# Patient Record
Sex: MALE | Race: WHITE | Employment: OTHER | URBAN - METROPOLITAN AREA
[De-identification: names, ages, dates, MRNs, and addresses within clinical notes are randomized per-mention and may not be internally consistent; named-entity substitution may affect disease eponyms.]

---

## 2017-03-13 ENCOUNTER — HOSPITAL ENCOUNTER (OUTPATIENT)
Dept: CT IMAGING | Age: 77
Discharge: HOME | End: 2017-03-13
Attending: NURSE PRACTITIONER
Payer: MEDICARE

## 2017-03-13 DIAGNOSIS — R91.1 LUNG NODULE: ICD-10-CM

## 2017-03-13 PROCEDURE — 71250 CT THORAX DX C-: CPT

## 2017-03-14 PROBLEM — Z01.810 PREOP CARDIOVASCULAR EXAM: Status: ACTIVE | Noted: 2017-03-14

## 2017-06-06 PROBLEM — I10 ESSENTIAL HYPERTENSION WITH GOAL BLOOD PRESSURE LESS THAN 130/80: Status: ACTIVE | Noted: 2017-06-06

## 2017-06-06 PROBLEM — E11.9 CONTROLLED TYPE 2 DIABETES MELLITUS WITHOUT COMPLICATION, WITHOUT LONG-TERM CURRENT USE OF INSULIN (HCC): Status: ACTIVE | Noted: 2017-06-06

## 2017-06-09 ENCOUNTER — HOSPITAL ENCOUNTER (INPATIENT)
Age: 77
LOS: 2 days | Discharge: HOME | DRG: 190 | End: 2017-06-11
Attending: INTERNAL MEDICINE | Admitting: INTERNAL MEDICINE
Payer: MEDICARE

## 2017-06-09 DIAGNOSIS — E87.1 HYPONATREMIA: ICD-10-CM

## 2017-06-09 DIAGNOSIS — J18.9 PNEUMONIA OF RIGHT LOWER LOBE DUE TO INFECTIOUS ORGANISM: ICD-10-CM

## 2017-06-09 DIAGNOSIS — R91.8 PULMONARY NODULES: ICD-10-CM

## 2017-06-09 DIAGNOSIS — E11.9 CONTROLLED TYPE 2 DIABETES MELLITUS WITHOUT COMPLICATION, WITHOUT LONG-TERM CURRENT USE OF INSULIN (HCC): ICD-10-CM

## 2017-06-09 DIAGNOSIS — J44.1 COPD EXACERBATION (HCC): ICD-10-CM

## 2017-06-09 PROBLEM — I10 ESSENTIAL HYPERTENSION WITH GOAL BLOOD PRESSURE LESS THAN 130/80: Chronic | Status: ACTIVE | Noted: 2017-06-06

## 2017-06-09 LAB — GLUCOSE BLD STRIP.AUTO-MCNC: 135 MG/DL (ref 65–100)

## 2017-06-09 PROCEDURE — 74011000250 HC RX REV CODE- 250: Performed by: INTERNAL MEDICINE

## 2017-06-09 PROCEDURE — 74011250637 HC RX REV CODE- 250/637: Performed by: NURSE PRACTITIONER

## 2017-06-09 PROCEDURE — 82962 GLUCOSE BLOOD TEST: CPT

## 2017-06-09 PROCEDURE — 99223 1ST HOSP IP/OBS HIGH 75: CPT | Performed by: INTERNAL MEDICINE

## 2017-06-09 PROCEDURE — 65270000029 HC RM PRIVATE

## 2017-06-09 PROCEDURE — 74011250636 HC RX REV CODE- 250/636: Performed by: INTERNAL MEDICINE

## 2017-06-09 PROCEDURE — 94760 N-INVAS EAR/PLS OXIMETRY 1: CPT

## 2017-06-09 PROCEDURE — 94640 AIRWAY INHALATION TREATMENT: CPT

## 2017-06-09 RX ORDER — ALBUTEROL SULFATE 0.83 MG/ML
2.5 SOLUTION RESPIRATORY (INHALATION)
Status: DISCONTINUED | OUTPATIENT
Start: 2017-06-09 | End: 2017-06-11 | Stop reason: HOSPADM

## 2017-06-09 RX ORDER — INSULIN LISPRO 100 [IU]/ML
INJECTION, SOLUTION INTRAVENOUS; SUBCUTANEOUS
Status: DISCONTINUED | OUTPATIENT
Start: 2017-06-09 | End: 2017-06-11 | Stop reason: HOSPADM

## 2017-06-09 RX ORDER — TRAZODONE HYDROCHLORIDE 50 MG/1
50 TABLET ORAL
Status: DISCONTINUED | OUTPATIENT
Start: 2017-06-09 | End: 2017-06-11 | Stop reason: HOSPADM

## 2017-06-09 RX ORDER — SODIUM CHLORIDE 0.9 % (FLUSH) 0.9 %
5-10 SYRINGE (ML) INJECTION EVERY 8 HOURS
Status: DISCONTINUED | OUTPATIENT
Start: 2017-06-09 | End: 2017-06-11 | Stop reason: HOSPADM

## 2017-06-09 RX ORDER — ENOXAPARIN SODIUM 100 MG/ML
40 INJECTION SUBCUTANEOUS EVERY 24 HOURS
Status: DISCONTINUED | OUTPATIENT
Start: 2017-06-10 | End: 2017-06-11 | Stop reason: HOSPADM

## 2017-06-09 RX ORDER — SODIUM CHLORIDE 9 MG/ML
75 INJECTION, SOLUTION INTRAVENOUS CONTINUOUS
Status: DISCONTINUED | OUTPATIENT
Start: 2017-06-09 | End: 2017-06-11 | Stop reason: HOSPADM

## 2017-06-09 RX ORDER — ACETAMINOPHEN 325 MG/1
650 TABLET ORAL
Status: DISCONTINUED | OUTPATIENT
Start: 2017-06-09 | End: 2017-06-11 | Stop reason: HOSPADM

## 2017-06-09 RX ORDER — SODIUM CHLORIDE 0.9 % (FLUSH) 0.9 %
5-10 SYRINGE (ML) INJECTION AS NEEDED
Status: DISCONTINUED | OUTPATIENT
Start: 2017-06-09 | End: 2017-06-11 | Stop reason: HOSPADM

## 2017-06-09 RX ORDER — DOCUSATE SODIUM 100 MG/1
100 CAPSULE, LIQUID FILLED ORAL
Status: DISCONTINUED | OUTPATIENT
Start: 2017-06-09 | End: 2017-06-11 | Stop reason: HOSPADM

## 2017-06-09 RX ADMIN — SODIUM CHLORIDE 75 ML/HR: 900 INJECTION, SOLUTION INTRAVENOUS at 21:27

## 2017-06-09 RX ADMIN — TRAZODONE HYDROCHLORIDE 50 MG: 50 TABLET ORAL at 21:31

## 2017-06-09 RX ADMIN — Medication 5 ML: at 21:28

## 2017-06-09 RX ADMIN — ALBUTEROL SULFATE 2.5 MG: 2.5 SOLUTION RESPIRATORY (INHALATION) at 21:54

## 2017-06-09 RX ADMIN — METHYLPREDNISOLONE SODIUM SUCCINATE 60 MG: 125 INJECTION, POWDER, FOR SOLUTION INTRAMUSCULAR; INTRAVENOUS at 21:30

## 2017-06-09 NOTE — IP AVS SNAPSHOT
Oscar Shaver 
 
 
 [address] [facility] [address] [facility] 
[phone] Patient: Mynorjames Flores MRN: [id number] EOV:[id number] You are allergic to the following Allergen Reactions Aspirin Swelling Anaphylaxis Aleve (Naproxen Sodium) Unknown (comments) Codeine Sulfate Unknown (comments) Corn Containing Products Other (comments) Crestor (Rosuvastatin) Unknown (comments) Septra (Sulfamethoprim Ds) Unknown (comments) Zetia (Ezetimibe) Unknown (comments) Zoloft (Sertraline) Unknown (comments) Recent Documentation Weight BMI Smoking Status 61.5 kg 21.89 kg/m2 Former Smoker Unresulted Labs Order Current Status CULTURE, RESPIRATORY/SPUTUM/BRONCH W GRAM STAIN In process Emergency Contacts Name Discharge Info Relation Home Work Mobile Parvin Kim DISCHARGE CAREGIVER [3] Spouse [3] [phone] About your hospitalization You were admitted on:  June 9, 2017 You last received care in the:  [facility] You were discharged on:  June 11, 2017 Unit phone number:  [phone] Why you were hospitalized Your primary diagnosis was:  Pneumonia Your diagnoses also included:  Copd Exacerbation (Hcc), Hyponatremia, Controlled Type 2 Diabetes Mellitus Without Complication, Without Long-Term Current Use Of Insulin (Hcc) Providers Seen During Your Hospitalizations Provider Role Specialty Primary office phone Maria Antonia Michelle MD Attending Provider Pulmonary Disease [phone] Your Primary Care Physician (PCP) Primary Care Physician Office Phone Office Fax Sumeet Guillen [phone] Follow-up Information Follow up With Details Comments Contact Info  Ashley Swan PA-C Schedule an appointment as soon as possible for a visit in 1 week call on Monday for follow up appointment post hospital admission [address] [place] [address] 
[phone] CONTRERAS Pang Schedule an appointment as soon as possible for a visit in 3 weeks call on Monday for follow up appointment in 3-4 weeks. [address] [facility] [address] 
[phone] Your Appointments Tuesday June 20, 2017  9:00 AM EDT Nurse Visit with [place] [address] ([address] [facility]) [address] [facility] [address] [facility] [id number] [phone] Monday June 26, 2017  3:00 PM EDT  
(Arrive by 2:40 PM) Return appointment with CONTRERAS Pang [facility] ([facility]) [address] [place] [address] [facility]  
[phone] Thursday July 06, 2017  1:00 PM EDT Office Visit with MD Carol Calles [address] [facility] [address] [facility] ENT) [address]  
[phone] Current Discharge Medication List  
  
START taking these medications Dose & Instructions Dispensing Information Comments Morning Noon Evening Bedtime  
 azithromycin 500 mg Tab Commonly known as:  Catarino Serrano Start taking on:  6/12/2017 Your last dose was:  6/10/17 Your next dose is:  6/11/17 Take 2 tablets today, then take 1 tablet daily Quantity:  5 Tab Refills:  0 Take first done tomorrow on 6/12 cefadroxil 500 mg capsule Commonly known as:  Joseph Castañeda Your last dose was:  none Your next dose is:  6/11/17 Dose:  500 mg Take 1 Cap by mouth two (2) times a day. Quantity:  10 Cap Refills:  0 Take first dose tomorrow  
    
   
   
   
  
 predniSONE 20 mg tablet Commonly known as:  Tanner Reveal Your last dose was:  none Your next dose is:  6/11/17  Take 40 mg x 2 days,  then 20 mg x 2 days, then 10 mg x 2 days, then stop.  
 Quantity:  20 Tab Refills:  0 CONTINUE these medications which have CHANGED Dose & Instructions Dispensing Information Comments Morning Noon Evening Bedtime * PROAIR HFA 90 mcg/actuation inhaler Generic drug:  albuterol What changed:  Another medication with the same name was added. Make sure you understand how and when to take each. INHALE 2 PUFFS BY MOUTH EVERY 4 HOURS AS NEEDED Quantity:  25.5 Inhaler Refills:  3  
     
   
   
   
  
 * albuterol 2.5 mg /3 mL (0.083 %) nebulizer solution Commonly known as:  PROVENTIL VENTOLIN What changed: You were already taking a medication with the same name, and this prescription was added. Make sure you understand how and when to take each. Dose:  2.5 mg  
3 mL by Nebulization route every six (6) hours as needed for Wheezing. Quantity:  90 Each Refills:  1  
     
   
   
   
  
 * Notice: This list has 2 medication(s) that are the same as other medications prescribed for you. Read the directions carefully, and ask your doctor or other care provider to review them with you. CONTINUE these medications which have NOT CHANGED Dose & Instructions Dispensing Information Comments Morning Noon Evening Bedtime  
 amitriptyline 75 mg tablet Commonly known as:  ELAVIL Your last dose was:  6/10/17 Your next dose is:  6/11/17 Dose:  75 mg Take 1 Tab by mouth nightly. Quantity:  90 Tab Refills:  1  
     
   
   
   
  
 clopidogrel 75 mg Tab Commonly known as:  PLAVIX Your last dose was:  6/11/17 Your next dose is:  6/12/17 TAKE 1 TABLET EVERY DAY Quantity:  90 Tab Refills:  3 EPIPEN 0.3 mg/0.3 mL injection Generic drug:  EPINEPHrine Dose:  0.3 mg  
0.3 mg by IntraMUSCular route once as needed. Refills:  0  
     
   
   
   
  
 fluticasone-salmeterol 500-50 mcg/dose diskus inhaler Commonly known as:  ADVAIR DISKUS Dose:  1 Puff Take 1 Puff by inhalation two (2) times a day. Quantity:  1 Inhaler Refills:  11  
     
   
   
   
  
 gabapentin 300 mg capsule Commonly known as:  NEURONTIN Your last dose was:  6/10/17 Your next dose is:  6/11/17 Dose:  300 mg Take 1 Cap by mouth two (2) times a day. Quantity:  180 Cap Refills:  1  
     
   
   
   
  
 glucose blood VI test strips strip Commonly known as:  blood glucose test  
   
 Check BS once a day fasting (DX: E11.9); one touch verio Quantity:  100 Strip Refills:  11 Lancets Misc Check BS once a day fasting (DX: E11.9); one touch verio Quantity:  100 Each Refills:  11  
     
   
   
   
  
 lisinopril 2.5 mg tablet Commonly known as:  Clif Alexander Your last dose was:  6/11/17 Your next dose is:  6/12/17 TAKE 1 TABLET BY MOUTH EVERY DAY; please cancel the 10 mg dosage and refills Quantity:  30 Tab Refills:  6  
     
   
   
   
  
 loratadine 10 mg tablet Commonly known as:  Concha Flores Your last dose was:  6/11/17 Your next dose is:  6/12/17 Dose:  10 mg Take 1 Tab by mouth daily. Quantity:  90 Tab Refills:  1  
     
   
   
   
  
 metFORMIN  mg tablet Commonly known as:  GLUCOPHAGE XR Dose:  1000 mg Take 2 Tabs by mouth daily (with dinner). Quantity:  60 Tab Refills:  6  
     
   
   
   
  
 mometasone 50 mcg/actuation nasal spray Commonly known as:  Meldon Duane Your last dose was:  6/11/17 am  
Your next dose is:  6/11/17 pm  
   
 USE 2 SPRAYS IN EACH NOSTRIL TWICE A DAY Quantity:  1 Container Refills:  11  
     
   
   
   
  
 montelukast 10 mg tablet Commonly known as:  SINGULAIR Your last dose was:  6/10/17 Your next dose is:  6/11/17 Dose:  10 mg Take 1 Tab by mouth every evening. Quantity:  90 Tab Refills:  1  
     
   
   
   
  
 nitroglycerin 0.4 mg SL tablet Commonly known as:  NITROSTAT Dose:  0.4 mg  
1 Tab by SubLINGual route every five (5) minutes as needed for Chest Pain. Indications: ANGINA Quantity:  25 Tab Refills:  11 PRESERVISION AREDS PO Dose:  1 Tab Take 1 Tab by mouth two (2) times a day. Refills:  0 PROBIOTIC (S.BOULARDII) PO Take  by mouth daily as needed. Refills:  0 RHINOCORT AQUA 32 mcg/actuation nasal spray Generic drug:  budesonide  
   
 daily. Refills:  0 SPIRIVA WITH HANDIHALER 18 mcg inhalation capsule Generic drug:  tiotropium Your last dose was:  6/11/17 Your next dose is:  6/12/17 INHALE 1 CAPSULE VIA HANDIHALER ONCE DAILY AT THE SAME TIME EVERY DAY Quantity:  90 Cap Refills:  3 SUMAtriptan 50 mg tablet Commonly known as:  IMITREX  
   
 TAKE 1 TABLET BY MOUTH ONCE AS NEEDED for migraine, may repeat in 2 hours if no better, no more than 2 tablets in 24 hours Quantity:  15 Tab Refills:  2 Where to Get Your Medications These medications were sent to [address],[facility], [address] [place] [address] Phone:  [phone]  
  albuterol 2.5 mg /3 mL (0.083 %) nebulizer solution  
 azithromycin 500 mg Tab  
 cefadroxil 500 mg capsule  
 predniSONE 20 mg tablet Discharge Instructions DISCHARGE SUMMARY from Nurse The following personal items are in your possession at time of discharge: 
 
Dental Appliances: Partials, With patient Visual Aid: Glasses Hearing Aids/Status: Bilateral, With patient Home Medications: None Jewelry: Ring, With patient Clothing: Pants, Shirt, Footwear Other Valuables: None PATIENT INSTRUCTIONS: 
 
 
F-face looks uneven A-arms unable to move or move unevenly S-speech slurred or non-existent T-time-call 911 as soon as signs and symptoms begin-DO NOT go Back to bed or wait to see if you get better-TIME IS BRAIN. Warning Signs of HEART ATTACK Call 911 if you have these symptoms: 
? Chest discomfort. Most heart attacks involve discomfort in the center of the chest that lasts more than a few minutes, or that goes away and comes back. It can feel like uncomfortable pressure, squeezing, fullness, or pain. ? Discomfort in other areas of the upper body. Symptoms can include pain or discomfort in one or both arms, the back, neck, jaw, or stomach. ? Shortness of breath with or without chest discomfort. ? Other signs may include breaking out in a cold sweat, nausea, or lightheadedness. Don't wait more than five minutes to call [address]! Fast action can save your life. Calling 911 is almost always the fastest way to get lifesaving treatment. Emergency Medical Services staff can begin treatment when they arrive  up to an hour sooner than if someone gets to the hospital by car. The discharge information has been reviewed with the patient and spouse. The patient and spouse verbalized understanding. Discharge medications reviewed with the patient and spouse and appropriate educational materials and side effects teaching were provided. Discharge Orders None ACO Transitions of Care Introducing [employer] [address] offers a voluntary care coordination program to provide high quality service and care to [facility] fee-for-service beneficiaries. Evon Gamez was designed to help you enhance your health and well-being through the following services: ? Transitions of Care  support for individuals who are transitioning from one care setting to another (example: Hospital to home). ? Chronic and Complex Care Coordination  support for individuals and caregivers of those with serious or chronic illnesses or with more than one chronic (ongoing) condition and those who take a number of different medications. If you meet specific medical criteria, a [address] [place] [address] Rd may call you directly to coordinate your care with your primary care physician and your other care providers. For questions about the [facility] programs, please, contact your physicians office. For general questions or additional information about Accountable Care Organizations: 
Please visit www.medicare.gov/acos. html or call 1-800-MEDICARE (2-426.441.1313) TTY users should call 5-873.296.1079. [employer] Announcement We are excited to announce that we are making your provider's discharge notes available to you in [employer]. You will see these notes when they are completed and signed by the physician that discharged you from your recent hospital stay. If you have any questions or concerns about any information you see in [employer], please call the Health Information Department where you were seen or reach out to your Primary Care Provider for more information about your plan of care. Introducing [facility]! Dear Chris Agrawal: 
Thank you for requesting a [employer] account. Our records indicate that you already have an active [employer] account. You can access your account anytime at [email or web address] Did you know that you can access your hospital and ER discharge instructions at any time in [employer]? You can also review all of your test results from your hospital stay or ER visit. Additional Information If you have questions, please visit the Frequently Asked Questions section of the [employer] website at [email or web address] Remember, MyChart is NOT to be used for urgent needs. For medical emergencies, dial 911. Now available from your iPhone and Android! General Information Please provide this summary of care documentation to your next provider. Patient Signature:  ____________________________________________________________ Date:  ____________________________________________________________  
  
[place] Provider Signature:  ____________________________________________________________ Date:  ____________________________________________________________

## 2017-06-09 NOTE — PROGRESS NOTES
Patient arrived via EMS transport, via stretcher from [address] [place] [address]. Assessment completed and documented, see docflow. Patient A/Ox3, but with some confusion; reorients easily. Patient aware of surroundings, staff and room. Patient denies needs. NAD noted at present. Respirations even and non labored. Patient verbalized understanding to call for needs. Call light within reach. Will continue to monitor.

## 2017-06-09 NOTE — H&P
HISTORY AND PHYSICAL      Gissell Kim    6/9/2017    Date of Admission:  6/9/2017    The patient's chart is reviewed and the patient is discussed with the staff. Subjective:     Patient is a 68 y.o.  male presents with sob. Patient has a history of fungal sinusitis followed by ENT, post herpetic neuralgia s/p shingles, HTN, CAD, remote tobacco abuse and COPD. He is followed by [facility] as an outpatient and is maintained on Albuterol / Niranjan Ng / Spiriva. Patient's wife is at the bedside and assists with the history. Patient started feeling poorly about 2 weeks ago with body aches. This week he developed fevers and Wednesday reached 103 and continues to have chills. Patient reports increased sob with any activity, cough, wheezing, and pain in R shoulder area. Wife reports that patient has also been having visual hallucinations. He denies any recent sick contacts. He felt poorly enough today that he went to  for further evaluation. His HR was elevated to 120 bpm, WBC 18.3, Na+ 128, and CXR with RLL infiltrate. He was referred to [facility] for direct admission.       Review of Systems  Constitutional: positive for fevers, chills and anorexia  Eyes: negative  Ears, nose, mouth, throat, and face: positive for chronic sinus drainage - unchanged from baseline  Respiratory: positive for cough, wheezing or increased sob  Cardiovascular: negative  Gastrointestinal: negative  Genitourinary:negative  Integument/breast: negative  Hematologic/lymphatic: negative  Musculoskeletal:negative  Neurological: negative  Behavioral/Psych: positive for hallucinations  Endocrine: negative  Allergic/Immunologic: negative    Patient Active Problem List   Diagnosis Code    COPD (chronic obstructive pulmonary disease) ([facility] J44.9    Migraine G43.909    CAD (coronary artery disease) I25.10    Allergic rhinitis J30.9    Gastroesophageal reflux disease without esophagitis K21.9    Osteoporosis M81.0    Encounter for long-term (current) use of antiplatelets/antithrombotics Z79.02    Personal history of tobacco use Z87.891    Thoracic aneurysm without mention of rupture I71.2    Pulmonary nodules R91.8    Pulmonary infiltrates on CXR R91.8    Ineffective airway clearance R06.89    Mild dementia F03.90    Chest pain R07.9    DNR (do not resuscitate) Z66    Coronary artery disease due to lipid rich plaque I25.10, I25.83    Post herpetic neuralgia B02.29    Mixed hyperlipidemia E78.2    ETD (eustachian tube dysfunction) H69.80    Chronic sinusitis J32.9    Nasal polyp J33.9    Fungal sinusitis B49, J32.9    Platelet inhibition due to Plavix D69.59, T45.515A    Hx of migraines Z86.69    Shingles B02.9    Nasal obstruction J34.89    Long term (current) use of antithrombotics/antiplatelets V97.40    Preop cardiovascular exam Z01.810    Essential hypertension with goal blood pressure less than 130/80 I10    Controlled type 2 diabetes mellitus without complication, without long-term current use of insulin (HCC) E11.9    Pneumonia J18.9       Prior to Admission Medications   Prescriptions Last Dose Informant Patient Reported? Taking? EPINEPHrine (EPIPEN) 0.3 mg/0.3 mL (1:1,000) injection   Yes No   Si.3 mg by IntraMUSCular route once as needed. Lancets misc   No No   Sig: Check BS once a day fasting (DX: E11.9); one touch verio   PROAIR HFA 90 mcg/actuation inhaler   No No   Sig: INHALE 2 PUFFS BY MOUTH EVERY 4 HOURS AS NEEDED   SACCHAROMYCES BOULARDII (PROBIOTIC, S.BOULARDII, PO)   Yes No   Sig: Take  by mouth daily as needed.    SPIRIVA WITH HANDIHALER 18 mcg inhalation capsule   No No   Sig: INHALE 1 CAPSULE VIA HANDIHALER ONCE DAILY AT THE SAME TIME EVERY DAY   SUMAtriptan (IMITREX) 50 mg tablet   No No   Sig: TAKE 1 TABLET BY MOUTH ONCE AS NEEDED for migraine, may repeat in 2 hours if no better, no more than 2 tablets in 24 hours   VIT A/VIT C/VIT E/ZINC/COPPER (PRESERVISION AREDS PO)   Yes No   Sig: Take 1 Tab by mouth two (2) times a day. amitriptyline (ELAVIL) 75 mg tablet   No No   Sig: Take 1 Tab by mouth nightly. budesonide (RHINOCORT AQUA) 32 mcg/actuation nasal spray   Yes No   Sig: daily. clopidogrel (PLAVIX) 75 mg tablet   No No   Sig: TAKE 1 TABLET EVERY DAY   fluticasone-salmeterol (ADVAIR DISKUS) 500-50 mcg/dose diskus inhaler   No No   Sig: Take 1 Puff by inhalation two (2) times a day.   gabapentin (NEURONTIN) 300 mg capsule   No No   Sig: Take 1 Cap by mouth two (2) times a day. glucose blood VI test strips (BLOOD GLUCOSE TEST) strip   No No   Sig: Check BS once a day fasting (DX: E11.9); one touch verio   lisinopril (PRINIVIL, ZESTRIL) 2.5 mg tablet   No No   Sig: TAKE 1 TABLET BY MOUTH EVERY DAY; please cancel the 10 mg dosage and refills   loratadine (CLARITIN) 10 mg tablet   No No   Sig: Take 1 Tab by mouth daily. metFORMIN ER (GLUCOPHAGE XR) 500 mg tablet   No No   Sig: Take 2 Tabs by mouth daily (with dinner). mometasone (NASONEX) 50 mcg/actuation nasal spray   No No   Sig: USE 2 SPRAYS IN EACH NOSTRIL TWICE A DAY   montelukast (SINGULAIR) 10 mg tablet   No No   Sig: Take 1 Tab by mouth every evening. nitroglycerin (NITROSTAT) 0.4 mg SL tablet   No No   Si Tab by SubLINGual route every five (5) minutes as needed for Chest Pain. Indications: ANGINA      Facility-Administered Medications: None       Past Medical History:   Diagnosis Date    Arthritis     Chronic obstructive pulmonary disease (HCC)     Diabetes ([address]     Fungal sinusitis     GERD (gastroesophageal reflux disease)     History of multiple allergies     Hx of migraines     Hypertension     Shingles 10/1/15    Sinus problem     Thoracic aneurysm without mention of rupture 2014    No chest or upper back pain. Echo shows a mildly dilated aortic root at 4.0 cm. There is mild LVH and normal systolic function.        Past Surgical History:   Procedure Laterality Date    HX APPENDECTOMY  age 10    HX CATARACT REMOVAL Bilateral     HX HEART CATHETERIZATION  2/27/12    stent x 1    HX HEENT  1999    sinus x3    HX HERNIA REPAIR Right 1978    inguinal    HX OTHER SURGICAL  2/2012    neurostimulator implant for migraines at [facility] in [place], [place]      childhood   [address] [facility]      Neurostimulator implant for migraines 2/2012     Social History     Social History    Marital status:      Spouse name: RADHA    Number of children: 3    Years of education: 1 YR TRADE     Occupational History    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      Family History   Problem Relation Age of Onset    No Known Problems Sister     No Known Problems Brother     No Known Problems Sister     COPD Mother     Asthma Father     Dementia Other      UNCLE     Allergies   Allergen Reactions    Aspirin Swelling and Anaphylaxis    Aleve [Naproxen Sodium] Unknown (comments)    Codeine Sulfate Unknown (comments)    Corn Containing Products Other (comments)    Crestor [Rosuvastatin] Unknown (comments)    Septra [Sulfamethoprim Ds] Unknown (comments)    Zetia [Ezetimibe] Unknown (comments)    Zoloft [Sertraline] Unknown (comments)       Current Facility-Administered Medications   Medication Dose Route Frequency    sodium chloride (NS) flush 5-10 mL  5-10 mL IntraVENous Q8H    sodium chloride (NS) flush 5-10 mL  5-10 mL IntraVENous PRN    [START ON 6/10/2017] cefTRIAXone (ROCEPHIN) 2 g in 0.9% sodium chloride (MBP/ADV) 50 mL  2 g IntraVENous Q24H    [START ON 6/10/2017] azithromycin (ZITHROMAX) 500 mg in 0.9% sodium chloride (MBP/ADV) 250 mL  500 mg IntraVENous Q24H    acetaminophen (TYLENOL) tablet 650 mg  650 mg Oral Q4H PRN    albuterol (PROVENTIL VENTOLIN) nebulizer solution 2.5 mg  2.5 mg Nebulization Q6H PRN    docusate sodium (COLACE) capsule 100 mg  100 mg Oral BID PRN    enoxaparin (LOVENOX) injection 40 mg  40 mg SubCUTAneous Q24H    insulin lispro (HUMALOG) injection   SubCUTAneous AC&HS    albuterol (PROVENTIL VENTOLIN) nebulizer solution 2.5 mg  2.5 mg Nebulization QID RT    0.9% sodium chloride infusion  75 mL/hr IntraVENous CONTINUOUS    methylPREDNISolone (PF) (SOLU-MEDROL) injection 60 mg  60 mg IntraVENous Q8H       Objective:     Vitals:    06/09/17 1736   BP: 132/85   Pulse: (!) 120   Resp: 18   Temp: 99.1 °F (37.3 °C)   SpO2: 97%       PHYSICAL EXAM     Constitutional:  the patient is well developed and in no acute distress  EENMT:  Sclera clear, pupils equal, oral mucosa moist  Respiratory: diffuse wheezing and scattered rhonchi, RA  Cardiovascular:  RRR without M,G,R  Gastrointestinal: soft and non-tender; with positive bowel sounds. Musculoskeletal: warm without cyanosis. There is no lower leg edema. Skin:  no jaundice or rashes, no open wounds   Neurologic: no gross neuro deficits     Psychiatric:  alert and oriented x 3    CXR:  6/9/2017          No results for input(s): WBC, HGB, HCT, PLT, INR, HGBEXT, HCTEXT, PLTEXT in the last 72 hours. No lab exists for component: INREXT  No results for input(s): NA, K, CL, GLU, CO2, BUN, CREA, MG, PHOS, CA, TROIQ, ALB, TBIL, TBILI, GPT, ALT, SGOT, BNPP in the last 72 hours. No lab exists for component: TROIP  No results for input(s): PH, PCO2, PO2, HCO3 in the last 72 hours. No results for input(s): LCAD, LAC in the last 72 hours.     Assessment:  (Medical Decision Making)     Hospital Problems  Date Reviewed: 6/9/2017          Codes Class Noted POA    Controlled type 2 diabetes mellitus without complication, without long-term current use of insulin ([facility] ICD-10-CM: E11.9  ICD-9-CM: 250.00  6/9/2017 Yes    Recently given prescription as an outpatient but has not yet had the opportunity to start  Likely to be exacerbated by steroids - will cover with SSI      Pneumonia ICD-10-CM: J18.9  ICD-9-CM: 963  6/9/2017 Yes    RLL infiltrate      COPD exacerbation ([facility] ICD-10-CM: J44.1  ICD-9-CM: 491.21  6/9/2017 Yes    Diffuse wheezing  Maintained on Albuterol / Ricardo Remak / Carmie Nightingale as an outpatient  Start scheduled nebs / steroids      Hyponatremia ICD-10-CM: E87.1  ICD-9-CM: 276.1  6/9/2017 Yes    Na+ 128 at          Plan:  (Medical Decision Making)     --Will admit for further medical management  --Supplemental O2 as needed  --Respiratory nebulizer treatments  --culture  --steroid therapy  --antibiotic therapy - received a dose of LVQ at   --IVF with NS and continue to follow BMP     More than 50% of the time documented was spent in face-to-face contact with the patient and in the care of the patient on the floor/unit where the patient is located. Petey Johnston NP  I have spoken with and examined the patient. I agree with the above assessment and plan as documented. Mr. Karen Burton has symptoms of CAP but in setting of RLL bronchiectasis and emphysema. We are treating CAP but also COPD exacerbation, hyponatremia. 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 Will monitor Na after IVF (given for insensible losses for pneumonia) and can consider free water restriction once euvolemic.   Sputum cultures, including mycobacterial  F/u blood cultures from [facility] in [facility] MD

## 2017-06-09 NOTE — IP AVS SNAPSHOT
Current Discharge Medication List  
  
START taking these medications Dose & Instructions Dispensing Information Comments Morning Noon Evening Bedtime  
 azithromycin 500 mg Tab Commonly known as:  Josue Severin Start taking on:  6/12/2017 Your last dose was:  6/10/17 Your next dose is:  6/11/17 Take 2 tablets today, then take 1 tablet daily Quantity:  5 Tab Refills:  0 Take first done tomorrow on 6/12 cefadroxil 500 mg capsule Commonly known as:  Michael Jordan Your last dose was:  none Your next dose is:  6/11/17 Dose:  500 mg Take 1 Cap by mouth two (2) times a day. Quantity:  10 Cap Refills:  0 Take first dose tomorrow  
    
   
   
   
  
 predniSONE 20 mg tablet Commonly known as:  Adam Whittaker Your last dose was:  none Your next dose is:  6/11/17 Take 40 mg x 2 days,  then 20 mg x 2 days, then 10 mg x 2 days, then stop. Quantity:  20 Tab Refills:  0 CONTINUE these medications which have CHANGED Dose & Instructions Dispensing Information Comments Morning Noon Evening Bedtime * PROAIR HFA 90 mcg/actuation inhaler Generic drug:  albuterol What changed:  Another medication with the same name was added. Make sure you understand how and when to take each. INHALE 2 PUFFS BY MOUTH EVERY 4 HOURS AS NEEDED Quantity:  25.5 Inhaler Refills:  3  
     
   
   
   
  
 * albuterol 2.5 mg /3 mL (0.083 %) nebulizer solution Commonly known as:  PROVENTIL VENTOLIN What changed: You were already taking a medication with the same name, and this prescription was added. Make sure you understand how and when to take each. Dose:  2.5 mg  
3 mL by Nebulization route every six (6) hours as needed for Wheezing. Quantity:  90 Each Refills:  1  
     
   
   
   
  
 * Notice:   This list has 2 medication(s) that are the same as other medications prescribed for you. Read the directions carefully, and ask your doctor or other care provider to review them with you. CONTINUE these medications which have NOT CHANGED Dose & Instructions Dispensing Information Comments Morning Noon Evening Bedtime  
 amitriptyline 75 mg tablet Commonly known as:  ELAVIL Your last dose was:  6/10/17 Your next dose is:  6/11/17 Dose:  75 mg Take 1 Tab by mouth nightly. Quantity:  90 Tab Refills:  1  
     
   
   
   
  
 clopidogrel 75 mg Tab Commonly known as:  PLAVIX Your last dose was:  6/11/17 Your next dose is:  6/12/17 TAKE 1 TABLET EVERY DAY Quantity:  90 Tab Refills:  3 EPIPEN 0.3 mg/0.3 mL injection Generic drug:  EPINEPHrine Dose:  0.3 mg  
0.3 mg by IntraMUSCular route once as needed. Refills:  0  
     
   
   
   
  
 fluticasone-salmeterol 500-50 mcg/dose diskus inhaler Commonly known as:  ADVAIR DISKUS Dose:  1 Puff Take 1 Puff by inhalation two (2) times a day. Quantity:  1 Inhaler Refills:  11  
     
   
   
   
  
 gabapentin 300 mg capsule Commonly known as:  NEURONTIN Your last dose was:  6/10/17 Your next dose is:  6/11/17 Dose:  300 mg Take 1 Cap by mouth two (2) times a day. Quantity:  180 Cap Refills:  1  
     
   
   
   
  
 glucose blood VI test strips strip Commonly known as:  blood glucose test  
   
 Check BS once a day fasting (DX: E11.9); one touch verio Quantity:  100 Strip Refills:  11 Lancets Misc Check BS once a day fasting (DX: E11.9); one touch verio Quantity:  100 Each Refills:  11  
     
   
   
   
  
 lisinopril 2.5 mg tablet Commonly known as:  Patrick Leaver Your last dose was:  6/11/17 Your next dose is:  6/12/17 TAKE 1 TABLET BY MOUTH EVERY DAY; please cancel the 10 mg dosage and refills Quantity:  30 Tab Refills:  6 loratadine 10 mg tablet Commonly known as:  Marie Engle Your last dose was:  6/11/17 Your next dose is:  6/12/17 Dose:  10 mg Take 1 Tab by mouth daily. Quantity:  90 Tab Refills:  1  
     
   
   
   
  
 metFORMIN  mg tablet Commonly known as:  GLUCOPHAGE XR Dose:  1000 mg Take 2 Tabs by mouth daily (with dinner). Quantity:  60 Tab Refills:  6  
     
   
   
   
  
 mometasone 50 mcg/actuation nasal spray Commonly known as:  Olvin Lane Your last dose was:  6/11/17 am  
Your next dose is:  6/11/17 pm  
   
 USE 2 SPRAYS IN EACH NOSTRIL TWICE A DAY Quantity:  1 Container Refills:  11  
     
   
   
   
  
 montelukast 10 mg tablet Commonly known as:  SINGULAIR Your last dose was:  6/10/17 Your next dose is:  6/11/17 Dose:  10 mg Take 1 Tab by mouth every evening. Quantity:  90 Tab Refills:  1  
     
   
   
   
  
 nitroglycerin 0.4 mg SL tablet Commonly known as:  NITROSTAT Dose:  0.4 mg  
1 Tab by SubLINGual route every five (5) minutes as needed for Chest Pain. Indications: ANGINA Quantity:  25 Tab Refills:  11 PRESERVISION AREDS PO Dose:  1 Tab Take 1 Tab by mouth two (2) times a day. Refills:  0 PROBIOTIC (S.BOULARDII) PO Take  by mouth daily as needed. Refills:  0 RHINOCORT AQUA 32 mcg/actuation nasal spray Generic drug:  budesonide  
   
 daily. Refills:  0 SPIRIVA WITH HANDIHALER 18 mcg inhalation capsule Generic drug:  tiotropium Your last dose was:  6/11/17 Your next dose is:  6/12/17 INHALE 1 CAPSULE VIA HANDIHALER ONCE DAILY AT THE SAME TIME EVERY DAY Quantity:  90 Cap Refills:  3 SUMAtriptan 50 mg tablet Commonly known as:  IMITREX  
   
 TAKE 1 TABLET BY MOUTH ONCE AS NEEDED for migraine, may repeat in 2 hours if no better, no more than 2 tablets in 24 hours Quantity:  15 Tab Refills:  2 Where to Get Your Medications These medications were sent to [address],[facility], [address] [place] [address], [address] [place] [address] Chas Phone:  [phone]  
  albuterol 2.5 mg /3 mL (0.083 %) nebulizer solution  
 azithromycin 500 mg Tab  
 cefadroxil 500 mg capsule  
 predniSONE 20 mg tablet

## 2017-06-10 ENCOUNTER — APPOINTMENT (OUTPATIENT)
Dept: GENERAL RADIOLOGY | Age: 77
DRG: 190 | End: 2017-06-10
Attending: INTERNAL MEDICINE
Payer: MEDICARE

## 2017-06-10 LAB
ANION GAP BLD CALC-SCNC: 12 MMOL/L (ref 7–16)
BUN SERPL-MCNC: 12 MG/DL (ref 8–23)
CALCIUM SERPL-MCNC: 8.3 MG/DL (ref 8.3–10.4)
CHLORIDE SERPL-SCNC: 100 MMOL/L (ref 98–107)
CO2 SERPL-SCNC: 22 MMOL/L (ref 21–32)
CREAT SERPL-MCNC: 0.48 MG/DL (ref 0.8–1.5)
ERYTHROCYTE [DISTWIDTH] IN BLOOD BY AUTOMATED COUNT: 13.6 % (ref 11.9–14.6)
GLUCOSE BLD STRIP.AUTO-MCNC: 191 MG/DL (ref 65–100)
GLUCOSE BLD STRIP.AUTO-MCNC: 210 MG/DL (ref 65–100)
GLUCOSE BLD STRIP.AUTO-MCNC: 219 MG/DL (ref 65–100)
GLUCOSE BLD STRIP.AUTO-MCNC: 263 MG/DL (ref 65–100)
GLUCOSE SERPL-MCNC: 186 MG/DL (ref 65–100)
HCT VFR BLD AUTO: 37.9 % (ref 41.1–50.3)
HGB BLD-MCNC: 12.9 G/DL (ref 13.6–17.2)
MCH RBC QN AUTO: 27.9 PG (ref 26.1–32.9)
MCHC RBC AUTO-ENTMCNC: 34 G/DL (ref 31.4–35)
MCV RBC AUTO: 81.9 FL (ref 79.6–97.8)
PLATELET # BLD AUTO: 307 K/UL (ref 150–450)
PMV BLD AUTO: 9.5 FL (ref 10.8–14.1)
POTASSIUM SERPL-SCNC: 4.1 MMOL/L (ref 3.5–5.1)
RBC # BLD AUTO: 4.63 M/UL (ref 4.23–5.67)
SODIUM SERPL-SCNC: 134 MMOL/L (ref 136–145)
WBC # BLD AUTO: 10.6 K/UL (ref 4.3–11.1)

## 2017-06-10 PROCEDURE — 74011250636 HC RX REV CODE- 250/636: Performed by: INTERNAL MEDICINE

## 2017-06-10 PROCEDURE — 74011250637 HC RX REV CODE- 250/637: Performed by: NURSE PRACTITIONER

## 2017-06-10 PROCEDURE — 74011636637 HC RX REV CODE- 636/637: Performed by: INTERNAL MEDICINE

## 2017-06-10 PROCEDURE — 94640 AIRWAY INHALATION TREATMENT: CPT

## 2017-06-10 PROCEDURE — 80048 BASIC METABOLIC PNL TOTAL CA: CPT | Performed by: INTERNAL MEDICINE

## 2017-06-10 PROCEDURE — 74011000258 HC RX REV CODE- 258: Performed by: INTERNAL MEDICINE

## 2017-06-10 PROCEDURE — 65270000029 HC RM PRIVATE

## 2017-06-10 PROCEDURE — 82962 GLUCOSE BLOOD TEST: CPT

## 2017-06-10 PROCEDURE — 99232 SBSQ HOSP IP/OBS MODERATE 35: CPT | Performed by: INTERNAL MEDICINE

## 2017-06-10 PROCEDURE — 77010033678 HC OXYGEN DAILY

## 2017-06-10 PROCEDURE — 85027 COMPLETE CBC AUTOMATED: CPT | Performed by: INTERNAL MEDICINE

## 2017-06-10 PROCEDURE — 94760 N-INVAS EAR/PLS OXIMETRY 1: CPT

## 2017-06-10 PROCEDURE — 74011000250 HC RX REV CODE- 250: Performed by: INTERNAL MEDICINE

## 2017-06-10 PROCEDURE — 71020 XR CHEST PA LAT: CPT

## 2017-06-10 PROCEDURE — 36415 COLL VENOUS BLD VENIPUNCTURE: CPT | Performed by: INTERNAL MEDICINE

## 2017-06-10 RX ORDER — MONTELUKAST SODIUM 10 MG/1
10 TABLET ORAL EVERY EVENING
Status: DISCONTINUED | OUTPATIENT
Start: 2017-06-10 | End: 2017-06-11 | Stop reason: HOSPADM

## 2017-06-10 RX ORDER — LORATADINE 10 MG/1
10 TABLET ORAL DAILY
Status: DISCONTINUED | OUTPATIENT
Start: 2017-06-10 | End: 2017-06-11 | Stop reason: HOSPADM

## 2017-06-10 RX ORDER — LISINOPRIL 5 MG/1
5 TABLET ORAL DAILY
Status: DISCONTINUED | OUTPATIENT
Start: 2017-06-10 | End: 2017-06-11 | Stop reason: HOSPADM

## 2017-06-10 RX ORDER — NITROGLYCERIN 0.4 MG/1
0.4 TABLET SUBLINGUAL
Status: DISCONTINUED | OUTPATIENT
Start: 2017-06-10 | End: 2017-06-11 | Stop reason: HOSPADM

## 2017-06-10 RX ORDER — CLOPIDOGREL BISULFATE 75 MG/1
75 TABLET ORAL DAILY
Status: DISCONTINUED | OUTPATIENT
Start: 2017-06-10 | End: 2017-06-11 | Stop reason: HOSPADM

## 2017-06-10 RX ORDER — GABAPENTIN 300 MG/1
600 CAPSULE ORAL 3 TIMES DAILY
Status: DISCONTINUED | OUTPATIENT
Start: 2017-06-10 | End: 2017-06-11 | Stop reason: HOSPADM

## 2017-06-10 RX ORDER — AMITRIPTYLINE HYDROCHLORIDE 50 MG/1
75 TABLET, FILM COATED ORAL
Status: DISCONTINUED | OUTPATIENT
Start: 2017-06-10 | End: 2017-06-11 | Stop reason: HOSPADM

## 2017-06-10 RX ORDER — SUMATRIPTAN 50 MG/1
50 TABLET, FILM COATED ORAL
Status: DISCONTINUED | OUTPATIENT
Start: 2017-06-10 | End: 2017-06-11 | Stop reason: HOSPADM

## 2017-06-10 RX ORDER — FLUTICASONE PROPIONATE 50 MCG
2 SPRAY, SUSPENSION (ML) NASAL DAILY
Status: DISCONTINUED | OUTPATIENT
Start: 2017-06-10 | End: 2017-06-11 | Stop reason: HOSPADM

## 2017-06-10 RX ADMIN — TIOTROPIUM BROMIDE 18 MCG: 18 CAPSULE ORAL; RESPIRATORY (INHALATION) at 12:32

## 2017-06-10 RX ADMIN — ALBUTEROL SULFATE 2.5 MG: 2.5 SOLUTION RESPIRATORY (INHALATION) at 20:32

## 2017-06-10 RX ADMIN — TRAZODONE HYDROCHLORIDE 50 MG: 50 TABLET ORAL at 22:08

## 2017-06-10 RX ADMIN — AMITRIPTYLINE HYDROCHLORIDE 75 MG: 50 TABLET, FILM COATED ORAL at 22:08

## 2017-06-10 RX ADMIN — ALBUTEROL SULFATE 2.5 MG: 2.5 SOLUTION RESPIRATORY (INHALATION) at 15:35

## 2017-06-10 RX ADMIN — FLUTICASONE PROPIONATE 2 SPRAY: 50 SPRAY, METERED NASAL at 09:58

## 2017-06-10 RX ADMIN — Medication 10 ML: at 06:17

## 2017-06-10 RX ADMIN — ALBUTEROL SULFATE 2.5 MG: 2.5 SOLUTION RESPIRATORY (INHALATION) at 08:38

## 2017-06-10 RX ADMIN — LORATADINE 10 MG: 10 TABLET ORAL at 09:55

## 2017-06-10 RX ADMIN — LISINOPRIL 5 MG: 5 TABLET ORAL at 09:55

## 2017-06-10 RX ADMIN — MONTELUKAST SODIUM 10 MG: 10 TABLET, FILM COATED ORAL at 16:41

## 2017-06-10 RX ADMIN — AZITHROMYCIN MONOHYDRATE 500 MG: 500 INJECTION, POWDER, LYOPHILIZED, FOR SOLUTION INTRAVENOUS at 09:57

## 2017-06-10 RX ADMIN — SODIUM CHLORIDE 75 ML/HR: 900 INJECTION, SOLUTION INTRAVENOUS at 12:32

## 2017-06-10 RX ADMIN — GABAPENTIN 600 MG: 300 CAPSULE ORAL at 22:08

## 2017-06-10 RX ADMIN — ENOXAPARIN SODIUM 40 MG: 40 INJECTION SUBCUTANEOUS at 09:55

## 2017-06-10 RX ADMIN — INSULIN LISPRO 4 UNITS: 100 INJECTION, SOLUTION INTRAVENOUS; SUBCUTANEOUS at 22:14

## 2017-06-10 RX ADMIN — INSULIN LISPRO 4 UNITS: 100 INJECTION, SOLUTION INTRAVENOUS; SUBCUTANEOUS at 16:41

## 2017-06-10 RX ADMIN — METHYLPREDNISOLONE SODIUM SUCCINATE 60 MG: 125 INJECTION, POWDER, FOR SOLUTION INTRAMUSCULAR; INTRAVENOUS at 06:16

## 2017-06-10 RX ADMIN — INSULIN LISPRO 2 UNITS: 100 INJECTION, SOLUTION INTRAVENOUS; SUBCUTANEOUS at 06:44

## 2017-06-10 RX ADMIN — INSULIN LISPRO 6 UNITS: 100 INJECTION, SOLUTION INTRAVENOUS; SUBCUTANEOUS at 12:33

## 2017-06-10 RX ADMIN — METHYLPREDNISOLONE SODIUM SUCCINATE 40 MG: 125 INJECTION, POWDER, FOR SOLUTION INTRAMUSCULAR; INTRAVENOUS at 22:08

## 2017-06-10 RX ADMIN — CLOPIDOGREL BISULFATE 75 MG: 75 TABLET ORAL at 09:55

## 2017-06-10 RX ADMIN — FLUTICASONE PROPIONATE 2 SPRAY: 50 SPRAY, METERED NASAL at 09:59

## 2017-06-10 RX ADMIN — ALBUTEROL SULFATE 2.5 MG: 2.5 SOLUTION RESPIRATORY (INHALATION) at 11:23

## 2017-06-10 RX ADMIN — CEFTRIAXONE 2 G: 2 INJECTION, POWDER, FOR SOLUTION INTRAMUSCULAR; INTRAVENOUS at 09:56

## 2017-06-10 RX ADMIN — Medication 1 TABLET: at 12:32

## 2017-06-10 RX ADMIN — Medication 5 ML: at 12:35

## 2017-06-10 NOTE — PROGRESS NOTES
.Respirations even and unlabored. No c/o pain, CP, palpitations or SOB. Encouraged to call for needs. Assessment completed. Will continue to monitor.

## 2017-06-10 NOTE — PROGRESS NOTES
PUlmonary daily progress Note      Jose Kim    6/10/2017     Patient is a 68 y.o.  male presents with sob. Patient has a history of fungal sinusitis followed by ENT, post herpetic neuralgia s/p shingles, HTN, CAD, remote tobacco abuse and COPD. He is followed by [facility] as an outpatient and is maintained on Albuterol / Bev Bhandari / Spiriva. Patient's wife is at the bedside and assists with the history. Patient started feeling poorly about 2 weeks ago with body aches. This week he developed fevers and Wednesday reached 103 and continues to have chills. Patient reports increased sob with any activity, cough, wheezing, and pain in R shoulder area. Wife reports that patient has also been having visual hallucinations. He denies any recent sick contacts. He felt poorly enough today that he went to [facility]. His HR was elevated to 120 bpm, WBC 18.3, Na+ 128, and CXR with RLL infiltrate. He was referred to [facility] for direct [facility]. Date of Admission:  6/9/2017    The patient's chart is reviewed and the patient is discussed with the staff. Subjective:     Patient is feeling stronger. Coughing up lightly yellow phlegm. NO chest pain.        Review of Systems:  -Fever  -Headaches  -Chest pain  -Dyspnea, -wheezing, +cough  -Abdominal pain, -constipation  -Leg swelling  All other organ systems grossly normal.      Current Facility-Administered Medications   Medication Dose Route Frequency    sodium chloride (NS) flush 5-10 mL  5-10 mL IntraVENous Q8H    sodium chloride (NS) flush 5-10 mL  5-10 mL IntraVENous PRN    cefTRIAXone (ROCEPHIN) 2 g in 0.9% sodium chloride (MBP/ADV) 50 mL  2 g IntraVENous Q24H    azithromycin (ZITHROMAX) 500 mg in 0.9% sodium chloride (MBP/ADV) 250 mL  500 mg IntraVENous Q24H    acetaminophen (TYLENOL) tablet 650 mg  650 mg Oral Q4H PRN    albuterol (PROVENTIL VENTOLIN) nebulizer solution 2.5 mg  2.5 mg Nebulization Q6H PRN    docusate sodium (COLACE) capsule 100 mg  100 mg Oral BID PRN    enoxaparin (LOVENOX) injection 40 mg  40 mg SubCUTAneous Q24H    insulin lispro (HUMALOG) injection   SubCUTAneous AC&HS    albuterol (PROVENTIL VENTOLIN) nebulizer solution 2.5 mg  2.5 mg Nebulization QID RT    0.9% sodium chloride infusion  75 mL/hr IntraVENous CONTINUOUS    methylPREDNISolone (PF) (SOLU-MEDROL) injection 60 mg  60 mg IntraVENous Q8H    traZODone (DESYREL) tablet 50 mg  50 mg Oral QHS PRN       Objective:     Vitals:    06/09/17 2317 06/10/17 0359 06/10/17 0600 06/10/17 0737   BP: 128/74 115/68  129/78   Pulse: 97 97  (!) 104   Resp: 18 17 18   Temp: 98.2 °F (36.8 °C) 97.4 °F (36.3 °C)  97.6 °F (36.4 °C)   SpO2: 95% 93%  94%   Weight:   135 lb 9.6 oz (61.5 kg)        PHYSICAL EXAM     Constitutional:  the patient is well developed and in no acute distress  EENMT:  Sclera clear, pupils equal, oral mucosa moist  Respiratory: coarse sounds in the right side. No wheezing today. Cardiovascular:  RRR without M,G,R  Gastrointestinal: soft and non-tender; with positive bowel sounds. Musculoskeletal: warm without cyanosis. There is no lower leg edema. Skin:  no jaundice or rashes, no open wounds   Neurologic: no gross neuro deficits     Psychiatric:  alert and oriented x 3    CXR:  6/9/2017          Recent Labs      06/10/17   0445   WBC  10.6   HGB  12.9*   HCT  37.9*   PLT  307     Recent Labs      06/10/17   0445   NA  134*   K  4.1   CL  100   GLU  186*   CO2  22   BUN  12   CREA  0.48*   CA  8.3     No results for input(s): PH, PCO2, PO2, HCO3 in the last 72 hours. No results for input(s): LCAD, LAC in the last 72 hours.     Assessment:  (Medical Decision Making)     Hospital Problems  Date Reviewed: 6/9/2017          Codes Class Noted POA    Controlled type 2 diabetes mellitus without complication, without long-term current use of insulin ([facility] ICD-10-CM: E11.9  ICD-9-CM: 250.00  6/9/2017 Yes    Range noted.     Pneumonia ICD-10-CM: J18.9  ICD-9-CM: 505  6/9/2017 Yes    RLL infiltrate      COPD exacerbation (HCC) ICD-10-CM: J44.1  ICD-9-CM: 491.21  6/9/2017 Yes    Diffuse wheezing -- none today. Maintained on Albuterol / Crissy Frank / Pepper Galvez as an outpatient  Start scheduled nebs / steroids      Hyponatremia ICD-10-CM: E87.1  ICD-9-CM: 276.1  6/9/2017 Yes    Na+ 128 at  and up here. We are treating CAP but also COPD exacerbation, hyponatremia. Plan:  (Medical Decision Making)        --Respiratory nebulizer treatments  --steroid therapy -- on 60 q8 will taper to 40 q12  --antibiotic therapy - D2 now -- on Rocephin/azithro  --get cultures not done per computer  --IVF continue and sodium is up, f/u tomorrow  --if ok by tomorrow will send him home      More than 50% of the time documented was spent in face-to-face contact with the patient and in the care of the patient on the floor/unit where the patient is located. Aashish Harrison MD  I have spoken with and examined the patient. I agree with the above assessment and plan as documented.

## 2017-06-11 VITALS
HEART RATE: 104 BPM | RESPIRATION RATE: 19 BRPM | BODY MASS INDEX: 21.89 KG/M2 | SYSTOLIC BLOOD PRESSURE: 131 MMHG | WEIGHT: 135.6 LBS | TEMPERATURE: 97.5 F | OXYGEN SATURATION: 94 % | DIASTOLIC BLOOD PRESSURE: 81 MMHG

## 2017-06-11 LAB
ANION GAP BLD CALC-SCNC: 8 MMOL/L (ref 7–16)
BASOPHILS # BLD AUTO: 0 K/UL (ref 0–0.2)
BASOPHILS # BLD: 0 % (ref 0–2)
BUN SERPL-MCNC: 12 MG/DL (ref 8–23)
CALCIUM SERPL-MCNC: 8.4 MG/DL (ref 8.3–10.4)
CHLORIDE SERPL-SCNC: 106 MMOL/L (ref 98–107)
CO2 SERPL-SCNC: 25 MMOL/L (ref 21–32)
CREAT SERPL-MCNC: 0.51 MG/DL (ref 0.8–1.5)
DIFFERENTIAL METHOD BLD: ABNORMAL
EOSINOPHIL # BLD: 0 K/UL (ref 0–0.8)
EOSINOPHIL NFR BLD: 0 % (ref 0.5–7.8)
ERYTHROCYTE [DISTWIDTH] IN BLOOD BY AUTOMATED COUNT: 13.7 % (ref 11.9–14.6)
GLUCOSE BLD STRIP.AUTO-MCNC: 187 MG/DL (ref 65–100)
GLUCOSE SERPL-MCNC: 186 MG/DL (ref 65–100)
HCT VFR BLD AUTO: 34.8 % (ref 41.1–50.3)
HGB BLD-MCNC: 12.2 G/DL (ref 13.6–17.2)
IMM GRANULOCYTES # BLD: 0.1 K/UL (ref 0–0.5)
IMM GRANULOCYTES NFR BLD AUTO: 1.3 % (ref 0–5)
LYMPHOCYTES # BLD AUTO: 13 % (ref 13–44)
LYMPHOCYTES # BLD: 1.4 K/UL (ref 0.5–4.6)
MCH RBC QN AUTO: 28.1 PG (ref 26.1–32.9)
MCHC RBC AUTO-ENTMCNC: 35.1 G/DL (ref 31.4–35)
MCV RBC AUTO: 80.2 FL (ref 79.6–97.8)
MONOCYTES # BLD: 0.6 K/UL (ref 0.1–1.3)
MONOCYTES NFR BLD AUTO: 6 % (ref 4–12)
NEUTS SEG # BLD: 8.3 K/UL (ref 1.7–8.2)
NEUTS SEG NFR BLD AUTO: 80 % (ref 43–78)
PLATELET # BLD AUTO: 356 K/UL (ref 150–450)
PMV BLD AUTO: 9.4 FL (ref 10.8–14.1)
POTASSIUM SERPL-SCNC: 3.8 MMOL/L (ref 3.5–5.1)
RBC # BLD AUTO: 4.34 M/UL (ref 4.23–5.67)
SODIUM SERPL-SCNC: 139 MMOL/L (ref 136–145)
WBC # BLD AUTO: 10.4 K/UL (ref 4.3–11.1)

## 2017-06-11 PROCEDURE — 77010033678 HC OXYGEN DAILY

## 2017-06-11 PROCEDURE — 85025 COMPLETE CBC W/AUTO DIFF WBC: CPT | Performed by: INTERNAL MEDICINE

## 2017-06-11 PROCEDURE — 74011250637 HC RX REV CODE- 250/637: Performed by: NURSE PRACTITIONER

## 2017-06-11 PROCEDURE — 36415 COLL VENOUS BLD VENIPUNCTURE: CPT | Performed by: INTERNAL MEDICINE

## 2017-06-11 PROCEDURE — 74011250636 HC RX REV CODE- 250/636: Performed by: INTERNAL MEDICINE

## 2017-06-11 PROCEDURE — 94760 N-INVAS EAR/PLS OXIMETRY 1: CPT

## 2017-06-11 PROCEDURE — 99239 HOSP IP/OBS DSCHRG MGMT >30: CPT | Performed by: INTERNAL MEDICINE

## 2017-06-11 PROCEDURE — 74011000258 HC RX REV CODE- 258: Performed by: INTERNAL MEDICINE

## 2017-06-11 PROCEDURE — 82962 GLUCOSE BLOOD TEST: CPT

## 2017-06-11 PROCEDURE — 74011636637 HC RX REV CODE- 636/637: Performed by: INTERNAL MEDICINE

## 2017-06-11 PROCEDURE — 80048 BASIC METABOLIC PNL TOTAL CA: CPT | Performed by: INTERNAL MEDICINE

## 2017-06-11 PROCEDURE — 94640 AIRWAY INHALATION TREATMENT: CPT

## 2017-06-11 PROCEDURE — 87070 CULTURE OTHR SPECIMN AEROBIC: CPT | Performed by: INTERNAL MEDICINE

## 2017-06-11 PROCEDURE — 74011000250 HC RX REV CODE- 250: Performed by: INTERNAL MEDICINE

## 2017-06-11 RX ORDER — CEFADROXIL 500 MG/1
500 CAPSULE ORAL 2 TIMES DAILY
Qty: 10 CAP | Refills: 0 | Status: SHIPPED | OUTPATIENT
Start: 2017-06-11 | End: 2017-06-26 | Stop reason: ALTCHOICE

## 2017-06-11 RX ORDER — PREDNISONE 20 MG/1
TABLET ORAL
Qty: 20 TAB | Refills: 0 | Status: SHIPPED | OUTPATIENT
Start: 2017-06-11 | End: 2017-06-26 | Stop reason: ALTCHOICE

## 2017-06-11 RX ORDER — AZITHROMYCIN 500 MG/1
TABLET, FILM COATED ORAL
Qty: 5 TAB | Refills: 0 | Status: SHIPPED | OUTPATIENT
Start: 2017-06-12 | End: 2017-06-26 | Stop reason: ALTCHOICE

## 2017-06-11 RX ORDER — ALBUTEROL SULFATE 0.83 MG/ML
2.5 SOLUTION RESPIRATORY (INHALATION)
Qty: 90 EACH | Refills: 1 | Status: SHIPPED | OUTPATIENT
Start: 2017-06-11 | End: 2017-09-06 | Stop reason: SDUPTHER

## 2017-06-11 RX ADMIN — FLUTICASONE PROPIONATE 2 SPRAY: 50 SPRAY, METERED NASAL at 09:34

## 2017-06-11 RX ADMIN — METHYLPREDNISOLONE SODIUM SUCCINATE 40 MG: 125 INJECTION, POWDER, FOR SOLUTION INTRAMUSCULAR; INTRAVENOUS at 09:31

## 2017-06-11 RX ADMIN — INSULIN LISPRO 2 UNITS: 100 INJECTION, SOLUTION INTRAVENOUS; SUBCUTANEOUS at 06:32

## 2017-06-11 RX ADMIN — ALBUTEROL SULFATE 2.5 MG: 2.5 SOLUTION RESPIRATORY (INHALATION) at 07:59

## 2017-06-11 RX ADMIN — CEFTRIAXONE 2 G: 2 INJECTION, POWDER, FOR SOLUTION INTRAMUSCULAR; INTRAVENOUS at 09:32

## 2017-06-11 RX ADMIN — CLOPIDOGREL BISULFATE 75 MG: 75 TABLET ORAL at 09:32

## 2017-06-11 RX ADMIN — TIOTROPIUM BROMIDE 18 MCG: 18 CAPSULE ORAL; RESPIRATORY (INHALATION) at 08:00

## 2017-06-11 RX ADMIN — Medication 10 ML: at 06:30

## 2017-06-11 RX ADMIN — Medication 1 TABLET: at 09:32

## 2017-06-11 RX ADMIN — ENOXAPARIN SODIUM 40 MG: 40 INJECTION SUBCUTANEOUS at 09:31

## 2017-06-11 RX ADMIN — LISINOPRIL 5 MG: 5 TABLET ORAL at 09:32

## 2017-06-11 RX ADMIN — LORATADINE 10 MG: 10 TABLET ORAL at 09:32

## 2017-06-11 RX ADMIN — SODIUM CHLORIDE 75 ML/HR: 900 INJECTION, SOLUTION INTRAVENOUS at 00:30

## 2017-06-11 NOTE — DISCHARGE INSTRUCTIONS
DISCHARGE SUMMARY from Nurse    The following personal items are in your possession at time of discharge:    Dental Appliances: Partials, With patient  Visual Aid: Glasses  Hearing Aids/Status: Bilateral, With patient  Home Medications: None  Jewelry: Ring, With patient  Clothing: Pants, Shirt, Footwear  Other Valuables: None             PATIENT INSTRUCTIONS:    After general anesthesia or intravenous sedation, for 24 hours or while taking prescription Narcotics:  · Limit your activities  · Do not drive and operate hazardous machinery  · Do not make important personal or business decisions  · Do  not drink alcoholic beverages  · If you have not urinated within 8 hours after discharge, please contact your surgeon on call. Report the following to your surgeon:  · Excessive pain, swelling, redness or odor of or around the surgical area  · Temperature over 100.5  · Nausea and vomiting lasting longer than 4 hours or if unable to take medications  · Any signs of decreased circulation or nerve impairment to extremity: change in color, persistent  numbness, tingling, coldness or increase pain  · Any questions        What to do at Home:  Recommended activity: Activity as tolerated,     If you experience any of the following symptoms fever above 100.5, shortness of breath, chest pain, unrelieved cough, foul discolored sputum, please follow up with Dr. Brandon Lux at [facility]. *  Please give a list of your current medications to your Primary Care Provider. *  Please update this list whenever your medications are discontinued, doses are      changed, or new medications (including over-the-counter products) are added. *  Please carry medication information at all times in case of emergency situations.           These are general instructions for a healthy lifestyle:    No smoking/ No tobacco products/ Avoid exposure to second hand smoke    Surgeon General's Warning:  Quitting smoking now greatly reduces serious risk to your health. Obesity, smoking, and sedentary lifestyle greatly increases your risk for illness    A healthy diet, regular physical exercise & weight monitoring are important for maintaining a healthy lifestyle    You may be retaining fluid if you have a history of heart failure or if you experience any of the following symptoms:  Weight gain of 3 pounds or more overnight or 5 pounds in a week, increased swelling in our hands or feet or shortness of breath while lying flat in bed. Please call your doctor as soon as you notice any of these symptoms; do not wait until your next office visit. Recognize signs and symptoms of STROKE:    F-face looks uneven    A-arms unable to move or move unevenly    S-speech slurred or non-existent    T-time-call 911 as soon as signs and symptoms begin-DO NOT go       Back to bed or wait to see if you get better-TIME IS BRAIN. Warning Signs of HEART ATTACK     Call 911 if you have these symptoms:   Chest discomfort. Most heart attacks involve discomfort in the center of the chest that lasts more than a few minutes, or that goes away and comes back. It can feel like uncomfortable pressure, squeezing, fullness, or pain.  Discomfort in other areas of the upper body. Symptoms can include pain or discomfort in one or both arms, the back, neck, jaw, or stomach.  Shortness of breath with or without chest discomfort.  Other signs may include breaking out in a cold sweat, nausea, or lightheadedness. Don't wait more than five minutes to call 911 - MINUTES MATTER! Fast action can save your life. Calling 911 is almost always the fastest way to get lifesaving treatment. Emergency Medical Services staff can begin treatment when they arrive -- up to an hour sooner than if someone gets to the hospital by car. The discharge information has been reviewed with the patient and spouse. The patient and spouse verbalized understanding.     Discharge medications reviewed with the patient and spouse and appropriate educational materials and side effects teaching were provided.

## 2017-06-11 NOTE — PROGRESS NOTES
Respirations even and unlabored. No c/o pain or SOB. Encouraged to call for needs. Assessment completed.   Up in chair, set up for breakfast.

## 2017-06-11 NOTE — DISCHARGE SUMMARY
DISCHARGE NOTE    Yovany Kim  Admission date:  6/9/2017  Discharge date:  6/11/2017    Admitting Diagnosis:  pneumonia;Pneumonia    Discharge Diagnoses:    Hospital Problems  Date Reviewed: 6/9/2017          Codes Class Noted POA    Controlled type 2 diabetes mellitus without complication, without long-term current use of insulin ([facility] ICD-10-CM: E11.9  ICD-9-CM: 250.00  6/9/2017 Yes        * (Principal)Pneumonia ICD-10-CM: J18.9  ICD-9-CM: 427  6/9/2017 Yes        COPD exacerbation ([facility].) ICD-10-CM: J44.1  ICD-9-CM: 491.21  6/9/2017 Yes        Hyponatremia ICD-10-CM: E87.1  ICD-9-CM: 276.1  6/9/2017 Yes              Consultants:  None     Studies/Procedures:  CXR    Condition on Discharge:    Stable    Disposition:    Home with office follow up    Presenting Illness / Hospital course:  Patient is a 68 y.o.  male who presented with sob. Patient has a history of fungal sinusitis followed by ENT, post herpetic neuralgia s/p shingles, HTN, CAD, remote tobacco abuse and COPD. He is followed by [facility] as an outpatient and is maintained on Albuterol / Luciano Jacqueline / Spiriva. Patient's wife was at the bedside and assisted with the history. Patient started feeling poorly about 2 weeks ago with body aches. This week he developed fevers and Wednesday reached 103 and continued to have chills. Patient reported increased sob with any activity, cough, wheezing, and pain in R shoulder area. Wife reports that patient has also been having visual hallucinations. He denied any recent sick contacts. He felt poorly enough the day of admission that he went to  for further evaluation. His HR was elevated to 120 bpm, WBC 18.3, Na+ 128, and CXR with RLL infiltrate. He was referred to [address] [place] [address] for direct admission. Patient was admitted to the floor for CAP, COPD exacerbation, and hyponatremia and started on abx, nebulizer tx, steroids, and IVF. His Na+ improved and currently is up to 139.   Cultures are pending still. He very quickly clinically improved and is now felt stable for discharge home. He will complete a 7 day course of abx (both duricef and azithro) and follow up in the office with repeat CXR. He can use in inhalers at home and will write for him to have a nebulzier that he can use at least 2 x per day for this week then prn. Physical Exam:   Constitution:  the patient is well developed and in no acute distress  EENMT:  Sclera clear, pupils equal, oral mucosa moist  Respiratory: scant crackles. No wheezing  Cardiovascular:  RRR without M,G,R  Gastrointestinal: soft and non-tender; with positive bowel sounds. Musculoskeletal: warm without cyanosis. There is no lower leg edema. Skin:  no jaundice or rashes, no wounds   Neurologic: no gross neuro deficits     Psychiatric:  alert and oriented x 3    LAB  Recent Labs      06/11/17   0500  06/10/17   0445   WBC  10.4  10.6   HGB  12.2*  12.9*   HCT  34.8*  37.9*   PLT  356  307     Recent Labs      06/11/17   0500  06/10/17   0445   NA  139  134*   K  3.8  4.1   CL  106  100   CO2  25  22   BUN  12  12   CREA  0.51*  0.48*   CA  8.4  8.3     No results for input(s): PH, PCO2, PO2, HCO3 in the last 72 hours. Discharge Medications:   Current Discharge Medication List      START taking these medications    Details   albuterol (PROVENTIL VENTOLIN) 2.5 mg /3 mL (0.083 %) nebulizer solution 3 mL by Nebulization route every six (6) hours as needed for Wheezing. Qty: 90 Each, Refills: 1      cefadroxil (DURICEF) 500 mg capsule Take 1 Cap by mouth two (2) times a day. Qty: 10 Cap, Refills: 0    Comments: Take first dose tomorrow      azithromycin (ZITHROMAX) 500 mg tab Take 2 tablets today, then take 1 tablet daily  Qty: 5 Tab, Refills: 0    Comments: Take first done tomorrow on 6/12      predniSONE (DELTASONE) 20 mg tablet Take 40 mg x 2 days,  then 20 mg x 2 days, then 10 mg x 2 days, then stop.   Qty: 20 Tab, Refills: 0         CONTINUE these medications which have NOT CHANGED    Details   lisinopril (PRINIVIL, ZESTRIL) 2.5 mg tablet TAKE 1 TABLET BY MOUTH EVERY DAY; please cancel the 10 mg dosage and refills  Qty: 30 Tab, Refills: 6    Associated Diagnoses: Essential hypertension with goal blood pressure less than 130/80      metFORMIN ER (GLUCOPHAGE XR) 500 mg tablet Take 2 Tabs by mouth daily (with dinner). Qty: 60 Tab, Refills: 6    Associated Diagnoses: Controlled type 2 diabetes mellitus without complication, without long-term current use of insulin ([facility])      glucose blood VI test strips (BLOOD GLUCOSE TEST) strip Check BS once a day fasting (DX: E11.9); one touch verio  Qty: 100 Strip, Refills: 11    Associated Diagnoses: Controlled type 2 diabetes mellitus without complication, without long-term current use of insulin ([facility])      Lancets misc Check BS once a day fasting (DX: E11.9); one touch verio  Qty: 100 Each, Refills: 11    Associated Diagnoses: Controlled type 2 diabetes mellitus without complication, without long-term current use of insulin ([facility])      montelukast (SINGULAIR) 10 mg tablet Take 1 Tab by mouth every evening. Qty: 90 Tab, Refills: 1    Associated Diagnoses: Allergic rhinitis due to pollen, unspecified rhinitis seasonality      loratadine (CLARITIN) 10 mg tablet Take 1 Tab by mouth daily. Qty: 90 Tab, Refills: 1    Associated Diagnoses: Allergic rhinitis due to pollen, unspecified rhinitis seasonality      gabapentin (NEURONTIN) 300 mg capsule Take 1 Cap by mouth two (2) times a day. Qty: 180 Cap, Refills: 1    Associated Diagnoses: Postherpetic neuralgia      amitriptyline (ELAVIL) 75 mg tablet Take 1 Tab by mouth nightly.   Qty: 90 Tab, Refills: 1    Associated Diagnoses: Postherpetic neuralgia      SUMAtriptan (IMITREX) 50 mg tablet TAKE 1 TABLET BY MOUTH ONCE AS NEEDED for migraine, may repeat in 2 hours if no better, no more than 2 tablets in 24 hours  Qty: 15 Tab, Refills: 2    Associated Diagnoses: Migraine with aura and without status migrainosus, not intractable      SPIRIVA WITH HANDIHALER 18 mcg inhalation capsule INHALE 1 CAPSULE VIA HANDIHALER ONCE DAILY AT THE SAME TIME EVERY DAY  Qty: 90 Cap, Refills: 3      mometasone (NASONEX) 50 mcg/actuation nasal spray USE 2 SPRAYS IN EACH NOSTRIL TWICE A DAY  Qty: 1 Container, Refills: 11      budesonide (RHINOCORT AQUA) 32 mcg/actuation nasal spray daily. nitroglycerin (NITROSTAT) 0.4 mg SL tablet 1 Tab by SubLINGual route every five (5) minutes as needed for Chest Pain. Indications: ANGINA  Qty: 25 Tab, Refills: 11      PROAIR HFA 90 mcg/actuation inhaler INHALE 2 PUFFS BY MOUTH EVERY 4 HOURS AS NEEDED  Qty: 25.5 Inhaler, Refills: 3      clopidogrel (PLAVIX) 75 mg tablet TAKE 1 TABLET EVERY DAY  Qty: 90 Tab, Refills: 3      fluticasone-salmeterol (ADVAIR DISKUS) 500-50 mcg/dose diskus inhaler Take 1 Puff by inhalation two (2) times a day. Qty: 1 Inhaler, Refills: 11      SACCHAROMYCES BOULARDII (PROBIOTIC, S.BOULARDII, PO) Take  by mouth daily as needed. Associated Diagnoses: DNR (do not resuscitate); Memory loss; Polypharmacy; Palliative care encounter; Back pain, unspecified back pain laterality, unspecified chronicity, unspecified location; Dysphagia, unspecified type; Neck pain; Other insomnia; Balance disorder; MCI (mild cognitive impairment)      EPINEPHrine (EPIPEN) 0.3 mg/0.3 mL (1:1,000) injection 0.3 mg by IntraMUSCular route once as needed. VIT A/VIT C/VIT E/ZINC/COPPER (PRESERVISION AREDS PO) Take 1 Tab by mouth two (2) times a day. Condition on Discharge:    Stable    Followup/Outpt Studies:  --Follow up appointment with [facility] in 4 weeks with CXR. --Total discharge greater than 30 minutes in duration. More than 50% of the time documented was spent in face-to-face contact with the patient and in the care of the patient on the floor/unit where the patient is located.     Mercedes Guaman MD

## 2017-06-11 NOTE — PROGRESS NOTES
Discharge instructions given, prescriptions sent electronically to [facility]. Opportunity given for questions , verbalized understanding. Escorted per Drake Foley CNA in [facility] to discharge area to waiting vehicle.

## 2017-06-11 NOTE — PROGRESS NOTES
Will discharge today. Will send home on nebs and will need both duricef and rachelle to complete both.    Angelia Miranda MD

## 2017-06-12 ENCOUNTER — PATIENT OUTREACH (OUTPATIENT)
Dept: CASE MANAGEMENT | Age: 77
End: 2017-06-12

## 2017-06-12 NOTE — PROGRESS NOTES
Date/Time of Call:   06/12/17 12:50 PM   What was the patient hospitalized for? Patient was hospitalized for Pneumonia   Does the patient understand his/her diagnosis and/or treatment and what happened during the hospitalization? Patient verbalized understanding of treatment and diagnosis. Did the patient receive discharge instructions? Yes Patient states d/c instructions received. Review any discharge instructions (see notes in [facility]). Ask patient if they understand these. Do they have any questions? Patient discussed d/c plan and instruction as he understood it to be with Care Coordinator. Which I have documented in the pertinent areas throughout this note. Were home services ordered (nursing, PT, OT, ST, etc.)? No HH ordered at d/c. If so, has the first visit occurred? If not, why? (Assist with coordination of services if necessary.) N/A   Was any DME ordered? No DME ordered at d/c. per patient. If so, has it been received? If not, why?  (Assist with coordination of arranging DME orders if necessary.) N/A   Complete a review of all medications (new, continued and discontinued meds per the D/C instructions and medication tab in [address] [place] [address]). Care coordinator and patient reviewed all medications per [facility]. Albuterol, Duricef, Zithromax, and Prednisone were prescribed at d/c. Were all new prescriptions filled? If not, why?  (Assist with obtainment of medications if necessary.) Yes. Does the patient understand the purpose and dosing instructions for all medications? (If patient has questions, provide explanation and education.) Indicated by patient to care coordinator, the purpose and dosing instructions for all medications and were understood. Does the patient have any problems in performing ADLs? (If patient is unable to perform ADLs  what is the limiting factor(s)?   Do they have a support system that can assist? If no support system is present, discuss possible assistance that they may be able to obtain.) Patient states he is independent with all ADLs. Does the patient have all follow-up appointments scheduled? Has transportation been arranged? [facility] follow-up should be within 7 days of discharge; all others should have PCP follow-up within 7 days of discharge; follow-ups with other specialists as appropriate or ordered.) Patient has [facility] appt. 6/20/17 and PCP f/u 6/26/17. Patient declines need for transportation. Any other questions or concerns expressed by the patient? Patient did not express any other questions or concerns. Patient stated gratitude for care and call. No further needs identified. ANGEL Call Completed By: Sylwia Herrera LPN  [facility] [address] Coordinator          This note will not be viewable in [address]

## 2017-06-13 LAB
BACTERIA SPEC CULT: NORMAL
GRAM STN SPEC: NORMAL
SERVICE CMNT-IMP: NORMAL

## 2017-07-10 ENCOUNTER — HOSPITAL ENCOUNTER (OUTPATIENT)
Dept: CT IMAGING | Age: 77
Discharge: HOME | End: 2017-07-10
Attending: PHYSICIAN ASSISTANT
Payer: MEDICARE

## 2017-07-10 DIAGNOSIS — R91.8 PULMONARY NODULES: ICD-10-CM

## 2017-07-10 PROCEDURE — 71250 CT THORAX DX C-: CPT

## 2017-07-20 PROBLEM — R00.0 TACHYCARDIA: Status: ACTIVE | Noted: 2017-07-20

## 2017-08-01 ENCOUNTER — HOSPITAL ENCOUNTER (OUTPATIENT)
Dept: PET IMAGING | Age: 77
Discharge: HOME | End: 2017-08-01

## 2017-08-01 DIAGNOSIS — R91.1 PULMONARY NODULE: ICD-10-CM

## 2017-08-10 ENCOUNTER — HOSPITAL ENCOUNTER (OUTPATIENT)
Dept: PET IMAGING | Age: 77
Discharge: HOME | End: 2017-08-10
Payer: MEDICARE

## 2017-08-10 PROCEDURE — 78815 PET IMAGE W/CT SKULL-THIGH: CPT

## 2017-08-10 PROCEDURE — 74011636320 HC RX REV CODE- 636/320: Performed by: PHYSICIAN ASSISTANT

## 2017-08-10 RX ADMIN — DIATRIZOATE MEGLUMINE AND DIATRIZOATE SODIUM 10 ML: 660; 100 LIQUID ORAL; RECTAL at 07:24

## 2017-08-10 NOTE — PROGRESS NOTES
Please call pt and tell him that PET scan revealed decreased density in RML felt to be related to chronic infection. Radiology has recommended CT chest without contrast in 3 months. Please call pt and inform him and place order for CT in 3 months.

## 2017-08-14 NOTE — PROGRESS NOTES
Pt called regarding his PET scan. Information and recommendations were given, and he agrees to CT in 3 months. I will order the CT. While on the call w/pt, he states he is coughing up \"stuff\" of various colors. I transferred call to Alessandra Lobato to assist further.

## 2017-08-16 ENCOUNTER — HOSPITAL ENCOUNTER (OUTPATIENT)
Dept: LAB | Age: 77
Discharge: HOME | End: 2017-08-16
Payer: MEDICARE

## 2017-08-16 DIAGNOSIS — J44.1 COPD EXACERBATION (HCC): ICD-10-CM

## 2017-08-16 PROCEDURE — 87070 CULTURE OTHR SPECIMN AEROBIC: CPT | Performed by: INTERNAL MEDICINE

## 2017-08-16 PROCEDURE — 87186 SC STD MICRODIL/AGAR DIL: CPT | Performed by: INTERNAL MEDICINE

## 2017-08-16 PROCEDURE — 87077 CULTURE AEROBIC IDENTIFY: CPT | Performed by: INTERNAL MEDICINE

## 2017-08-20 LAB
BACTERIA SPEC CULT: ABNORMAL
GRAM STN SPEC: ABNORMAL
SERVICE CMNT-IMP: ABNORMAL
SERVICE CMNT-IMP: ABNORMAL

## 2017-10-09 PROBLEM — E11.9 CONTROLLED TYPE 2 DIABETES MELLITUS WITHOUT COMPLICATION, WITHOUT LONG-TERM CURRENT USE OF INSULIN (HCC): Status: RESOLVED | Noted: 2017-06-09 | Resolved: 2017-10-09

## 2017-10-09 PROBLEM — J44.1 COPD EXACERBATION (HCC): Status: RESOLVED | Noted: 2017-06-09 | Resolved: 2017-10-09

## 2017-10-09 PROBLEM — R73.01 IMPAIRED FASTING BLOOD SUGAR: Status: ACTIVE | Noted: 2017-10-09

## 2017-10-09 PROBLEM — D72.829 LEUKOCYTOSIS: Status: ACTIVE | Noted: 2017-10-09

## 2017-10-09 PROBLEM — B02.29 POSTHERPETIC NEURALGIA: Status: ACTIVE | Noted: 2017-10-09

## 2017-10-09 PROBLEM — E87.1 HYPONATREMIA: Status: RESOLVED | Noted: 2017-06-09 | Resolved: 2017-10-09

## 2017-10-09 PROBLEM — R00.0 TACHYCARDIA: Status: RESOLVED | Noted: 2017-07-20 | Resolved: 2017-10-09

## 2017-10-09 PROBLEM — Z86.69 HISTORY OF MIGRAINE: Status: ACTIVE | Noted: 2017-10-09

## 2017-10-09 PROBLEM — F03.90 DEMENTIA WITHOUT BEHAVIORAL DISTURBANCE (HCC): Status: ACTIVE | Noted: 2017-10-09

## 2017-10-11 ENCOUNTER — HOSPITAL ENCOUNTER (OUTPATIENT)
Dept: CT IMAGING | Age: 77
Discharge: HOME | End: 2017-10-11
Attending: PHYSICIAN ASSISTANT
Payer: MEDICARE

## 2017-10-11 DIAGNOSIS — F03.90 DEMENTIA WITHOUT BEHAVIORAL DISTURBANCE, UNSPECIFIED DEMENTIA TYPE: ICD-10-CM

## 2017-10-11 PROCEDURE — 70450 CT HEAD/BRAIN W/O DYE: CPT

## 2017-10-18 NOTE — PROGRESS NOTES
I advised the patient of mucosal thickening in the sinuses on CT scan, being monitored by ENt and pulmonology. The patient denies feeling c/w infection at this time and has been recently on multiple rounds of antibiotics as written by pulmonary. He  also does have a long history of sinusitis, he has follow-up scheduled with ENT in Dec and pulmonary in about 2 weeks, will forward the result to ENT at this time. This also could be causing a mild dull headache at this time and patient is advised of this.

## 2017-11-06 ENCOUNTER — HOSPITAL ENCOUNTER (OUTPATIENT)
Dept: CT IMAGING | Age: 77
Discharge: HOME | End: 2017-11-06
Attending: NURSE PRACTITIONER
Payer: MEDICARE

## 2017-11-06 DIAGNOSIS — R91.8 PULMONARY INFILTRATE: ICD-10-CM

## 2017-11-06 DIAGNOSIS — R91.1 LUNG NODULE: ICD-10-CM

## 2017-11-06 PROCEDURE — 71250 CT THORAX DX C-: CPT

## 2017-11-06 NOTE — PROGRESS NOTES
Patient has appt with me later this week (11/8/17). Last sputum grew out pseudomonas and was treated with Cipro for 2 weeks. CT is worse now. Do you recommend bronch at this time?

## 2017-11-28 ENCOUNTER — APPOINTMENT (OUTPATIENT)
Dept: GENERAL RADIOLOGY | Age: 77
End: 2017-11-28
Attending: INTERNAL MEDICINE
Payer: MEDICARE

## 2017-11-28 ENCOUNTER — APPOINTMENT (OUTPATIENT)
Dept: CT IMAGING | Age: 77
End: 2017-11-28
Attending: INTERNAL MEDICINE
Payer: MEDICARE

## 2017-11-28 ENCOUNTER — HOSPITAL ENCOUNTER (OUTPATIENT)
Age: 77
Setting detail: OUTPATIENT SURGERY
Discharge: HOME | End: 2017-11-28
Attending: INTERNAL MEDICINE | Admitting: INTERNAL MEDICINE
Payer: MEDICARE

## 2017-11-28 VITALS
TEMPERATURE: 97.4 F | RESPIRATION RATE: 16 BRPM | HEIGHT: 66 IN | HEART RATE: 93 BPM | DIASTOLIC BLOOD PRESSURE: 69 MMHG | SYSTOLIC BLOOD PRESSURE: 141 MMHG | WEIGHT: 133 LBS | BODY MASS INDEX: 21.38 KG/M2 | OXYGEN SATURATION: 91 %

## 2017-11-28 DIAGNOSIS — R93.89 ABNORMAL CT SCAN, CHEST: ICD-10-CM

## 2017-11-28 DIAGNOSIS — R91.8 PULMONARY NODULES: ICD-10-CM

## 2017-11-28 DIAGNOSIS — R06.89 INEFFECTIVE AIRWAY CLEARANCE: ICD-10-CM

## 2017-11-28 LAB
APPEARANCE FLD: NORMAL
COLOR FLD: COLORLESS
EOSINOPHIL NFR FLD MANUAL: 21 %
FLUID COMMENTS, FCOM: NORMAL
GLUCOSE BLD STRIP.AUTO-MCNC: 93 MG/DL (ref 65–100)
LYMPHOCYTES NFR FLD: 39 %
MONOS+MACROS NFR FLD: 5 %
NEUTROPHILS NFR FLD: 35 %
NUC CELL # FLD: 350 /CU MM
RBC # FLD: NORMAL /CU MM
SPECIMEN SOURCE FLD: NORMAL

## 2017-11-28 PROCEDURE — 71250 CT THORAX DX C-: CPT

## 2017-11-28 PROCEDURE — 87077 CULTURE AEROBIC IDENTIFY: CPT | Performed by: INTERNAL MEDICINE

## 2017-11-28 PROCEDURE — 76040000025: Performed by: INTERNAL MEDICINE

## 2017-11-28 PROCEDURE — 74011000250 HC RX REV CODE- 250: Performed by: INTERNAL MEDICINE

## 2017-11-28 PROCEDURE — 77030012699 HC VLV SUC CNTRL OCOA -A: Performed by: INTERNAL MEDICINE

## 2017-11-28 PROCEDURE — 87116 MYCOBACTERIA CULTURE: CPT | Performed by: INTERNAL MEDICINE

## 2017-11-28 PROCEDURE — 89050 BODY FLUID CELL COUNT: CPT | Performed by: INTERNAL MEDICINE

## 2017-11-28 PROCEDURE — 74011250636 HC RX REV CODE- 250/636: Performed by: INTERNAL MEDICINE

## 2017-11-28 PROCEDURE — 87102 FUNGUS ISOLATION CULTURE: CPT | Performed by: INTERNAL MEDICINE

## 2017-11-28 PROCEDURE — 82962 GLUCOSE BLOOD TEST: CPT

## 2017-11-28 PROCEDURE — 88112 CYTOPATH CELL ENHANCE TECH: CPT | Performed by: INTERNAL MEDICINE

## 2017-11-28 PROCEDURE — G0500 MOD SEDAT ENDO SERVICE >5YRS: HCPCS | Performed by: INTERNAL MEDICINE

## 2017-11-28 PROCEDURE — 87186 SC STD MICRODIL/AGAR DIL: CPT | Performed by: INTERNAL MEDICINE

## 2017-11-28 PROCEDURE — 74011250636 HC RX REV CODE- 250/636

## 2017-11-28 PROCEDURE — 31624 DX BRONCHOSCOPE/LAVAGE: CPT | Performed by: INTERNAL MEDICINE

## 2017-11-28 PROCEDURE — 86356 MONONUCLEAR CELL ANTIGEN: CPT | Performed by: INTERNAL MEDICINE

## 2017-11-28 PROCEDURE — 87070 CULTURE OTHR SPECIMN AEROBIC: CPT | Performed by: INTERNAL MEDICINE

## 2017-11-28 PROCEDURE — 87799 DETECT AGENT NOS DNA QUANT: CPT | Performed by: INTERNAL MEDICINE

## 2017-11-28 RX ORDER — MIDAZOLAM HYDROCHLORIDE 1 MG/ML
.25-5 INJECTION, SOLUTION INTRAMUSCULAR; INTRAVENOUS
Status: DISCONTINUED | OUTPATIENT
Start: 2017-11-28 | End: 2017-11-28 | Stop reason: HOSPADM

## 2017-11-28 RX ORDER — SODIUM CHLORIDE 0.9 % (FLUSH) 0.9 %
5-10 SYRINGE (ML) INJECTION EVERY 8 HOURS
Status: CANCELLED | OUTPATIENT
Start: 2017-11-28

## 2017-11-28 RX ORDER — NALOXONE HYDROCHLORIDE 0.4 MG/ML
0.4 INJECTION, SOLUTION INTRAMUSCULAR; INTRAVENOUS; SUBCUTANEOUS
Status: DISCONTINUED | OUTPATIENT
Start: 2017-11-28 | End: 2017-11-28 | Stop reason: HOSPADM

## 2017-11-28 RX ORDER — FLUMAZENIL 0.1 MG/ML
0.2 INJECTION INTRAVENOUS
Status: DISCONTINUED | OUTPATIENT
Start: 2017-11-28 | End: 2017-11-28 | Stop reason: HOSPADM

## 2017-11-28 RX ORDER — LIDOCAINE HYDROCHLORIDE 40 MG/ML
SOLUTION TOPICAL AS NEEDED
Status: DISCONTINUED | OUTPATIENT
Start: 2017-11-28 | End: 2017-11-28 | Stop reason: HOSPADM

## 2017-11-28 RX ORDER — LIDOCAINE HYDROCHLORIDE 20 MG/ML
JELLY TOPICAL AS NEEDED
Status: DISCONTINUED | OUTPATIENT
Start: 2017-11-28 | End: 2017-11-28 | Stop reason: HOSPADM

## 2017-11-28 RX ORDER — SODIUM CHLORIDE 9 MG/ML
1000 INJECTION, SOLUTION INTRAVENOUS CONTINUOUS
Status: DISCONTINUED | OUTPATIENT
Start: 2017-11-28 | End: 2017-11-28 | Stop reason: HOSPADM

## 2017-11-28 RX ORDER — SODIUM CHLORIDE 0.9 % (FLUSH) 0.9 %
5-10 SYRINGE (ML) INJECTION AS NEEDED
Status: CANCELLED | OUTPATIENT
Start: 2017-11-28

## 2017-11-28 RX ORDER — FENTANYL CITRATE 50 UG/ML
100 INJECTION, SOLUTION INTRAMUSCULAR; INTRAVENOUS
Status: DISCONTINUED | OUTPATIENT
Start: 2017-11-28 | End: 2017-11-28 | Stop reason: HOSPADM

## 2017-11-28 RX ADMIN — FENTANYL CITRATE 50 MCG: 50 INJECTION, SOLUTION INTRAMUSCULAR; INTRAVENOUS at 08:33

## 2017-11-28 RX ADMIN — SODIUM CHLORIDE 1000 ML: 900 INJECTION, SOLUTION INTRAVENOUS at 07:18

## 2017-11-28 RX ADMIN — LIDOCAINE HYDROCHLORIDE 7 ML: 40 SOLUTION TOPICAL at 08:40

## 2017-11-28 RX ADMIN — FENTANYL CITRATE 50 MCG: 50 INJECTION, SOLUTION INTRAMUSCULAR; INTRAVENOUS at 08:36

## 2017-11-28 RX ADMIN — FENTANYL CITRATE 50 MCG: 50 INJECTION, SOLUTION INTRAMUSCULAR; INTRAVENOUS at 08:30

## 2017-11-28 RX ADMIN — FENTANYL CITRATE 50 MCG: 50 INJECTION, SOLUTION INTRAMUSCULAR; INTRAVENOUS at 08:26

## 2017-11-28 RX ADMIN — FENTANYL CITRATE 50 MCG: 50 INJECTION, SOLUTION INTRAMUSCULAR; INTRAVENOUS at 08:22

## 2017-11-28 RX ADMIN — LIDOCAINE HYDROCHLORIDE 1 ML: 20 JELLY TOPICAL at 08:40

## 2017-11-28 RX ADMIN — MIDAZOLAM HYDROCHLORIDE 2 MG: 1 INJECTION, SOLUTION INTRAMUSCULAR; INTRAVENOUS at 08:22

## 2017-11-28 NOTE — IP AVS SNAPSHOT
[address] 
[phone] Patient: Nova Mueller MRN: [id number] IOY:[id number] About your hospitalization You were admitted on:  November 28, 2017 You last received care in the:  [facility] You were discharged on:  November 28, 2017 Why you were hospitalized Your primary diagnosis was:  Not on File Your diagnoses also included:  Abnormal Ct Scan, Chest, Ineffective Airway Clearance Things You Need To Do (next 8 weeks) Monday Dec 04, 2017 Office Visit with Barak Garcia MD at  9:20 AM  
Where:  [facility]) Wednesday Eleazar 10, 2018 Return appointment with Krupa Garg NP at  8:00 AM (Arrive by 7:40 AM) Where:  [facility] ([facility]) ESTABLISHED PATIENT with Jeremy Brand DO at  2:15 PM  
Where:  [address] ([address] [place] [address]) Discharge Orders None A check martin indicates which time of day the medication should be taken. My Medications ASK your physician about these medications Instructions Each Dose to Equal  
 Morning Noon Evening Bedtime  
 amitriptyline 25 mg tablet Commonly known as:  ELAVIL Your last dose was: Your next dose is: Take 1 Tab by mouth nightly. Please cancel and stop the 50 mg dosage 25 mg  
    
   
   
   
  
 clopidogrel 75 mg Tab Commonly known as:  PLAVIX Your last dose was: Your next dose is: Take 1 Tab by mouth daily. 75 mg  
    
   
   
   
  
 donepezil 10 mg tablet Commonly known as:  ARICEPT Your last dose was: Your next dose is: Take 1 Tab by mouth nightly. 10 mg  
    
   
   
   
  
 EPIPEN 0.3 mg/0.3 mL injection Generic drug:  EPINEPHrine Your last dose was: Your next dose is: 0.3 mg by IntraMUSCular route once as needed. 0.3 mg  
    
   
   
   
  
 fluticasone-salmeterol 500-50 mcg/dose diskus inhaler Commonly known as:  ADVAIR DISKUS Your last dose was: Your next dose is: Take 1 Puff by inhalation two (2) times a day. 1 Puff  
    
   
   
   
  
 gabapentin 300 mg capsule Commonly known as:  NEURONTIN Your last dose was: Your next dose is: TAKE 1 CAP BY MOUTH TWO (2) TIMES A DAY. glucose blood VI test strips strip Commonly known as:  blood glucose test  
   
Your last dose was: Your next dose is:    
   
   
 Check BS once a day fasting (DX: E11.9); one touch verio Lancets Misc Your last dose was: Your next dose is:    
   
   
 Check BS once a day fasting (DX: E11.9); one touch verio  
     
   
   
   
  
 latanoprost 0.005 % ophthalmic solution Commonly known as:  Garrick Goyal Your last dose was: Your next dose is:    
   
   
 Administer 1 Drop to both eyes nightly. 1 Drop  
    
   
   
   
  
 mometasone 50 mcg/actuation nasal spray Commonly known as:  Gearld  Your last dose was: Your next dose is: 2 Sprays by Both Nostrils route daily. 2 Spray  
    
   
   
   
  
 nitroglycerin 0.4 mg SL tablet Commonly known as:  NITROSTAT Your last dose was: Your next dose is:    
   
   
 1 Tab by SubLINGual route every five (5) minutes as needed for Chest Pain. Indications: ANGINA  
 0.4 mg  
    
   
   
   
  
 PRESERVISION AREDS PO Your last dose was: Your next dose is: Take 1 Tab by mouth two (2) times a day. 1 Tab * PROAIR HFA 90 mcg/actuation inhaler Generic drug:  albuterol Your last dose was:     
   
Your next dose is:    
   
   
 INHALE 2 PUFFS BY MOUTH EVERY 4 HOURS AS NEEDED  
     
   
   
   
  
 * albuterol 2.5 mg /3 mL (0.083 %) nebulizer solution Commonly known as:  PROVENTIL VENTOLIN Your last dose was: Your next dose is:    
   
   
 3 mL by Nebulization route three (3) times daily. 2.5 mg PROBIOTIC (S.BOULARDII) PO Your last dose was: Your next dose is: Take  by mouth daily as needed. SPIRIVA WITH HANDIHALER 18 mcg inhalation capsule Generic drug:  tiotropium Your last dose was: Your next dose is:    
   
   
 INHALE 1 CAPSULE VIA HANDIHALER ONCE DAILY AT THE SAME TIME EVERY DAY  
     
   
   
   
  
 SUMAtriptan 100 mg tablet Commonly known as:  IMITREX Your last dose was: Your next dose is: Take 1/2-1 tablet as needed for migraine, may repeat in 2 hours if no better, take no more than 2 tablets in 24 hours. * Notice: This list has 2 medication(s) that are the same as other medications prescribed for you. Read the directions carefully, and ask your doctor or other care provider to review them with you. Discharge Instructions RESPIRATORY CARE - BRONCHOSCOPY - DISCHARGE INSTRUCTIONS You received a lot of numbing medication for your throat and nose, and you also received medication to make you sleepy during your procedure. Because of this and because the bronchoscopy may have irritated your airways, we ask that you follow these directions: 1. Do not eat or drink until  1030 . After that, you may have what you please. You may want to try some liquids first, because your throat may be a little sore. 2. Medication may cause drowsiness for several hours, therefore, do not drive or operate machinery for remainder of the day. No alcohol today. 3. You may cough up more mucus than usual and you may see some blood, but this is expected and should subside by the following day. 4. If severe throat irritation, coughing, or bleeding continue, call your doctor. 5.         If you run a fever greater than 102, call [facility] at [phone]. 6.         Dr. Edenilson Flanagan has asked that you: A. Call the doctor's office at [phone] results of bronchoscopy next week-you can ask for [facility]. B. See [facility] in the office on 1/10/2018 as scheduled. Discharge Medications: 
 
  
 
Any additional instructions:  motrin or tylenol for fever. Resume Plavix as previously scheduled and dosed. Instructions given to Floyd Fung and other family members ACO Transitions of Care Introducing [employer] [address] offers a voluntary care coordination program to provide high quality service and care to [facility] fee-for-service beneficiaries. Tamika Monte was designed to help you enhance your health and well-being through the following services: ? Transitions of Care  support for individuals who are transitioning from one care setting to another (example: Hospital to home). ? Chronic and Complex Care Coordination  support for individuals and caregivers of those with serious or chronic illnesses or with more than one chronic (ongoing) condition and those who take a number of different medications. If you meet specific medical criteria, a [facility] [address] may call you directly to coordinate your care with your primary care physician and your other care providers. For questions about the [facility] programs, please, contact your physicians office. For general questions or additional information about Accountable Care Organizations: 
Please visit www.medicare.gov/acos. html or call 1-800-MEDICARE (9-531.879.9456) TTY users should call 3-524.897.2139. Introducing [facility]! Dear Mayito Galvez: Thank you for requesting a [employer] account. Our records indicate that you already have an active FreakOut account. You can access your account anytime at [email or web address] Did you know that you can access your hospital and ER discharge instructions at any time in [employer]Out? You can also review all of your test results from your hospital stay or ER visit. Additional Information If you have questions, please visit the Frequently Asked Questions section of the [employer] website at [email or web address] Remember, [employer]Out is NOT to be used for urgent needs. For medical emergencies, dial 911. Now available from your iPhone and Android! Providers Seen During Your Hospitalization Provider Specialty Primary office phone Amber Chan MD Pulmonary Disease [phone] Your Primary Care Physician (PCP) Primary Care Physician Office Phone Office Fax Rodrigue Coates [phone] You are allergic to the following Allergen Reactions Aspirin Swelling Anaphylaxis Aleve (Naproxen Sodium) Unknown (comments) Codeine Sulfate Other (comments)  
 headache Corn Containing Products Other (comments) Not allergic Crestor (Rosuvastatin) Unknown (comments)  
 unknown Septra (Sulfamethoprim Ds) Unknown (comments) Not allergic Zetia (Ezetimibe) Unknown (comments) Unknown allergy Zoloft (Sertraline) Unknown (comments) Not allergic Recent Documentation Height Weight BMI Smoking Status 1.676 m 60.3 kg 21.47 kg/m2 Former Smoker Emergency Contacts Name Discharge Info Relation Home Work Mobile Parvin Kim DISCHARGE CAREGIVER [3] Spouse [3] [phone] Patient Belongings  The following personal items are in your possession at time of discharge: 
  Dental Appliances: Partials (PARTIALS ON TOP AND LOWER)  Visual Aid: Glasses   Hearing Aids/Status: Bilateral                   
 
  
  
 Please provide this summary of care documentation to your next provider. Signatures-by signing, you are acknowledging that this After Visit Summary has been reviewed with you and you have received a copy. Patient Signature:  ____________________________________________________________ Date:  ____________________________________________________________  
  
[facility] Provider Signature:  ____________________________________________________________ Date:  ____________________________________________________________

## 2017-11-28 NOTE — H&P (VIEW-ONLY)
[address] [facility] [address] [place]. [address] [facility], [address] [facility]  [phone]      Patient Name:  Héctor Kim  YOB: 1940      Office Visit 11/8/2017    CHIEF COMPLAINT:    Chief Complaint   Patient presents with    COPD    Infiltrate         HISTORY OF PRESENT ILLNESS:     The patient is a 29-year-old white male who is seen for follow-up of pulmonary infiltrate, lung nodules, and COPD. He had CT of chest earlier this week which revealed worsening of the bilateral multifocal pneumonia. This scan was reviewed with Dr. Terell Trejo and Dr. Geovanny Monte who recommended bronchoscopy with navigation. The patient tells me that he is getting over a horrible sinus infection. He just completed Levaquin on Monday. He is finally feeling better. His chart is reviewed again. He does have a soft tissue right middle lobe density that is peripheral.  He has a right middle lobe nodule that is nearly 2 cm. He had a PET scan in August which demonstrated no significant activity, maximum SUV was 1.9. He did grow out heavy growth of Pseudomonas from sputum culture in August and was treated with Cipro for 2 weeks. Past Medical History:   Diagnosis Date    Arthritis     Chronic obstructive pulmonary disease ([address]     Diabetes ([address]     Fungal sinusitis     GERD (gastroesophageal reflux disease)     History of multiple allergies     Hx of migraines     Hypertension     Shingles 10/1/15    Sinus problem     Thoracic aneurysm without mention of rupture 12/2/2014    No chest or upper back pain. Echo shows a mildly dilated aortic root at 4.0 cm. There is mild LVH and normal systolic function.            Problem List  Date Reviewed: 11/8/2017          Codes Class Noted    Dementia without behavioral disturbance ICD-10-CM: F03.90  ICD-9-CM: 294.20  10/9/2017        Impaired fasting blood sugar ICD-10-CM: R73.01  ICD-9-CM: 790.21  10/9/2017        Leukocytosis ICD-10-CM: K94.424  ICD-9-CM: 288.60  10/9/2017 History of migraine ICD-10-CM: Z86.69  ICD-9-CM: V12.49  10/9/2017        Postherpetic neuralgia ICD-10-CM: B02.29  ICD-9-CM: 053.19  10/9/2017        Pulmonary nodules ICD-10-CM: R91.8  ICD-9-CM: 793.19  6/9/2017    Overview Addendum 2/18/2016 12:52 PM by [address] [facility]     CT-8/10/2015-  Multiple  small  bilateral  pulmonary  nodules. Since  some  of the nodules  are  new,  six-month  followup  is  recommended. CT-2/18/2016-  1.  Increased nodular airspace disease in both lungs, most prominent in the  right lower lobe.  This is most consistent with an atypical infection such as  BOO.  Consider follow-up to document resolution. 2.  Cholelithiasis. Pneumonia ICD-10-CM: J18.9  ICD-9-CM: 680  6/9/2017        Essential hypertension with goal blood pressure less than 130/80 (Chronic) ICD-10-CM: I10  ICD-9-CM: 401.9  6/6/2017        Long term (current) use of antithrombotics/antiplatelets QJS-30-XX: Z79.02  ICD-9-CM: V58.63  9/15/2016        Fungal sinusitis ICD-10-CM: B49, J32.9  ICD-9-CM: 117.9, 473.9  8/8/2016        Platelet inhibition due to Plavix ICD-10-CM: D69.59, T45.515A  ICD-9-CM: 287.49, E934.2  8/8/2016        Hx of migraines ICD-10-CM: Z86.69  ICD-9-CM: V12.49  8/8/2016        ETD (eustachian tube dysfunction) ICD-10-CM: H69.80  ICD-9-CM: 381.81  7/21/2016        Chronic sinusitis ICD-10-CM: J32.9  ICD-9-CM: 473.9  7/21/2016        Nasal polyp ICD-10-CM: J33.9  ICD-9-CM: 471.9  Unknown        Mixed hyperlipidemia (Chronic) ICD-10-CM: E78.2  ICD-9-CM: 272.2  6/2/2016        DNR (do not resuscitate) ICD-10-CM: Z66  ICD-9-CM: V49.86  5/12/2016        Ineffective airway clearance ICD-10-CM: R06.89  ICD-9-CM: 786.09  3/8/2016        Thoracic aneurysm without mention of rupture (Chronic) ICD-10-CM: I71.2  ICD-9-CM: 441.2  12/2/2014    Overview Signed 4/4/2016 11:56 AM by Dajuan Fonseca     No chest or upper back pain. Echo shows a mildly dilated aortic root at 4.0 cm.   There is mild LVH and normal systolic function. COPD (chronic obstructive pulmonary disease) (HCC) (Chronic) ICD-10-CM: J44.9  ICD-9-CM: 496  10/8/2013    Overview Signed 10/8/2013 12:49 PM by Radha Gilmore NP     GOLD stage II             Migraine (Chronic) ICD-10-CM: M06.324  ICD-9-CM: 346.90  10/8/2013        CAD (coronary artery disease) (Chronic) ICD-10-CM: I25.10  ICD-9-CM: 414.00  10/8/2013    Overview Signed 4/4/2016 11:55 AM by Debra VERMA of the native vessel             Allergic rhinitis (Chronic) ICD-10-CM: J30.9  ICD-9-CM: 477.9  10/8/2013        Gastroesophageal reflux disease without esophagitis (Chronic) ICD-10-CM: K21.9  ICD-9-CM: 530.81  10/8/2013        Osteoporosis (Chronic) ICD-10-CM: M81.0  ICD-9-CM: 733.00  10/8/2013        Encounter for long-term (current) use of antiplatelets/antithrombotics (Chronic) ICD-10-CM: Z79.02  ICD-9-CM: V58.63  10/8/2013    Overview Signed 10/8/2013 12:50 PM by Radha Gilmore NP     Plavix             Personal history of tobacco use (Chronic) ICD-10-CM: J54.381  ICD-9-CM: V15.82  10/8/2013                Past Surgical History:   Procedure Laterality Date    HX APPENDECTOMY  age 9   Jose Clines CATARACT REMOVAL Bilateral     HX HEART CATHETERIZATION  2/27/12    stent x 1    HX HEENT  1999    sinus x3    HX HERNIA REPAIR Right 1978    inguinal    HX OTHER SURGICAL  2/2012    neurostimulator implant for migraines at [facility] in [facility]      childhood   [address] [facility]      Neurostimulator implant for migraines 2/2012       No flowsheet data found.         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 Family History   Problem Relation Age of Onset    No Known Problems Sister     No Known Problems Brother     No Known Problems Sister     COPD Mother     Asthma Father     Dementia Other      UNCLE         Allergies   Allergen Reactions    Aspirin Swelling and Anaphylaxis    Aleve [Naproxen Sodium] Unknown (comments)    Codeine Sulfate Unknown (comments)    Corn Containing Products Other (comments)    Crestor [Rosuvastatin] Unknown (comments)    Septra [Sulfamethoprim Ds] Unknown (comments)    Zetia [Ezetimibe] Unknown (comments)    Zoloft [Sertraline] Unknown (comments)         Current Outpatient Prescriptions   Medication Sig    gabapentin (NEURONTIN) 300 mg capsule TAKE 1 CAP BY MOUTH TWO (2) TIMES A DAY.  SUMAtriptan (IMITREX) 100 mg tablet Take 1/2-1 tablet as needed for migraine, may repeat in 2 hours if no better, take no more than 2 tablets in 24 hours.  amitriptyline (ELAVIL) 50 mg tablet Take 1 Tab by mouth nightly. Please cancel and stop the 75 mg dosage    mometasone (NASONEX) 50 mcg/actuation nasal spray 2 Sprays by Both Nostrils route daily.  albuterol (PROVENTIL VENTOLIN) 2.5 mg /3 mL (0.083 %) nebulizer solution 3 mL by Nebulization route three (3) times daily.  fluticasone-salmeterol (ADVAIR DISKUS) 500-50 mcg/dose diskus inhaler Take 1 Puff by inhalation two (2) times a day.  latanoprost (XALATAN) 0.005 % ophthalmic solution Administer 1 Drop to both eyes nightly.     glucose blood VI test strips (BLOOD GLUCOSE TEST) strip Check BS once a day fasting (DX: E11.9); one touch verio    Lancets misc Check BS once a day fasting (DX: E11.9); one touch verio    SPIRIVA WITH HANDIHALER 18 mcg inhalation capsule INHALE 1 CAPSULE VIA HANDIHALER ONCE DAILY AT THE SAME TIME EVERY DAY    nitroglycerin (NITROSTAT) 0.4 mg SL tablet 1 Tab by SubLINGual route every five (5) minutes as needed for Chest Pain. Indications: ANGINA    PROAIR HFA 90 mcg/actuation inhaler INHALE 2 PUFFS BY MOUTH EVERY 4 HOURS AS NEEDED    clopidogrel (PLAVIX) 75 mg tablet TAKE 1 TABLET EVERY DAY    SACCHAROMYCES BOULARDII (PROBIOTIC, S.BOULARDII, PO) Take  by mouth daily as needed.  EPINEPHrine (EPIPEN) 0.3 mg/0.3 mL (1:1,000) injection 0.3 mg by IntraMUSCular route once as needed.  VIT A/VIT C/VIT E/ZINC/COPPER (PRESERVISION AREDS PO) Take 1 Tab by mouth two (2) times a day.  donepezil (ARICEPT) 5 mg tablet Take 1 Tab by mouth nightly. No current facility-administered medications for this visit. REVIEW OF SYSTEMS:   CONSTITUTIONAL:   There is no history of fever, chills, night sweats, weight loss, weight gain, persistent fatigue, or lethargy/hypersomnolence. CARDIAC:   No chest pain, pressure, discomfort, palpitations, orthopnea, murmurs, or edema. GI:   No dysphagia, heartburn, reflux, nausea/vomiting, diarrhea, abdominal pain, or bleeding. NEURO:   There is no history of AMS, persistent headache, decreased level of consciousness, seizures, or motor or sensory deficits. PHYSICAL EXAM:    Visit Vitals    /82 (BP 1 Location: Left arm, BP Patient Position: Sitting)    Pulse (!) 110    Temp 97.8 °F (36.6 °C) (Oral)    Resp 16    Ht 5' 6\" (1.676 m)    Wt 135 lb (61.2 kg)    SpO2 93%    BMI 21.79 kg/m2        GENERAL APPEARANCE:   The patient is normal weight and in no respiratory distress. HEENT:   PERRL. Conjunctivae unremarkable. Nasal mucosa is without epistaxis, exudate, or polyps. Gums and dentition are unremarkable. There is no oropharyngeal narrowing. TMs are clear. NECK/LYMPHATIC:   Symmetrical with no elevation of jugular venous pulsation. Trachea midline. No thyroid enlargement. No cervical adenopathy.    LUNGS:   Normal respiratory effort with symmetrical lung expansion. Breath sounds minimally decreased without rhonchi or wheezing. HEART:   There is a regular rate and rhythm. No murmur, rub, or gallop. There is no edema in the lower extremities. ABDOMEN:   Soft and non-tender. No hepatosplenomegaly. Bowel sounds are normal.     NEURO:   The patient is alert and oriented to person, place, and time. Memory appears intact and mood is normal.  No gross sensorimotor deficits are present. DIAGNOSTIC TESTS:   PCXR: No results found for this or any previous visit. CXR PA and lateral:    Results for orders placed during the hospital encounter of 06/09/17   XR CHEST PA LAT    Narrative PA and lateral chest radiographs    History: Follow-up pneumonia. Comparison: 06/09/2017    Findings: The cardiomediastinal silhouette is normal. Patchy somewhat reticular  opacities in the right lower lung are similar to slightly increased from  yesterday. Left basilar atelectasis is also similar. Impression Impression:  Right lower lung opacities are similar to slightly increased from  yesterday. Stable left basilar atelectasis. CT of chest without contrast:     Results for orders placed during the hospital encounter of 11/06/17   CT CHEST WO CONT    Narrative CT THORAX WITHOUT CONTRAST    HISTORY: Lung nodule, <1cm, mod-high risk for cancer, 68 years Male    COMPARISON: CT chest July 10, 2017 dating back to CT lung screening August 07, 2015    TECHNIQUE: Noncontrast axial helical images from the thoracic inlet through  diaphragm were obtained. Radiation dose reduction techniques were used for this  study:  Our CT scanners use one or all of the following: Automated exposure  control, adjustment of the mA and/or kVp according to patient's size, iterative  reconstruction. FINDINGS:  Partially visualized thyroid unremarkable. Mild prominent mediastinal lymph  nodes measuring up to 11 mm in short axis unchanged, nonspecific.   No evidence  of significant axillary lymphadenopathy. Mild calcific atherosclerosis of a  normal caliber aortic arch and descending aorta. Persistent mild bilateral  upper lobe predominant panlobular emphysema. There are increased bilateral tree  in bud and nodular airspace opacities, most likely representing interval  worsening of  multifocal pneumonia. Mild dependent subsegmental atelectasis  bilateral lung bases. No evidence of pleural effusion. Visualized proximal  airways unremarkable. Visualized upper abdominal viscera including the adrenal glands are otherwise  unremarkable. Visualized osseous structures unremarkable. Impression IMPRESSION:     1. Apparent interval worsening of bilateral multifocal pneumonia. Category  Lung-RADS 3, probably benign findings. Follow-up low dose CT chest without IV  contrast is recommended in 3-6 months to ensure resolution after treatment  (P3c). 2.  Other chronic findings as above. PET/CT:   Results for orders placed during the hospital encounter of 08/01/17   PET/CT TUMOR IMAGE SKULL THIGH W (INI)    Narrative PET/CT      Indication: Initial staging right middle lobe pulmonary nodule    Radiopharmaceutical: 15.6 mCi F18-FDG, intravenously. Technique: Imaging was performed from the skull through the proximal thighs  using routine PET/CT acquisition protocol. Imaging was performed approximately  60 minutes post injection. Oral contrast was administered. Radiation dose  reduction techniques were used for this study:  Our CT scanners use one or all  of the following: Automated exposure control, adjustment of the mA and/or kVp  according to patient's size, iterative reconstruction. Serum glucose: 92 mg/dL prior to injection. Comparison studies: Chest CT 7/10/2017 and prior    Findings:    Head and Neck: No lymphadenopathy or abnormal FDG uptake.     Chest: Cardiac enlargement, trace pericardial effusion, extensive coronary  artery calcification, mild aneurysmal dilatation of the ascending aorta 4.1 cm  similar to prior study. Scattered patchy densities of the bilateral lower lobes  similar to prior exam. Bronchial wall thickening also present. More prominent  wedge-shaped soft tissue adjacent to the minor fissure right middle lobe  location appears less dense than on previous exam although there is minimal  elevated FDG activity relative to background parenchyma with Max SUV 1.9. Similar appearance to left lower lobe peripheral density image 77. Abdomen/Pelvis: Subcentimeter right paratracheal lymph node image 75 with  minimal FDG activity, max SUV 2.4. Cholelithiasis without biliary ductal  dilatation. Adrenal glands normal on noncontrast imaging. Left renal simple  cyst. Extensive atherosclerotic calcification of the aorta. There is mild  aneurysmal dilatation of the right common iliac artery measuring 2.3 cm. Minimal  FDG uptake associated with the spinous process L5 likely degenerative  inflammation. No bowel obstruction. No lymphadenopathy. Impression IMPRESSION:   1. Decreased density of process associated with the right middle lobe at the  minor fissure. This focus as well as stable appearing infiltrative process  peripheral left lower lobe each with minimal FDG activity. Likely reactive right  paratracheal lymph node. Favor sequelae of chronic infection. 2. Recommend noncontrast chest CT in 3 months. ASSESSMENT:  (Medical Decision Making)       ICD-10-CM ICD-9-CM    1. Pulmonary infiltrate  Worse on CT R91.8 793.19 SCHEDULE PROCEDURE   2. COPD, severe ([facility] [address] J44.9 496    3. Encounter for long-term (current) use of antiplatelets/antithrombotics Z79.02 V58.63         PLAN:  Reviewed with patient Dr. Lázaro Russell and Dr. Katina Yi recommendations. Will schedule for outpatient bronch and navigational bronch. He is on Plavix and this will need to be held for 5 days prior.     Will hold off on any further antibiotics or steroids until results come back    Orders Placed This Encounter    SCHEDULE PROCEDURE     Bronch with BAL and ANTHONY--per Dr. João Arenas. Follow up with us in 6 weeks. Supervising physician is Dr. Jose Angel Tapia. Over 50% of today's office visit was spent in face to face time reviewing test results/records, prognosis, importance of compliance, education about disease process, benefits of medications, instructions for management of acute flare-ups, and follow up plans. Total time spent was 25 minutes. Larry Black NP  Electronically signed    Dictated using voice recognition software.   Proof read but unrecognized errors may exist.

## 2017-11-28 NOTE — INTERVAL H&P NOTE
H&P Update:  Aby Kim was seen and examined. History and physical has been reviewed. The patient has been examined.  There have been no significant clinical changes since the completion of the originally dated History and Physical.    Signed By: Yovani Mitchell MD     November 28, 2017 8:22 AM

## 2017-11-28 NOTE — PROCEDURES
PROCEDURE    Bronchoscopy with airway inspection/cleanout/BAL    INDICATION   Abnormal CT scan (infiltrates and nodules, many of which improved on today's Super D CT, so planned biopsies not performed, just BAL)    EQUIPMENT:  Olympus T 180 Bronchoscope    ANESTHESIA    Concious sedation with: Fentanyl 250 mcg IV; Versed 2mg IV; Lidocaine 200 mg to tracheo-bronchial tree and vocal cords. IMAGING:  CT Chest 11/28/17            AIRWAY INSPECTION    After obtaining informed consent, orally with use of a bite block, an Olympus T 180 Bronchoscope was introduced into the trachea without complication. RIGHT    LOCATION NORM/ABNORM DESCRIPTION   Larynx NL    VOCAL CORDS NL    TRACHEA NL    KEVIN NL    RMSB NL    RUL NL    BI NL    RML NL    RLL NL    SUP SEGM RLL NL    MED BASAL NL    ANTERIOR BASAL NL    LATERAL BASAL NL    POSTERIOR BASAL NL               LEFT    LOCATION NORM/ABNORM DESCRIPTION   LMSB NL    THAD NL    LINGULA NL    LLL NL    SUPERIOR SEG LLL NL    YOVANNY-MEDIAL LLL NL    LATERAL LLL NL    POSTERIOR LLL NL      While all airways were grossly normal, they were also filled with purulent secretions. There was severe mucus plugging with all visible plugs removed with the use of saline. The following samples were obtained:    BAL: Lingula - cell count, diff, culture, afb, fungus, cytology, CD4:CD8, aspergillus pcr. Bronchial Wash: LLL - routine culture. The procedure was completed without complication and the patient tolerated the procedure well. EBL: <10cc    Recommendations:  Patient's lung process is most likely infectious/inflammatory. Would follow up final culture results and treat accordingly (has grown heavy pseudomonas as recently as August). Continue to follow up serial CT scans. Increase airway clearance regimen at next appointment.      Nupur Ye MD

## 2017-11-28 NOTE — DISCHARGE INSTRUCTIONS
RESPIRATORY CARE - BRONCHOSCOPY - DISCHARGE INSTRUCTIONS      You received a lot of numbing medication for your throat and nose, and you also received medication to make you sleepy during your procedure. Because of this and because the bronchoscopy may have irritated your airways, we ask that you follow these directions:    1. Do not eat or drink until  1030 . After that, you may have what you please. You may want to try some liquids first, because your throat may be a little sore. 2. Medication may cause drowsiness for several hours, therefore, do not drive or operate machinery for remainder of the day. No alcohol today. 3. You may cough up more mucus than usual and you may see some blood, but this is expected and should subside by the following day. 4. If severe throat irritation, coughing, or bleeding continue, call your doctor. 5.         If you run a fever greater than 102, call [facility] at [phone]. 6.         Dr. Delfino Gilliland has asked that you:                A. Call the doctor's office at [phone] results of bronchoscopy next week-you can ask for [facility]. B. See [facility] in the office on 1/10/2018 as scheduled. Discharge Medications:         Any additional instructions:  motrin or tylenol for fever. Resume Plavix as previously scheduled and dosed.     Instructions given to Rani Williamson and other family members

## 2017-11-28 NOTE — PROGRESS NOTES
Discharge instructions and discharge med list given to pt's wife, Lindsay Sheikh, voiced understanding.

## 2017-11-28 NOTE — PROGRESS NOTES
Super D protocol CT reviewed prior to procedure. The predominate areas of concern present on his last CT have shrunk in size. They are no longer good targets for navigation guided biopsy and also are less likely malignancy given their reduction in size. Will not perform TBBx. However, bronch with BAL would still seem to be indicated to guide future abx choices and we will proceed with that.      Brayan Hdez MD

## 2017-11-29 LAB
CD3 CELLS # SPEC: 94 %
CD3+CD4+ CELLS # BLD: 32 %
CD4:CD8 RATIO, C48RBT: 0.53 RATIO
CD8, CD8BT: 60 %
SPECIMEN SOURCE: NORMAL

## 2017-11-30 LAB
A FUMIGATUS DNA SPEC QL NAA+PROBE: NOT DETECTED
A TERREUS DNA SPEC QL NAA+PROBE: NOT DETECTED
ASPERGILLUS DNA SPEC QL NAA+PROBE: NOT DETECTED
BACTERIA SPEC CULT: NORMAL
GRAM STN SPEC: NORMAL
SERVICE CMNT-IMP: NORMAL

## 2017-12-03 LAB
BACTERIA SPEC CULT: ABNORMAL
BACTERIA SPEC CULT: ABNORMAL
GRAM STN SPEC: ABNORMAL
SERVICE CMNT-IMP: ABNORMAL

## 2017-12-27 LAB
FUNGUS CULTURE, RFCO2T: NORMAL
FUNGUS SMEAR, RFCO1T: NORMAL
FUNGUS SPEC CULT: NORMAL
FUNGUS STAIN, 188244: NORMAL
REFLEX TO ID, RFCO3T: NORMAL
SPECIMEN SOURCE: NORMAL
SPECIMEN SOURCE: NORMAL

## 2018-01-11 LAB
ACID FAST STN SPEC: NEGATIVE
MYCOBACTERIUM SPEC QL CULT: NEGATIVE
SPECIMEN PREPARATION: NORMAL
SPECIMEN SOURCE: NORMAL

## 2018-01-31 PROBLEM — J18.9 PNEUMONIA: Status: RESOLVED | Noted: 2017-06-09 | Resolved: 2018-01-31

## 2018-02-16 ENCOUNTER — HOSPITAL ENCOUNTER (OUTPATIENT)
Dept: CT IMAGING | Age: 78
Discharge: HOME | End: 2018-02-16
Attending: NURSE PRACTITIONER
Payer: MEDICARE

## 2018-02-16 DIAGNOSIS — J98.8 PSEUDOMONAS RESPIRATORY INFECTION: ICD-10-CM

## 2018-02-16 DIAGNOSIS — B96.5 PSEUDOMONAS RESPIRATORY INFECTION: ICD-10-CM

## 2018-02-16 DIAGNOSIS — R91.8 PULMONARY NODULES: ICD-10-CM

## 2018-02-16 PROCEDURE — 71250 CT THORAX DX C-: CPT

## 2018-08-16 ENCOUNTER — HOSPITAL ENCOUNTER (OUTPATIENT)
Dept: CT IMAGING | Age: 78
Discharge: HOME | End: 2018-08-16
Attending: NURSE PRACTITIONER
Payer: MEDICARE

## 2018-08-16 DIAGNOSIS — R91.8 PULMONARY NODULES: ICD-10-CM

## 2018-08-16 PROCEDURE — 71250 CT THORAX DX C-: CPT

## 2018-08-16 NOTE — PROGRESS NOTES
Patient is way overdue for appointment. Please schedule follow up appt with us soon. I'm concerned about possible aspiration based upon CT results. May give him results--waxing and waning of nodular densities.

## 2018-10-02 PROBLEM — K58.0 IRRITABLE BOWEL SYNDROME WITH DIARRHEA: Status: ACTIVE | Noted: 2018-10-02

## 2018-10-05 ENCOUNTER — HOSPITAL ENCOUNTER (OUTPATIENT)
Dept: CT IMAGING | Age: 78
Discharge: HOME | End: 2018-10-05
Attending: NURSE PRACTITIONER
Payer: MEDICARE

## 2018-10-05 DIAGNOSIS — J18.9 RECURRENT PNEUMONIA: ICD-10-CM

## 2018-10-05 DIAGNOSIS — R05.9 COUGH: ICD-10-CM

## 2018-10-05 PROCEDURE — 71250 CT THORAX DX C-: CPT

## 2018-10-08 NOTE — PROGRESS NOTES
Please let patient know that CT scan demonstrates MILD bronchiectasis. We can try to start the process for VEST therapy if he agrees or we can hold off and order at another time.

## 2018-12-19 PROBLEM — Z86.69 HISTORY OF MIGRAINE: Status: RESOLVED | Noted: 2017-10-09 | Resolved: 2018-12-19

## 2019-01-23 ENCOUNTER — HOSPITAL ENCOUNTER (INPATIENT)
Age: 79
LOS: 3 days | Discharge: HOME | DRG: 871 | End: 2019-01-26
Attending: EMERGENCY MEDICINE | Admitting: INTERNAL MEDICINE
Payer: MEDICARE

## 2019-01-23 ENCOUNTER — HOSPITAL ENCOUNTER (EMERGENCY)
Age: 79
Discharge: HOME | DRG: 871 | End: 2019-01-23
Attending: EMERGENCY MEDICINE | Admitting: EMERGENCY MEDICINE
Payer: MEDICARE

## 2019-01-23 ENCOUNTER — APPOINTMENT (OUTPATIENT)
Dept: GENERAL RADIOLOGY | Age: 79
DRG: 871 | End: 2019-01-23
Payer: MEDICARE

## 2019-01-23 VITALS
DIASTOLIC BLOOD PRESSURE: 74 MMHG | HEART RATE: 98 BPM | OXYGEN SATURATION: 91 % | BODY MASS INDEX: 22.5 KG/M2 | WEIGHT: 140 LBS | TEMPERATURE: 99.5 F | RESPIRATION RATE: 26 BRPM | HEIGHT: 66 IN | SYSTOLIC BLOOD PRESSURE: 134 MMHG

## 2019-01-23 DIAGNOSIS — J47.1 BRONCHIECTASIS WITH ACUTE EXACERBATION (HCC): Chronic | ICD-10-CM

## 2019-01-23 DIAGNOSIS — J96.01 ACUTE RESPIRATORY FAILURE WITH HYPOXIA (HCC): ICD-10-CM

## 2019-01-23 DIAGNOSIS — J18.9 COMMUNITY ACQUIRED PNEUMONIA, UNSPECIFIED LATERALITY: Primary | ICD-10-CM

## 2019-01-23 DIAGNOSIS — J18.9 COMMUNITY ACQUIRED PNEUMONIA OF RIGHT LOWER LOBE OF LUNG: Primary | ICD-10-CM

## 2019-01-23 DIAGNOSIS — J44.9 CHRONIC OBSTRUCTIVE PULMONARY DISEASE, UNSPECIFIED COPD TYPE (HCC): Chronic | ICD-10-CM

## 2019-01-23 DIAGNOSIS — J18.9 PNEUMONIA OF BOTH LUNGS DUE TO INFECTIOUS ORGANISM, UNSPECIFIED PART OF LUNG: ICD-10-CM

## 2019-01-23 DIAGNOSIS — J44.1 COPD WITH ACUTE EXACERBATION (HCC): ICD-10-CM

## 2019-01-23 LAB
ALBUMIN SERPL-MCNC: 3.1 G/DL (ref 3.2–4.6)
ALBUMIN/GLOB SERPL: 0.7 {RATIO} (ref 1.2–3.5)
ALP SERPL-CCNC: 72 U/L (ref 50–136)
ALT SERPL-CCNC: 84 U/L (ref 12–65)
ANION GAP SERPL CALC-SCNC: 7 MMOL/L (ref 7–16)
AST SERPL-CCNC: 66 U/L (ref 15–37)
ATRIAL RATE: 119 BPM
BASOPHILS # BLD: 0.1 K/UL (ref 0–0.2)
BASOPHILS NFR BLD: 1 % (ref 0–2)
BILIRUB SERPL-MCNC: 1.1 MG/DL (ref 0.2–1.1)
BUN SERPL-MCNC: 7 MG/DL (ref 8–23)
CALCIUM SERPL-MCNC: 9.2 MG/DL (ref 8.3–10.4)
CALCULATED P AXIS, ECG09: 75 DEGREES
CALCULATED R AXIS, ECG10: 59 DEGREES
CALCULATED T AXIS, ECG11: 80 DEGREES
CHLORIDE SERPL-SCNC: 99 MMOL/L (ref 98–107)
CO2 SERPL-SCNC: 28 MMOL/L (ref 21–32)
CREAT SERPL-MCNC: 0.77 MG/DL (ref 0.8–1.5)
DIAGNOSIS, 93000: NORMAL
DIFFERENTIAL METHOD BLD: ABNORMAL
EOSINOPHIL # BLD: 0 K/UL (ref 0–0.8)
EOSINOPHIL NFR BLD: 0 % (ref 0.5–7.8)
ERYTHROCYTE [DISTWIDTH] IN BLOOD BY AUTOMATED COUNT: 14.5 % (ref 11.9–14.6)
GLOBULIN SER CALC-MCNC: 4.5 G/DL (ref 2.3–3.5)
GLUCOSE SERPL-MCNC: 97 MG/DL (ref 65–100)
HCT VFR BLD AUTO: 46.7 % (ref 41.1–50.3)
HGB BLD-MCNC: 15.4 G/DL (ref 13.6–17.2)
IMM GRANULOCYTES # BLD AUTO: 0.2 K/UL (ref 0–0.5)
IMM GRANULOCYTES NFR BLD AUTO: 1 % (ref 0–5)
LACTATE BLD-SCNC: 1.75 MMOL/L (ref 0.5–1.9)
LACTATE BLD-SCNC: 2.89 MMOL/L (ref 0.5–1.9)
LYMPHOCYTES # BLD: 1.1 K/UL (ref 0.5–4.6)
LYMPHOCYTES NFR BLD: 6 % (ref 13–44)
MCH RBC QN AUTO: 28.6 PG (ref 26.1–32.9)
MCHC RBC AUTO-ENTMCNC: 33 G/DL (ref 31.4–35)
MCV RBC AUTO: 86.8 FL (ref 79.6–97.8)
MONOCYTES # BLD: 2.4 K/UL (ref 0.1–1.3)
MONOCYTES NFR BLD: 14 % (ref 4–12)
NEUTS SEG # BLD: 13.3 K/UL (ref 1.7–8.2)
NEUTS SEG NFR BLD: 78 % (ref 43–78)
NRBC # BLD: 0 K/UL (ref 0–0.2)
P-R INTERVAL, ECG05: 164 MS
PLATELET # BLD AUTO: 312 K/UL (ref 150–450)
PMV BLD AUTO: 10 FL (ref 9.4–12.3)
POTASSIUM SERPL-SCNC: 5.1 MMOL/L (ref 3.5–5.1)
PROT SERPL-MCNC: 7.6 G/DL (ref 6.3–8.2)
Q-T INTERVAL, ECG07: 288 MS
QRS DURATION, ECG06: 68 MS
QTC CALCULATION (BEZET), ECG08: 405 MS
RBC # BLD AUTO: 5.38 M/UL (ref 4.23–5.6)
SODIUM SERPL-SCNC: 134 MMOL/L (ref 136–145)
VENTRICULAR RATE, ECG03: 119 BPM
WBC # BLD AUTO: 17 K/UL (ref 4.3–11.1)

## 2019-01-23 PROCEDURE — 93005 ELECTROCARDIOGRAM TRACING: CPT | Performed by: EMERGENCY MEDICINE

## 2019-01-23 PROCEDURE — 93005 ELECTROCARDIOGRAM TRACING: CPT | Performed by: INTERNAL MEDICINE

## 2019-01-23 PROCEDURE — 87040 BLOOD CULTURE FOR BACTERIA: CPT

## 2019-01-23 PROCEDURE — 94640 AIRWAY INHALATION TREATMENT: CPT

## 2019-01-23 PROCEDURE — 74011250637 HC RX REV CODE- 250/637: Performed by: EMERGENCY MEDICINE

## 2019-01-23 PROCEDURE — 96365 THER/PROPH/DIAG IV INF INIT: CPT | Performed by: EMERGENCY MEDICINE

## 2019-01-23 PROCEDURE — 85025 COMPLETE CBC W/AUTO DIFF WBC: CPT

## 2019-01-23 PROCEDURE — 74011250636 HC RX REV CODE- 250/636: Performed by: EMERGENCY MEDICINE

## 2019-01-23 PROCEDURE — 65270000029 HC RM PRIVATE

## 2019-01-23 PROCEDURE — 74011000258 HC RX REV CODE- 258: Performed by: EMERGENCY MEDICINE

## 2019-01-23 PROCEDURE — 87804 INFLUENZA ASSAY W/OPTIC: CPT

## 2019-01-23 PROCEDURE — 83605 ASSAY OF LACTIC ACID: CPT

## 2019-01-23 PROCEDURE — 99285 EMERGENCY DEPT VISIT HI MDM: CPT | Performed by: EMERGENCY MEDICINE

## 2019-01-23 PROCEDURE — 74011000250 HC RX REV CODE- 250: Performed by: EMERGENCY MEDICINE

## 2019-01-23 PROCEDURE — 80053 COMPREHEN METABOLIC PANEL: CPT

## 2019-01-23 PROCEDURE — 71046 X-RAY EXAM CHEST 2 VIEWS: CPT

## 2019-01-23 PROCEDURE — 77030013140 HC MSK NEB VYRM -A

## 2019-01-23 PROCEDURE — 99284 EMERGENCY DEPT VISIT MOD MDM: CPT | Performed by: EMERGENCY MEDICINE

## 2019-01-23 RX ORDER — IPRATROPIUM BROMIDE AND ALBUTEROL SULFATE 2.5; .5 MG/3ML; MG/3ML
3 SOLUTION RESPIRATORY (INHALATION)
Status: COMPLETED | OUTPATIENT
Start: 2019-01-23 | End: 2019-01-23

## 2019-01-23 RX ORDER — SUMATRIPTAN 50 MG/1
50 TABLET, FILM COATED ORAL
Status: COMPLETED | OUTPATIENT
Start: 2019-01-23 | End: 2019-01-23

## 2019-01-23 RX ORDER — CEFDINIR 300 MG/1
300 CAPSULE ORAL 2 TIMES DAILY
Qty: 14 CAP | Refills: 0 | Status: SHIPPED | OUTPATIENT
Start: 2019-01-23 | End: 2019-01-26

## 2019-01-23 RX ORDER — ACETAMINOPHEN 500 MG
1000 TABLET ORAL
Status: COMPLETED | OUTPATIENT
Start: 2019-01-23 | End: 2019-01-23

## 2019-01-23 RX ADMIN — AZITHROMYCIN MONOHYDRATE 500 MG: 500 INJECTION, POWDER, LYOPHILIZED, FOR SOLUTION INTRAVENOUS at 23:50

## 2019-01-23 RX ADMIN — SODIUM CHLORIDE 1000 ML: 900 INJECTION, SOLUTION INTRAVENOUS at 23:50

## 2019-01-23 RX ADMIN — ACETAMINOPHEN 1000 MG: 500 TABLET ORAL at 17:00

## 2019-01-23 RX ADMIN — SODIUM CHLORIDE 1000 ML: 900 INJECTION, SOLUTION INTRAVENOUS at 15:12

## 2019-01-23 RX ADMIN — SUMATRIPTAN SUCCINATE 50 MG: 50 TABLET ORAL at 16:49

## 2019-01-23 RX ADMIN — IPRATROPIUM BROMIDE AND ALBUTEROL SULFATE 3 ML: .5; 3 SOLUTION RESPIRATORY (INHALATION) at 23:46

## 2019-01-23 RX ADMIN — CEFTRIAXONE 1 G: 1 INJECTION, POWDER, FOR SOLUTION INTRAMUSCULAR; INTRAVENOUS at 16:08

## 2019-01-23 NOTE — DISCHARGE INSTRUCTIONS
Patient Education        Pneumonia: Care Instructions  Your Care Instructions    Pneumonia is an infection of the lungs. Most cases are caused by infections from bacteria or viruses. Pneumonia may be mild or very severe. If it is caused by bacteria, you will be treated with antibiotics. It may take a few weeks to a few months to recover fully from pneumonia, depending on how sick you were and whether your overall health is good. Follow-up care is a key part of your treatment and safety. Be sure to make and go to all appointments, and call your doctor if you are having problems. It's also a good idea to know your test results and keep a list of the medicines you take. How can you care for yourself at home? · Take your antibiotics exactly as directed. Do not stop taking the medicine just because you are feeling better. You need to take the full course of antibiotics. · Take your medicines exactly as prescribed. Call your doctor if you think you are having a problem with your medicine. · Get plenty of rest and sleep. You may feel weak and tired for a while, but your energy level will improve with time. · To prevent dehydration, drink plenty of fluids, enough so that your urine is light yellow or clear like water. Choose water and other caffeine-free clear liquids until you feel better. If you have kidney, heart, or liver disease and have to limit fluids, talk with your doctor before you increase the amount of fluids you drink. · Take care of your cough so you can rest. A cough that brings up mucus from your lungs is common with pneumonia. It is one way your body gets rid of the infection. But if coughing keeps you from resting or causes severe fatigue and chest-wall pain, talk to your doctor. He or she may suggest that you take a medicine to reduce the cough. · Use a vaporizer or humidifier to add moisture to your bedroom. Follow the directions for cleaning the machine.   · Do not smoke or allow others to smoke around you. Smoke will make your cough last longer. If you need help quitting, talk to your doctor about stop-smoking programs and medicines. These can increase your chances of quitting for good. · Take an over-the-counter pain medicine, such as acetaminophen (Tylenol), ibuprofen (Advil, Motrin), or naproxen (Aleve). Read and follow all instructions on the label. · Do not take two or more pain medicines at the same time unless the doctor told you to. Many pain medicines have acetaminophen, which is Tylenol. Too much acetaminophen (Tylenol) can be harmful. · If you were given a spirometer to measure how well your lungs are working, use it as instructed. This can help your doctor tell how your recovery is going. · To prevent pneumonia in the future, talk to your doctor about getting a flu vaccine (once a year) and a pneumococcal vaccine (one time only for most people). When should you call for help? Call 911 anytime you think you may need emergency care. For example, call if:    · You have severe trouble breathing.    Call your doctor now or seek immediate medical care if:    · You cough up dark brown or bloody mucus (sputum).     · You have new or worse trouble breathing.     · You are dizzy or lightheaded, or you feel like you may faint.    Watch closely for changes in your health, and be sure to contact your doctor if:    · You have a new or higher fever.     · You are coughing more deeply or more often.     · You are not getting better after 2 days (48 hours).     · You do not get better as expected. Where can you learn more? Go to http://kiran-justice.info/. Enter [id number] in the search box to learn more about \"Pneumonia: Care Instructions. \"  Current as of: September 5, 2018  Content Version: 11.9  © 6554-7133 [employer]. Care instructions adapted under license by [employer] (which disclaims liability or warranty for this information).  If you have questions about a medical condition or this instruction, always ask your healthcare professional. [place] [address] any warranty or liability for your use of this information.

## 2019-01-23 NOTE — ED NOTES
Patient reports migraine HA type symptoms. Patient reports he takes 50mg imitrex PRN- requests a dose. MD Marmolejo Drafts advised. Patient off floor to XR.

## 2019-01-23 NOTE — ED PROVIDER NOTES
77-year-old white male with history of coronary artery disease and COPD but not on supplemental oxygen presents with cough and shortness breath and low-grade fever for the past 2 days. Cough is productive of a yellow green nonbloody sputum. Temperature yesterday 100.9. No chest pain. No vomiting. No abdominal pain. Patient also complaining of a headache which she says feels similar to previous migraines. He takes 50 mg of oral Imitrex. The history is provided by the patient. Cough Associated symptoms include chills, headaches and shortness of breath. Pertinent negatives include no chest pain, no nausea and no vomiting. Past Medical History:  
Diagnosis Date  Arthritis  Benign essential HTN 6/6/2017  Chronic obstructive pulmonary disease ([address]  Diabetes ([address]  Fungal sinusitis  GERD (gastroesophageal reflux disease)  History of multiple allergies  Hx of migraines  Hypertension  Irritable bowel syndrome with diarrhea 10/2/2018  Shingles 10/1/15  Sinus problem  Thoracic aneurysm without mention of rupture 12/2/2014 No chest or upper back pain. Echo shows a mildly dilated aortic root at 4.0 cm. There is mild LVH and normal systolic function. Past Surgical History:  
Procedure Laterality Date  HX APPENDECTOMY  age 9  
 HX CATARACT REMOVAL Bilateral   
 HX HEART CATHETERIZATION  2/27/12  
 stent x 1  
 HX HEENT  1999  
 sinus x3  
 HX HERNIA REPAIR Right 1978  
 inguinal  
 HX OTHER SURGICAL  2/2012  
 neurostimulator implant for migraines at [facility] in [address] childhood  NEUROLOGICAL PROCEDURE UNLISTED Neurostimulator implant for migraines 2/2012 Family History:  
Problem Relation Age of Onset  No Known Problems Sister  No Known Problems Brother  No Known Problems Sister  COPD Mother  Asthma Father  Dementia Other UNCLE Social History Socioeconomic History  Marital status:  Spouse name: Murvin Boxer  Number of children: 3  
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  
  Alcohol/week: 0.0 oz  Drug use: No  
 Sexual activity: Not on file Other Topics Concern  Not on file Social History Narrative  and lives with wife. ALLERGIES: Aspirin; Aleve [naproxen sodium]; Codeine sulfate; Corn containing products; Crestor [rosuvastatin]; Septra [sulfamethoprim ds]; Zetia [ezetimibe]; and Zoloft [sertraline] Review of Systems Constitutional: Positive for chills and fever. HENT: Positive for congestion. Respiratory: Positive for cough and shortness of breath. Cardiovascular: Negative for chest pain and leg swelling. Gastrointestinal: Negative for abdominal pain, nausea and vomiting. Genitourinary: Negative for dysuria. Musculoskeletal: Negative for back pain and neck pain. Skin: Negative for rash. Neurological: Positive for headaches. Vitals:  
 19 1342 19 1551 BP: 121/75 132/75 Pulse: (!) 133 (!) 121 Resp: 20 20 Temp: (!) 100.8 °F (38.2 °C) 99.8 °F (37.7 °C) SpO2: 97% 95% Weight: 63.5 kg (140 lb) Height: 5' 6\" (1.676 m) Physical Exam  
Constitutional: He is oriented to person, place, and time.  He appears well-developed and well-nourished. No distress. HENT:  
Head: Normocephalic and atraumatic. Mouth/Throat: Oropharynx is clear and moist.  
Eyes: Conjunctivae are normal. Pupils are equal, round, and reactive to light. Neck: Normal range of motion. Neck supple. Cardiovascular: Regular rhythm. Tachycardia present. Pulmonary/Chest: Effort normal.  
Mild coarseness both bases Abdominal: Soft. He exhibits no distension. There is no tenderness. There is no guarding. Musculoskeletal: Normal range of motion. He exhibits no edema. Neurological: He is alert and oriented to person, place, and time. Skin: Skin is warm and dry. Psychiatric: He has a normal mood and affect. His behavior is normal.  
Nursing note and vitals reviewed. MDM Number of Diagnoses or Management Options Diagnosis management comments: Lab work as a leukocytosis 17.0, but otherwise normal basic panel, LFTs and lactic acid. Chest x-ray mild right basilar scar versus infiltrate. Patient being treated with IV fluids, Rocephin and oral Imitrex. Patient is feeling better. At this time I'm concerned he may have pneumonia that is not showing up on chest x-ray. Have recommended admission however patient would prefer to be treated as an outpatient. His O2 saturations remained in the 90s without supplemental oxygen therefore will allow patient to be treated as an outpatient. Amount and/or Complexity of Data Reviewed Clinical lab tests: ordered and reviewed Tests in the radiology section of CPT®: ordered and reviewed Tests in the medicine section of CPT®: ordered and reviewed Independent visualization of images, tracings, or specimens: yes Risk of Complications, Morbidity, and/or Mortality Presenting problems: moderate Diagnostic procedures: moderate Management options: moderate Procedures

## 2019-01-23 NOTE — ED NOTES
I have reviewed discharge instructions with the patient and spouse. The patient and spouse verbalized understanding. Patient left ED via Discharge Method: ambulatory to Home with (spouse. Opportunity for questions and clarification provided. Patient given 1 scripts. No e-sign To continue your aftercare when you leave the hospital, you may receive an automated call from our care team to check in on how you are doing. This is a free service and part of our promise to provide the best care and service to meet your aftercare needs.  If you have questions, or wish to unsubscribe from this service please call [phone]. Thank you for Choosing our [facility] Emergency Department.

## 2019-01-24 PROBLEM — J18.9 PNEUMONIA: Status: ACTIVE | Noted: 2019-01-24

## 2019-01-24 PROBLEM — J47.9 BRONCHIECTASIS (HCC): Chronic | Status: ACTIVE | Noted: 2019-01-24

## 2019-01-24 PROBLEM — J96.01 ACUTE RESPIRATORY FAILURE WITH HYPOXIA (HCC): Status: ACTIVE | Noted: 2019-01-24

## 2019-01-24 PROBLEM — A41.9 SEPSIS (HCC): Status: ACTIVE | Noted: 2019-01-24

## 2019-01-24 LAB
ATRIAL RATE: 126 BPM
CALCULATED P AXIS, ECG09: 68 DEGREES
CALCULATED R AXIS, ECG10: 46 DEGREES
CALCULATED T AXIS, ECG11: 70 DEGREES
DIAGNOSIS, 93000: NORMAL
FLUAV AG NPH QL IA: NEGATIVE
FLUBV AG NPH QL IA: NEGATIVE
GLUCOSE BLD STRIP.AUTO-MCNC: 158 MG/DL (ref 65–100)
GLUCOSE BLD STRIP.AUTO-MCNC: 186 MG/DL (ref 65–100)
GLUCOSE BLD STRIP.AUTO-MCNC: 211 MG/DL (ref 65–100)
GLUCOSE BLD STRIP.AUTO-MCNC: 223 MG/DL (ref 65–100)
P-R INTERVAL, ECG05: 152 MS
Q-T INTERVAL, ECG07: 298 MS
QRS DURATION, ECG06: 78 MS
QTC CALCULATION (BEZET), ECG08: 431 MS
SPECIMEN SOURCE: NORMAL
VENTRICULAR RATE, ECG03: 126 BPM

## 2019-01-24 PROCEDURE — 77030021668 HC NEB PREFIL KT VYRM -A

## 2019-01-24 PROCEDURE — 94760 N-INVAS EAR/PLS OXIMETRY 1: CPT

## 2019-01-24 PROCEDURE — 74011250636 HC RX REV CODE- 250/636: Performed by: INTERNAL MEDICINE

## 2019-01-24 PROCEDURE — 74011250637 HC RX REV CODE- 250/637: Performed by: INTERNAL MEDICINE

## 2019-01-24 PROCEDURE — 65270000029 HC RM PRIVATE

## 2019-01-24 PROCEDURE — 86580 TB INTRADERMAL TEST: CPT | Performed by: INTERNAL MEDICINE

## 2019-01-24 PROCEDURE — 74011000250 HC RX REV CODE- 250: Performed by: INTERNAL MEDICINE

## 2019-01-24 PROCEDURE — 99223 1ST HOSP IP/OBS HIGH 75: CPT | Performed by: INTERNAL MEDICINE

## 2019-01-24 PROCEDURE — 74011250637 HC RX REV CODE- 250/637: Performed by: NURSE PRACTITIONER

## 2019-01-24 PROCEDURE — 74011000258 HC RX REV CODE- 258: Performed by: INTERNAL MEDICINE

## 2019-01-24 PROCEDURE — 82962 GLUCOSE BLOOD TEST: CPT

## 2019-01-24 PROCEDURE — 94640 AIRWAY INHALATION TREATMENT: CPT

## 2019-01-24 PROCEDURE — 74011000250 HC RX REV CODE- 250: Performed by: NURSE PRACTITIONER

## 2019-01-24 PROCEDURE — 74011636637 HC RX REV CODE- 636/637: Performed by: INTERNAL MEDICINE

## 2019-01-24 PROCEDURE — 77010033678 HC OXYGEN DAILY

## 2019-01-24 PROCEDURE — 87070 CULTURE OTHR SPECIMN AEROBIC: CPT

## 2019-01-24 PROCEDURE — 74011000302 HC RX REV CODE- 302: Performed by: INTERNAL MEDICINE

## 2019-01-24 RX ORDER — TAMSULOSIN HYDROCHLORIDE 0.4 MG/1
0.4 CAPSULE ORAL DAILY
Status: DISCONTINUED | OUTPATIENT
Start: 2019-01-24 | End: 2019-01-26 | Stop reason: HOSPADM

## 2019-01-24 RX ORDER — ALBUTEROL SULFATE 0.83 MG/ML
2.5 SOLUTION RESPIRATORY (INHALATION)
Status: DISCONTINUED | OUTPATIENT
Start: 2019-01-24 | End: 2019-01-26 | Stop reason: HOSPADM

## 2019-01-24 RX ORDER — ENOXAPARIN SODIUM 100 MG/ML
40 INJECTION SUBCUTANEOUS EVERY 24 HOURS
Status: DISCONTINUED | OUTPATIENT
Start: 2019-01-24 | End: 2019-01-26 | Stop reason: HOSPADM

## 2019-01-24 RX ORDER — GUAIFENESIN 600 MG/1
1200 TABLET, EXTENDED RELEASE ORAL 2 TIMES DAILY
Status: DISCONTINUED | OUTPATIENT
Start: 2019-01-24 | End: 2019-01-26 | Stop reason: HOSPADM

## 2019-01-24 RX ORDER — FLUTICASONE PROPIONATE 50 MCG
2 SPRAY, SUSPENSION (ML) NASAL DAILY
Status: DISCONTINUED | OUTPATIENT
Start: 2019-01-24 | End: 2019-01-26 | Stop reason: HOSPADM

## 2019-01-24 RX ORDER — IPRATROPIUM BROMIDE AND ALBUTEROL SULFATE 2.5; .5 MG/3ML; MG/3ML
3 SOLUTION RESPIRATORY (INHALATION)
Status: DISCONTINUED | OUTPATIENT
Start: 2019-01-24 | End: 2019-01-24

## 2019-01-24 RX ORDER — SODIUM CHLORIDE 0.9 % (FLUSH) 0.9 %
5-40 SYRINGE (ML) INJECTION EVERY 8 HOURS
Status: DISCONTINUED | OUTPATIENT
Start: 2019-01-24 | End: 2019-01-26 | Stop reason: HOSPADM

## 2019-01-24 RX ORDER — NITROGLYCERIN 0.4 MG/1
0.4 TABLET SUBLINGUAL
Status: DISCONTINUED | OUTPATIENT
Start: 2019-01-24 | End: 2019-01-26 | Stop reason: HOSPADM

## 2019-01-24 RX ORDER — SUMATRIPTAN 50 MG/1
50 TABLET, FILM COATED ORAL
Status: COMPLETED | OUTPATIENT
Start: 2019-01-24 | End: 2019-01-24

## 2019-01-24 RX ORDER — CLOPIDOGREL BISULFATE 75 MG/1
75 TABLET ORAL DAILY
Status: DISCONTINUED | OUTPATIENT
Start: 2019-01-24 | End: 2019-01-26 | Stop reason: HOSPADM

## 2019-01-24 RX ORDER — GABAPENTIN 300 MG/1
300 CAPSULE ORAL 3 TIMES DAILY
Status: DISCONTINUED | OUTPATIENT
Start: 2019-01-24 | End: 2019-01-26 | Stop reason: HOSPADM

## 2019-01-24 RX ORDER — LATANOPROST 50 UG/ML
1 SOLUTION/ DROPS OPHTHALMIC
Status: DISCONTINUED | OUTPATIENT
Start: 2019-01-24 | End: 2019-01-26 | Stop reason: HOSPADM

## 2019-01-24 RX ORDER — SODIUM CHLORIDE 0.9 % (FLUSH) 0.9 %
5-40 SYRINGE (ML) INJECTION AS NEEDED
Status: DISCONTINUED | OUTPATIENT
Start: 2019-01-24 | End: 2019-01-26 | Stop reason: HOSPADM

## 2019-01-24 RX ORDER — LEVOFLOXACIN 5 MG/ML
750 INJECTION, SOLUTION INTRAVENOUS EVERY 24 HOURS
Status: DISCONTINUED | OUTPATIENT
Start: 2019-01-24 | End: 2019-01-24

## 2019-01-24 RX ORDER — INSULIN LISPRO 100 [IU]/ML
INJECTION, SOLUTION INTRAVENOUS; SUBCUTANEOUS
Status: DISCONTINUED | OUTPATIENT
Start: 2019-01-24 | End: 2019-01-26 | Stop reason: HOSPADM

## 2019-01-24 RX ORDER — ONDANSETRON 2 MG/ML
4 INJECTION INTRAMUSCULAR; INTRAVENOUS
Status: DISCONTINUED | OUTPATIENT
Start: 2019-01-24 | End: 2019-01-26 | Stop reason: HOSPADM

## 2019-01-24 RX ORDER — SUMATRIPTAN 50 MG/1
50 TABLET, FILM COATED ORAL
Status: DISCONTINUED | OUTPATIENT
Start: 2019-01-24 | End: 2019-01-26 | Stop reason: HOSPADM

## 2019-01-24 RX ORDER — ACETAMINOPHEN 325 MG/1
650 TABLET ORAL
Status: DISCONTINUED | OUTPATIENT
Start: 2019-01-24 | End: 2019-01-26 | Stop reason: HOSPADM

## 2019-01-24 RX ORDER — BUDESONIDE 0.5 MG/2ML
500 INHALANT ORAL
Status: DISCONTINUED | OUTPATIENT
Start: 2019-01-24 | End: 2019-01-26 | Stop reason: HOSPADM

## 2019-01-24 RX ORDER — SAME BUTANEDISULFONATE/BETAINE 400-600 MG
500 POWDER IN PACKET (EA) ORAL 2 TIMES DAILY
Status: DISCONTINUED | OUTPATIENT
Start: 2019-01-24 | End: 2019-01-24

## 2019-01-24 RX ORDER — HYDROCODONE BITARTRATE AND ACETAMINOPHEN 5; 325 MG/1; MG/1
1 TABLET ORAL
Status: DISCONTINUED | OUTPATIENT
Start: 2019-01-24 | End: 2019-01-26 | Stop reason: HOSPADM

## 2019-01-24 RX ADMIN — LEVOFLOXACIN 750 MG: 5 INJECTION, SOLUTION INTRAVENOUS at 01:59

## 2019-01-24 RX ADMIN — GABAPENTIN 300 MG: 300 CAPSULE ORAL at 08:25

## 2019-01-24 RX ADMIN — IPRATROPIUM BROMIDE AND ALBUTEROL SULFATE 3 ML: .5; 3 SOLUTION RESPIRATORY (INHALATION) at 03:29

## 2019-01-24 RX ADMIN — INSULIN LISPRO 2 UNITS: 100 INJECTION, SOLUTION INTRAVENOUS; SUBCUTANEOUS at 07:30

## 2019-01-24 RX ADMIN — FLUTICASONE PROPIONATE 2 SPRAY: 50 SPRAY, METERED NASAL at 09:21

## 2019-01-24 RX ADMIN — Medication 10 ML: at 13:22

## 2019-01-24 RX ADMIN — METHYLPREDNISOLONE SODIUM SUCCINATE 40 MG: 125 INJECTION, POWDER, FOR SOLUTION INTRAMUSCULAR; INTRAVENOUS at 08:25

## 2019-01-24 RX ADMIN — GABAPENTIN 300 MG: 300 CAPSULE ORAL at 22:01

## 2019-01-24 RX ADMIN — BUDESONIDE 500 MCG: 0.5 INHALANT RESPIRATORY (INHALATION) at 20:05

## 2019-01-24 RX ADMIN — GUAIFENESIN 1200 MG: 600 TABLET, EXTENDED RELEASE ORAL at 17:08

## 2019-01-24 RX ADMIN — Medication 500 MG: at 09:21

## 2019-01-24 RX ADMIN — PIPERACILLIN AND TAZOBACTAM 4.5 G: 4; .5 INJECTION, POWDER, FOR SOLUTION INTRAVENOUS at 19:10

## 2019-01-24 RX ADMIN — INSULIN LISPRO 2 UNITS: 100 INJECTION, SOLUTION INTRAVENOUS; SUBCUTANEOUS at 17:06

## 2019-01-24 RX ADMIN — Medication 10 ML: at 02:00

## 2019-01-24 RX ADMIN — LATANOPROST 1 DROP: 50 SOLUTION OPHTHALMIC at 22:00

## 2019-01-24 RX ADMIN — ALBUTEROL SULFATE 2.5 MG: 2.5 SOLUTION RESPIRATORY (INHALATION) at 15:13

## 2019-01-24 RX ADMIN — INSULIN LISPRO 4 UNITS: 100 INJECTION, SOLUTION INTRAVENOUS; SUBCUTANEOUS at 22:00

## 2019-01-24 RX ADMIN — BUDESONIDE 500 MCG: 0.5 INHALANT RESPIRATORY (INHALATION) at 07:32

## 2019-01-24 RX ADMIN — CLOPIDOGREL BISULFATE 75 MG: 75 TABLET ORAL at 08:25

## 2019-01-24 RX ADMIN — ENOXAPARIN SODIUM 40 MG: 40 INJECTION SUBCUTANEOUS at 08:27

## 2019-01-24 RX ADMIN — ALBUTEROL SULFATE 2.5 MG: 2.5 SOLUTION RESPIRATORY (INHALATION) at 23:52

## 2019-01-24 RX ADMIN — GABAPENTIN 300 MG: 300 CAPSULE ORAL at 16:00

## 2019-01-24 RX ADMIN — METHYLPREDNISOLONE SODIUM SUCCINATE 40 MG: 125 INJECTION, POWDER, FOR SOLUTION INTRAMUSCULAR; INTRAVENOUS at 22:00

## 2019-01-24 RX ADMIN — TAMSULOSIN HYDROCHLORIDE 0.4 MG: 0.4 CAPSULE ORAL at 08:25

## 2019-01-24 RX ADMIN — PIPERACILLIN AND TAZOBACTAM 4.5 G: 4; .5 INJECTION, POWDER, FOR SOLUTION INTRAVENOUS at 11:45

## 2019-01-24 RX ADMIN — TUBERCULIN PURIFIED PROTEIN DERIVATIVE 5 UNITS: 5 INJECTION, SOLUTION INTRADERMAL at 01:59

## 2019-01-24 RX ADMIN — Medication 10 ML: at 22:01

## 2019-01-24 RX ADMIN — METHYLPREDNISOLONE SODIUM SUCCINATE 40 MG: 125 INJECTION, POWDER, FOR SOLUTION INTRAMUSCULAR; INTRAVENOUS at 01:59

## 2019-01-24 RX ADMIN — SUMATRIPTAN SUCCINATE 50 MG: 50 TABLET ORAL at 11:45

## 2019-01-24 RX ADMIN — IPRATROPIUM BROMIDE AND ALBUTEROL SULFATE 3 ML: .5; 3 SOLUTION RESPIRATORY (INHALATION) at 07:32

## 2019-01-24 RX ADMIN — INSULIN LISPRO 4 UNITS: 100 INJECTION, SOLUTION INTRAVENOUS; SUBCUTANEOUS at 11:30

## 2019-01-24 RX ADMIN — ALBUTEROL SULFATE 2.5 MG: 2.5 SOLUTION RESPIRATORY (INHALATION) at 20:05

## 2019-01-24 RX ADMIN — GUAIFENESIN 1200 MG: 600 TABLET, EXTENDED RELEASE ORAL at 13:42

## 2019-01-24 NOTE — H&P
Hospitalist H&P Note Admit Date:  2019 10:51 PM  
Name:  Trace Hernandez Age:  66 y.o. 
:  1940 MRN:  [id number] PCP:  Teetee Magallanes DO Treatment Team: Attending Provider: Akhil Beckett MD; Primary Nurse: Selena Sanders CC: sob and wheezing HPI:  
72yr old male with pmhx sig for copd and bronchiectasis- relatively well until 2-3 days ago when he noted increased wheezing and sob. Pt has vest at home along with bronchodilator which he has been using but symptoms got worse. He went to [facility] today and was sent to the er. He was treated and declined to be admitted. But he went home and wheezing and sob got worse so he came back to the ER. He was again found to be in hypoxic resp failure and wheezing- hospitalist asked to admit. .. 10 systems reviewed and negative except as noted in HPI. Past Medical History:  
Diagnosis Date  Arthritis  Benign essential HTN 2017  Chronic obstructive pulmonary disease ([address]  Diabetes ([address]  Fungal sinusitis  GERD (gastroesophageal reflux disease)  History of multiple allergies  Hx of migraines  Hypertension  Irritable bowel syndrome with diarrhea 10/2/2018  Shingles 10/1/15  Sinus problem  Thoracic aneurysm without mention of rupture 2014 No chest or upper back pain. Echo shows a mildly dilated aortic root at 4.0 cm. There is mild LVH and normal systolic function. Past Surgical History:  
Procedure Laterality Date  HX APPENDECTOMY  age 9  
 HX CATARACT REMOVAL Bilateral   
 HX HEART CATHETERIZATION  12  
 stent x 1  
 HX HEENT    
 sinus x3  
 HX HERNIA REPAIR Right 1978  
 inguinal  
 HX OTHER SURGICAL  2012  
 neurostimulator implant for migraines at [facility] in [facility] childhood  NEUROLOGICAL PROCEDURE UNLISTED Neurostimulator implant for migraines 2012 Allergies Allergen Reactions  Aspirin Swelling and Anaphylaxis  Aleve [Naproxen Sodium] Unknown (comments)  Codeine Sulfate Other (comments)  
  headache  Corn Containing Products Other (comments) Not allergic  Crestor [Rosuvastatin] Unknown (comments)  
  unknown  Septra [Sulfamethoprim Ds] Unknown (comments) Not allergic  Zetia [Ezetimibe] Unknown (comments) Unknown allergy  Zoloft [Sertraline] Unknown (comments) Not allergic Social History Tobacco Use  Smoking status: Former Smoker Packs/day: 1.50 Years: 40.00 Pack years: 60.00 Types: Cigarettes Last attempt to quit: 1973 Years since quittin.0  Smokeless tobacco: Never Used Substance Use Topics  Alcohol use: No  
  Alcohol/week: 0.0 oz Family History Problem Relation Age of Onset  No Known Problems Sister  No Known Problems Brother  No Known Problems Sister  COPD Mother  Asthma Father  Dementia Other UNCLE Immunization History Administered Date(s) Administered  Influenza High Dose Vaccine PF 2015, 10/15/2016, 10/06/2017  Influenza Vaccine 2012, 2014, 10/01/2014  Pneumococcal Conjugate (PCV-13) 2016  Pneumococcal Polysaccharide (PPSV-23) 2017  Pneumococcal Vaccine (Unspecified Type) 2010  Zoster Vaccine, Live 2014 PTA Medications: 
Prior to Admission Medications Prescriptions Last Dose Informant Patient Reported? Taking? ADVAIR DISKUS 500-50 mcg/dose diskus inhaler   No No  
Sig: INHALE 1 PUFF BY MOUTH 2 TIMES DAILY B.infantis-B.ani-B.long-B.bifi (PROBIOTIC 4X) 10-15 mg TbEC   Yes No  
Sig: Take  by mouth. EPINEPHrine (EPIPEN) 0.3 mg/0.3 mL (1:1,000) injection   Yes No  
Si.3 mg by IntraMUSCular route once as needed. Lancets misc   No No  
Sig: Check BS once a day fasting (DX: E11.9); one touch verio PROAIR HFA 90 mcg/actuation inhaler   No No  
 Sig: INHALE 2 PUFFS BY MOUTH EVERY 4 HOURS AS NEEDED  
SPIRIVA WITH HANDIHALER 18 mcg inhalation capsule   No No  
Sig: INHALE 1 CAPSULE VIA HANDIHALER ONCE DAILY AT THE SAME TIME EVERY DAY  
SUMAtriptan (IMITREX) 100 mg tablet   No No  
Sig: TAKE 1/2 TO 1 TABLET BY MOUTH AS NEEDED FOR MIGRAINE MAY REPEAT IN 2HRS MAX 2/24HRS  
VIT A/VIT C/VIT E/ZINC/COPPER (PRESERVISION AREDS PO)   Yes No  
Sig: Take 1 Tab by mouth two (2) times a day. albuterol (PROVENTIL VENTOLIN) 2.5 mg /3 mL (0.083 %) nebulizer solution   No No  
Sig: USE 3 ML BY NEBULIZATION ROUTE THREE (3) TIMES DAILY. albuterol (PROVENTIL VENTOLIN) 2.5 mg /3 mL (0.083 %) nebulizer solution   No No  
Sig: 3 mL by Nebulization route three (3) times daily. cefdinir (OMNICEF) 300 mg capsule   No No  
Sig: Take 1 Cap by mouth two (2) times a day. cholecalciferol (VITAMIN D3) 400 unit tab tablet   Yes No  
Sig: Take  by mouth daily. clopidogrel (PLAVIX) 75 mg tab   No No  
Sig: TAKE 1 TAB BY MOUTH DAILY. gabapentin (NEURONTIN) 300 mg capsule   No No  
Sig: Take 1 Cap by mouth three (3) times daily. glucose blood VI test strips (BLOOD GLUCOSE TEST) strip   No No  
Sig: Check BS once a day fasting (DX: E11.9); one touch verio  
latanoprost (XALATAN) 0.005 % ophthalmic solution   Yes No  
Sig: Administer 1 Drop to both eyes nightly. nitroglycerin (NITROSTAT) 0.4 mg SL tablet   No No  
Si Tab by SubLINGual route every five (5) minutes as needed for Chest Pain. predniSONE (DELTASONE) 20 mg tablet   No No  
Simg daily x 5d, then 20mg daily x 5d, then D/C.  
tamsulosin (FLOMAX) 0.4 mg capsule   No No  
Sig: TAKE 1 CAP BY MOUTH DAILY. INDICATIONS: BENIGN PROSTATIC HYPERPLASIA WITH LOWER URINARY TRACT SX  
triamcinolone (NASACORT) 55 mcg nasal inhaler   No No  
Si Sprays by Both Nostrils route daily. Indications: Allergic Rhinitis Facility-Administered Medications: None Objective:  
 
Patient Vitals for the past 24 hrs: Temp Pulse Resp BP SpO2  
01/23/19 2346     96 % 01/23/19 2245 98.2 °F (36.8 °C) (!) 127 30 115/69 90 % Oxygen Therapy O2 Sat (%): 96 % (01/23/19 2346) Pulse via Oximetry: 120 beats per minute (01/23/19 2346) O2 Device: Nasal cannula (01/23/19 2346) O2 Flow Rate (L/min): 3 l/min (01/23/19 2346) No intake or output data in the 24 hours ending 01/24/19 0023 Physical Exam: 
General:    Well nourished. Alert. Eyes:   Normal sclera. Extraocular movements intact. ENT:  Normocephalic, atraumatic. Moist mucous membranes CV:   RRR. No m/r/g. Peripheral pulses present. Capillary refill normal 
Lungs:  CTAB. No wheezing, rhonchi, or rales. Abdomen: Soft, nontender, nondistended. Bowel sounds normal.  
Extremities: Warm and dry. No cyanosis or edema. Neurologic: CN II-XII grossly intact. Sensation intact. Skin:     No rashes or jaundice. Normal coloration Psych:  Normal mood and affect. I reviewed the labs, imaging, EKGs, telemetry, and other studies done this admission. Data Review:  
Recent Results (from the past 24 hour(s)) CBC WITH AUTOMATED DIFF Collection Time: 01/23/19  1:47 PM  
Result Value Ref Range WBC 17.0 (H) 4.3 - 11.1 K/uL  
 RBC 5.38 4.23 - 5.6 M/uL  
 HGB 15.4 13.6 - 17.2 g/dL HCT 46.7 41.1 - 50.3 % MCV 86.8 79.6 - 97.8 FL  
 MCH 28.6 26.1 - 32.9 PG  
 MCHC 33.0 31.4 - 35.0 g/dL  
 RDW 14.5 11.9 - 14.6 % PLATELET 009 142 - 421 K/uL MPV 10.0 9.4 - 12.3 FL ABSOLUTE NRBC 0.00 0.0 - 0.2 K/uL  
 DF AUTOMATED NEUTROPHILS 78 43 - 78 % LYMPHOCYTES 6 (L) 13 - 44 % MONOCYTES 14 (H) 4.0 - 12.0 % EOSINOPHILS 0 (L) 0.5 - 7.8 % BASOPHILS 1 0.0 - 2.0 % IMMATURE GRANULOCYTES 1 0.0 - 5.0 %  
 ABS. NEUTROPHILS 13.3 (H) 1.7 - 8.2 K/UL  
 ABS. LYMPHOCYTES 1.1 0.5 - 4.6 K/UL  
 ABS. MONOCYTES 2.4 (H) 0.1 - 1.3 K/UL  
 ABS. EOSINOPHILS 0.0 0.0 - 0.8 K/UL  
 ABS. BASOPHILS 0.1 0.0 - 0.2 K/UL  
 ABS. IMM. GRANS. 0.2 0.0 - 0.5 K/UL METABOLIC PANEL, COMPREHENSIVE Collection Time: 01/23/19  1:47 PM  
Result Value Ref Range Sodium 134 (L) 136 - 145 mmol/L Potassium 5.1 3.5 - 5.1 mmol/L Chloride 99 98 - 107 mmol/L  
 CO2 28 21 - 32 mmol/L Anion gap 7 7 - 16 mmol/L Glucose 97 65 - 100 mg/dL BUN 7 (L) 8 - 23 MG/DL Creatinine 0.77 (L) 0.8 - 1.5 MG/DL  
 GFR est AA >60 >60 ml/min/1.73m2 GFR est non-AA >60 >60 ml/min/1.73m2 Calcium 9.2 8.3 - 10.4 MG/DL Bilirubin, total 1.1 0.2 - 1.1 MG/DL  
 ALT (SGPT) 84 (H) 12 - 65 U/L  
 AST (SGOT) 66 (H) 15 - 37 U/L Alk. phosphatase 72 50 - 136 U/L Protein, total 7.6 6.3 - 8.2 g/dL Albumin 3.1 (L) 3.2 - 4.6 g/dL Globulin 4.5 (H) 2.3 - 3.5 g/dL A-G Ratio 0.7 (L) 1.2 - 3.5 POC LACTIC ACID Collection Time: 01/23/19  1:49 PM  
Result Value Ref Range Lactic Acid (POC) 1.75 0.5 - 1.9 mmol/L  
EKG, 12 LEAD, INITIAL Collection Time: 01/23/19  3:47 PM  
Result Value Ref Range Ventricular Rate 119 BPM  
 Atrial Rate 119 BPM  
 P-R Interval 164 ms QRS Duration 68 ms Q-T Interval 288 ms QTC Calculation (Bezet) 405 ms Calculated P Axis 75 degrees Calculated R Axis 59 degrees Calculated T Axis 80 degrees Diagnosis    
  !! AGE AND GENDER SPECIFIC ECG ANALYSIS !! Sinus tachycardia Low voltage QRS Borderline ECG No previous ECGs available Confirmed by Kathie Serra ([id number]) on 1/23/2019 4:15:28 PM 
  
POC LACTIC ACID Collection Time: 01/23/19 11:28 PM  
Result Value Ref Range Lactic Acid (POC) 2.89 (H) 0.5 - 1.9 mmol/L INFLUENZA A & B AG (RAPID TEST) Collection Time: 01/23/19 11:39 PM  
Result Value Ref Range Influenza A Ag NEGATIVE  NEG Influenza B Ag NEGATIVE  NEG Source NASOPHARYNGEAL All Micro Results Procedure Component Value Units Date/Time INFLUENZA A & B AG (RAPID TEST) [[id number]] Collected:  01/23/19 9532  Order Status:  Completed Specimen:  Nasopharyngeal from Nasal washing Updated:  01/24/19 1836 Influenza A Ag NEGATIVE Comment: NEGATIVE FOR THE PRESENCE OF INFLUENZA A ANTIGEN 
INFECTION DUE TO INFLUENZA A CANNOT BE RULED OUT. BECAUSE THE ANTIGEN PRESENT IN THE SAMPLE MAY BE BELOW 
THE DETECTION LIMIT OF THE TEST. A NEGATIVE TEST IS PRESUMPTIVE AND IT IS RECOMMENDED THAT THESE RESULTS BE CONFIRMED BY VIRAL CULTURE OR AN FDA-CLEARED INFLUENZA A AND B MOLECULAR ASSAY. Influenza B Ag NEGATIVE Comment: NEGATIVE FOR THE PRESENCE OF INFLUENZA B ANTIGEN 
INFECTION DUE TO INFLUENZA B CANNOT BE RULED OUT. BECAUSE THE ANTIGEN PRESENT IN THE SAMPLE MAY BE BELOW 
THE DETECTION LIMIT OF THE TEST. A NEGATIVE TEST IS PRESUMPTIVE AND IT IS RECOMMENDED THAT THESE RESULTS BE CONFIRMED BY VIRAL CULTURE OR AN FDA-CLEARED INFLUENZA A AND B MOLECULAR ASSAY. Source NASOPHARYNGEAL     
 CULTURE, BLOOD [[id number]] Collected:  01/23/19 2335 Order Status:  Completed Specimen:  Blood Updated:  01/24/19 0008 CULTURE, BLOOD [[id number]] Collected:  01/23/19 2333 Order Status:  Completed Specimen:  Whole Blood Updated:  01/24/19 0008 Other Studies: Xr Chest Pa Lat Result Date: 1/23/2019 Chest X-ray INDICATION: Cough and fever PA and lateral views of the chest were obtained. FINDINGS: There is patchy density in the right lung base. This could represent scarring from an old pneumonia, or a new area of infiltrate. There is stable scarring in the left lung base. Upper lung fields remain clear. The heart size is normal.  The bony thorax is intact. IMPRESSION: Minimal scarring/infiltrate in the right lung base Assessment and Plan:  
 
Hospital Problems as of 1/24/2019 Date Reviewed: 1/8/2019 Codes Class Noted - Resolved POA Pneumonia ICD-10-CM: J18.9 ICD-9-CM: 137  1/24/2019 - Present Yes Acute respiratory failure with hypoxia [facility]) ICD-10-CM: J96.01 
ICD-9-CM: 518.81  1/24/2019 - Present Yes Sepsis ([facility] ICD-10-CM: A41.9 ICD-9-CM: 038.9, 995.91  1/24/2019 - Present Yes COPD with acute exacerbation ([facility] ICD-10-CM: J44.1 ICD-9-CM: 491.21  1/23/2019 - Present Unknown Dementia without behavioral disturbance ICD-10-CM: F03.90 ICD-9-CM: 294.20  10/9/2017 - Present Yes Benign essential HTN ICD-10-CM: I10 
ICD-9-CM: 401.1  6/6/2017 - Present Yes CAD (coronary artery disease) (Chronic) ICD-10-CM: I25.10 ICD-9-CM: 414.00  10/8/2013 - Present Yes Overview Signed 4/4/2016 11:55 AM by Twila VERMA of the native vessel Gastroesophageal reflux disease without esophagitis (Chronic) ICD-10-CM: K21.9 ICD-9-CM: 530.81  10/8/2013 - Present Yes PLAN: 
· Copd exacerbation - admit, continue nebs, steroids, abx, percussion vest-; consult pulmonology · Pneumonia- continue abx- f/up cultures · Hypoxic resp failure- continue o2- wean as tolerated should resolve with the above treatment- he is not on home o2 at baseline · Concern for sepsis- given elevated wbc, lactic acid, fever and pneumonia and resp failure - treat per sepsis protocol - will be judicious with fluids given resp failure and hx of cad · Continue appropriate home meds · Ppd 
· Further workup and mgt based on his clinical course DVT ppx:  lovenox Anticipated DC needs:  none Code status:  DNR Estimated LOS:  Greater than 2 midnights Risk:  high Signed: 
Quynh Solis MD

## 2019-01-24 NOTE — PROGRESS NOTES
TRANSFER - IN REPORT: 
 
Verbal report received from BLANCA Fink on Terrell Álvarez  being received from ED for routine progression of care Report consisted of patients Situation, Background, Assessment and  
Recommendations(SBAR). Information from the following report(s) SBAR, OR Summary and Recent Results was reviewed with the receiving nurse. Opportunity for questions and clarification was provided. Assessment will be completed upon patients arrival to unit and care assumed.

## 2019-01-24 NOTE — CONSULTS
CONSULT NOTE    Olimpia Kim    1/24/2019    Date of Admission:  1/23/2019    The patient's chart is reviewed and the patient is discussed with the staff. Subjective:     Patient is a 66 y.o.  male seen and evaluated at the request of Dr. Luiz Iraheta. He was seen at Urgent Care, noted with hypoxia and referred to the ER. In the ER complained of productive cough with low grade fevers for 2 days. Influenza: negative and CXR with right base scarring-possible for pneumonia. He decided he wanted to go home. He left and returned last night with increased shortness of breath and wheezing. WBC was up to 17 from 13. Had recently been treated for bronchitis with Doxycycline. Was placed on Levaquin but changed to Zosyn with culture in November growing pseudomonas. Temp max 100.8 last 24 hours. He is not on home O2 and is followed in our office for underlying centrilobular emphysema, bronchiectasis and pulmonary nodule. CT then with right middle and lower lobes there is new bronchial wall thickening and reticular nodular densities there is hazy groundglass opacities in the left lower lobe as well as new linear stranding in the left lung apex. These changes were less significant than in November 2017. Hs uses a percussion vest twice a day most days and uses Albuterol nebs.     Chronic Medical:  CAD, COPD and has nebs and home vest, HTN, migraines, GERD    Review of Systems  A comprehensive review of systems was negative except for: Constitutional: positive for fevers, chills, sweats, fatigue and malaise  Ears, nose, mouth, throat, and face: positive for hearing loss  Respiratory: positive for cough, sputum, wheezing or dyspnea on exertion  Cardiovascular: positive for dyspnea, fatigue, Hx CAD, dyspnea on exertion  Gastrointestinal: positive for dyspepsia and reflux symptoms  Neurological: positive for headaches and memory problems    Patient Active Problem List   Diagnosis Code    COPD (chronic obstructive pulmonary disease) (HCC) J44.9    Migraine G43.909    CAD (coronary artery disease) I25.10    Allergic rhinitis J30.9    Gastroesophageal reflux disease without esophagitis K21.9    Osteoporosis M81.0    Encounter for long-term (current) use of antiplatelets/antithrombotics Z79.02    Personal history of tobacco use Z87.891    Thoracic aneurysm without mention of rupture I71.2    Pulmonary nodules R91.8    Ineffective airway clearance R06.89    DNR (do not resuscitate) Z66    Mixed hyperlipidemia E78.2    ETD (eustachian tube dysfunction) H69.80    Chronic sinusitis J32.9    Nasal polyp J33.9    Fungal sinusitis B49, J32.9    Platelet inhibition due to Plavix Z79.02    Long term (current) use of antithrombotics/antiplatelets X42.20    Benign essential HTN I10    Dementia without behavioral disturbance F03.90    Impaired fasting blood sugar R73.01    Leukocytosis D72.829    Postherpetic neuralgia B02.29    Abnormal CT scan, chest R93.89    Irritable bowel syndrome with diarrhea K58.0    COPD with acute exacerbation ([facility]) J44.1    Pneumonia J18.9    Acute respiratory failure with hypoxia ([facility]) J96.01    Sepsis ([address] A41.9    Bronchiectasis ([address] J47.9       Home DME company none. Prior to Admission Medications   Prescriptions Last Dose Informant Patient Reported? Taking? ADVAIR DISKUS 500-50 mcg/dose diskus inhaler   No No   Sig: INHALE 1 PUFF BY MOUTH 2 TIMES DAILY   B.infantis-B.ani-B.long-B.bifi (PROBIOTIC 4X) 10-15 mg TbEC   Yes No   Sig: Take  by mouth. EPINEPHrine (EPIPEN) 0.3 mg/0.3 mL (1:1,000) injection   Yes No   Si.3 mg by IntraMUSCular route once as needed.    Lancets misc   No No   Sig: Check BS once a day fasting (DX: E11.9); one touch verio   PROAIR HFA 90 mcg/actuation inhaler   No No   Sig: INHALE 2 PUFFS BY MOUTH EVERY 4 HOURS AS NEEDED   SPIRIVA WITH HANDIHALER 18 mcg inhalation capsule   No No   Sig: INHALE 1 CAPSULE VIA HANDIHALER ONCE DAILY AT THE SAME TIME EVERY DAY   SUMAtriptan (IMITREX) 100 mg tablet   No No   Sig: TAKE 1/2 TO 1 TABLET BY MOUTH AS NEEDED FOR MIGRAINE MAY REPEAT IN 2HRS MAX 2/24HRS   VIT A/VIT C/VIT E/ZINC/COPPER (PRESERVISION AREDS PO)   Yes No   Sig: Take 1 Tab by mouth two (2) times a day. albuterol (PROVENTIL VENTOLIN) 2.5 mg /3 mL (0.083 %) nebulizer solution   No No   Sig: USE 3 ML BY NEBULIZATION ROUTE THREE (3) TIMES DAILY. albuterol (PROVENTIL VENTOLIN) 2.5 mg /3 mL (0.083 %) nebulizer solution   No No   Sig: 3 mL by Nebulization route three (3) times daily. cefdinir (OMNICEF) 300 mg capsule   No No   Sig: Take 1 Cap by mouth two (2) times a day. cholecalciferol (VITAMIN D3) 400 unit tab tablet   Yes No   Sig: Take  by mouth daily. clopidogrel (PLAVIX) 75 mg tab   No No   Sig: TAKE 1 TAB BY MOUTH DAILY. gabapentin (NEURONTIN) 300 mg capsule   No No   Sig: Take 1 Cap by mouth three (3) times daily. glucose blood VI test strips (BLOOD GLUCOSE TEST) strip   No No   Sig: Check BS once a day fasting (DX: E11.9); one touch verio   latanoprost (XALATAN) 0.005 % ophthalmic solution   Yes No   Sig: Administer 1 Drop to both eyes nightly. nitroglycerin (NITROSTAT) 0.4 mg SL tablet   No No   Si Tab by SubLINGual route every five (5) minutes as needed for Chest Pain. predniSONE (DELTASONE) 20 mg tablet   No No   Simg daily x 5d, then 20mg daily x 5d, then D/C.   tamsulosin (FLOMAX) 0.4 mg capsule   No No   Sig: TAKE 1 CAP BY MOUTH DAILY. INDICATIONS: BENIGN PROSTATIC HYPERPLASIA WITH LOWER URINARY TRACT SX   triamcinolone (NASACORT) 55 mcg nasal inhaler   No No   Si Sprays by Both Nostrils route daily. Indications:  Allergic Rhinitis      Facility-Administered Medications: None       Past Medical History:   Diagnosis Date    Arthritis     Benign essential HTN 2017    Chronic obstructive pulmonary disease (HCC)     Diabetes (HCC)     Fungal sinusitis     GERD (gastroesophageal reflux disease)     History of multiple allergies     Hx of migraines     Hypertension     Irritable bowel syndrome with diarrhea 10/2/2018    Shingles 10/1/15    Sinus problem     Thoracic aneurysm without mention of rupture 2014    No chest or upper back pain. Echo shows a mildly dilated aortic root at 4.0 cm. There is mild LVH and normal systolic function. Past Surgical History:   Procedure Laterality Date    HX APPENDECTOMY  age 10    HX CATARACT REMOVAL Bilateral     HX HEART CATHETERIZATION  12    stent x 1    HX HEENT      sinus x3    HX HERNIA REPAIR Right 1978    inguinal    HX OTHER SURGICAL  2012    neurostimulator implant for migraines at [facility] in [place], [place]      childhood   [address] 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    Highest education level: Not on file   Social Needs    Financial resource strain: Not on file    Food insecurity - worry: Not on file    Food insecurity - inability: Not on file   [employer] needs - medical: Not on file   [employer] needs - non-medical: Not on file   Occupational History    Occupation: ELECTRICAL MAINTENANCE   Tobacco Use    Smoking status: Former Smoker     Packs/day: 1.50     Years: 40.00     Pack years: 60.00     Types: Cigarettes     Last attempt to quit: 1973     Years since quittin.0    Smokeless tobacco: Never Used   Substance and Sexual Activity    Alcohol use: No     Alcohol/week: 0.0 oz    Drug use: No    Sexual activity: Not on file   Other Topics Concern    Not on file   Social History Narrative     and lives with wife.      Family History   Problem Relation Age of Onset    No Known Problems Sister     No Known Problems Brother     No Known Problems Sister     COPD Mother  Asthma Father     Dementia Other         UNCLE     Allergies   Allergen Reactions    Aspirin Swelling and Anaphylaxis    Aleve [Naproxen Sodium] Unknown (comments)    Codeine Sulfate Other (comments)     headache    Corn Containing Products Other (comments)     Not allergic    Crestor [Rosuvastatin] Unknown (comments)     unknown    Septra [Sulfamethoprim Ds] Unknown (comments)     Not allergic    Zetia [Ezetimibe] Unknown (comments)     Unknown allergy    Zoloft [Sertraline] Unknown (comments)     Not allergic       Current Facility-Administered Medications   Medication Dose Route Frequency    sodium chloride (NS) flush 5-40 mL  5-40 mL IntraVENous Q8H    sodium chloride (NS) flush 5-40 mL  5-40 mL IntraVENous PRN    insulin lispro (HUMALOG) injection   SubCUTAneous AC&HS    budesonide (PULMICORT) 500 mcg/2 ml nebulizer suspension  500 mcg Nebulization BID RT    methylPREDNISolone (PF) (Solu-MEDROL) injection 40 mg  40 mg IntraVENous Q12H    enoxaparin (LOVENOX) injection 40 mg  40 mg SubCUTAneous Q24H    ondansetron (ZOFRAN) injection 4 mg  4 mg IntraVENous Q4H PRN    acetaminophen (TYLENOL) tablet 650 mg  650 mg Oral Q4H PRN    HYDROcodone-acetaminophen (NORCO) 5-325 mg per tablet 1 Tab  1 Tab Oral Q4H PRN    tuberculin injection 5 Units  5 Units IntraDERMal ONCE    clopidogrel (PLAVIX) tablet 75 mg  75 mg Oral DAILY    gabapentin (NEURONTIN) capsule 300 mg  300 mg Oral TID    latanoprost (XALATAN) 0.005 % ophthalmic solution 1 Drop  1 Drop Both Eyes QHS    nitroglycerin (NITROSTAT) tablet 0.4 mg  0.4 mg SubLINGual Q5MIN PRN    tamsulosin (FLOMAX) capsule 0.4 mg  0.4 mg Oral DAILY    SUMAtriptan (IMITREX) tablet 50 mg  50 mg Oral Q2H PRN    fluticasone (FLONASE) 50 mcg/actuation nasal spray 2 Spray  2 Spray Both Nostrils DAILY    piperacillin-tazobactam (ZOSYN) 4.5 g in 0.9% sodium chloride (MBP/ADV) 100 mL  4.5 g IntraVENous Q8H    albuterol (PROVENTIL VENTOLIN) nebulizer solution 2.5 mg  2.5 mg Nebulization Q4H RT    albuterol (PROVENTIL VENTOLIN) nebulizer solution 2.5 mg  2.5 mg Nebulization Q4H PRN    guaiFENesin ER (MUCINEX) tablet 1,200 mg  1,200 mg Oral BID         Objective:     Vitals:    01/24/19 0329 01/24/19 0726 01/24/19 0735 01/24/19 1055   BP:  128/74  152/70   Pulse:  (!) 101  (!) 119   Resp:  22  22   Temp:  97.5 °F (36.4 °C)  98.4 °F (36.9 °C)   SpO2: 96% 98% 92% 97%   Weight:       Height:           PHYSICAL EXAM     Constitutional:  the patient is thin, elderly and in no acute distress, NC 4L, sat 97%  EENMT:  Sclera clear, pupils equal, oral mucosa moist, [place]  Respiratory: scattered anterior and posterior crackles, with wheezing at times  Cardiovascular:  RRR without M,G,R  Gastrointestinal: soft and non-tender; with positive bowel sounds. Musculoskeletal: warm without cyanosis. There is no lower leg edema. Skin:  no jaundice or rashes, no wounds   Neurologic: no gross neuro deficits     Psychiatric:  alert and oriented x 3    CXR:  None today    CXR 1/23/19:  Minimal scarring/infiltrate in the right lung base        Recent Labs     01/23/19  1347   WBC 17.0*   HGB 15.4   HCT 46.7        Recent Labs     01/23/19  1347   *   K 5.1   CL 99   GLU 97   CO2 28   BUN 7*   CREA 0.77*   CA 9.2   ALB 3.1*   TBILI 1.1   ALT 84*   SGOT 66*     No results for input(s): PH, PCO2, PO2, HCO3 in the last 72 hours. No results for input(s): LCAD, LAC in the last 72 hours.     Assessment:  (Medical Decision Making)     Hospital Problems  Date Reviewed: 1/8/2019          Codes Class Noted POA    Pneumonia ICD-10-CM: J18.9  ICD-9-CM: 162  1/24/2019 Yes    Continue Zosyn--follow up cultures    * (Principal) Acute respiratory failure with hypoxia [facility]) ICD-10-CM: J96.01  ICD-9-CM: 518.81  1/24/2019 Yes    Improved symptoms and less sputum production, weaning O2    Sepsis ([facility] ICD-10-CM: A41.9  ICD-9-CM: 038.9, 995.91  1/24/2019 Yes    Hemodynamically stable Bronchiectasis ([facility] (Chronic) ICD-10-CM: J47.9  ICD-9-CM: 494.0  1/24/2019 Yes    Chronic--add flutter valve and Mucinex, changed to Albuterol and     COPD with acute exacerbation ([facility] ICD-10-CM: J44.1  ICD-9-CM: 491.21  1/23/2019 Unknown    Chronic--continue current    Dementia without behavioral disturbance ICD-10-CM: F03.90  ICD-9-CM: 294.20  10/9/2017 Yes    Chronic--copperative    Benign essential HTN ICD-10-CM: I10  ICD-9-CM: 401.1  6/6/2017 Yes    chronic--controlled    CAD (coronary artery disease) (Chronic) ICD-10-CM: I25.10  ICD-9-CM: 414.00  10/8/2013 Yes    Overview Signed 4/4/2016 11:55 AM by Naresh Fernandezs of the native vessel         No angina    Gastroesophageal reflux disease without esophagitis (Chronic) ICD-10-CM: K21.9  ICD-9-CM: 530.81  10/8/2013 Yes    chronic          Plan:  (Medical Decision Making)     --Duoneb, Pulmicort--change Duoneb to Albuterol and add Mucinex and flutter valve  --Solu Medrol 40mg q12h  --WBC 17.0 yesterday  --Zosyn day 1  --Blood cultures:  Pending  --Respiratory specimen sent today for culture-pending  --Wean o2 as tolerated--was not on prior to admission    More than 50% of the time documented was spent in face-to-face contact with the patient and in the care of the patient on the floor/unit where the patient is located. Thank you very much for this referral.  We appreciate the opportunity to participate in this patient's care. Will follow along with above stated plan. Stephon Quiroga NP     Lungs:  Coarse rhonchi, crackles and wheezes bilaterally  Heart:  RRR with no Murmur/Rubs/Gallops    Additional Comments:  Agree with above. Will also add Vibralung to help with mucus clearance. Uses Vest at home but not very comfortable. I have spoken with and examined the patient. I agree with the above assessment and plan as documented.     Epi Villa MD

## 2019-01-24 NOTE — PROGRESS NOTES
END OF SHIFT NOTE: 
 
Intake/Output 01/24 0701 - 01/24 1900 In: 343 [P.O.:240; I.V.:103] Out: 450 [Urine:450] Voiding: YES Catheter: NO 
Drain:   
 
 
 
 
Stool:  0 occurrences. Emesis:  0 occurrences. VITAL SIGNS Patient Vitals for the past 12 hrs: 
 Temp Pulse Resp BP SpO2  
01/24/19 1515     97 % 01/24/19 1440 97.5 °F (36.4 °C) 98 22 93/69 97 % 01/24/19 1055 98.4 °F (36.9 °C) (!) 119 22 152/70 97 % 01/24/19 0735     92 % 01/24/19 0726 97.5 °F (36.4 °C) (!) 101 22 128/74 98 % Pain Assessment Pain 1 Pain Scale 1: Numeric (0 - 10) (01/24/19 0820) Pain Intensity 1: 0 (01/24/19 0820) Patient Stated Pain Goal: 0 (01/24/19 0820) Ambulating Yes Additional Information: Patient doing well will ambulate in room. Shift report given to oncoming nurse at the bedside.  
 
Irma Dorantes RN

## 2019-01-24 NOTE — PROGRESS NOTES
Problem: Falls - Risk of 
Goal: *Absence of Falls Document Claribel Rose Fall Risk and appropriate interventions in the flowsheet. Outcome: Progressing Towards Goal 
Fall Risk Interventions: 
Mobility Interventions: Patient to call before getting OOB Medication Interventions: Teach patient to arise slowly

## 2019-01-24 NOTE — PROGRESS NOTES
Hospitalist Progress Note 2019 Admit Date: 2019 10:51 PM  
NAME: Torito Morton :  1940 MRN:  [id number] Attending: Janice Díaz MD 
PCP:  Garrick Felty, DO 
 
SUBJECTIVE:  
72yr old male with pmhx sig for copd and bronchiectasis- relatively well until 2-3 days ago when he noted increased wheezing and sob. Pt has vest at home along with bronchodilator which he has been using but symptoms got worse. He went to [facility] today and was sent to the er. He was treated and declined to be admitted. But he went home and wheezing and sob got worse so he came back to the ER.  
  
He was again found to be in hypoxic resp failure and wheezing- hospitalist asked to admit. .. 
  
Interval History (): patient examined at bedside. No acute events since admission, he says he has \"a migraine\" and wants to know why he cannot eat something. No fevers/chills but his coughing and shortness of breath have been getting worse over the past several days. No chest pain. No abdominal pain, nausea/vomiting, or diarrhea. Review of Systems negative with exception of pertinent positives noted above PHYSICAL EXAM  
 
Visit Vitals /74 (BP 1 Location: Left arm) Pulse (!) 101 Temp 97.5 °F (36.4 °C) Resp 22 Ht 5' 6\" (1.676 m) Wt 58.1 kg (128 lb) SpO2 92% BMI 20.66 kg/m² Temp (24hrs), Av.1 °F (37.3 °C), Min:97.5 °F (36.4 °C), Max:100.8 °F (38.2 °C) Oxygen Therapy O2 Sat (%): 92 % (19) Pulse via Oximetry: 104 beats per minute (19) O2 Device: Nasal cannula (19) O2 Flow Rate (L/min): 4 l/min (19) Intake/Output Summary (Last 24 hours) at 2019 1049 Last data filed at 2019 5035 Gross per 24 hour Intake 250 ml Output 1250 ml Net -1000 ml General: No acute distress, frail appearing  
Lungs:  Diffuse expiratory wheezes bilaterally, crackles L>R, fair overall airflow Heart:  Regular rate and rhythm,  No murmur, rub, or gallop Abdomen: Soft, Non distended, Non tender, Positive bowel sounds Extremities: No cyanosis, clubbing or edema Neurologic:  No focal deficits ASSESSMENT Active Hospital Problems Diagnosis Date Noted  Pneumonia 01/24/2019  Acute respiratory failure with hypoxia ([facility] 01/24/2019  Sepsis ([facility] 01/24/2019  COPD with acute exacerbation ([facility] 01/23/2019  Dementia without behavioral disturbance 10/09/2017  Benign essential HTN 06/06/2017  CAD (coronary artery disease) 10/08/2013 [facility] of the native vessel  Gastroesophageal reflux disease without esophagitis 10/08/2013 Plan: # Hypoxic respiratory failure in the setting of known COPD and bronchiectasis 
- continue with scheduled jet nebulizers and corticosteroids - patient with past respiratory cultures growing Pseudomonas, ordered for repeat respiratory culture 
- discontinue Levaquin as past Psedomonas was \"intermediate\" susceptibility 
- add Zosyn empirically now pending repeat cultures - pulmonary consulted, appreciate all assistance 
- wean supplemental oxygen as tolerated (not on home O2), goal SpO2 of 88-92% based on history of COPD # Migraine 
- ordered sumatriptan per patient request 
 
# History of CAD 
- continue with home meds F/E/N: no fluids, replete electrolytes as needed, cardiac diet Ppx: Lovenox for VTE Code Status: DNR/DNI Disposition: pending clinical improvement with management as above. All questions answered. Signed By: Amarilis Klein DO   
 January 24, 2019

## 2019-01-24 NOTE — PROGRESS NOTES
END OF SHIFT NOTE: 
 
Intake/Output 01/23 1901 - 01/24 0700 In: 250 [P.O.:100; I.V.:150] Out: 775 [Urine:775] Voiding: YES Catheter: NO 
Drain:   
 
 
 
 
Stool:  0 occurrences. Emesis:  0 occurrences. VITAL SIGNS Patient Vitals for the past 12 hrs: 
 Temp Pulse Resp BP SpO2  
01/24/19 0329     96 % 01/24/19 0320 98.4 °F (36.9 °C) (!) 117 22 115/70 95 % 01/24/19 0115 99.1 °F (37.3 °C) (!) 116 24 122/57 96 % 01/24/19 0017  (!) 117   98 % 01/23/19 2346     96 % 01/23/19 2245 98.2 °F (36.8 °C) (!) 127 30 115/69 90 % Pain Assessment Pain 1 Pain Scale 1: Numeric (0 - 10) (01/24/19 0134) Pain Intensity 1: 0 (01/24/19 0134) Patient Stated Pain Goal: 0 (01/24/19 0134) Ambulating Yes Additional Information:  
Pt resting quietly. No visible s/sx of distress. Shift report will be given to oncoming nurse at the bedside.  
 
Simba Alejandro, RN

## 2019-01-24 NOTE — PROGRESS NOTES
Problem: Falls - Risk of 
Goal: *Absence of Falls Document Orvel Buffy Fall Risk and appropriate interventions in the flowsheet. Outcome: Progressing Towards Goal 
Fall Risk Interventions: 
  
 
  
 
Medication Interventions: Evaluate medications/consider consulting pharmacy, Teach patient to arise slowly

## 2019-01-24 NOTE — ED NOTES
TRANSFER - OUT REPORT: 
 
Verbal report given to Negar Orantes RN(name) on Lydia Mo  being transferred to 519(unit) for routine progression of care Report consisted of patients Situation, Background, Assessment and  
Recommendations(SBAR). Information from the following report(s) SBAR and ED Summary was reviewed with the receiving nurse. Lines:  
Peripheral IV 01/24/19 Forearm (Active) Site Assessment Clean, dry, & intact 1/24/2019 12:22 AM  
Phlebitis Assessment 0 1/24/2019 12:22 AM  
Infiltration Assessment 0 1/24/2019 12:22 AM  
Dressing Status Clean, dry, & intact 1/24/2019 12:22 AM  
Dressing Type 4 X 4 1/24/2019 12:22 AM  
Hub Color/Line Status Pink 1/24/2019 12:22 AM  
Alcohol Cap Used Yes 1/24/2019 12:22 AM  
   
Peripheral IV 01/24/19 Left Antecubital (Active) Site Assessment Clean, dry, & intact 1/24/2019 12:22 AM  
Phlebitis Assessment 0 1/24/2019 12:22 AM  
Infiltration Assessment 0 1/24/2019 12:22 AM  
Dressing Status Clean, dry, & intact 1/24/2019 12:22 AM  
Dressing Type 4 X 4 1/24/2019 12:22 AM  
Hub Color/Line Status Pink 1/24/2019 12:22 AM  
Alcohol Cap Used Yes 1/24/2019 12:22 AM  
   
Peripheral IV 01/24/19 Left Forearm (Active) Site Assessment Clean, dry, & intact 1/24/2019 12:22 AM  
Phlebitis Assessment 0 1/24/2019 12:22 AM  
Infiltration Assessment 0 1/24/2019 12:22 AM  
Dressing Status Clean, dry, & intact 1/24/2019 12:22 AM  
Dressing Type 4 X 4 1/24/2019 12:22 AM  
Hub Color/Line Status Pink 1/24/2019 12:22 AM  
Alcohol Cap Used Yes 1/24/2019 12:22 AM  
  
 
Opportunity for questions and clarification was provided. Patient transported with: 
 O2 @ 3 liters

## 2019-01-24 NOTE — ED PROVIDER NOTES
66year-old male with history of COPD, hypertension, diabetes, GERD, migraines presents with cough productive of yellow sputum and shortness of breath for the past 2 days with low grade fevers. He also has worsening migraines and has been taking Imitrex. He was seen this morning at urgent care and was given duoneb. Flu was negative. Sats were in the 80s so he was sent to the emergency department for further evaluation. Chest x-ray was suspicious for early pneumonia or scar in right lung base. White blood cell count had increased from 13,000 at urgent care to 17,000 in ED. He had a normal lactic acid. Sats had stabilized to 90% off oxygen and patient preferred to go home for treatment. Tachycardia had improved. He was given dose of IV Rocephin. He has now returned for increasing shortness of breath. He is not on home oxygen. He was treated for bronchitis last week with doxycycline. The history is provided by the patient. Shortness of Breath Associated symptoms include a fever, headaches and cough. Pertinent negatives include no chest pain, no vomiting, no abdominal pain and no rash. Past Medical History:  
Diagnosis Date  Arthritis  Benign essential HTN 6/6/2017  Chronic obstructive pulmonary disease ([facility]  Diabetes ([facility]  Fungal sinusitis  GERD (gastroesophageal reflux disease)  History of multiple allergies  Hx of migraines  Hypertension  Irritable bowel syndrome with diarrhea 10/2/2018  Shingles 10/1/15  Sinus problem  Thoracic aneurysm without mention of rupture 12/2/2014 No chest or upper back pain. Echo shows a mildly dilated aortic root at 4.0 cm. There is mild LVH and normal systolic function. Past Surgical History:  
Procedure Laterality Date  HX APPENDECTOMY  age 9  
 HX CATARACT REMOVAL Bilateral   
 HX HEART CATHETERIZATION  2/27/12  
 stent x 1  
 HX HEENT  1999  
 sinus x3  
 HX HERNIA REPAIR Right 1978 inguinal  
 HX OTHER SURGICAL  2012  
 neurostimulator implant for migraines at [facility] in [facility] Jimy Quesada 67 childhood  NEUROLOGICAL PROCEDURE UNLISTED Neurostimulator implant for migraines 2012 Family History:  
Problem Relation Age of Onset  No Known Problems Sister  No Known Problems Brother  No Known Problems Sister  COPD Mother  Asthma Father  Dementia Other UNCLE Social History Socioeconomic History  Marital status:  Spouse name: Micah Mayer  Number of children: 3  
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  
  Alcohol/week: 0.0 oz  Drug use: No  
 Sexual activity: Not on file Other Topics Concern  Not on file Social History Narrative  and lives with wife. ALLERGIES: Aspirin; Aleve [naproxen sodium]; Codeine sulfate; Corn containing products; Crestor [rosuvastatin]; Septra [sulfamethoprim ds]; Zetia [ezetimibe]; and Zoloft [sertraline] Review of Systems Constitutional: Positive for chills, fatigue and fever. HENT: Negative for hearing loss. Eyes: Negative for visual disturbance. Respiratory: Positive for cough and shortness of breath. Cardiovascular: Negative for chest pain and palpitations.   
Gastrointestinal: Negative for abdominal pain, diarrhea, nausea and vomiting. Musculoskeletal: Negative for back pain. Skin: Negative for rash. Neurological: Positive for headaches. Negative for weakness. Psychiatric/Behavioral: Negative for confusion. Vitals:  
 01/23/19 2245 BP: 115/69 Pulse: (!) 127 Resp: 30 Temp: 98.2 °F (36.8 °C) SpO2: 90% Weight: 64 kg (141 lb) Height: 5' 6\" (1.676 m) Physical Exam  
Constitutional: He appears well-developed and well-nourished. HENT:  
Head: Normocephalic and atraumatic. Eyes: EOM are normal. Pupils are equal, round, and reactive to light. Neck: Normal range of motion. Neck supple. Cardiovascular: Regular rhythm and normal heart sounds. Tachycardia present. Pulmonary/Chest: Effort normal. Tachypnea noted. He has decreased breath sounds. He has wheezes. He has rhonchi. Abdominal: Soft. There is no tenderness. Musculoskeletal: Normal range of motion. Neurological: He is alert. Skin: Skin is warm and dry. Psychiatric: His behavior is normal.  
Nursing note and vitals reviewed. MDM Number of Diagnoses or Management Options Diagnosis management comments: Parts of this document were created using dragon voice recognition software. The chart has been reviewed but errors may still be present. Will repeat lactic and flu and check blood cultures. zithromax and duoneb ordered. Will dw hospitalist for admission. 11:38 PM 
Lactic increasing. Discussed with hospitalist and patient updated. Amount and/or Complexity of Data Reviewed Clinical lab tests: reviewed and ordered (Results for orders placed or performed during the hospital encounter of 01/23/19 
-POC LACTIC ACID Result                      Value             Ref Range Lactic Acid (POC)           2.89 (H)          0.5 - 1.9 mm* 
) Tests in the medicine section of CPT®: ordered and reviewed Procedures

## 2019-01-24 NOTE — PROGRESS NOTES
01/24/19 0139 Dual Skin Pressure Injury Assessment Dual Skin Pressure Injury Assessment WDL Skin Integumentary Skin Integumentary (WDL) WDL Pressure  Injury Documentation No Pressure Injury Noted-Pressure Ulcer Prevention Initiated Surgical scar to right shoulder blade and lower mid back noted. Venipuncture bruising to left arm noted Blanchable redness to sacrum noted.

## 2019-01-24 NOTE — PROGRESS NOTES
Initial visit by  to convey care and concern and encourage patient that  services are available if desired. Offered spiritual interventions, including empathic listening, affirmation of emotions & eliz, and prayer during the visit. Provided business card for future reference. Christine Gonzalez MDiv Board Certified [employer]

## 2019-01-25 LAB
ANION GAP SERPL CALC-SCNC: 7 MMOL/L (ref 7–16)
BASOPHILS # BLD: 0 K/UL (ref 0–0.2)
BASOPHILS NFR BLD: 0 % (ref 0–2)
BUN SERPL-MCNC: 9 MG/DL (ref 8–23)
CALCIUM SERPL-MCNC: 8.2 MG/DL (ref 8.3–10.4)
CHLORIDE SERPL-SCNC: 109 MMOL/L (ref 98–107)
CO2 SERPL-SCNC: 25 MMOL/L (ref 21–32)
CREAT SERPL-MCNC: 0.58 MG/DL (ref 0.8–1.5)
DIFFERENTIAL METHOD BLD: ABNORMAL
EOSINOPHIL # BLD: 0 K/UL (ref 0–0.8)
EOSINOPHIL NFR BLD: 0 % (ref 0.5–7.8)
ERYTHROCYTE [DISTWIDTH] IN BLOOD BY AUTOMATED COUNT: 14.1 % (ref 11.9–14.6)
GLUCOSE BLD STRIP.AUTO-MCNC: 160 MG/DL (ref 65–100)
GLUCOSE BLD STRIP.AUTO-MCNC: 202 MG/DL (ref 65–100)
GLUCOSE BLD STRIP.AUTO-MCNC: 224 MG/DL (ref 65–100)
GLUCOSE BLD STRIP.AUTO-MCNC: 239 MG/DL (ref 65–100)
GLUCOSE SERPL-MCNC: 136 MG/DL (ref 65–100)
HCT VFR BLD AUTO: 41 % (ref 41.1–50.3)
HGB BLD-MCNC: 13.3 G/DL (ref 13.6–17.2)
IMM GRANULOCYTES # BLD AUTO: 0.1 K/UL (ref 0–0.5)
IMM GRANULOCYTES NFR BLD AUTO: 1 % (ref 0–5)
LYMPHOCYTES # BLD: 1 K/UL (ref 0.5–4.6)
LYMPHOCYTES NFR BLD: 9 % (ref 13–44)
MCH RBC QN AUTO: 28.3 PG (ref 26.1–32.9)
MCHC RBC AUTO-ENTMCNC: 32.4 G/DL (ref 31.4–35)
MCV RBC AUTO: 87.2 FL (ref 79.6–97.8)
MM INDURATION POC: NORMAL MM (ref 0–5)
MONOCYTES # BLD: 0.5 K/UL (ref 0.1–1.3)
MONOCYTES NFR BLD: 5 % (ref 4–12)
NEUTS SEG # BLD: 10.1 K/UL (ref 1.7–8.2)
NEUTS SEG NFR BLD: 86 % (ref 43–78)
NRBC # BLD: 0 K/UL (ref 0–0.2)
PLATELET # BLD AUTO: 251 K/UL (ref 150–450)
PMV BLD AUTO: 9.7 FL (ref 9.4–12.3)
POTASSIUM SERPL-SCNC: 3.8 MMOL/L (ref 3.5–5.1)
PPD POC: NORMAL NEGATIVE
RBC # BLD AUTO: 4.7 M/UL (ref 4.23–5.6)
SODIUM SERPL-SCNC: 141 MMOL/L (ref 136–145)
WBC # BLD AUTO: 11.8 K/UL (ref 4.3–11.1)

## 2019-01-25 PROCEDURE — 65270000029 HC RM PRIVATE

## 2019-01-25 PROCEDURE — 77030020120 HC VLV RESP PEP HI -B

## 2019-01-25 PROCEDURE — 74011000250 HC RX REV CODE- 250: Performed by: NURSE PRACTITIONER

## 2019-01-25 PROCEDURE — 74011000258 HC RX REV CODE- 258: Performed by: INTERNAL MEDICINE

## 2019-01-25 PROCEDURE — 74011250637 HC RX REV CODE- 250/637: Performed by: NURSE PRACTITIONER

## 2019-01-25 PROCEDURE — 74011250636 HC RX REV CODE- 250/636: Performed by: INTERNAL MEDICINE

## 2019-01-25 PROCEDURE — 36415 COLL VENOUS BLD VENIPUNCTURE: CPT

## 2019-01-25 PROCEDURE — 74011000250 HC RX REV CODE- 250: Performed by: INTERNAL MEDICINE

## 2019-01-25 PROCEDURE — 94640 AIRWAY INHALATION TREATMENT: CPT

## 2019-01-25 PROCEDURE — 99232 SBSQ HOSP IP/OBS MODERATE 35: CPT | Performed by: INTERNAL MEDICINE

## 2019-01-25 PROCEDURE — 77030012341 HC CHMB SPCR OPTC MDI VYRM -A

## 2019-01-25 PROCEDURE — 74011250637 HC RX REV CODE- 250/637: Performed by: INTERNAL MEDICINE

## 2019-01-25 PROCEDURE — 77010033678 HC OXYGEN DAILY

## 2019-01-25 PROCEDURE — 85025 COMPLETE CBC W/AUTO DIFF WBC: CPT

## 2019-01-25 PROCEDURE — 94760 N-INVAS EAR/PLS OXIMETRY 1: CPT

## 2019-01-25 PROCEDURE — 80048 BASIC METABOLIC PNL TOTAL CA: CPT

## 2019-01-25 PROCEDURE — 82962 GLUCOSE BLOOD TEST: CPT

## 2019-01-25 PROCEDURE — 74011636637 HC RX REV CODE- 636/637: Performed by: INTERNAL MEDICINE

## 2019-01-25 RX ADMIN — INSULIN LISPRO 4 UNITS: 100 INJECTION, SOLUTION INTRAVENOUS; SUBCUTANEOUS at 16:49

## 2019-01-25 RX ADMIN — ALBUTEROL SULFATE 2.5 MG: 2.5 SOLUTION RESPIRATORY (INHALATION) at 04:19

## 2019-01-25 RX ADMIN — ENOXAPARIN SODIUM 40 MG: 40 INJECTION SUBCUTANEOUS at 07:58

## 2019-01-25 RX ADMIN — Medication 10 ML: at 13:59

## 2019-01-25 RX ADMIN — PIPERACILLIN AND TAZOBACTAM 4.5 G: 4; .5 INJECTION, POWDER, FOR SOLUTION INTRAVENOUS at 11:19

## 2019-01-25 RX ADMIN — INSULIN LISPRO 2 UNITS: 100 INJECTION, SOLUTION INTRAVENOUS; SUBCUTANEOUS at 07:56

## 2019-01-25 RX ADMIN — FLUTICASONE PROPIONATE 2 SPRAY: 50 SPRAY, METERED NASAL at 08:03

## 2019-01-25 RX ADMIN — INSULIN LISPRO 4 UNITS: 100 INJECTION, SOLUTION INTRAVENOUS; SUBCUTANEOUS at 11:56

## 2019-01-25 RX ADMIN — LATANOPROST 1 DROP: 50 SOLUTION OPHTHALMIC at 22:00

## 2019-01-25 RX ADMIN — METHYLPREDNISOLONE SODIUM SUCCINATE 40 MG: 125 INJECTION, POWDER, FOR SOLUTION INTRAMUSCULAR; INTRAVENOUS at 08:04

## 2019-01-25 RX ADMIN — ALBUTEROL SULFATE 2.5 MG: 2.5 SOLUTION RESPIRATORY (INHALATION) at 11:36

## 2019-01-25 RX ADMIN — BUDESONIDE 500 MCG: 0.5 INHALANT RESPIRATORY (INHALATION) at 19:36

## 2019-01-25 RX ADMIN — INSULIN LISPRO 4 UNITS: 100 INJECTION, SOLUTION INTRAVENOUS; SUBCUTANEOUS at 22:00

## 2019-01-25 RX ADMIN — ALBUTEROL SULFATE 2.5 MG: 2.5 SOLUTION RESPIRATORY (INHALATION) at 23:05

## 2019-01-25 RX ADMIN — GABAPENTIN 300 MG: 300 CAPSULE ORAL at 08:00

## 2019-01-25 RX ADMIN — GUAIFENESIN 1200 MG: 600 TABLET, EXTENDED RELEASE ORAL at 18:27

## 2019-01-25 RX ADMIN — SALINE NASAL SPRAY 2 SPRAY: 1.5 SOLUTION NASAL at 16:51

## 2019-01-25 RX ADMIN — TAMSULOSIN HYDROCHLORIDE 0.4 MG: 0.4 CAPSULE ORAL at 08:00

## 2019-01-25 RX ADMIN — GABAPENTIN 300 MG: 300 CAPSULE ORAL at 21:25

## 2019-01-25 RX ADMIN — CLOPIDOGREL BISULFATE 75 MG: 75 TABLET ORAL at 08:00

## 2019-01-25 RX ADMIN — GUAIFENESIN 1200 MG: 600 TABLET, EXTENDED RELEASE ORAL at 08:00

## 2019-01-25 RX ADMIN — SALINE NASAL SPRAY 2 SPRAY: 1.5 SOLUTION NASAL at 11:56

## 2019-01-25 RX ADMIN — PIPERACILLIN AND TAZOBACTAM 4.5 G: 4; .5 INJECTION, POWDER, FOR SOLUTION INTRAVENOUS at 18:27

## 2019-01-25 RX ADMIN — Medication 10 ML: at 22:00

## 2019-01-25 RX ADMIN — ALBUTEROL SULFATE 2.5 MG: 2.5 SOLUTION RESPIRATORY (INHALATION) at 19:36

## 2019-01-25 RX ADMIN — GABAPENTIN 300 MG: 300 CAPSULE ORAL at 16:49

## 2019-01-25 RX ADMIN — METHYLPREDNISOLONE SODIUM SUCCINATE 40 MG: 125 INJECTION, POWDER, FOR SOLUTION INTRAMUSCULAR; INTRAVENOUS at 21:26

## 2019-01-25 RX ADMIN — HYDROCODONE BITARTRATE AND ACETAMINOPHEN 1 TABLET: 5; 325 TABLET ORAL at 21:25

## 2019-01-25 RX ADMIN — ALBUTEROL SULFATE 2.5 MG: 2.5 SOLUTION RESPIRATORY (INHALATION) at 15:38

## 2019-01-25 RX ADMIN — BUDESONIDE 500 MCG: 0.5 INHALANT RESPIRATORY (INHALATION) at 07:21

## 2019-01-25 RX ADMIN — PIPERACILLIN AND TAZOBACTAM 4.5 G: 4; .5 INJECTION, POWDER, FOR SOLUTION INTRAVENOUS at 03:35

## 2019-01-25 RX ADMIN — ALBUTEROL SULFATE 2.5 MG: 2.5 SOLUTION RESPIRATORY (INHALATION) at 07:21

## 2019-01-25 NOTE — PROGRESS NOTES
Problem: Breathing Pattern - Ineffective Goal: *Absence of hypoxia Outcome: Progressing Towards Goal 
Patient has wheezy BS today although is progressing well with weaning from supplemental O2

## 2019-01-25 NOTE — PROGRESS NOTES
Results of Oximetry with Exercise RA Restin% RA Ambulated: 85% 1L Ambulated:  85% 2L Ambulated:  86% 3L Ambulated:  87% 4L Ambulated:  89% 5L Ambulated:  94% Patient does not require supplemental O2 while resting. While ambulated patient requires 5L to stay above 90%.

## 2019-01-25 NOTE — PROGRESS NOTES
01/24/19 2006 Oxygen Therapy O2 Sat (%) 98 % Pulse via Oximetry 102 beats per minute O2 Device Nasal cannula O2 Flow Rate (L/min) 3 l/min Pre-Treatment Breathing Pattern Regular Cough Non-productive Breath Sounds Bilateral Coarse Left Breath Sounds Expiratory wheezing Right Breath Sounds Expiratory wheezing Pulse 105 SpO2 95 % Respirations 16 Post-Treatment Breathing Pattern Regular Cough Non-productive Breath Sounds Bilateral Expiratory wheezing Left Breath Sounds Expiratory wheezing Right Breath Sounds Expiratory wheezing Pulse 103 SpO2 99 % Respirations 16 Treatment Tolerance Well Procedures  
$$ Subsequent Procedure Aerosol;IPV ([facility] ) Delivery Source Breath Actuated Nebulizer Aerosolized Medications Albuterol;Pulmicort

## 2019-01-25 NOTE — PROGRESS NOTES
Uriah Shepherd Admission Date: 1/23/2019 Daily Progress Note: 1/25/2019 The patient's chart is reviewed and the patient is discussed with the staff. 66 y.o.  male seen and evaluated at the request of Dr. Narda Rodríguez. He was seen at Urgent Care, noted with hypoxia and referred to the ER. In the ER complained of productive cough with low grade fevers for 2 days. Influenza: negative and CXR with right base scarring-possible for pneumonia. He decided he wanted to go home. He left and returned last night with increased shortness of breath and wheezing. WBC was up to 17 from 13. Had recently been treated for bronchitis with Doxycycline. Was placed on Levaquin but changed to Zosyn with culture in November growing pseudomonas. Temp max 100.8 last 24 hours. He is not on home O2 and is followed in our office for underlying centrilobular emphysema, bronchiectasis and pulmonary nodule. CT then with right middle and lower lobes there is new bronchial wall thickening and reticular nodular densities there is hazy groundglass opacities in the left lower lobe as well as new linear stranding in the left lung apex. These changes were less significant than in November 2017. Hs uses a percussion vest twice a day most days and uses Albuterol nebs. 
  
Chronic Medical:  CAD, COPD and has nebs and home vest, HTN, migraines, GERD Subjective:  
  Feels better. Talked about home regimen - nebulizer, vest, etc.  
 
Current Facility-Administered Medications Medication Dose Route Frequency  sodium chloride (OCEAN) 0.65 % nasal squeeze bottle 2 Spray  2 Spray Both Nostrils Q4H  
 sodium chloride (NS) flush 5-40 mL  5-40 mL IntraVENous Q8H  
 sodium chloride (NS) flush 5-40 mL  5-40 mL IntraVENous PRN  
 insulin lispro (HUMALOG) injection   SubCUTAneous AC&HS  budesonide (PULMICORT) 500 mcg/2 ml nebulizer suspension  500 mcg Nebulization BID RT  
  methylPREDNISolone (PF) (Solu-MEDROL) injection 40 mg  40 mg IntraVENous Q12H  
 enoxaparin (LOVENOX) injection 40 mg  40 mg SubCUTAneous Q24H  
 ondansetron (ZOFRAN) injection 4 mg  4 mg IntraVENous Q4H PRN  
 acetaminophen (TYLENOL) tablet 650 mg  650 mg Oral Q4H PRN  
 HYDROcodone-acetaminophen (NORCO) 5-325 mg per tablet 1 Tab  1 Tab Oral Q4H PRN  
 clopidogrel (PLAVIX) tablet 75 mg  75 mg Oral DAILY  gabapentin (NEURONTIN) capsule 300 mg  300 mg Oral TID  latanoprost (XALATAN) 0.005 % ophthalmic solution 1 Drop  1 Drop Both Eyes QHS  nitroglycerin (NITROSTAT) tablet 0.4 mg  0.4 mg SubLINGual Q5MIN PRN  
 tamsulosin (FLOMAX) capsule 0.4 mg  0.4 mg Oral DAILY  SUMAtriptan (IMITREX) tablet 50 mg  50 mg Oral Q2H PRN  
 fluticasone (FLONASE) 50 mcg/actuation nasal spray 2 Spray  2 Spray Both Nostrils DAILY  piperacillin-tazobactam (ZOSYN) 4.5 g in 0.9% sodium chloride (MBP/ADV) 100 mL  4.5 g IntraVENous Q8H  
 albuterol (PROVENTIL VENTOLIN) nebulizer solution 2.5 mg  2.5 mg Nebulization Q4H RT  
 albuterol (PROVENTIL VENTOLIN) nebulizer solution 2.5 mg  2.5 mg Nebulization Q4H PRN  
 guaiFENesin ER (MUCINEX) tablet 1,200 mg  1,200 mg Oral BID Objective:  
 
Vitals:  
 01/25/19 0412 01/25/19 0421 01/25/19 5813 01/25/19 4489 BP: 122/77   155/83 Pulse: 92   98 Resp: 20 19 Temp: 97.8 °F (36.6 °C)   98.1 °F (36.7 °C) SpO2: 92% 92% 95% 96% Weight:      
Height:      
 
Intake and Output:  
01/23 1901 - 01/25 0700 In: 505 [P.O.:580; I.V.:253] Out: 2700 [Urine:2700] 01/25 0701 - 01/25 1900 In: 587 [P.O.:480; I.V.:107] Out: 200 [Urine:200] Physical Exam:  
Constitutional:  the patient is well developed and in no acute distress HEENT:  Sclera clear, pupils equal, oral mucosa moist 
Lungs: wheezing and crackles bilaterally. Wearing nasal cannula Cardiovascular:  RRR without M,G,R 
Abd/GI: soft and non-tender; with positive bowel sounds. Ext: warm without cyanosis. There is no lower leg edema. Musculoskeletal: moves all four extremities with equal strength Skin:  no jaundice or rashes, no wounds Neuro: no gross neuro deficits Musculoskeletal: can ambulate. No deformity Psychiatric: Calm. ROS: chronic congestion, dyspnea Lines: peripheral IV 
 
CHEST XRAY: none today LAB Recent Labs  
  01/25/19 
0349 01/23/19 
1347 WBC 11.8* 17.0*  
HGB 13.3* 15.4 HCT 41.0* 46.7  312 Recent Labs  
  01/25/19 
0349 01/23/19 
1347  134* K 3.8 5.1 * 99  
CO2 25 28 * 97 BUN 9 7* CREA 0.58* 0.77* ALB  --  3.1*  
SGOT  --  66* Assessment:  (Medical Decision Making) Hospital Problems  Date Reviewed: 1/8/2019 Codes Class Noted POA Pneumonia ICD-10-CM: J18.9 ICD-9-CM: 490  1/24/2019 Yes * (Principal) Acute respiratory failure with hypoxia ([facility] ICD-10-CM: J96.01 
ICD-9-CM: 518.81  1/24/2019 Yes Sepsis ([facility] ICD-10-CM: A41.9 ICD-9-CM: 038.9, 995.91  1/24/2019 Yes Bronchiectasis ([facility] (Chronic) ICD-10-CM: J47.9 ICD-9-CM: 494.0  1/24/2019 Yes COPD with acute exacerbation ([facility] ICD-10-CM: J44.1 ICD-9-CM: 491.21  1/23/2019 Unknown Dementia without behavioral disturbance ICD-10-CM: F03.90 ICD-9-CM: 294.20  10/9/2017 Yes Benign essential HTN ICD-10-CM: I10 
ICD-9-CM: 401.1  6/6/2017 Yes CAD (coronary artery disease) (Chronic) ICD-10-CM: I25.10 ICD-9-CM: 414.00  10/8/2013 Yes Overview Signed 4/4/2016 11:55 AM by Matthew VERMA of the native vessel Gastroesophageal reflux disease without esophagitis (Chronic) ICD-10-CM: K21.9 ICD-9-CM: 530.81  10/8/2013 Yes Plan:  (Medical Decision Making) 1. Check room air sat - not on home oxygen 2. Day 2 zosyn. Cultures neg so far. WBC down, afebrile 3. IV steroids - still wheezing on exam 
4. Vest therapy - uses at home. Nebulized albuterol, mucolytic Jayne Glass, NP More than 50% of time documented was spent face-to-face contact with the patient and in the care of the patient on the floor/unit where the patient is located. Lungs:  Coarse Heart:  RRR with no Murmur/Rubs/Gallops Additional Comments:  Needs 5L  Of -02 with ambulation not on 02 at home before,not ready for discharge ,but will need home 02,will ask case management to arrange I have spoken with and examined the patient. I agree with the above assessment and plan as documented.  
 
Rodrigo Nash MD

## 2019-01-25 NOTE — PROGRESS NOTES
DME for O2 faxed to [facility] 
Per O2 qualifier pt requires 5l during Ambulation Requested portable O2 tank to be delivered tomorrow to pts room  Pending possible  Discharge. Assistance Pt has been ambulating in room without any  Assistance  Do not anticipate and furthers  CM needs at discharge Care Management Interventions PCP Verified by CM: Yes Mode of Transport at Discharge: Self Transition of Care Consult (CM Consult): Discharge Planning Current Support Network: Own Home Confirm Follow Up Transport: Family Plan discussed with Pt/Family/Caregiver: Yes [employer] Information Provided?: No

## 2019-01-25 NOTE — PROGRESS NOTES
END OF SHIFT NOTE: 
 
Intake/Output 01/24 1901 - 01/25 0700 In: -  
Out: 450 [Urine:450] Voiding: YES Catheter: NO 
Drain:   
 
 
 
 
Stool:  0 occurrences. Emesis:  0 occurrences. VITAL SIGNS Patient Vitals for the past 12 hrs: 
 Temp Pulse Resp BP SpO2  
01/25/19 0421     92 % 01/25/19 0341 97.8 °F (36.6 °C) 92 20 122/77 92 % 01/24/19 2352     94 % 01/24/19 2339 97.7 °F (36.5 °C) 94 20 130/73 98 % 01/24/19 2006     98 % 01/24/19 1910 97.8 °F (36.6 °C) (!) 108 20 117/70 97 % Pain Assessment Pain 1 Pain Scale 1: Numeric (0 - 10) (01/25/19 0307) Pain Intensity 1: 0 (01/25/19 0307) Patient Stated Pain Goal: 0 (01/25/19 0307) Pain Reassessment 1: Yes (01/25/19 0307) Ambulating Yes Additional Information:  
 
Shift report given to oncoming nurse at the bedside.  
 
Rupesh Savage RN

## 2019-01-25 NOTE — PROGRESS NOTES
Hospitalist Progress Note 2019 Admit Date: 2019 10:51 PM  
NAME: Jackelin Darnell :  1940 MRN:  [id number] Attending: Jodi Baker MD 
PCP:  Sumeet Eisenberg DO 
 
SUBJECTIVE:  
Gustavo Krabbe is a 67 yo M with history of COPD and bronchiectasis, who was admitted  with 3 day history of SOB, wheezing, and productive cough with green-brown sputum with CXR showing RLL infiltrate. Reports he is doing better today. Sputum clearing up. He reports he is breathing significantly better. Review of Systems negative with exception of pertinent positives noted above PHYSICAL EXAM  
 
Visit Vitals /79 (BP 1 Location: Left arm, BP Patient Position: At rest;Sitting) Pulse (!) 108 Temp 97.6 °F (36.4 °C) Resp 18 Ht 5' 6\" (1.676 m) Wt 58.1 kg (128 lb) SpO2 96% BMI 20.66 kg/m² Temp (24hrs), Av.8 °F (36.6 °C), Min:97.6 °F (36.4 °C), Max:98.1 °F (36.7 °C) Oxygen Therapy O2 Sat (%): 96 % (19 153) Pulse via Oximetry: 105 beats per minute (19 153) O2 Device: Nasal cannula (19) O2 Flow Rate (L/min): 2 l/min (19 153) Intake/Output Summary (Last 24 hours) at 2019 1742 Last data filed at 2019 1635 Gross per 24 hour Intake 1067 ml Output 1700 ml Net -633 ml General: No acute distress   
Lungs:  Slight wheeze bilaterally, decent air movement. Heart:  Regular rate and rhythm,  No murmur, rub, or gallop Abdomen: Soft, Non distended, Non tender, Positive bowel sounds Extremities: No cyanosis, clubbing or edema Neurologic:  No focal deficits Recent Results (from the past 24 hour(s)) GLUCOSE, POC Collection Time: 19  9:27 PM  
Result Value Ref Range Glucose (POC) 223 (H) 65 - 100 mg/dL PLEASE READ & DOCUMENT PPD TEST IN 24 HRS Collection Time: 19  1:59 AM  
Result Value Ref Range PPD  Negative  
 mm Induration  mm METABOLIC PANEL, BASIC  
 Collection Time: 01/25/19  3:49 AM  
Result Value Ref Range Sodium 141 136 - 145 mmol/L Potassium 3.8 3.5 - 5.1 mmol/L Chloride 109 (H) 98 - 107 mmol/L  
 CO2 25 21 - 32 mmol/L Anion gap 7 7 - 16 mmol/L Glucose 136 (H) 65 - 100 mg/dL BUN 9 8 - 23 MG/DL Creatinine 0.58 (L) 0.8 - 1.5 MG/DL  
 GFR est AA >60 >60 ml/min/1.73m2 GFR est non-AA >60 >60 ml/min/1.73m2 Calcium 8.2 (L) 8.3 - 10.4 MG/DL  
CBC WITH AUTOMATED DIFF Collection Time: 01/25/19  3:49 AM  
Result Value Ref Range WBC 11.8 (H) 4.3 - 11.1 K/uL  
 RBC 4.70 4.23 - 5.6 M/uL  
 HGB 13.3 (L) 13.6 - 17.2 g/dL HCT 41.0 (L) 41.1 - 50.3 % MCV 87.2 79.6 - 97.8 FL  
 MCH 28.3 26.1 - 32.9 PG  
 MCHC 32.4 31.4 - 35.0 g/dL  
 RDW 14.1 11.9 - 14.6 % PLATELET 564 546 - 192 K/uL MPV 9.7 9.4 - 12.3 FL ABSOLUTE NRBC 0.00 0.0 - 0.2 K/uL  
 DF AUTOMATED NEUTROPHILS 86 (H) 43 - 78 % LYMPHOCYTES 9 (L) 13 - 44 % MONOCYTES 5 4.0 - 12.0 % EOSINOPHILS 0 (L) 0.5 - 7.8 % BASOPHILS 0 0.0 - 2.0 % IMMATURE GRANULOCYTES 1 0.0 - 5.0 %  
 ABS. NEUTROPHILS 10.1 (H) 1.7 - 8.2 K/UL  
 ABS. LYMPHOCYTES 1.0 0.5 - 4.6 K/UL  
 ABS. MONOCYTES 0.5 0.1 - 1.3 K/UL  
 ABS. EOSINOPHILS 0.0 0.0 - 0.8 K/UL  
 ABS. BASOPHILS 0.0 0.0 - 0.2 K/UL  
 ABS. IMM. GRANS. 0.1 0.0 - 0.5 K/UL GLUCOSE, POC Collection Time: 01/25/19  7:20 AM  
Result Value Ref Range Glucose (POC) 160 (H) 65 - 100 mg/dL GLUCOSE, POC Collection Time: 01/25/19 11:09 AM  
Result Value Ref Range Glucose (POC) 224 (H) 65 - 100 mg/dL GLUCOSE, POC Collection Time: 01/25/19  4:33 PM  
Result Value Ref Range Glucose (POC) 202 (H) 65 - 100 mg/dL Imaging: No orders to display ASSESSMENT Hospital Problems as of 1/25/2019 Date Reviewed: 1/8/2019 Codes Class Noted - Resolved POA Pneumonia ICD-10-CM: J18.9 ICD-9-CM: 137  1/24/2019 - Present Yes  * (Principal) Acute respiratory failure with hypoxia ([facility] ICD-10-CM: J96.01 
ICD-9-CM: 518.81  1/24/2019 - Present Yes Sepsis ([facility] ICD-10-CM: A41.9 ICD-9-CM: 038.9, 995.91  1/24/2019 - Present Yes Bronchiectasis ([facility] (Chronic) ICD-10-CM: J47.9 ICD-9-CM: 494.0  1/24/2019 - Present Yes COPD with acute exacerbation ([facility] ICD-10-CM: J44.1 ICD-9-CM: 491.21  1/23/2019 - Present Unknown Dementia without behavioral disturbance ICD-10-CM: F03.90 ICD-9-CM: 294.20  10/9/2017 - Present Yes Benign essential HTN ICD-10-CM: I10 
ICD-9-CM: 401.1  6/6/2017 - Present Yes CAD (coronary artery disease) (Chronic) ICD-10-CM: I25.10 ICD-9-CM: 414.00  10/8/2013 - Present Yes Overview Signed 4/4/2016 11:55 AM by Sherice VERMA of the native vessel Gastroesophageal reflux disease without esophagitis (Chronic) ICD-10-CM: K21.9 ICD-9-CM: 530.81  10/8/2013 - Present Yes Plan: 
· COPD exacerbation/brocnchectasis with history of pseudomonas- continue zosyn. · Follow cultures. · Continue solumedrol per pulm. · Hypoxic respiratory failure- due to above. · Wean oxygen as tolerated. · May need home oxygen on discharge · CAD- clopidogrel, home meds. DVT Prophylaxis: Lovenox Signed By: Ismael Mcburney Charla Bo, MD   
 January 25, 2019

## 2019-01-25 NOTE — PROGRESS NOTES
Problem: Falls - Risk of 
Goal: *Absence of Falls Document Lindsay Turner Fall Risk and appropriate interventions in the flowsheet. Outcome: Progressing Towards Goal 
Fall Risk Interventions: 
Mobility Interventions: Patient to call before getting OOB Medication Interventions: Evaluate medications/consider consulting pharmacy History of Falls Interventions: Door open when patient unattended, Room close to nurse's station

## 2019-01-26 VITALS
HEIGHT: 66 IN | BODY MASS INDEX: 20.57 KG/M2 | TEMPERATURE: 96.9 F | WEIGHT: 128 LBS | RESPIRATION RATE: 20 BRPM | OXYGEN SATURATION: 98 % | SYSTOLIC BLOOD PRESSURE: 142 MMHG | HEART RATE: 110 BPM | DIASTOLIC BLOOD PRESSURE: 83 MMHG

## 2019-01-26 LAB
BACTERIA SPEC CULT: NORMAL
GLUCOSE BLD STRIP.AUTO-MCNC: 161 MG/DL (ref 65–100)
GLUCOSE BLD STRIP.AUTO-MCNC: 171 MG/DL (ref 65–100)
GRAM STN SPEC: NORMAL
MM INDURATION POC: NORMAL 0MM (ref 0–5)
PPD POC: NORMAL NEGATIVE
SERVICE CMNT-IMP: NORMAL

## 2019-01-26 PROCEDURE — 74011000250 HC RX REV CODE- 250: Performed by: NURSE PRACTITIONER

## 2019-01-26 PROCEDURE — 82962 GLUCOSE BLOOD TEST: CPT

## 2019-01-26 PROCEDURE — 74011250636 HC RX REV CODE- 250/636: Performed by: INTERNAL MEDICINE

## 2019-01-26 PROCEDURE — 74011000250 HC RX REV CODE- 250: Performed by: INTERNAL MEDICINE

## 2019-01-26 PROCEDURE — 94640 AIRWAY INHALATION TREATMENT: CPT

## 2019-01-26 PROCEDURE — 74011636637 HC RX REV CODE- 636/637: Performed by: INTERNAL MEDICINE

## 2019-01-26 PROCEDURE — 94760 N-INVAS EAR/PLS OXIMETRY 1: CPT

## 2019-01-26 PROCEDURE — 99232 SBSQ HOSP IP/OBS MODERATE 35: CPT | Performed by: INTERNAL MEDICINE

## 2019-01-26 PROCEDURE — 77010033678 HC OXYGEN DAILY

## 2019-01-26 PROCEDURE — 74011250637 HC RX REV CODE- 250/637: Performed by: INTERNAL MEDICINE

## 2019-01-26 PROCEDURE — 74011250637 HC RX REV CODE- 250/637: Performed by: NURSE PRACTITIONER

## 2019-01-26 PROCEDURE — 74011000258 HC RX REV CODE- 258: Performed by: INTERNAL MEDICINE

## 2019-01-26 RX ORDER — PREDNISONE 10 MG/1
TABLET ORAL
Qty: 30 TAB | Refills: 0 | Status: SHIPPED | OUTPATIENT
Start: 2019-01-26 | End: 2019-02-13 | Stop reason: ALTCHOICE

## 2019-01-26 RX ORDER — AMOXICILLIN AND CLAVULANATE POTASSIUM 875; 125 MG/1; MG/1
1 TABLET, FILM COATED ORAL EVERY 12 HOURS
Qty: 10 TAB | Refills: 0 | Status: SHIPPED | OUTPATIENT
Start: 2019-01-26 | End: 2019-01-31

## 2019-01-26 RX ADMIN — BUDESONIDE 500 MCG: 0.5 INHALANT RESPIRATORY (INHALATION) at 07:11

## 2019-01-26 RX ADMIN — ALBUTEROL SULFATE 2.5 MG: 2.5 SOLUTION RESPIRATORY (INHALATION) at 11:05

## 2019-01-26 RX ADMIN — ENOXAPARIN SODIUM 40 MG: 40 INJECTION SUBCUTANEOUS at 07:50

## 2019-01-26 RX ADMIN — Medication 10 ML: at 06:00

## 2019-01-26 RX ADMIN — INSULIN LISPRO 2 UNITS: 100 INJECTION, SOLUTION INTRAVENOUS; SUBCUTANEOUS at 08:06

## 2019-01-26 RX ADMIN — TAMSULOSIN HYDROCHLORIDE 0.4 MG: 0.4 CAPSULE ORAL at 07:49

## 2019-01-26 RX ADMIN — ALBUTEROL SULFATE 2.5 MG: 2.5 SOLUTION RESPIRATORY (INHALATION) at 03:57

## 2019-01-26 RX ADMIN — INSULIN LISPRO 2 UNITS: 100 INJECTION, SOLUTION INTRAVENOUS; SUBCUTANEOUS at 12:17

## 2019-01-26 RX ADMIN — ALBUTEROL SULFATE 2.5 MG: 2.5 SOLUTION RESPIRATORY (INHALATION) at 07:11

## 2019-01-26 RX ADMIN — PIPERACILLIN AND TAZOBACTAM 4.5 G: 4; .5 INJECTION, POWDER, FOR SOLUTION INTRAVENOUS at 04:32

## 2019-01-26 RX ADMIN — CLOPIDOGREL BISULFATE 75 MG: 75 TABLET ORAL at 07:49

## 2019-01-26 RX ADMIN — METHYLPREDNISOLONE SODIUM SUCCINATE 40 MG: 125 INJECTION, POWDER, FOR SOLUTION INTRAMUSCULAR; INTRAVENOUS at 07:51

## 2019-01-26 RX ADMIN — GABAPENTIN 300 MG: 300 CAPSULE ORAL at 07:49

## 2019-01-26 RX ADMIN — GUAIFENESIN 1200 MG: 600 TABLET, EXTENDED RELEASE ORAL at 07:50

## 2019-01-26 RX ADMIN — FLUTICASONE PROPIONATE 2 SPRAY: 50 SPRAY, METERED NASAL at 07:53

## 2019-01-26 NOTE — PROGRESS NOTES
END OF SHIFT NOTE: 
 
Intake/Output 01/26 0701 - 01/26 1900 In: -  
Out: 100 [Urine:100] Voiding: YES Catheter: NO 
Drain:   
 
 
 
 
Stool:  1 occurrences. Stool Assessment Stool Color: Vallery Hamming (01/26/19 1434) Stool Amount: Small (01/26/19 0514) Stool Source/Status: Rectum (01/26/19 1849) Emesis:  0 occurrences. VITAL SIGNS Patient Vitals for the past 12 hrs: 
 Temp Pulse Resp BP SpO2  
01/26/19 0723    (!) 143/102   
01/26/19 0719 97.8 °F (36.6 °C) 67 20 (!) 155/94 95 % 01/26/19 0711     94 % 01/26/19 0357     95 % 01/26/19 0343 97.9 °F (36.6 °C) 76 18 153/89 96 % 01/25/19 2324 98.1 °F (36.7 °C) (!) 114 20 140/85 96 % 01/25/19 2305     97 % 01/25/19 1955 97.9 °F (36.6 °C) (!) 116 20 119/66 99 % 01/25/19 1936     97 % Pain Assessment Pain 0 Ambulating Yes Additional Information: Patient up ambulating in the room. Voids in urinal. Clear, yellow urine. Continue Iv abt. Exp. Wheezes noted. Shift report given to oncoming nurse at the bedside. Rene Cruz

## 2019-01-26 NOTE — PROGRESS NOTES
Care Management Interventions PCP Verified by CM: Yes Mode of Transport at Discharge: Self Transition of Care Consult (CM Consult): Discharge Planning Discharge Durable Medical Equipment: Yes([facility] for new home O2 set up.) Current Support Network: Own Home Confirm Follow Up Transport: Family Plan discussed with Pt/Family/Caregiver: Yes [employer] Information Provided?: No 
Discharge Location Discharge Placement: Home [facility] updated that pt is discharged home today.

## 2019-01-26 NOTE — DISCHARGE SUMMARY
Hospitalist Discharge Summary     Patient ID:  Nora Barraza  [id number]  66 y.o.  1940  Admit date: 1/23/2019 10:51 PM  Discharge date and time: 1/26/2019  Attending: Vaishali Esteves MD  PCP:  Max Tabares DO  Treatment Team: Attending Provider: Vaishali Esteves MD; Consulting Provider: Lima Wolfe MD; Utilization Review: Bernie Saini RN    Principal Diagnosis Acute respiratory failure with hypoxia [facility])   Hospital Problems as of 1/26/2019 Date Reviewed: 1/26/2019          Codes Class Noted - Resolved POA    Pneumonia ICD-10-CM: J18.9  ICD-9-CM: 700  1/24/2019 - Present Yes        * (Principal) Acute respiratory failure with hypoxia ([facility] ICD-10-CM: J96.01  ICD-9-CM: 518.81  1/24/2019 - Present Yes        Sepsis ([facility] ICD-10-CM: A41.9  ICD-9-CM: 038.9, 995.91  1/24/2019 - Present Yes        Bronchiectasis ([facility] [address] (Chronic) ICD-10-CM: J47.9  ICD-9-CM: 494.0  1/24/2019 - Present Yes        COPD with acute exacerbation ([facility] [address] ICD-10-CM: J44.1  ICD-9-CM: 491.21  1/23/2019 - Present Unknown        Dementia without behavioral disturbance ICD-10-CM: F03.90  ICD-9-CM: 294.20  10/9/2017 - Present Yes        Benign essential HTN (Chronic) ICD-10-CM: I10  ICD-9-CM: 401.1  6/6/2017 - Present Yes        CAD (coronary artery disease) (Chronic) ICD-10-CM: I25.10  ICD-9-CM: 414.00  10/8/2013 - Present Yes    Overview Signed 4/4/2016 11:55 AM by Lorin Shankar of the native vessel             Gastroesophageal reflux disease without esophagitis (Chronic) ICD-10-CM: K21.9  ICD-9-CM: 530.81  10/8/2013 - Present Yes                 HPI:  '78yr old male with pmhx sig for copd and bronchiectasis- relatively well until 2-3 days ago when he noted increased wheezing and sob. Pt has vest at home along with bronchodilator which he has been using but symptoms got worse. He went to [facility] today and was sent to the er. He was treated and declined to be admitted.  But he went home and wheezing and sob got worse so he came back to the ER.   Rain Armas was again found to be in hypoxic resp failure and wheezing- hospitalist asked to admit.'    Hospital Course:  He was admitted and started on IV abx, IV corticosteroids, nebs. CXR showed R lung base infiltrate. His symptoms improved with treatment. Sputum culture grew normal respiratory michael and blood cultures no growth to date. He had evaluation for home oxygen and qualified based on results below:  Results of Oximetry with Exercise      RA Restin%     RA Ambulated: 85%  1L Ambulated:  85%  2L Ambulated:  86%  3L Ambulated:  87%  4L Ambulated:  89%  5L Ambulated:  94%     On day of discharge, he felt he was better and was anxious to return home. Discussed with pulmonary team.  He will be discharged with 5 more days of augmentin and prednisone taper. He will need close follow up with pulmonology. Significant Diagnostic Studies:       Labs: Results:       Chemistry Recent Labs     19  03419  1347   * 97    134*   K 3.8 5.1   * 99   CO2 25 28   BUN 9 7*   CREA 0.58* 0.77*   CA 8.2* 9.2   AGAP 7 7   AP  --  72   TP  --  7.6   ALB  --  3.1*   GLOB  --  4.5*   AGRAT  --  0.7*      CBC w/Diff Recent Labs     19  03419  1347   WBC 11.8* 17.0*   RBC 4.70 5.38   HGB 13.3* 15.4   HCT 41.0* 46.7    312   GRANS 86* 78   LYMPH 9* 6*   EOS 0* 0*      Cardiac Enzymes No results for input(s): CPK, CKND1, LAKISHA in the last 72 hours. No lab exists for component: CKRMB, TROIP   Coagulation No results for input(s): PTP, INR, APTT in the last 72 hours. No lab exists for component: INREXT    Lipid Panel Lab Results   Component Value Date/Time    Cholesterol, total 221 (H) 2017 08:38 AM    HDL Cholesterol 55 2017 08:38 AM    LDL, calculated 143 (H) 2017 08:38 AM    VLDL, calculated 2017 08:38 AM    Triglyceride 117 2017 08:38 AM      BNP No results for input(s): BNPP in the last 72 hours. Liver Enzymes Recent Labs     01/23/19  1347   TP 7.6   ALB 3.1*   AP 72   SGOT 66*      Thyroid Studies Lab Results   Component Value Date/Time    TSH 0.563 06/25/2018 03:48 PM    TSH 0.797 09/27/2017 08:38 AM          Imaging:    No orders to display       Discharge Exam:  Visit Vitals  BP (!) 151/98 (BP Patient Position: Sitting;Post activity)   Pulse (!) 107   Temp 97.8 °F (36.6 °C)   Resp 20   Ht 5' 6\" (1.676 m)   Wt 58.1 kg (128 lb)   SpO2 95%   BMI 20.66 kg/m²     Patient Vitals for the past 24 hrs:   Temp Pulse Resp BP SpO2   01/26/19 0800  (!) 107  (!) 151/98    01/26/19 0723    (!) 143/102    01/26/19 0719 97.8 °F (36.6 °C) 67 20 (!) 155/94 95 %   01/26/19 0711     94 %   01/26/19 0357     95 %   01/26/19 0343 97.9 °F (36.6 °C) 76 18 153/89 96 %   01/25/19 2324 98.1 °F (36.7 °C) (!) 114 20 140/85 96 %   01/25/19 2305     97 %   01/25/19 1955 97.9 °F (36.6 °C) (!) 116 20 119/66 99 %   01/25/19 1936     97 %   01/25/19 1538     96 %   01/25/19 1504 97.6 °F (36.4 °C) (!) 108 18 135/79 96 %   01/25/19 1139     91 %   01/25/19 1124 97.9 °F (36.6 °C) 100 18 141/85 97 %       General appearance: alert, cooperative, no distress, appears stated age  Lungs: slight bilateral expiratory wheeze  Heart: regular rate and rhythm, S1, S2 normal, no murmur, click, rub or gallop  Abdomen: soft, non-tender. Bowel sounds normal. No masses,  no organomegaly  Extremities: no cyanosis or edema  Neurologic: Grossly normal    Disposition: home  Discharge Condition: stable  Patient Instructions:   Current Discharge Medication List      START taking these medications    Details   guaiFENesin ER (MUCINEX) 1,200 mg Ta12 ER tablet Take 1 Tab by mouth two (2) times a day. Qty: 60 Tab, Refills: 0      amoxicillin-clavulanate (AUGMENTIN) 875-125 mg per tablet Take 1 Tab by mouth every twelve (12) hours for 5 days.   Qty: 10 Tab, Refills: 0         CONTINUE these medications which have CHANGED    Details predniSONE (DELTASONE) 10 mg tablet Taper:  4 tabs x 3 days, then 3 tabs x 3 days, then 2 tabs x 3 days, then 1 tab x 3 days  Qty: 30 Tab, Refills: 0    Associated Diagnoses: COPD with acute exacerbation ([address]         CONTINUE these medications which have NOT CHANGED    Details   lipase-protease-amylase (CREON 12,000) 12,000-38,000 -60,000 unit capsule Take 3 Caps by mouth three (3) times daily (with meals). clopidogrel (PLAVIX) 75 mg tab TAKE 1 TAB BY MOUTH DAILY. Qty: 80 Tab, Refills: 1    Associated Diagnoses: Coronary artery disease involving native coronary artery of native heart without angina pectoris      SUMAtriptan (IMITREX) 100 mg tablet TAKE 1/2 TO 1 TABLET BY MOUTH AS NEEDED FOR MIGRAINE MAY REPEAT IN 2HRS MAX 2/24HRS  Qty: 9 Tab, Refills: 5    Associated Diagnoses: Migraine with aura and without status migrainosus, not intractable      tamsulosin (FLOMAX) 0.4 mg capsule TAKE 1 CAP BY MOUTH DAILY. INDICATIONS: BENIGN PROSTATIC HYPERPLASIA WITH LOWER URINARY TRACT SX  Qty: 90 Cap, Refills: 1    Associated Diagnoses: Benign prostatic hyperplasia with urinary frequency      !! albuterol (PROVENTIL VENTOLIN) 2.5 mg /3 mL (0.083 %) nebulizer solution USE 3 ML BY NEBULIZATION ROUTE THREE (3) TIMES DAILY. Qty: 1 Package, Refills: 4      !! albuterol (PROVENTIL VENTOLIN) 2.5 mg /3 mL (0.083 %) nebulizer solution 3 mL by Nebulization route three (3) times daily. Qty: 90 Each, Refills: 11    Comments: COPD J44.9      gabapentin (NEURONTIN) 300 mg capsule Take 1 Cap by mouth three (3) times daily. Qty: 270 Cap, Refills: 1    Associated Diagnoses: Postherpetic neuralgia      B.infantis-B.ani-B.long-B.bifi (PROBIOTIC 4X) 10-15 mg TbEC Take  by mouth.       ADVAIR DISKUS 500-50 mcg/dose diskus inhaler INHALE 1 PUFF BY MOUTH 2 TIMES DAILY  Qty: 1 Inhaler, Refills: 11      SPIRIVA WITH HANDIHALER 18 mcg inhalation capsule INHALE 1 CAPSULE VIA HANDIHALER ONCE DAILY AT THE SAME TIME EVERY DAY  Qty: 30 Cap, Refills: 11      triamcinolone (NASACORT) 55 mcg nasal inhaler 2 Sprays by Both Nostrils route daily. Indications: Allergic Rhinitis  Qty: 1 Bottle, Refills: 11      cholecalciferol (VITAMIN D3) 400 unit tab tablet Take  by mouth daily. PROAIR HFA 90 mcg/actuation inhaler INHALE 2 PUFFS BY MOUTH EVERY 4 HOURS AS NEEDED  Qty: 1 Inhaler, Refills: 11      latanoprost (XALATAN) 0.005 % ophthalmic solution Administer 1 Drop to both eyes nightly. Associated Diagnoses: Frequency of urination      VIT A/VIT C/VIT E/ZINC/COPPER (PRESERVISION AREDS PO) Take 1 Tab by mouth two (2) times a day. nitroglycerin (NITROSTAT) 0.4 mg SL tablet 1 Tab by SubLINGual route every five (5) minutes as needed for Chest Pain. Qty: 25 Tab, Refills: 6      glucose blood VI test strips (BLOOD GLUCOSE TEST) strip Check BS once a day fasting (DX: E11.9); one touch verio  Qty: 100 Strip, Refills: 11    Associated Diagnoses: Controlled type 2 diabetes mellitus without complication, without long-term current use of insulin ([facility])      Lancets misc Check BS once a day fasting (DX: E11.9); one touch verio  Qty: 100 Each, Refills: 11    Associated Diagnoses: Controlled type 2 diabetes mellitus without complication, without long-term current use of insulin ([facility])      EPINEPHrine (EPIPEN) 0.3 mg/0.3 mL (1:1,000) injection 0.3 mg by IntraMUSCular route once as needed. !! - Potential duplicate medications found. Please discuss with provider. STOP taking these medications       cefdinir (OMNICEF) 300 mg capsule Comments:   Reason for Stopping:               Activity: Activity as tolerated  Diet: Cardiac Diet    He is to continue his nebs, percussion vest, and other recommendations per pulmonology. Follow-up      Follow-up Appointments   Procedures    FOLLOW UP VISIT Appointment in: Other ([address]) 1. PCP in 1 week for hospital follow up.  2. [facility] in 2 weeks for follow up for COPD exacerbation/bronchiectasis/R side pneumonia     1. PCP in 1 week for hospital follow up. 2. [facility] in 2 weeks for follow up for COPD exacerbation/bronchiectasis/R side pneumonia     Standing Status:   Standing     Number of Occurrences:   1     Order Specific Question:   Appointment in     Answer: Other (Specify)   ·   ·   Time spent to discharge patient 31 minutes  Signed:  Nathalia Abdullahi.  Aubrie Low MD  1/26/2019  10:50 AM

## 2019-01-26 NOTE — PROGRESS NOTES
END OF SHIFT NOTE: 
 
Intake/Output No intake/output data recorded. Voiding: YES Catheter: NO 
Drain:   
 
 
 
 
Stool:  0 occurrences. Emesis:  0 occurrences. VITAL SIGNS Patient Vitals for the past 12 hrs: 
 Temp Pulse Resp BP SpO2  
01/25/19 1538     96 % 01/25/19 1504 97.6 °F (36.4 °C) (!) 108 18 135/79 96 % 01/25/19 1139     91 % 01/25/19 1124 97.9 °F (36.6 °C) 100 18 141/85 97 % 01/25/19 0738 98.1 °F (36.7 °C) 98 19 155/83 96 % 01/25/19 0724     95 % Pain Assessment Pain 1 Pain Scale 1: Numeric (0 - 10) (01/25/19 0750) Pain Intensity 1: 0 (01/25/19 0750) Patient Stated Pain Goal: 0 (01/25/19 0750) Pain Reassessment 1: Yes (01/25/19 0750) Ambulating Yes Additional Information: Patient going home with O2. [facility] stopped by and gave instructions on O2 tank. No discharge orders yet. Shift report given to oncoming nurse at the bedside.  
 
Eden Proctor, RN

## 2019-01-26 NOTE — DISCHARGE INSTRUCTIONS
DISCHARGE SUMMARY from Nurse    PATIENT INSTRUCTIONS:    After general anesthesia or intravenous sedation, for 24 hours or while taking prescription Narcotics:  · Limit your activities  · Do not drive and operate hazardous machinery  · Do not make important personal or business decisions  · Do  not drink alcoholic beverages  · If you have not urinated within 8 hours after discharge, please contact your surgeon on call. Please call physician after your discharge if the following symptoms present:    1. Temperature greater than 100.5  2. Heart rate greater than 90 beats per minute  3. Increased respirations   4. Chills  5. Cough with any sputum (color: yellow, white, green, or bloody?)  6. Shortness of breath or change in breathing pattern  7. Bleeding:  Any prolonged bleeding presented from skin tears, cuts, bleeding from brushing teeth, nose bleed, bleeding noted in stool  8. Tenderness, swelling, or drainage from IV site or central line catheter    Diet:cardiac regular    What to do at Home:  Recommended activity: Activity as tolerated    *  Please give a list of your current medications to your Primary Care Provider. *  Please update this list whenever your medications are discontinued, doses are      changed, or new medications (including over-the-counter products) are added. *  Please carry medication information at all times in case of emergency situations. These are general instructions for a healthy lifestyle:    No smoking/ No tobacco products/ Avoid exposure to second hand smoke  Surgeon General's Warning:  Quitting smoking now greatly reduces serious risk to your health.     Obesity, smoking, and sedentary lifestyle greatly increases your risk for illness    A healthy diet, regular physical exercise & weight monitoring are important for maintaining a healthy lifestyle    You may be retaining fluid if you have a history of heart failure or if you experience any of the following symptoms: Weight gain of 3 pounds or more overnight or 5 pounds in a week, increased swelling in our hands or feet or shortness of breath while lying flat in bed. Please call your doctor as soon as you notice any of these symptoms; do not wait until your next office visit. Recognize signs and symptoms of STROKE:    F-face looks uneven    A-arms unable to move or move unevenly    S-speech slurred or non-existent    T-time-call 911 as soon as signs and symptoms begin-DO NOT go       Back to bed or wait to see if you get better-TIME IS BRAIN. Warning Signs of HEART ATTACK     Call 911 if you have these symptoms:   Chest discomfort. Most heart attacks involve discomfort in the center of the chest that lasts more than a few minutes, or that goes away and comes back. It can feel like uncomfortable pressure, squeezing, fullness, or pain.  Discomfort in other areas of the upper body. Symptoms can include pain or discomfort in one or both arms, the back, neck, jaw, or stomach.  Shortness of breath with or without chest discomfort.  Other signs may include breaking out in a cold sweat, nausea, or lightheadedness. Don't wait more than five minutes to call 911 - MINUTES MATTER! Fast action can save your life. Calling 911 is almost always the fastest way to get lifesaving treatment. Emergency Medical Services staff can begin treatment when they arrive -- up to an hour sooner than if someone gets to the hospital by car. The discharge information has been reviewed with the patient. The patient verbalized understanding. Discharge medications reviewed with the patient and appropriate educational materials and side effects teaching were provided.   ___________________________________________________________________________________________________________________________________

## 2019-01-26 NOTE — PROGRESS NOTES
0820- Pts BP elevated since beginning of shift. Dr Aubrie Damon notified and will continue to monitor. 1225- Discharge instructions provided to patient and wife at bedside. Med list, Prescrips, appointments and instructions reviewed. Pt verbalized understanding and any questions answered at bedside. 18- Pt discharged via wheelchair with wife and all patient belongings and oxygen tank.

## 2019-01-28 ENCOUNTER — PATIENT OUTREACH (OUTPATIENT)
Dept: CASE MANAGEMENT | Age: 79
End: 2019-01-28

## 2019-01-28 NOTE — PROGRESS NOTES
Transition of Care Discharge Follow-up Questionnaire Date/Time of Call: 
 1/28/19 What was the patient hospitalized for? Hypoxic Resp. Failure Does the patient understand his/her diagnosis and/or treatment and what happened during the hospitalization? Yes, states understanding Did the patient receive discharge instructions? Yes  
CM Assessed Risk for Readmission:  
 
 
Patient stated Risk for Readmission: Moderate related to COPD If something happens Review any discharge instructions (see discharge instructions/AVS in [facility]). Ask patient if they understand these. Do they have any questions? Activity as tolerated Cardiac diet Were home services ordered (nursing, PT, OT, ST, etc.)? No  
If so, has the first visit occurred? If not, why? (Assist with coordination of services if necessary.) 
 N/A Was any DME ordered? New O2 set up If so, has it been received? If not, why?  (Assist patient in obtaining DME orders &/or equipment if necessary.) Yes Complete a review of all medications (new, continued and discontinued meds per the D/C instructions and medication tab in [facility]). Start: mucinex q 12 hours Augmentin q 12 hours x 5 days Stop: [facility] All other medications without changes Were all new prescriptions filled? If not, why?  (Assist patient in obtaining medications if necessary  escalate for CCM &/or SW if ongoing issues are verbalized by pt or anticipated) All picked-up and taking as ordered. No questions Does the patient understand the purpose and dosing instructions for all medications? (If patient has questions, provide explanation and education.) States understanding Does the patient have any problems in performing ADLs? (If patient is unable to perform ADLs  what is the limiting factor(s)?   Do they have a support system that can assist? If no support system is present, discuss possible assistance that they may be able to obtain. Escalate for CCM/SW if ongoing issues are verbalized by pt or anticipated) Independent of ADLs. Has support system at home to assist as needed. Does the patient have all follow-up appointments scheduled? 7 day f/up with PCP?  
(f/up with PCP may be w/in 14 days if patient has a f/up with their specialist w/in 7 days) 7-14 day f/up with specialist?  
(or per discharge instructions) If f/up has not been made  what actions has the care coordinator made to accomplish this? Has transportation been arranged? PCP Dr. Milton August: states will call for an appointment this afternoon Pulmonology 2/13 at 1:30 Reports he has transportation Any other questions or concerns expressed by the patient? None Schedule next appointment with AUDI Maria or refer to RN Case Manager/ per the workflow guidelines. When is care coordinators next follow-up call scheduled? If referred for CCM  what RN care manager was the referral assigned? Follow-up call within 30 days.   
ANGEL Call Completed By: Morris Abbott RN

## 2019-01-29 LAB
BACTERIA SPEC CULT: NORMAL
BACTERIA SPEC CULT: NORMAL
SERVICE CMNT-IMP: NORMAL
SERVICE CMNT-IMP: NORMAL

## 2019-02-01 ENCOUNTER — HOSPITAL ENCOUNTER (OUTPATIENT)
Dept: CT IMAGING | Age: 79
Discharge: HOME | End: 2019-02-01
Attending: NURSE PRACTITIONER
Payer: MEDICARE

## 2019-02-01 DIAGNOSIS — R91.8 PULMONARY NODULES: ICD-10-CM

## 2019-02-01 DIAGNOSIS — R93.89 ABNORMAL CT OF THE CHEST: ICD-10-CM

## 2019-02-01 PROCEDURE — 71250 CT THORAX DX C-: CPT

## 2019-02-13 PROBLEM — D72.829 LEUKOCYTOSIS: Status: RESOLVED | Noted: 2017-10-09 | Resolved: 2019-02-13

## 2019-02-13 PROBLEM — J18.9 PNEUMONIA: Status: RESOLVED | Noted: 2019-01-24 | Resolved: 2019-02-13

## 2019-02-13 PROBLEM — J44.1 COPD WITH ACUTE EXACERBATION (HCC): Status: RESOLVED | Noted: 2019-01-23 | Resolved: 2019-02-13

## 2019-02-13 PROBLEM — A41.9 SEPSIS (HCC): Status: RESOLVED | Noted: 2019-01-24 | Resolved: 2019-02-13

## 2019-02-13 PROBLEM — J96.01 ACUTE RESPIRATORY FAILURE WITH HYPOXIA (HCC): Status: RESOLVED | Noted: 2019-01-24 | Resolved: 2019-02-13

## 2019-02-19 ENCOUNTER — PATIENT OUTREACH (OUTPATIENT)
Dept: CASE MANAGEMENT | Age: 79
End: 2019-02-19

## 2019-02-19 NOTE — PROGRESS NOTES
Outreach via phone for ANGEL follow-up. Mr. Salazar Back report he is doing well, no new issues. ANGEL episode closed.

## 2019-07-16 LAB
ALBUMIN SERPL-MCNC: 4.1 G/DL (ref 3.2–4.6)
ALBUMIN/GLOB SERPL: 1.2 {RATIO} (ref 1.2–3.5)
ALP SERPL-CCNC: 98 U/L (ref 50–136)
ALT SERPL-CCNC: 27 U/L (ref 12–65)
ANION GAP SERPL CALC-SCNC: 10 MMOL/L (ref 7–16)
AST SERPL-CCNC: 18 U/L (ref 15–37)
BASOPHILS # BLD: 0.1 K/UL (ref 0–0.2)
BASOPHILS NFR BLD: 1 % (ref 0–2)
BILIRUB SERPL-MCNC: 0.8 MG/DL (ref 0.2–1.1)
BUN SERPL-MCNC: 14 MG/DL (ref 8–23)
CALCIUM SERPL-MCNC: 9.1 MG/DL (ref 8.3–10.4)
CHLORIDE SERPL-SCNC: 99 MMOL/L (ref 98–107)
CO2 SERPL-SCNC: 26 MMOL/L (ref 21–32)
CREAT SERPL-MCNC: 0.76 MG/DL (ref 0.8–1.5)
DIFFERENTIAL METHOD BLD: ABNORMAL
EOSINOPHIL # BLD: 0.3 K/UL (ref 0–0.8)
EOSINOPHIL NFR BLD: 1 % (ref 0.5–7.8)
ERYTHROCYTE [DISTWIDTH] IN BLOOD BY AUTOMATED COUNT: 14.1 % (ref 11.9–14.6)
GLOBULIN SER CALC-MCNC: 3.5 G/DL (ref 2.3–3.5)
GLUCOSE SERPL-MCNC: 129 MG/DL (ref 65–100)
HCT VFR BLD AUTO: 50.2 % (ref 41.1–50.3)
HGB BLD-MCNC: 16.4 G/DL (ref 13.6–17.2)
IMM GRANULOCYTES # BLD AUTO: 0.2 K/UL (ref 0–0.5)
IMM GRANULOCYTES NFR BLD AUTO: 1 % (ref 0–5)
LACTATE BLD-SCNC: 1.05 MMOL/L (ref 0.5–1.9)
LYMPHOCYTES # BLD: 1.6 K/UL (ref 0.5–4.6)
LYMPHOCYTES NFR BLD: 7 % (ref 13–44)
MCH RBC QN AUTO: 27.9 PG (ref 26.1–32.9)
MCHC RBC AUTO-ENTMCNC: 32.7 G/DL (ref 31.4–35)
MCV RBC AUTO: 85.4 FL (ref 79.6–97.8)
MONOCYTES # BLD: 1.7 K/UL (ref 0.1–1.3)
MONOCYTES NFR BLD: 8 % (ref 4–12)
NEUTS SEG # BLD: 18.2 K/UL (ref 1.7–8.2)
NEUTS SEG NFR BLD: 82 % (ref 43–78)
NRBC # BLD: 0 K/UL (ref 0–0.2)
PLATELET # BLD AUTO: 236 K/UL (ref 150–450)
PMV BLD AUTO: 9.1 FL (ref 9.4–12.3)
POTASSIUM SERPL-SCNC: 4 MMOL/L (ref 3.5–5.1)
PROCALCITONIN SERPL-MCNC: 0.2 NG/ML
PROT SERPL-MCNC: 7.6 G/DL (ref 6.3–8.2)
RBC # BLD AUTO: 5.88 M/UL (ref 4.23–5.6)
SODIUM SERPL-SCNC: 135 MMOL/L (ref 136–145)
WBC # BLD AUTO: 22 K/UL (ref 4.3–11.1)

## 2019-07-16 PROCEDURE — 84145 PROCALCITONIN (PCT): CPT

## 2019-07-16 PROCEDURE — 85025 COMPLETE CBC W/AUTO DIFF WBC: CPT

## 2019-07-16 PROCEDURE — 83605 ASSAY OF LACTIC ACID: CPT

## 2019-07-16 PROCEDURE — 93005 ELECTROCARDIOGRAM TRACING: CPT | Performed by: EMERGENCY MEDICINE

## 2019-07-16 PROCEDURE — 80053 COMPREHEN METABOLIC PANEL: CPT

## 2019-07-16 PROCEDURE — 87040 BLOOD CULTURE FOR BACTERIA: CPT

## 2019-07-16 PROCEDURE — 99283 EMERGENCY DEPT VISIT LOW MDM: CPT | Performed by: EMERGENCY MEDICINE

## 2019-07-17 ENCOUNTER — APPOINTMENT (OUTPATIENT)
Dept: GENERAL RADIOLOGY | Age: 79
DRG: 190 | End: 2019-07-17
Attending: EMERGENCY MEDICINE
Payer: MEDICARE

## 2019-07-17 ENCOUNTER — HOSPITAL ENCOUNTER (INPATIENT)
Age: 79
LOS: 1 days | Discharge: HOME | DRG: 190 | End: 2019-07-18
Attending: EMERGENCY MEDICINE | Admitting: FAMILY MEDICINE
Payer: MEDICARE

## 2019-07-17 ENCOUNTER — APPOINTMENT (OUTPATIENT)
Dept: CT IMAGING | Age: 79
DRG: 190 | End: 2019-07-17
Attending: INTERNAL MEDICINE
Payer: MEDICARE

## 2019-07-17 DIAGNOSIS — D72.829 LEUKOCYTOSIS, UNSPECIFIED TYPE: ICD-10-CM

## 2019-07-17 DIAGNOSIS — R50.9 FEVER, UNSPECIFIED FEVER CAUSE: ICD-10-CM

## 2019-07-17 DIAGNOSIS — J18.9 PNEUMONIA DUE TO INFECTIOUS ORGANISM, UNSPECIFIED LATERALITY, UNSPECIFIED PART OF LUNG: Primary | ICD-10-CM

## 2019-07-17 DIAGNOSIS — J47.9 BRONCHIECTASIS WITHOUT COMPLICATION (HCC): Chronic | ICD-10-CM

## 2019-07-17 DIAGNOSIS — R91.8 PULMONARY NODULES: Chronic | ICD-10-CM

## 2019-07-17 DIAGNOSIS — Z66 DNR (DO NOT RESUSCITATE): ICD-10-CM

## 2019-07-17 DIAGNOSIS — G93.41 ACUTE METABOLIC ENCEPHALOPATHY: ICD-10-CM

## 2019-07-17 DIAGNOSIS — F03.90 DEMENTIA WITHOUT BEHAVIORAL DISTURBANCE, UNSPECIFIED DEMENTIA TYPE: ICD-10-CM

## 2019-07-17 DIAGNOSIS — J44.9 COPD, MODERATE (HCC): Chronic | ICD-10-CM

## 2019-07-17 DIAGNOSIS — J44.9 CHRONIC OBSTRUCTIVE PULMONARY DISEASE, UNSPECIFIED COPD TYPE (HCC): ICD-10-CM

## 2019-07-17 DIAGNOSIS — J44.1 ACUTE EXACERBATION OF CHRONIC OBSTRUCTIVE PULMONARY DISEASE (COPD) (HCC): ICD-10-CM

## 2019-07-17 DIAGNOSIS — R06.02 SOB (SHORTNESS OF BREATH): ICD-10-CM

## 2019-07-17 PROBLEM — R93.89 ABNORMAL CT SCAN, CHEST: Status: RESOLVED | Noted: 2017-11-28 | Resolved: 2019-07-17

## 2019-07-17 LAB
APPEARANCE UR: CLEAR
ATRIAL RATE: 100 BPM
ATRIAL RATE: 104 BPM
BACTERIA URNS QL MICRO: 0 /HPF
BILIRUB UR QL: NEGATIVE
CALCULATED P AXIS, ECG09: 69 DEGREES
CALCULATED P AXIS, ECG09: 78 DEGREES
CALCULATED R AXIS, ECG10: 45 DEGREES
CALCULATED R AXIS, ECG10: 47 DEGREES
CALCULATED T AXIS, ECG11: 59 DEGREES
CALCULATED T AXIS, ECG11: 66 DEGREES
CASTS URNS QL MICRO: 0 /LPF
COLOR UR: YELLOW
CRYSTALS URNS QL MICRO: 0 /LPF
DIAGNOSIS, 93000: NORMAL
DIAGNOSIS, 93000: NORMAL
EPI CELLS #/AREA URNS HPF: 0 /HPF
GLUCOSE BLD STRIP.AUTO-MCNC: 116 MG/DL (ref 65–100)
GLUCOSE BLD STRIP.AUTO-MCNC: 126 MG/DL (ref 65–100)
GLUCOSE BLD STRIP.AUTO-MCNC: 173 MG/DL (ref 65–100)
GLUCOSE BLD STRIP.AUTO-MCNC: 181 MG/DL (ref 65–100)
GLUCOSE UR STRIP.AUTO-MCNC: NEGATIVE MG/DL
HGB UR QL STRIP: NEGATIVE
KETONES UR QL STRIP.AUTO: 40 MG/DL
LEUKOCYTE ESTERASE UR QL STRIP.AUTO: NEGATIVE
MUCOUS THREADS URNS QL MICRO: 0 /LPF
NITRITE UR QL STRIP.AUTO: NEGATIVE
P-R INTERVAL, ECG05: 178 MS
P-R INTERVAL, ECG05: 186 MS
PH UR STRIP: 7.5 [PH] (ref 5–9)
PROT UR STRIP-MCNC: ABNORMAL MG/DL
Q-T INTERVAL, ECG07: 308 MS
Q-T INTERVAL, ECG07: 320 MS
QRS DURATION, ECG06: 68 MS
QRS DURATION, ECG06: 82 MS
QTC CALCULATION (BEZET), ECG08: 405 MS
QTC CALCULATION (BEZET), ECG08: 412 MS
RBC #/AREA URNS HPF: 0 /HPF
SP GR UR REFRACTOMETRY: 1.02 (ref 1–1.02)
UA: UC IF INDICATED,UAUC: ABNORMAL
UROBILINOGEN UR QL STRIP.AUTO: 0.2 EU/DL (ref 0.2–1)
VENTRICULAR RATE, ECG03: 100 BPM
VENTRICULAR RATE, ECG03: 104 BPM
WBC URNS QL MICRO: 0 /HPF

## 2019-07-17 PROCEDURE — 74011636637 HC RX REV CODE- 636/637: Performed by: FAMILY MEDICINE

## 2019-07-17 PROCEDURE — 77030012341 HC CHMB SPCR OPTC MDI VYRM -A

## 2019-07-17 PROCEDURE — 87040 BLOOD CULTURE FOR BACTERIA: CPT

## 2019-07-17 PROCEDURE — 77030020120 HC VLV RESP PEP HI -B

## 2019-07-17 PROCEDURE — 74011250637 HC RX REV CODE- 250/637: Performed by: FAMILY MEDICINE

## 2019-07-17 PROCEDURE — 74011000250 HC RX REV CODE- 250: Performed by: FAMILY MEDICINE

## 2019-07-17 PROCEDURE — 36415 COLL VENOUS BLD VENIPUNCTURE: CPT

## 2019-07-17 PROCEDURE — 71250 CT THORAX DX C-: CPT

## 2019-07-17 PROCEDURE — 74011250636 HC RX REV CODE- 250/636: Performed by: EMERGENCY MEDICINE

## 2019-07-17 PROCEDURE — 65270000029 HC RM PRIVATE

## 2019-07-17 PROCEDURE — 94640 AIRWAY INHALATION TREATMENT: CPT

## 2019-07-17 PROCEDURE — 93005 ELECTROCARDIOGRAM TRACING: CPT | Performed by: FAMILY MEDICINE

## 2019-07-17 PROCEDURE — 74011250636 HC RX REV CODE- 250/636: Performed by: FAMILY MEDICINE

## 2019-07-17 PROCEDURE — 97165 OT EVAL LOW COMPLEX 30 MIN: CPT

## 2019-07-17 PROCEDURE — 99223 1ST HOSP IP/OBS HIGH 75: CPT | Performed by: INTERNAL MEDICINE

## 2019-07-17 PROCEDURE — 74011000250 HC RX REV CODE- 250: Performed by: NURSE PRACTITIONER

## 2019-07-17 PROCEDURE — 74011000258 HC RX REV CODE- 258: Performed by: EMERGENCY MEDICINE

## 2019-07-17 PROCEDURE — 94760 N-INVAS EAR/PLS OXIMETRY 1: CPT

## 2019-07-17 PROCEDURE — 71046 X-RAY EXAM CHEST 2 VIEWS: CPT

## 2019-07-17 PROCEDURE — 82962 GLUCOSE BLOOD TEST: CPT

## 2019-07-17 PROCEDURE — 81001 URINALYSIS AUTO W/SCOPE: CPT

## 2019-07-17 PROCEDURE — 97161 PT EVAL LOW COMPLEX 20 MIN: CPT

## 2019-07-17 RX ORDER — ALBUTEROL SULFATE 0.83 MG/ML
2.5 SOLUTION RESPIRATORY (INHALATION)
Status: DISCONTINUED | OUTPATIENT
Start: 2019-07-17 | End: 2019-07-18 | Stop reason: HOSPADM

## 2019-07-17 RX ORDER — ACETAMINOPHEN 325 MG/1
650 TABLET ORAL
Status: DISCONTINUED | OUTPATIENT
Start: 2019-07-17 | End: 2019-07-17 | Stop reason: SDUPTHER

## 2019-07-17 RX ORDER — LATANOPROST 50 UG/ML
1 SOLUTION/ DROPS OPHTHALMIC
Status: DISCONTINUED | OUTPATIENT
Start: 2019-07-17 | End: 2019-07-18 | Stop reason: HOSPADM

## 2019-07-17 RX ORDER — ONDANSETRON 2 MG/ML
4 INJECTION INTRAMUSCULAR; INTRAVENOUS
Status: DISCONTINUED | OUTPATIENT
Start: 2019-07-17 | End: 2019-07-18 | Stop reason: HOSPADM

## 2019-07-17 RX ORDER — IPRATROPIUM BROMIDE AND ALBUTEROL SULFATE 2.5; .5 MG/3ML; MG/3ML
3 SOLUTION RESPIRATORY (INHALATION)
Status: DISCONTINUED | OUTPATIENT
Start: 2019-07-17 | End: 2019-07-17

## 2019-07-17 RX ORDER — SODIUM CHLORIDE 0.9 % (FLUSH) 0.9 %
5-40 SYRINGE (ML) INJECTION AS NEEDED
Status: DISCONTINUED | OUTPATIENT
Start: 2019-07-17 | End: 2019-07-18 | Stop reason: HOSPADM

## 2019-07-17 RX ORDER — TAMSULOSIN HYDROCHLORIDE 0.4 MG/1
0.4 CAPSULE ORAL DAILY
Status: DISCONTINUED | OUTPATIENT
Start: 2019-07-17 | End: 2019-07-18 | Stop reason: HOSPADM

## 2019-07-17 RX ORDER — BUDESONIDE 0.5 MG/2ML
500 INHALANT ORAL
Status: DISCONTINUED | OUTPATIENT
Start: 2019-07-17 | End: 2019-07-18 | Stop reason: HOSPADM

## 2019-07-17 RX ORDER — SODIUM CHLORIDE 0.9 % (FLUSH) 0.9 %
5-40 SYRINGE (ML) INJECTION EVERY 8 HOURS
Status: DISCONTINUED | OUTPATIENT
Start: 2019-07-17 | End: 2019-07-18 | Stop reason: HOSPADM

## 2019-07-17 RX ORDER — FLUTICASONE PROPIONATE 50 MCG
1 SPRAY, SUSPENSION (ML) NASAL DAILY
Status: DISCONTINUED | OUTPATIENT
Start: 2019-07-17 | End: 2019-07-18 | Stop reason: HOSPADM

## 2019-07-17 RX ORDER — ACETAMINOPHEN 325 MG/1
TABLET ORAL
Status: ACTIVE
Start: 2019-07-17 | End: 2019-07-17

## 2019-07-17 RX ORDER — CLOPIDOGREL BISULFATE 75 MG/1
75 TABLET ORAL DAILY
Status: DISCONTINUED | OUTPATIENT
Start: 2019-07-17 | End: 2019-07-18 | Stop reason: HOSPADM

## 2019-07-17 RX ORDER — CEFTRIAXONE 1 G/1
1 INJECTION, POWDER, FOR SOLUTION INTRAMUSCULAR; INTRAVENOUS EVERY 24 HOURS
Status: DISCONTINUED | OUTPATIENT
Start: 2019-07-17 | End: 2019-07-17 | Stop reason: SDUPTHER

## 2019-07-17 RX ORDER — ENOXAPARIN SODIUM 100 MG/ML
40 INJECTION SUBCUTANEOUS EVERY 24 HOURS
Status: DISCONTINUED | OUTPATIENT
Start: 2019-07-17 | End: 2019-07-18 | Stop reason: HOSPADM

## 2019-07-17 RX ORDER — GABAPENTIN 300 MG/1
300 CAPSULE ORAL 3 TIMES DAILY
Status: DISCONTINUED | OUTPATIENT
Start: 2019-07-17 | End: 2019-07-18 | Stop reason: HOSPADM

## 2019-07-17 RX ORDER — ACETAMINOPHEN 325 MG/1
650 TABLET ORAL
Status: DISCONTINUED | OUTPATIENT
Start: 2019-07-17 | End: 2019-07-18 | Stop reason: HOSPADM

## 2019-07-17 RX ORDER — GUAIFENESIN 600 MG/1
1200 TABLET, EXTENDED RELEASE ORAL 2 TIMES DAILY
Status: DISCONTINUED | OUTPATIENT
Start: 2019-07-17 | End: 2019-07-18 | Stop reason: HOSPADM

## 2019-07-17 RX ADMIN — LATANOPROST 1 DROP: 50 SOLUTION OPHTHALMIC at 22:02

## 2019-07-17 RX ADMIN — PANCRELIPASE LIPASE, PANCRELIPASE PROTEASE, PANCRELIPASE AMYLASE 2 CAPSULE: 20000; 63000; 84000 CAPSULE, DELAYED RELEASE ORAL at 09:37

## 2019-07-17 RX ADMIN — Medication 10 ML: at 11:54

## 2019-07-17 RX ADMIN — GUAIFENESIN 1200 MG: 600 TABLET, EXTENDED RELEASE ORAL at 17:36

## 2019-07-17 RX ADMIN — CLOPIDOGREL BISULFATE 75 MG: 75 TABLET ORAL at 17:38

## 2019-07-17 RX ADMIN — FLUTICASONE PROPIONATE 1 SPRAY: 50 SPRAY, METERED NASAL at 09:32

## 2019-07-17 RX ADMIN — BUDESONIDE 500 MCG: 0.5 INHALANT RESPIRATORY (INHALATION) at 08:09

## 2019-07-17 RX ADMIN — ALBUTEROL SULFATE 2.5 MG: 2.5 SOLUTION RESPIRATORY (INHALATION) at 11:31

## 2019-07-17 RX ADMIN — ENOXAPARIN SODIUM 40 MG: 40 INJECTION SUBCUTANEOUS at 09:32

## 2019-07-17 RX ADMIN — GABAPENTIN 300 MG: 300 CAPSULE ORAL at 22:02

## 2019-07-17 RX ADMIN — ALBUTEROL SULFATE 2.5 MG: 2.5 SOLUTION RESPIRATORY (INHALATION) at 17:12

## 2019-07-17 RX ADMIN — AZITHROMYCIN MONOHYDRATE 500 MG: 500 INJECTION, POWDER, LYOPHILIZED, FOR SOLUTION INTRAVENOUS at 04:26

## 2019-07-17 RX ADMIN — ACETAMINOPHEN 650 MG: 325 TABLET, FILM COATED ORAL at 04:26

## 2019-07-17 RX ADMIN — INSULIN HUMAN 3 UNITS: 100 INJECTION, SOLUTION PARENTERAL at 17:38

## 2019-07-17 RX ADMIN — PANCRELIPASE LIPASE, PANCRELIPASE PROTEASE, PANCRELIPASE AMYLASE 2 CAPSULE: 20000; 63000; 84000 CAPSULE, DELAYED RELEASE ORAL at 11:47

## 2019-07-17 RX ADMIN — PANCRELIPASE LIPASE, PANCRELIPASE PROTEASE, PANCRELIPASE AMYLASE 2 CAPSULE: 20000; 63000; 84000 CAPSULE, DELAYED RELEASE ORAL at 17:38

## 2019-07-17 RX ADMIN — METHYLPREDNISOLONE SODIUM SUCCINATE 60 MG: 125 INJECTION, POWDER, FOR SOLUTION INTRAMUSCULAR; INTRAVENOUS at 09:33

## 2019-07-17 RX ADMIN — GUAIFENESIN 1200 MG: 600 TABLET, EXTENDED RELEASE ORAL at 09:34

## 2019-07-17 RX ADMIN — BUDESONIDE 500 MCG: 0.5 INHALANT RESPIRATORY (INHALATION) at 19:43

## 2019-07-17 RX ADMIN — CEFTRIAXONE SODIUM 2 G: 2 INJECTION, POWDER, FOR SOLUTION INTRAMUSCULAR; INTRAVENOUS at 02:34

## 2019-07-17 RX ADMIN — Medication 5 ML: at 22:02

## 2019-07-17 RX ADMIN — INSULIN HUMAN 3 UNITS: 100 INJECTION, SOLUTION PARENTERAL at 11:47

## 2019-07-17 RX ADMIN — Medication 5 ML: at 06:18

## 2019-07-17 RX ADMIN — GABAPENTIN 300 MG: 300 CAPSULE ORAL at 17:36

## 2019-07-17 RX ADMIN — IPRATROPIUM BROMIDE AND ALBUTEROL SULFATE 3 ML: .5; 3 SOLUTION RESPIRATORY (INHALATION) at 08:09

## 2019-07-17 RX ADMIN — ALBUTEROL SULFATE 2.5 MG: 2.5 SOLUTION RESPIRATORY (INHALATION) at 19:43

## 2019-07-17 NOTE — CONSULTS
CONSULT NOTE    Aquiles Kim    7/17/2019    Date of Admission:  7/17/2019    The patient's chart is reviewed and the patient is discussed with the staff. Subjective:     Patient is a 78 y.o.  male seen and evaluated at the request of Dr. Anastasia Pineda for COPD. Ambulating in the room on room air and states is feeling better today. Has occasional chronic cough with sputum production. He presented to [facility] with exertional shortness of breath, chills, fever and right pleuritic chest pain in inspiration. Was told WBC was elevated and referred to ER. He is followed in our office and has pulmonary nodules followed since Aug 2015 with repeat CT July 2017 showing  new/enlarging soft tissue density peripherally within the RML with spiculated margins. There was also an new pulmonary nodule in the right lower lobe and right upper lobe. The right middle lobe nodule measured 1.9 x 1.8 cm. Follow-up PET scan performed on August 10, 2017 demonstrated a decrease in the right middle lobe nodule and none of the nodules were significantly PET avid (max SUV less than 3). A CT on 2/1 revealed improved tree in bud opacities in THAD & f/u imaging is scheduled in one year. Chronic medical:  COPD, chronic respiratory failure on 2L at night, uses vest therapy for secretion mobilizatiion, has pulmonary nodules, GERD, sinusitis, DM, HTN, mild dementia, IBS.     Review of Systems  A comprehensive review of systems was negative except for: Constitutional: positive for fevers, chills, fatigue and malaise  Respiratory: positive for cough, sputum or COPD, nocturnal hypoxia  Cardiovascular: positive for chest pain, dyspnea, fatigue  Gastrointestinal: positive for reflux symptoms  Genitourinary: positive for BPH  Neurological: positive for headaches and neuropathy    Patient Active Problem List   Diagnosis Code    COPD, moderate ([facility] [address] J44.9    Migraine G43.909    CAD (coronary artery disease) I25.10  Allergic rhinitis J30.9    Gastroesophageal reflux disease without esophagitis K21.9    Osteoporosis M81.0    Encounter for long-term (current) use of antiplatelets/antithrombotics Z79.02    Personal history of tobacco use Z87.891    Thoracic aneurysm without mention of rupture I71.2    Pulmonary nodules R91.8    DNR (do not resuscitate) Z66    Mixed hyperlipidemia E78.2    ETD (eustachian tube dysfunction) H69.80    Chronic sinusitis J32.9    Nasal polyp J33.9    Fungal sinusitis B49, J32.9    Platelet inhibition due to Plavix Z79.02    Long term (current) use of antithrombotics/antiplatelets T00.57    Benign essential HTN I10    Dementia without behavioral disturbance F03.90    Impaired fasting blood sugar R73.01    Leukocytosis D72.829    Postherpetic neuralgia B02.29    Irritable bowel syndrome with diarrhea K58.0    Acute exacerbation of chronic obstructive pulmonary disease (COPD) ([facility]) J44.1    Bronchiectasis ([facility]) J47.9    Acute metabolic encephalopathy K43.76    SOB (shortness of breath) R06.02       Home O2 2L with sleep, percussion vest    Prior to Admission Medications   Prescriptions Last Dose Informant Patient Reported? Taking? ADVAIR DISKUS 500-50 mcg/dose diskus inhaler 2019 at Unknown time  No Yes   Sig: INHALE 1 PUFF BY MOUTH 2 TIMES DAILY   B.infantis-B.ani-B.long-B.bifi (PROBIOTIC 4X) 10-15 mg TbEC 2019 at Unknown time  Yes Yes   Sig: Take  by mouth. EPINEPHrine (EPIPEN) 0.3 mg/0.3 mL (1:1,000) injection Not Taking at Unknown time  Yes No   Si.3 mg by IntraMUSCular route once as needed.    Lancets misc 2019 at Unknown time  No Yes   Sig: Check BS once a day fasting (DX: E11.9); one touch verio   PROAIR HFA 90 mcg/actuation inhaler 2019 at Unknown time  No Yes   Sig: INHALE 2 PUFFS BY MOUTH EVERY 4 HOURS AS NEEDED   SPIRIVA WITH HANDIHALER 18 mcg inhalation capsule 2019 at Unknown time  No Yes   Sig: INHALE 1 CAPSULE VIA HANDIHALER ONCE DAILY AT THE SAME TIME EVERY DAY   SUMAtriptan (IMITREX) 100 mg tablet 2019 at Unknown time  No Yes   Sig: TAKE 1/2 TO 1 TABLET BY MOUTH AS NEEDED FOR MIGRAINE MAY REPEAT IN 2HRS MAX 2/24HRS   VIT A/VIT C/VIT E/ZINC/COPPER (PRESERVISION AREDS PO) 2019 at Unknown time  Yes Yes   Sig: Take 1 Tab by mouth two (2) times a day. albuterol (PROVENTIL VENTOLIN) 2.5 mg /3 mL (0.083 %) nebulizer solution 2019 at Unknown time  No Yes   Sig: USE 3 ML BY NEBULIZATION ROUTE THREE (3) TIMES DAILY. albuterol (PROVENTIL VENTOLIN) 2.5 mg /3 mL (0.083 %) nebulizer solution   No No   Sig: 3 mL by Nebulization route three (3) times daily. cholecalciferol (VITAMIN D3) 400 unit tab tablet 2019 at Unknown time  Yes Yes   Sig: Take  by mouth daily. clopidogrel (PLAVIX) 75 mg tab 2019 at Unknown time  No Yes   Sig: TAKE 1 TAB BY MOUTH DAILY. gabapentin (NEURONTIN) 600 mg tablet 2019 at Unknown time  Yes Yes   Sig: Take 600 mg by mouth daily. glucose blood VI test strips (BLOOD GLUCOSE TEST) strip 2019 at Unknown time  No Yes   Sig: Check BS once a day fasting (DX: E11.9); one touch verio   latanoprost (XALATAN) 0.005 % ophthalmic solution 2019 at Unknown time  Yes Yes   Sig: Administer 1 Drop to both eyes nightly. lipase-protease-amylase (CREON 12,000) 12,000-38,000 -60,000 unit capsule 2019 at Unknown time  Yes Yes   Sig: Take 3 Caps by mouth three (3) times daily (with meals). nitroglycerin (NITROSTAT) 0.4 mg SL tablet Unknown at Unknown time  No No   Si Tab by SubLINGual route every five (5) minutes as needed for Chest Pain.   tamsulosin (FLOMAX) 0.4 mg capsule 2019 at Unknown time  No Yes   Sig: TAKE 1 CAP BY MOUTH DAILY. INDICATIONS: BENIGN PROSTATIC HYPERPLASIA WITH LOWER URINARY TRACT SX   triamcinolone (NASACORT AQ) 55 mcg nasal inhaler 7/10/2019 at Unknown time  No Yes   Si SPRAYS BY BOTH NOSTRILS ROUTE DAILY.  INDICATIONS: ALLERGIC RHINITIS Facility-Administered Medications: None       Past Medical History:   Diagnosis Date    Arthritis     Benign essential HTN 2017    Chronic obstructive pulmonary disease (HCC)     Diabetes ([facility] [address]     Ear problems     Fungal sinusitis     GERD (gastroesophageal reflux disease)     History of multiple allergies     Hx of migraines     Hypertension     Irritable bowel syndrome with diarrhea 10/2/2018    Shingles 10/1/15    Sinus problem     Thoracic aneurysm without mention of rupture 2014    No chest or upper back pain. Echo shows a mildly dilated aortic root at 4.0 cm. There is mild LVH and normal systolic function.        Past Surgical History:   Procedure Laterality Date    HX APPENDECTOMY  age 10    HX CATARACT REMOVAL Bilateral     HX HEART CATHETERIZATION  12    stent x 1    HX HEENT      sinus x3    HX HERNIA REPAIR Right 1978    inguinal    HX OTHER SURGICAL  2012    neurostimulator implant for migraines at [facility] in [place], [place]      childhood   [id number] [facility]      Neurostimulator implant for migraines 2012     Social History     Socioeconomic History    Marital status:      Spouse name: RADHA    Number of children: 3    Years of education: 1 YR TRADE    Highest education level: Not on file   Occupational History    Occupation: ELECTRICAL MAINTENANCE   Social Needs    Financial resource strain: Not on file    Food insecurity:     Worry: Not on file     Inability: Not on file    Transportation needs:     Medical: Not on file     Non-medical: Not on file   Tobacco Use    Smoking status: Former Smoker     Packs/day: 1.50     Years: 40.00     Pack years: 60.00     Types: Cigarettes     Last attempt to quit: 1973     Years since quittin.5    Smokeless tobacco: Never Used   Substance and Sexual Activity    Alcohol use: No     Alcohol/week: 0.0 standard drinks    Drug use: No    Sexual activity: Not on file   Lifestyle    Physical activity:     Days per week: Not on file     Minutes per session: Not on file    Stress: Not on file   Relationships    Social connections:     Talks on phone: Not on file     Gets together: Not on file     Attends Rastafari service: Not on file     Active member of club or organization: Not on file     Attends meetings of clubs or organizations: Not on file     Relationship status: Not on file    Intimate partner violence:     Fear of current or ex partner: Not on file     Emotionally abused: Not on file     Physically abused: Not on file     Forced sexual activity: Not on file   Other Topics Concern    Not on file   Social History Narrative     and lives with wife.      Family History   Problem Relation Age of Onset    No Known Problems Sister     No Known Problems Brother     No Known Problems Sister     COPD Mother     Asthma Father     Dementia Other         UNCLE     Allergies   Allergen Reactions    Aspirin Swelling and Anaphylaxis    Aleve [Naproxen Sodium] Unknown (comments)    Codeine Sulfate Other (comments)     headache    Corn Containing Products Other (comments)     Not allergic    Crestor [Rosuvastatin] Unknown (comments)     unknown    Septra [Sulfamethoprim Ds] Unknown (comments)     Not allergic    Zetia [Ezetimibe] Unknown (comments)     Unknown allergy    Zoloft [Sertraline] Unknown (comments)     Not allergic       Current Facility-Administered Medications   Medication Dose Route Frequency    budesonide (PULMICORT) 500 mcg/2 ml nebulizer suspension  500 mcg Nebulization BID RT    albuterol-ipratropium (DUO-NEB) 2.5 MG-0.5 MG/3 ML  3 mL Nebulization Q4H RT    methylPREDNISolone (PF) (Solu-MEDROL) injection 60 mg  60 mg IntraVENous DAILY    [START ON 7/18/2019] azithromycin (ZITHROMAX) 500 mg in 0.9% sodium chloride (MBP/ADV) 250 mL  500 mg IntraVENous Q24H    clopidogrel (PLAVIX) tablet 75 mg  75 mg Oral DAILY    insulin regular ([place] R, HUMULIN R) injection   SubCUTAneous AC&HS    latanoprost (XALATAN) 0.005 % ophthalmic solution 1 Drop  1 Drop Both Eyes QHS    guaiFENesin ER (MUCINEX) tablet 1,200 mg  1,200 mg Oral BID    lipase-protease-amylase (ZENPEP 20,000) capsule 2 Cap  2 Cap Oral TID WITH MEALS    tamsulosin (FLOMAX) capsule 0.4 mg  0.4 mg Oral DAILY    fluticasone propionate (FLONASE) 50 mcg/actuation nasal spray 1 Spray  1 Spray Both Nostrils DAILY    sodium chloride (NS) flush 5-40 mL  5-40 mL IntraVENous Q8H    sodium chloride (NS) flush 5-40 mL  5-40 mL IntraVENous PRN    acetaminophen (TYLENOL) tablet 650 mg  650 mg Oral Q4H PRN    ondansetron (ZOFRAN) injection 4 mg  4 mg IntraVENous Q4H PRN    enoxaparin (LOVENOX) injection 40 mg  40 mg SubCUTAneous Q24H    acetaminophen (TYLENOL) 325 mg tablet        gabapentin (NEURONTIN) capsule 300 mg  300 mg Oral TID         Objective:     Vitals:    07/17/19 0529 07/17/19 0601 07/17/19 0812 07/17/19 0813   BP:  111/67  112/75   Pulse:  100  95   Resp:  19 19   Temp: 98.8 °F (37.1 °C) 98.7 °F (37.1 °C)  98.7 °F (37.1 °C)   SpO2:  90% 92% 92%   Weight:       Height:           PHYSICAL EXAM     Constitutional:  the patient is well developed and in no acute distress, room air sat 92%  EENMT:  Sclera clear, pupils equal, oral mucosa moist  Respiratory: bilateral anterior and posterior crackles, no wheezing  Cardiovascular:  RRR without M,G,R  Gastrointestinal: soft and non-tender; with positive bowel sounds. Musculoskeletal: warm without cyanosis. There is no lower extremity edema. Skin:  no jaundice or rashes, no wounds   Neurologic: no gross neuro deficits  Psychiatric:  alert and oriented x 3    Chest CT 7/17/19: Patchy scattered infiltrates as above which accounts for the chest x-ray abnormality. Nodular density left apex, likely inflammatory, recommend repeat CT in 3 months. CXR:    7/17/19:  New stellate lesion in left lower lobe.  Recommend chest CT.    (cannot pull up images)    Recent Labs     07/16/19  2210   WBC 22.0*   HGB 16.4   HCT 50.2        Recent Labs     07/16/19  2210   *   K 4.0   CL 99   *   CO2 26   BUN 14   CREA 0.76*   CA 9.1   ALB 4.1   TBILI 0.8   ALT 27   SGOT 18     No results for input(s): PH, PCO2, PO2, HCO3, PHI, PCO2I, PO2I, HCO3I in the last 72 hours. No results for input(s): LCAD, LAC in the last 72 hours. Assessment:  (Medical Decision Making)     Hospital Problems  Date Reviewed: 6/26/2019          Codes Class Noted POA    * (Principal) Acute metabolic encephalopathy LLF-62-VE: G93.41  ICD-9-CM: 348.31  7/17/2019 Yes    Oriented x3    SOB (shortness of breath) ICD-10-CM: R06.02  ICD-9-CM: 786.05  7/17/2019 Yes    less    Acute exacerbation of chronic obstructive pulmonary disease (COPD) ([facility] ICD-10-CM: J44.1  ICD-9-CM: 491.21  1/23/2019 Unknown    No wheezing    Dementia without behavioral disturbance ICD-10-CM: F03.90  ICD-9-CM: 294.20  10/9/2017 Yes    mild    Leukocytosis ICD-10-CM: D72.829  ICD-9-CM: 288.60  10/9/2017 Yes    WBC 22    Pulmonary nodules (Chronic) ICD-10-CM: R91.8  ICD-9-CM: 793.19  6/9/2017 Yes    Overview Addendum 7/17/2019 10:59 AM by Bryant Le NP     CT-8/10/2015:  Multiple  small  bilateral  pulmonary  nodules. CT-2/18/2016:    1.  Increased nodular airspace disease in both lungs, most prominent in the RLL. This is most consistent with an atypical infection such as BOO.  Consider follow-up to document resolution. 2.  Cholelithiasis. July 2017: new/enlarging soft tissue density peripherally within the right middle lobe with spiculated margins. There was also an new pulmonary nodule in the right lower lobe and right upper lobe. The right middle lobe nodule measured 1.9 x 1.8 cm. Follow-up PET scan performed on August 10, 2017 demonstrated a decrease in the right middle lobe nodule and none of the nodules were significantly PET avid (max SUV less than 3).   A CT on 2/1 revealed improved tree in bud opacities in THAD & f/u imaging is scheduled in one year. Follow up in 3 months    Benign essential HTN (Chronic) ICD-10-CM: I10  ICD-9-CM: 401.1  6/6/2017 Yes    chronic    Mixed hyperlipidemia (Chronic) ICD-10-CM: X78.2  ICD-9-CM: 272.2  6/2/2016 Yes    chronic    DNR (do not resuscitate) ICD-10-CM: Z66  ICD-9-CM: V49.86  5/12/2016 Yes    unchanged    COPD, moderate (HCC) (Chronic) ICD-10-CM: J44.9  ICD-9-CM: 053  10/8/2013 Yes    Overview Addendum 7/17/2019 10:57 AM by Candice Apodaca NP     GOLD stage II  O2 dependent 2L         chronic    CAD (coronary artery disease) (Chronic) ICD-10-CM: I25.10  ICD-9-CM: 414.00  10/8/2013 Yes    Overview Signed 4/4/2016 11:55 AM by Didier Huddleston of the native vessel         No angina    Gastroesophageal reflux disease without esophagitis (Chronic) ICD-10-CM: K21.9  ICD-9-CM: 530.81  10/8/2013 Yes    chronic          Plan:  (Medical Decision Making)     --Duoneb, Pulmicort, Mucinex--change to Albuterol, stop Duoneb  --Zithromax day 2--add Rocephin to the coverage  --Blood cultures:  Pending--follow  --WBC 22.0, procal 0.2--follow in am  --Solu Medrol 60 mg daily--wean, no wheezing    More than 50% of the time documented was spent in face-to-face contact with the patient and in the care of the patient on the floor/unit where the patient is located. Thank you very much for this referral.  We appreciate the opportunity to participate in this patient's care. Will follow along with above stated plan. Cayetano Garcia NP     Lungs: crackles in the bases  Heart:  RRR with no Murmur/Rubs/Gallops    Additional Comments: Will add Rocephin, change neb to albuterol. Wean steroids    I have spoken with and examined the patient. I agree with the above assessment and plan as documented.     Susan Keating MD

## 2019-07-17 NOTE — PROGRESS NOTES
Care Management Interventions  PCP Verified by CM: Yes  Last Visit to PCP: 04/30/19  Palliative Care Criteria Met (RRAT>21 & CHF Dx)?: No(RRAT 24 Dx COPD exacerbation )  Transition of Care Consult (CM Consult): Discharge Planning  Discharge Durable Medical Equipment: No(Nebulizer, O2 with [employer] and vest that vibrates his lungs for his COPD)  Physical Therapy Consult: Yes  Occupational Therapy Consult: Yes  Speech Therapy Consult: No  Current Support Network: Lives with Spouse  Confirm Follow Up Transport: Self  Plan discussed with Pt/Family/Caregiver: Yes  Freedom of Choice Offered: Yes  Discharge Location  Discharge Placement: Home  Met with patient for d/c planning. Patient alert and oriented to person and place. He has history of dementia and wife states sometimes memory issues. He is independent of ADL's, works in the yard and lives with his wife in one story home. DME includes Nebulizer O2 with [employer] and wears a vest at times that vibrates his lungs for COPD that he has had for approximately a year. He has transportation and is able to drive and wife also able to drive. He has Medicare and [employer] supplement and is able to obtain his medications without difficulty at [facility] in [address] [place] [address]. Current d/c plan is home with wife when medically stable. CM following.

## 2019-07-17 NOTE — PROGRESS NOTES
Progress Note    Patient: Negar Sy MRN: [id number]  SSN: [id number]    YOB: 1940  Age: 78 y.o. Sex: male      Admit Date: 7/17/2019    LOS: 0 days     Subjective:   He was found walking inside the room. No supplemental oxygen present. He feels better. Denies cough. Objective:     Vitals:    07/17/19 0529 07/17/19 0601 07/17/19 0812 07/17/19 0813   BP:  111/67  112/75   Pulse:  100  95   Resp:  19 19   Temp: 98.8 °F (37.1 °C) 98.7 °F (37.1 °C)  98.7 °F (37.1 °C)   SpO2:  90% 92% 92%   Weight:       Height:            Intake and Output:  Current Shift: No intake/output data recorded. Last three shifts: 07/15 1901 - 07/17 0700  In: 300 [I.V.:300]  Out: -     Physical Exam:   GENERAL: alert, cooperative, no distress, appears stated age  EYE: negative  LYMPHATIC: Cervical, supraclavicular, and axillary nodes normal.   THROAT & NECK: normal and no erythema or exudates noted. LUNG: bilateral air entry, no wheezing, mild rhonchi   HEART: regular rate and rhythm, S1, S2 normal, no murmur, click, rub or gallop  ABDOMEN: soft, non-tender. Bowel sounds normal. No masses,  no organomegaly  EXTREMITIES:  extremities normal, atraumatic, no cyanosis or edema  SKIN: Normal.  NEUROLOGIC: negative  PSYCHIATRIC: non focal    Lab/Data Review: All lab results for the last 24 hours reviewed.      Assessment:     Principal Problem:    Acute metabolic encephalopathy (8/26/2188)    Active Problems:    COPD, moderate ([address] (10/8/2013)      Overview: GOLD stage II      CAD (coronary artery disease) (10/8/2013)      Overview: ASCAD of the native vessel      Gastroesophageal reflux disease without esophagitis (10/8/2013)      DNR (do not resuscitate) (5/12/2016)      Mixed hyperlipidemia (6/2/2016)      Benign essential HTN (6/6/2017)      Dementia without behavioral disturbance (10/9/2017)      Leukocytosis (10/9/2017)      Acute exacerbation of chronic obstructive pulmonary disease (COPD) ([address] (1/23/2019)      SOB (shortness of breath) (7/17/2019)        Plan:   -Acute metabolic encephalopathy: resolved     -COPD exacerbation:   Improving  Keep nebulizer, steroids, antibiotics  Appreciate pulmonary input    -Pulmonary nodule on cxr:  Chest ct scan      -CAD:  Stable. Continue home meds.         DVT Prophylaxis: Lovenox       Code Status: DNR       Disposition: home soon     NO BILL CHARGE. SAME DAY ADMISSION.      Signed By: Linnette Bell MD     July 17, 2019

## 2019-07-17 NOTE — PROGRESS NOTES
Verbal bedside report received from Jarret Bowers [place]. Assumed care of patient.

## 2019-07-17 NOTE — PROGRESS NOTES
Verbal bedside report given to [facility], [facility], oncoming RN. Patient's situation, background, assessment and recommendations provided. Opportunity for questions provided. Oncoming RN assumed care of patient.

## 2019-07-17 NOTE — ED TRIAGE NOTES
Patient sent by [facility] for elevated WBC, pain with inspiration and shortness of breath with exertion. Hx of COPD. Wears 2 L NC at night.

## 2019-07-17 NOTE — PROGRESS NOTES
Bedside and Verbal shift change report given to self (oncoming nurse) by Aneudy Ge RN (offgoing nurse). Report included the following information SBAR, Kardex, MAR and Recent Results.

## 2019-07-17 NOTE — ED PROVIDER NOTES
Patient states he has been coughing today. He has history of COPD and that is nothing new for him. He started having chills and then ran a fever. He also had some pleuritic right-sided chest pain. He went to an urgent care and was seen. He had blood work done showing a leukocytosis. He also was wheezing and was given a breathing treatment. He was sent here for further evaluation. Elements of this note were created using speech recognition software. As such, errors of speech recognition may be present. Past Medical History:   Diagnosis Date    Arthritis     Benign essential HTN 6/6/2017    Chronic obstructive pulmonary disease (HCC)     Diabetes ([facility] [address]     Ear problems     Fungal sinusitis     GERD (gastroesophageal reflux disease)     History of multiple allergies     Hx of migraines     Hypertension     Irritable bowel syndrome with diarrhea 10/2/2018    Shingles 10/1/15    Sinus problem     Thoracic aneurysm without mention of rupture 12/2/2014    No chest or upper back pain. Echo shows a mildly dilated aortic root at 4.0 cm. There is mild LVH and normal systolic function.          Past Surgical History:   Procedure Laterality Date    HX APPENDECTOMY  age 10    HX CATARACT REMOVAL Bilateral     HX HEART CATHETERIZATION  2/27/12    stent x 1    HX HEENT  1999    sinus x3    HX HERNIA REPAIR Right 1978    inguinal    HX OTHER SURGICAL  2/2012    neurostimulator implant for migraines at [facility] in [place], [place] [facility]      childhood   [id number] [address]      Neurostimulator implant for migraines 2/2012         Family History:   Problem Relation Age of Onset    No Known Problems Sister     No Known Problems Brother     No Known Problems Sister     COPD Mother     Asthma Father     Dementia Other         UNCLE       Social History     Socioeconomic History    Marital status:      Spouse name: RADHA    Number of children: 3    Years of education: 1 YR TRADE    Highest education level: Not on file   Occupational History    Occupation: ELECTRICAL MAINTENANCE   Social Needs    Financial resource strain: Not on file    Food insecurity:     Worry: Not on file     Inability: Not on file    Transportation needs:     Medical: Not on file     Non-medical: Not on file   Tobacco Use    Smoking status: Former Smoker     Packs/day: 1.50     Years: 40.00     Pack years: 60.00     Types: Cigarettes     Last attempt to quit: 1973     Years since quittin.5    Smokeless tobacco: Never Used   Substance and Sexual Activity    Alcohol use: No     Alcohol/week: 0.0 oz    Drug use: No    Sexual activity: Not on file   Lifestyle    Physical activity:     Days per week: Not on file     Minutes per session: Not on file    Stress: Not on file   Relationships    Social connections:     Talks on phone: Not on file     Gets together: Not on file     Attends Bahai service: Not on file     Active member of club or organization: Not on file     Attends meetings of clubs or organizations: Not on file     Relationship status: Not on file    Intimate partner violence:     Fear of current or ex partner: Not on file     Emotionally abused: Not on file     Physically abused: Not on file     Forced sexual activity: Not on file   Other Topics Concern    Not on file   Social History Narrative     and lives with wife. ALLERGIES: Aspirin; Aleve [naproxen sodium]; Codeine sulfate; Corn containing products; Crestor [rosuvastatin]; Septra [sulfamethoprim ds]; Zetia [ezetimibe]; and Zoloft [sertraline]    Review of Systems   Constitutional: Negative for chills and fever. Respiratory: Positive for cough and wheezing. Gastrointestinal: Negative for nausea and vomiting. All other systems reviewed and are negative.       Vitals:    19 2206   BP: 122/70   Pulse: (!) 118   Resp: 16   Temp: 99.2 °F (37.3 °C)   SpO2: 92%   Weight: 57.6 kg (127 lb) Height: 5' 7\" (1.702 m)            Physical Exam   Constitutional: He is oriented to person, place, and time. He appears well-developed and well-nourished. HENT:   Head: Normocephalic and atraumatic. Eyes: Pupils are equal, round, and reactive to light. Conjunctivae are normal.   Neck: Normal range of motion. Neck supple. Cardiovascular: Normal rate, regular rhythm and normal heart sounds. Pulmonary/Chest: Effort normal and breath sounds normal.   Abdominal: Soft. Bowel sounds are normal.   Musculoskeletal: Normal range of motion. He exhibits no edema. Neurological: He is alert and oriented to person, place, and time. Skin: Skin is warm and dry. Nursing note and vitals reviewed. MDM  Number of Diagnoses or Management Options  Chronic obstructive pulmonary disease, unspecified COPD type ([facility] [address]):   Fever, unspecified fever cause:   Leukocytosis, unspecified type:   Pneumonia due to infectious organism, unspecified laterality, unspecified part of lung: new and requires workup  Diagnosis management comments: 12:42 AM Discussed results of the patient and wife, need for admission. Though no pneumonia showed up on his chest x-ray from urgent care, I am concerned about pneumonia given his history of lung disease and his fever and cough and leukocytosis. He will be started on antibiotics. I spoke with the hospitalist, to see patient for admission.        Amount and/or Complexity of Data Reviewed  Clinical lab tests: ordered and reviewed  Tests in the radiology section of CPT®: ordered  Obtain history from someone other than the patient: yes  Discuss the patient with other providers: yes    Risk of Complications, Morbidity, and/or Mortality  Presenting problems: moderate  Diagnostic procedures: moderate  Management options: moderate    Patient Progress  Patient progress: improved         Procedures

## 2019-07-17 NOTE — PROGRESS NOTES
Problem: Falls - Risk of  Goal: *Absence of Falls  Description  Document Shelby Muñoz Fall Risk and appropriate interventions in the flowsheet.   Outcome: Progressing Towards Goal  Note:   Fall Risk Interventions:            Medication Interventions: Patient to call before getting OOB

## 2019-07-17 NOTE — H&P
HOSPITALIST INITIAL HISTORY AND PHYSICAL    NAME:  Syed Sylvester   Age:  78 y.o.  :   1940   MRN:   [id number]  PCP: Junius Holstein  Consulting MD:  Treatment Team: Attending Provider: Boston Hargrove MD; Primary Nurse: Aayush Hung RN    CHIEF COMPLAINT: SOB    HISTORY OF PRESENT ILLNESS:   Syed Sylvester is a 78 y.o. male with a past medical history of dementia, DM type II, COPD, and HTN who presents to the ER with complaint of SOB and cough for the past day or two. He was seen at  and found to have elevated white blood cell count and was sent to ER for further evaluation. Currently, the patient is standing up, walking around his room. He denies any SOB or discomfort currently. He would like some \"refreshments\" to eat and drink. REVIEW OF SYSTEMS: Comprehensive ROS performed. Admits to fevers and chill, denies nausea, vomtiign, otherwise negative. Past Medical History:   Diagnosis Date    Arthritis     Benign essential HTN 2017    Chronic obstructive pulmonary disease (HCC)     Diabetes ([address]     Ear problems     Fungal sinusitis     GERD (gastroesophageal reflux disease)     History of multiple allergies     Hx of migraines     Hypertension     Irritable bowel syndrome with diarrhea 10/2/2018    Shingles 10/1/15    Sinus problem     Thoracic aneurysm without mention of rupture 2014    No chest or upper back pain. Echo shows a mildly dilated aortic root at 4.0 cm. There is mild LVH and normal systolic function.           Past Surgical History:   Procedure Laterality Date    HX APPENDECTOMY  age 10    HX CATARACT REMOVAL Bilateral     HX HEART CATHETERIZATION  12    stent x 1    HX HEENT      sinus x3    HX HERNIA REPAIR Right 1978    inguinal    HX OTHER SURGICAL  2012    neurostimulator implant for migraines at [facility] in [facility] implant for migraines 2012       Prior to Admission Medications   Prescriptions Last Dose Informant Patient Reported? Taking? ADVAIR DISKUS 500-50 mcg/dose diskus inhaler   No No   Sig: INHALE 1 PUFF BY MOUTH 2 TIMES DAILY   B.infantis-B.ani-B.long-B.bifi (PROBIOTIC 4X) 10-15 mg TbEC   Yes No   Sig: Take  by mouth. EPINEPHrine (EPIPEN) 0.3 mg/0.3 mL (1:1,000) injection   Yes No   Si.3 mg by IntraMUSCular route once as needed. Lancets misc   No No   Sig: Check BS once a day fasting (DX: E11.9); one touch verio   PROAIR HFA 90 mcg/actuation inhaler   No No   Sig: INHALE 2 PUFFS BY MOUTH EVERY 4 HOURS AS NEEDED   SPIRIVA WITH HANDIHALER 18 mcg inhalation capsule   No No   Sig: INHALE 1 CAPSULE VIA HANDIHALER ONCE DAILY AT THE SAME TIME EVERY DAY   SUMAtriptan (IMITREX) 100 mg tablet   No No   Sig: TAKE 1/2 TO 1 TABLET BY MOUTH AS NEEDED FOR MIGRAINE MAY REPEAT IN 2HRS MAX 2/24HRS   VIT A/VIT C/VIT E/ZINC/COPPER (PRESERVISION AREDS PO)   Yes No   Sig: Take 1 Tab by mouth two (2) times a day. albuterol (PROVENTIL VENTOLIN) 2.5 mg /3 mL (0.083 %) nebulizer solution   No No   Sig: USE 3 ML BY NEBULIZATION ROUTE THREE (3) TIMES DAILY. albuterol (PROVENTIL VENTOLIN) 2.5 mg /3 mL (0.083 %) nebulizer solution   No No   Sig: 3 mL by Nebulization route three (3) times daily. cholecalciferol (VITAMIN D3) 400 unit tab tablet   Yes No   Sig: Take  by mouth daily. clopidogrel (PLAVIX) 75 mg tab   No No   Sig: TAKE 1 TAB BY MOUTH DAILY. gabapentin (NEURONTIN) 600 mg tablet   Yes No   Sig: Take 600 mg by mouth daily. glucose blood VI test strips (BLOOD GLUCOSE TEST) strip   No No   Sig: Check BS once a day fasting (DX: E11.9); one touch verio   guaiFENesin ER (MUCINEX) 1,200 mg Ta12 ER tablet   No No   Sig: Take 1 Tab by mouth two (2) times a day. latanoprost (XALATAN) 0.005 % ophthalmic solution   Yes No   Sig: Administer 1 Drop to both eyes nightly.    lipase-protease-amylase (CREON 12,000) 12,000-38,000 -60,000 unit capsule   Yes No   Sig: Take 3 Caps by mouth three (3) times daily (with meals). nitroglycerin (NITROSTAT) 0.4 mg SL tablet   No No   Si Tab by SubLINGual route every five (5) minutes as needed for Chest Pain.   tamsulosin (FLOMAX) 0.4 mg capsule   No No   Sig: TAKE 1 CAP BY MOUTH DAILY. INDICATIONS: BENIGN PROSTATIC HYPERPLASIA WITH LOWER URINARY TRACT SX   triamcinolone (NASACORT AQ) 55 mcg nasal inhaler   No No   Si SPRAYS BY BOTH NOSTRILS ROUTE DAILY. INDICATIONS: ALLERGIC RHINITIS      Facility-Administered Medications: None       Allergies   Allergen Reactions    Aspirin Swelling and Anaphylaxis    Aleve [Naproxen Sodium] Unknown (comments)    Codeine Sulfate Other (comments)     headache    Corn Containing Products Other (comments)     Not allergic    Crestor [Rosuvastatin] Unknown (comments)     unknown    Septra [Sulfamethoprim Ds] Unknown (comments)     Not allergic    Zetia [Ezetimibe] Unknown (comments)     Unknown allergy    Zoloft [Sertraline] Unknown (comments)     Not allergic       FAMILY HISTORY: Reviewed.  Negative except   Family History   Problem Relation Age of Onset    No Known Problems Sister     No Known Problems Brother     No Known Problems Sister     COPD Mother    Steve Asthma Father     Dementia Other         UNCLE       Social History     Tobacco Use    Smoking status: Former Smoker     Packs/day: 1.50     Years: 40.00     Pack years: 60.00     Types: Cigarettes     Last attempt to quit: 1973     Years since quittin.5    Smokeless tobacco: Never Used   Substance Use Topics    Alcohol use: No     Alcohol/week: 0.0 standard drinks         Objective:     Visit Vitals  /70   Pulse (!) 101   Temp 99.2 °F (37.3 °C)   Resp 20   Ht 5' 7\" (1.702 m)   Wt 57.6 kg (127 lb)   SpO2 91%   BMI 19.89 kg/m²      Temp (24hrs), Av.2 °F (37.3 °C), Min:99.2 °F (37.3 °C), Max:99.2 °F (37.3 °C)    Oxygen Therapy  O2 Sat (%): 91 % (19 0359)  Pulse via Oximetry: 102 beats per minute (07/17/19 0359)  O2 Device: Room air (07/17/19 0130)  Physical Exam:  General:    The patient is a very pleasant elderly male in no acute distress. Head:   Normocephalic/atraumatic. Eyes:  No palpebral pallor or scleral icterus. ENT:  External auricular and nasal exam within normal limits. Mucous membranes are moist.  Neck:  Supple, non-tender, no JVD. Lungs:   Clear to auscultation bilaterally without wheezes or crackles. No respiratory distress or accessory muscle use. Heart:   Regular rate and rhythm, without murmurs, rubs, or gallops. Abdomen:   Soft, non-tender, non-distended with normoactive bowel sounds. Genitourinary: No tenderness over the bladder or bilateral CVAs. Extremities: Without clubbing, cyanosis, or edema. Skin:     Normal color, texture, and turgor. No rashes, lesions, or jaundice. Pulses: Radial and dorsalis pedis pulses present 2+ bilaterally. Capillary refill <2s. Neurologic: CN II-XII grossly intact and symmetrical.     Moving all four extremities well with no focal deficits. Psychiatric: Pleasant demeanor, appropriate affect. Alert and oriented x 3    Data Review:   Recent Results (from the past 24 hour(s))   CBC WITH AUTOMATED DIFF    Collection Time: 07/16/19 10:10 PM   Result Value Ref Range    WBC 22.0 (H) 4.3 - 11.1 K/uL    RBC 5.88 (H) 4.23 - 5.6 M/uL    HGB 16.4 13.6 - 17.2 g/dL    HCT 50.2 41.1 - 50.3 %    MCV 85.4 79.6 - 97.8 FL    MCH 27.9 26.1 - 32.9 PG    MCHC 32.7 31.4 - 35.0 g/dL    RDW 14.1 11.9 - 14.6 %    PLATELET 581 554 - 514 K/uL    MPV 9.1 (L) 9.4 - 12.3 FL    ABSOLUTE NRBC 0.00 0.0 - 0.2 K/uL    DF AUTOMATED      NEUTROPHILS 82 (H) 43 - 78 %    LYMPHOCYTES 7 (L) 13 - 44 %    MONOCYTES 8 4.0 - 12.0 %    EOSINOPHILS 1 0.5 - 7.8 %    BASOPHILS 1 0.0 - 2.0 %    IMMATURE GRANULOCYTES 1 0.0 - 5.0 %    ABS. NEUTROPHILS 18.2 (H) 1.7 - 8.2 K/UL    ABS. LYMPHOCYTES 1.6 0.5 - 4.6 K/UL    ABS.  MONOCYTES 1.7 (H) 0.1 - 1.3 K/UL    ABS. EOSINOPHILS 0.3 0.0 - 0.8 K/UL    ABS. BASOPHILS 0.1 0.0 - 0.2 K/UL    ABS. IMM. GRANS. 0.2 0.0 - 0.5 K/UL   METABOLIC PANEL, COMPREHENSIVE    Collection Time: 07/16/19 10:10 PM   Result Value Ref Range    Sodium 135 (L) 136 - 145 mmol/L    Potassium 4.0 3.5 - 5.1 mmol/L    Chloride 99 98 - 107 mmol/L    CO2 26 21 - 32 mmol/L    Anion gap 10 7 - 16 mmol/L    Glucose 129 (H) 65 - 100 mg/dL    BUN 14 8 - 23 MG/DL    Creatinine 0.76 (L) 0.8 - 1.5 MG/DL    GFR est AA >60 >60 ml/min/1.73m2    GFR est non-AA >60 >60 ml/min/1.73m2    Calcium 9.1 8.3 - 10.4 MG/DL    Bilirubin, total 0.8 0.2 - 1.1 MG/DL    ALT (SGPT) 27 12 - 65 U/L    AST (SGOT) 18 15 - 37 U/L    Alk. phosphatase 98 50 - 136 U/L    Protein, total 7.6 6.3 - 8.2 g/dL    Albumin 4.1 3.2 - 4.6 g/dL    Globulin 3.5 2.3 - 3.5 g/dL    A-G Ratio 1.2 1.2 - 3.5     PROCALCITONIN    Collection Time: 07/16/19 10:10 PM   Result Value Ref Range    Procalcitonin 0.2 ng/mL   POC LACTIC ACID    Collection Time: 07/16/19 10:15 PM   Result Value Ref Range    Lactic Acid (POC) 1.05 0.5 - 1.9 mmol/L       Imaging /Procedures /Studies:  No results found. Assessment and Plan:     Principal Problem:    Acute metabolic encephalopathy (4/38/8952)    Likely 2/2 COPD exacerbation. U/A pending. Will repeat CXR in AM to rule out evolving pneumonia. Active Problems:    Acute exacerbation of chronic obstructive pulmonary disease (COPD) ([facility] [address] (1/23/2019)    IV Solumedrol/Azithromycin. q4h DUoneb. Consult pulmonology. SOB (shortness of breath) (7/17/2019)    Per above. Leukocytosis (10/9/2017)    Per above. COPD, moderate ([address] (10/8/2013)    Per above. CAD (coronary artery disease) (10/8/2013)    Stable. Continue home meds. Benign essential HTN (6/6/2017)    Stable. Continue home meds. Gastroesophageal reflux disease without esophagitis (10/8/2013)    Stable. Continue home meds. Mixed hyperlipidemia (6/2/2016)    Stable.  Continue home meds. Dementia without behavioral disturbance (10/9/2017)    Stable. Continue home meds. DVT Prophylaxis: Lovenox      Code Status: DNR      Disposition: Admit to med/surg for evaluation and treatment as per above.       Anticipated discharge: 2-3 days     Signed By: Veronica Matamoros MD     July 17, 2019

## 2019-07-17 NOTE — ED NOTES
TRANSFER - OUT REPORT:    Verbal report given to Petey Hearn RN(name) on Shannon Grewal  being transferred to [facility](unit) for routine progression of care       Report consisted of patients Situation, Background, Assessment and   Recommendations(SBAR). Information from the following report(s) SBAR, ED Summary and MAR was reviewed with the receiving nurse. Lines:   Peripheral IV 07/17/19 Left Forearm (Active)   Site Assessment Clean, dry, & intact 7/17/2019  1:31 AM   Phlebitis Assessment 0 7/17/2019  1:31 AM   Dressing Status Clean, dry, & intact 7/17/2019  1:31 AM   Dressing Type Tape;Transparent 7/17/2019  1:31 AM       Peripheral IV 07/17/19 Right Antecubital (Active)   Site Assessment Clean, dry, & intact 7/17/2019  2:01 AM   Phlebitis Assessment 0 7/17/2019  2:01 AM   Infiltration Assessment 0 7/17/2019  2:01 AM   Dressing Status Clean, dry, & intact 7/17/2019  2:01 AM   Dressing Type Tape;Transparent 7/17/2019  2:01 AM        Opportunity for questions and clarification was provided.       Patient transported with:   Registered Nurse

## 2019-07-17 NOTE — PROGRESS NOTES
OCCUPATIONAL THERAPY: Initial Assessment and Discharge 7/17/2019  INPATIENT:    [place] / [place] / Product Type: Medicare /      NAME/AGE/GENDER: Jean-Paul Finley is a 78 y.o. male   PRIMARY DIAGNOSIS:  Acute metabolic encephalopathy [A14.65] Acute metabolic encephalopathy   Acute metabolic encephalopathy          ICD-10: Treatment Diagnosis:    Generalized Muscle Weakness (M62.81)   Precautions/Allergies:     Aspirin; Aleve [naproxen sodium]; Codeine sulfate; Corn containing products; Crestor [rosuvastatin]; Septra [sulfamethoprim ds]; Zetia [ezetimibe]; and Zoloft [sertraline]      ASSESSMENT:     Mr. Micheal Chavez was admitted with SOB and cough, metabolic encephalopathy. Pt lives with his wife and is independent at baseline, does not use an AD for mobility, is active and walks his dogs. This session, pt demonstrated independence with ADLs and mobility for ADLs. Pt reports that SOB has improved, denies decrease in energy level and demonstrated good endurance. BUE assessment WNL. Pt is at his functional baseline and does not require further skilled OT services at this time, no d/c needs. REHAB RECOMMENDATIONS (at time of discharge pending progress):    Placement: It is my opinion, based on this patient's performance to date, that Mr. Micheal Chavez may benefit from being discharged with NO further skilled therapy due to a proven ability to function at baseline.   Equipment:   None at this time              OCCUPATIONAL PROFILE AND HISTORY:   History of Present Injury/Illness (Reason for Referral):  See H&P  Past Medical History/Comorbidities:   Mr. Micheal Chavez  has a past medical history of Arthritis, Benign essential HTN (6/6/2017), Chronic obstructive pulmonary disease ([facility] [address] Diabetes ([facility] [address] Ear problems, Fungal sinusitis, GERD (gastroesophageal reflux disease), History of multiple allergies, migraines, Hypertension, Irritable bowel syndrome with diarrhea (10/2/2018), Shingles (10/1/15), Sinus problem, and Thoracic aneurysm without mention of rupture (12/2/2014). He also has no past medical history of Arrhythmia, Asthma, Cancer ([facility] [address] Chronic kidney disease, Coagulation disorder ([facility] [address] Endocarditis, Heart failure ([facility] [address] Liver disease, Nicotine vapor product user, Non-nicotine vapor product user, Psychiatric disorder, PUD (peptic ulcer disease), Seizures ([address] Sleep apnea, Stroke ([facility] [address] Thromboembolus ([facility] [address] or Thyroid disease. Mr. Laurence Escamilla  has a past surgical history that includes hx heent (1999); hx hernia repair (Right, 1978); hx appendectomy (age 9); hx other surgical (2/2012); hx tonsillectomy; hx cataract removal (Bilateral); pr neurological procedure unlisted; and hx heart catheterization (2/27/12). Social History/Living Environment:   Home Environment: Private residence  # Steps to Enter: 1  One/Two Story Residence: One story  Living Alone: No  Support Systems: Spouse/Significant Other/Partner, Friends \ neighbors  Patient Expects to be Discharged to[de-identified] Private residence  Current DME Used/Available at Home: None  Tub or Shower Type: Shower  Prior Level of Function/Work/Activity:  Independent, lives with wife     Number of Personal Factors/Comorbidities that affect the Plan of Care: Brief history (0):  LOW COMPLEXITY   ASSESSMENT OF OCCUPATIONAL PERFORMANCE[de-identified]   Activities of Daily Living:   Basic ADLs (From Assessment) Complex ADLs (From Assessment)   Feeding: Independent  Oral Facial Hygiene/Grooming: Independent  Bathing: Independent  Upper Body Dressing: Independent  Lower Body Dressing: Independent  Toileting: Independent     Grooming/Bathing/Dressing Activities of Daily Living     Cognitive Retraining  Safety/Judgement: Awareness of environment; Fall prevention                 Functional Transfers  Bathroom Mobility: Independent     Bed/Mat Mobility  Supine to Sit: Independent  Sit to Stand: Independent  Stand to Sit: Independent     Most Recent Physical Functioning:   Gross Assessment:  AROM: Within functional limits  Strength: Within functional limits               Posture:  Posture (WDL): Exceptions to WDL  Posture Assessment: Forward head  Balance:  Sitting: Intact  Standing: Intact  Standing - Static: Good  Standing - Dynamic : Fair(+) Bed Mobility:  Supine to Sit: Independent  Wheelchair Mobility:     Transfers:  Sit to Stand: Independent  Stand to Sit: Independent            Patient Vitals for the past 6 hrs:   BP SpO2 Pulse   19 1133 -- 94 % --   19 1153 137/67 95 % 98       Mental Status  Neurologic State: Alert  Orientation Level: Oriented X4  Cognition: Follows commands  Perception: Appears intact  Perseveration: No perseveration noted  Safety/Judgement: Awareness of environment, Fall prevention                          Physical Skills Involved:  none  Cognitive Skills Affected (resulting in the inability to perform in a timely and safe manner):  none  Psychosocial Skills Affected:  none    Number of elements that affect the Plan of Care: 1-3:  LOW COMPLEXITY   CLINICAL DECISION MAKIN[address] [place] [address] AM-PAC 6 Clicks   Daily Activity Inpatient Short Form  How much help from another person does the patient currently need. .. Total A Lot A Little None   1. Putting on and taking off regular lower body clothing? ? 1   ? 2   ? 3   ? 4   2. Bathing (including washing, rinsing, drying)? ? 1   ? 2   ? 3   ? 4   3. Toileting, which includes using toilet, bedpan or urinal?   ? 1   ? 2   ? 3   ? 4   4. Putting on and taking off regular upper body clothing? ? 1   ? 2   ? 3   ? 4   5. Taking care of personal grooming such as brushing teeth? ? 1   ? 2   ? 3   ? 4   6. Eating meals? ? 1   ? 2   ? 3   ? 4   © 2007, Trustees of [address] [place] [id number], under license to [employer].  All rights reserved      Score:  Initial: 24 Most Recent: X (Date: -- )    Interpretation of Tool:  Represents activities that are increasingly more difficult (i.e. Bed mobility, Transfers, Gait).        Use of outcome tool(s) and clinical judgement create a POC that gives a: LOW COMPLEXITY         TREATMENT:   (In addition to Assessment/Re-Assessment sessions the following treatments were rendered)     Pre-treatment Symptoms/Complaints:    Pain: Initial:   Pain Intensity 1: 0  Post Session:  0     Assessment/Reassessment only, no treatment provided today    Braces/Orthotics/Lines/Etc:   O2 Device: Room air  Treatment/Session Assessment:    Response to Treatment:  no adverse reaction   Interdisciplinary Collaboration:   Occupational Therapist  Registered Nurse  After treatment position/precautions:   Bed alarm/tab alert on  Bed/Chair-wheels locked  Bed in low position  Call light within reach     Total Treatment Duration:  OT Patient Time In/Time Out  Time In: 1324  Time Out: [address], OT

## 2019-07-17 NOTE — PROGRESS NOTES
TRANSFER - IN REPORT:    Verbal report received from [facility] on Kary Morales being received from ER for routine progression of care. Report consisted of patients Situation, Background, Assessment and Recommendations(SBAR). Information from the following report(s) SBAR, ED Summary, Intake/Output and Recent Results was reviewed. Opportunity for questions and clarification was provided. Assessment completed upon patients arrival to unit and care assumed. Patient received to room 315. Patient connected to monitor and assessment completed. Plan of care reviewed. Patient oriented to room and call light. Patient aware to use call light to communicate any chest pain or needs. Admission skin assessment completed with second RN and reveals the following: Skin assessment completed. Sacrum blanchable with no red ness. Encouraged repositioning.

## 2019-07-17 NOTE — PROGRESS NOTES
Verbal bedside report given to Ashlie Valero oncoming RN. Patient's situation, background, assessment and recommendations provided. Opportunity for questions provided. Oncoming RN assumed care of patient.

## 2019-07-17 NOTE — PROGRESS NOTES
Problem: Mobility Impaired (Adult and Pediatric)  Goal: *Acute Goals and Plan of Care (Insert Text)  Description  Patient appears to be at functional baseline. Will d/c from physical therapy services. ________________________________________________________________________________________________     Outcome: Resolved/Met     PHYSICAL THERAPY: Initial Assessment, Discharge and AM 7/17/2019  INPATIENT:    [place] / Product Type: Medicare /       NAME/AGE/GENDER: Kathlyne Curling is a 78 y.o. male   PRIMARY DIAGNOSIS: Acute metabolic encephalopathy [O70.52] Acute metabolic encephalopathy   Acute metabolic encephalopathy          ICD-10: Treatment Diagnosis:    Difficulty in walking, Not elsewhere classified (R26.2)   Precaution/Allergies:  Aspirin; Aleve [naproxen sodium]; Codeine sulfate; Corn containing products; Crestor [rosuvastatin]; Septra [sulfamethoprim ds]; Zetia [ezetimibe]; and Zoloft [sertraline]      ASSESSMENT:     Mr. Kaylan Flood is a 78 y.o. Male admitted for acute metabolic encephalopathy. He has history of dementia per chart and \"memory issues,\" per wife in the room. He lives with spouse in a single story home and is independent with ambulation, ADLs, able to do yard work when weather permits and drives. He is sitting edge of bed and agreeable to physical therapy evaluation on contact. SpO2 96% on room air with no c/o SOB. He stood independently and ambulated with modified independence due to minimal increased trunk sway 500' with no losses of balance or assistive device. Patient and wife report he is at his functional baseline. Will d/c from physical therapy services as they are not indicated at this time. This section established at most recent assessment     REHAB RECOMMENDATIONS (at time of discharge pending progress):    Placement:   It is my opinion, based on this patient's performance to date, that Mr. Kaylan Flood may benefit from being discharged with NO further skilled therapy due to a proven ability to function at baseline. Equipment:   None at this time              HISTORY:   History of Present Injury/Illness (Reason for Referral):  Bill Vera is a 78 y.o. male with a past medical history of dementia, DM type II, COPD, and HTN who presents to the ER with complaint of SOB and cough for the past day or two. He was seen at [facility] and found to have elevated white blood cell count and was sent to ER for further evaluation. Currently, the patient is standing up, walking around his room. He denies any SOB or discomfort currently. He would like some \"refreshments\" to eat and drink. Past Medical History/Comorbidities:   Mr. Surinder Prado  has a past medical history of Arthritis, Benign essential HTN (6/6/2017), Chronic obstructive pulmonary disease ([address] Diabetes ([address] Ear problems, Fungal sinusitis, GERD (gastroesophageal reflux disease), History of multiple allergies, migraines, Hypertension, Irritable bowel syndrome with diarrhea (10/2/2018), Shingles (10/1/15), Sinus problem, and Thoracic aneurysm without mention of rupture (12/2/2014). He also has no past medical history of Arrhythmia, Asthma, Cancer ([address] Chronic kidney disease, Coagulation disorder ([address] Endocarditis, Heart failure ([address] Liver disease, Nicotine vapor product user, Non-nicotine vapor product user, Psychiatric disorder, PUD (peptic ulcer disease), Seizures ([address] Sleep apnea, Stroke ([address] Thromboembolus ([address] or Thyroid disease. Mr. Surinder Prado  has a past surgical history that includes hx heent (1999); hx hernia repair (Right, 1978); hx appendectomy (age 9); hx other surgical (2/2012); hx tonsillectomy; hx cataract removal (Bilateral); pr neurological procedure unlisted; and hx heart catheterization (2/27/12).   Social History/Living Environment:   Home Environment: Private residence  # Steps to Enter: 1  One/Two Story Residence: One story  Living Alone: No  Support Systems: Friends \ neighbors, Spouse/Significant Other/Partner, Family member(s)  Patient Expects to be Discharged to[de-identified] Private residence  Current DME Used/Available at Home: Oxygen, portable  Prior Level of Function/Work/Activity:  He lives with spouse in a single story home and is independent with ambulation, ADLs, able to do yard work when weather permits and drives. No falls in the past 6 months. Number of Personal Factors/Comorbidities that affect the Plan of Care: 1-2: MODERATE COMPLEXITY   EXAMINATION:   Most Recent Physical Functioning:   Gross Assessment:  AROM: Within functional limits  PROM: Within functional limits  Strength: Within functional limits  Coordination: Within functional limits  Tone: Normal               Posture:  Posture (WDL): Exceptions to WDL  Posture Assessment: Forward head  Balance:  Sitting: Intact  Standing: Impaired  Standing - Static: Good  Standing - Dynamic : Fair(+) Bed Mobility:     Wheelchair Mobility:     Transfers:  Sit to Stand: Independent  Stand to Sit: Independent  Gait:     Gait Abnormalities: Trunk sway increased  Distance (ft): 500 Feet (ft)  Assistive Device: (none)  Ambulation - Level of Assistance: Modified independent      Body Structures Involved:  Heart  Lungs Body Functions Affected:  Cardio  Respiratory Activities and Participation Affected:  Community, Social and Civic Life   Number of elements that affect the Plan of Care: 3: MODERATE COMPLEXITY   CLINICAL PRESENTATION:   Presentation: Stable and uncomplicated: LOW COMPLEXITY   CLINICAL DECISION MAKIN [facility] [address] [address] AM-[facility]  How much difficulty does the patient currently have. .. Unable A Lot A Little None   1. Turning over in bed (including adjusting bedclothes, sheets and blankets)? ? 1   ? 2   ? 3   ? 4   2. Sitting down on and standing up from a chair with arms ( e.g., wheelchair, bedside commode, etc.)   ? 1   ? 2   ? 3   ? 4   3.   Moving from lying on back to sitting on the side of the bed?   ? 1   ? 2   ? 3   ? 4   How much help from another person does the patient currently need. .. Total A Lot A Little None   4. Moving to and from a bed to a chair (including a wheelchair)? ? 1   ? 2   ? 3   ? 4   5. Need to walk in hospital room? ? 1   ? 2   ? 3   ? 4   6. Climbing 3-5 steps with a railing? ? 1   ? 2   ? 3   ? 4   © 2007, Trustees of [address] [place] [address] [address] [id number], under license to [employer]. All rights reserved      Score:  Initial: 24 Most Recent: X (Date: -- )    Interpretation of Tool:  Represents activities that are increasingly more difficult (i.e. Bed mobility, Transfers, Gait). Use of outcome tool(s) and clinical judgement create a POC that gives a: Clear prediction of patient's progress: LOW COMPLEXITY            TREATMENT:   (In addition to Assessment/Re-Assessment sessions the following treatments were rendered)   Pre-treatment Symptoms/Complaints:  none  Pain: Initial:   Pain Intensity 1: 0  Post Session:  0/10     Assessment/Reassessment only, no treatment provided today    Braces/Orthotics/Lines/Etc:   O2 Device: Room air  Treatment/Session Assessment:    Response to Treatment:  Patient ambulated 500' with SpO2 drop to 92% and minimally increased SOB, however reports this is normal for him.   Interdisciplinary Collaboration:   Physical Therapist  Registered Nurse  After treatment position/precautions:   Bed in low position  Call light within reach  RN notified  Family at bedside  Sitting edge of bed    Compliance with Program/Exercises: Compliant all of the time    Total Treatment Duration:  PT Patient Time In/Time Out  Time In: 0841  Time Out: Yanick Toure, PT, DPT

## 2019-07-18 VITALS
HEART RATE: 93 BPM | WEIGHT: 129.5 LBS | DIASTOLIC BLOOD PRESSURE: 80 MMHG | OXYGEN SATURATION: 96 % | TEMPERATURE: 97.8 F | HEIGHT: 67 IN | SYSTOLIC BLOOD PRESSURE: 138 MMHG | BODY MASS INDEX: 20.33 KG/M2 | RESPIRATION RATE: 18 BRPM

## 2019-07-18 LAB
ANION GAP SERPL CALC-SCNC: 9 MMOL/L (ref 7–16)
BUN SERPL-MCNC: 13 MG/DL (ref 8–23)
CALCIUM SERPL-MCNC: 8.6 MG/DL (ref 8.3–10.4)
CHLORIDE SERPL-SCNC: 106 MMOL/L (ref 98–107)
CO2 SERPL-SCNC: 23 MMOL/L (ref 21–32)
CREAT SERPL-MCNC: 0.57 MG/DL (ref 0.8–1.5)
ERYTHROCYTE [DISTWIDTH] IN BLOOD BY AUTOMATED COUNT: 14 % (ref 11.9–14.6)
GLUCOSE BLD STRIP.AUTO-MCNC: 104 MG/DL (ref 65–100)
GLUCOSE BLD STRIP.AUTO-MCNC: 108 MG/DL (ref 65–100)
GLUCOSE SERPL-MCNC: 128 MG/DL (ref 65–100)
HCT VFR BLD AUTO: 42.5 % (ref 41.1–50.3)
HGB BLD-MCNC: 14 G/DL (ref 13.6–17.2)
MCH RBC QN AUTO: 28.1 PG (ref 26.1–32.9)
MCHC RBC AUTO-ENTMCNC: 32.9 G/DL (ref 31.4–35)
MCV RBC AUTO: 85.2 FL (ref 79.6–97.8)
NRBC # BLD: 0 K/UL (ref 0–0.2)
PLATELET # BLD AUTO: 218 K/UL (ref 150–450)
PMV BLD AUTO: 9.6 FL (ref 9.4–12.3)
POTASSIUM SERPL-SCNC: 4.1 MMOL/L (ref 3.5–5.1)
PROCALCITONIN SERPL-MCNC: 0.8 NG/ML
RBC # BLD AUTO: 4.99 M/UL (ref 4.23–5.6)
SODIUM SERPL-SCNC: 138 MMOL/L (ref 136–145)
WBC # BLD AUTO: 15.7 K/UL (ref 4.3–11.1)

## 2019-07-18 PROCEDURE — 74011250636 HC RX REV CODE- 250/636: Performed by: FAMILY MEDICINE

## 2019-07-18 PROCEDURE — 82962 GLUCOSE BLOOD TEST: CPT

## 2019-07-18 PROCEDURE — 94640 AIRWAY INHALATION TREATMENT: CPT

## 2019-07-18 PROCEDURE — 99232 SBSQ HOSP IP/OBS MODERATE 35: CPT | Performed by: INTERNAL MEDICINE

## 2019-07-18 PROCEDURE — 85027 COMPLETE CBC AUTOMATED: CPT

## 2019-07-18 PROCEDURE — 74011250636 HC RX REV CODE- 250/636: Performed by: NURSE PRACTITIONER

## 2019-07-18 PROCEDURE — 74011000258 HC RX REV CODE- 258: Performed by: NURSE PRACTITIONER

## 2019-07-18 PROCEDURE — 84145 PROCALCITONIN (PCT): CPT

## 2019-07-18 PROCEDURE — 36415 COLL VENOUS BLD VENIPUNCTURE: CPT

## 2019-07-18 PROCEDURE — 74011000250 HC RX REV CODE- 250: Performed by: FAMILY MEDICINE

## 2019-07-18 PROCEDURE — 80048 BASIC METABOLIC PNL TOTAL CA: CPT

## 2019-07-18 PROCEDURE — 74011000250 HC RX REV CODE- 250: Performed by: NURSE PRACTITIONER

## 2019-07-18 PROCEDURE — 74011250637 HC RX REV CODE- 250/637: Performed by: FAMILY MEDICINE

## 2019-07-18 RX ORDER — LEVOFLOXACIN 750 MG/1
750 TABLET ORAL DAILY
Qty: 5 TAB | Refills: 0 | Status: SHIPPED | OUTPATIENT
Start: 2019-07-18 | End: 2019-07-23

## 2019-07-18 RX ORDER — ACETAMINOPHEN 325 MG/1
650 TABLET ORAL
Qty: 20 TAB | Refills: 0 | Status: SHIPPED | OUTPATIENT
Start: 2019-07-18

## 2019-07-18 RX ADMIN — GUAIFENESIN 1200 MG: 600 TABLET, EXTENDED RELEASE ORAL at 08:27

## 2019-07-18 RX ADMIN — METHYLPREDNISOLONE SODIUM SUCCINATE 40 MG: 40 INJECTION, POWDER, FOR SOLUTION INTRAMUSCULAR; INTRAVENOUS at 08:28

## 2019-07-18 RX ADMIN — PANCRELIPASE LIPASE, PANCRELIPASE PROTEASE, PANCRELIPASE AMYLASE 2 CAPSULE: 20000; 63000; 84000 CAPSULE, DELAYED RELEASE ORAL at 11:58

## 2019-07-18 RX ADMIN — ALBUTEROL SULFATE 2.5 MG: 2.5 SOLUTION RESPIRATORY (INHALATION) at 07:17

## 2019-07-18 RX ADMIN — TAMSULOSIN HYDROCHLORIDE 0.4 MG: 0.4 CAPSULE ORAL at 08:27

## 2019-07-18 RX ADMIN — CEFTRIAXONE 1 G: 1 INJECTION, POWDER, FOR SOLUTION INTRAMUSCULAR; INTRAVENOUS at 02:27

## 2019-07-18 RX ADMIN — ENOXAPARIN SODIUM 40 MG: 40 INJECTION SUBCUTANEOUS at 08:28

## 2019-07-18 RX ADMIN — ALBUTEROL SULFATE 2.5 MG: 2.5 SOLUTION RESPIRATORY (INHALATION) at 11:23

## 2019-07-18 RX ADMIN — AZITHROMYCIN MONOHYDRATE 500 MG: 500 INJECTION, POWDER, LYOPHILIZED, FOR SOLUTION INTRAVENOUS at 05:04

## 2019-07-18 RX ADMIN — FLUTICASONE PROPIONATE 1 SPRAY: 50 SPRAY, METERED NASAL at 08:28

## 2019-07-18 RX ADMIN — Medication 5 ML: at 05:05

## 2019-07-18 RX ADMIN — BUDESONIDE 500 MCG: 0.5 INHALANT RESPIRATORY (INHALATION) at 07:17

## 2019-07-18 RX ADMIN — GABAPENTIN 300 MG: 300 CAPSULE ORAL at 08:27

## 2019-07-18 RX ADMIN — PANCRELIPASE LIPASE, PANCRELIPASE PROTEASE, PANCRELIPASE AMYLASE 2 CAPSULE: 20000; 63000; 84000 CAPSULE, DELAYED RELEASE ORAL at 08:27

## 2019-07-18 NOTE — DISCHARGE SUMMARY
Discharge Summary     Patient: Syed Sylvester MRN: [id number]  SSN: [id number]    YOB: 1940  Age: 78 y.o. Sex: male       Admit Date: 7/17/2019    Discharge Date: 7/18/2019      Admission Diagnoses: COPD exacerbation    Discharge Diagnoses:   Problem List as of 7/18/2019 Date Reviewed: 7/18/2019          Codes Class Noted - Resolved    * (Principal) Acute metabolic encephalopathy IRP-70-WA: G93.41  ICD-9-CM: 348.31  7/17/2019 - Present        SOB (shortness of breath) ICD-10-CM: R06.02  ICD-9-CM: 786.05  7/17/2019 - Present        Bronchiectasis ([facility] [address] [facility] (Chronic) ICD-10-CM: J47.9  ICD-9-CM: 494.0  1/24/2019 - Present        Acute exacerbation of chronic obstructive pulmonary disease (COPD) ([facility] ICD-10-CM: J44.1  ICD-9-CM: 491.21  1/23/2019 - Present        Irritable bowel syndrome with diarrhea ICD-10-CM: K58.0  ICD-9-CM: 564.1  10/2/2018 - Present        Dementia without behavioral disturbance ICD-10-CM: F03.90  ICD-9-CM: 294.20  10/9/2017 - Present        Impaired fasting blood sugar ICD-10-CM: R73.01  ICD-9-CM: 790.21  10/9/2017 - Present        Leukocytosis ICD-10-CM: D72.829  ICD-9-CM: 288.60  10/9/2017 - Present        Postherpetic neuralgia ICD-10-CM: B02.29  ICD-9-CM: 053.19  10/9/2017 - Present        Pulmonary nodules (Chronic) ICD-10-CM: R91.8  ICD-9-CM: 793.19  6/9/2017 - Present    Overview Addendum 7/17/2019 10:59 AM by Scott Jean NP     CT-8/10/2015:  Multiple  small  bilateral  pulmonary  nodules. CT-2/18/2016:    1.  Increased nodular airspace disease in both lungs, most prominent in the RLL. This is most consistent with an atypical infection such as BOO.  Consider follow-up to document resolution. 2.  Cholelithiasis. July 2017: new/enlarging soft tissue density peripherally within the right middle lobe with spiculated margins. There was also an new pulmonary nodule in the right lower lobe and right upper lobe.   The right middle lobe nodule measured 1.9 x 1.8 cm.  Follow-up PET scan performed on August 10, 2017 demonstrated a decrease in the right middle lobe nodule and none of the nodules were significantly PET avid (max SUV less than 3). A CT on 2/1 revealed improved tree in bud opacities in THAD & f/u imaging is scheduled in one year. Benign essential HTN (Chronic) ICD-10-CM: I10  ICD-9-CM: 401.1  6/6/2017 - Present        Long term (current) use of antithrombotics/antiplatelets [facility]: Z79.02  ICD-9-CM: V58.63  9/15/2016 - Present        Fungal sinusitis ICD-10-CM: B49, J32.9  ICD-9-CM: 117.9, 473.9  8/8/2016 - Present        Platelet inhibition due to Plavix ICD-10-CM: Z79.02  ICD-9-CM: V58.63  8/8/2016 - Present        ETD (eustachian tube dysfunction) ICD-10-CM: H69.80  ICD-9-CM: 381.81  7/21/2016 - Present        Chronic sinusitis ICD-10-CM: J32.9  ICD-9-CM: 473.9  7/21/2016 - Present        Nasal polyp ICD-10-CM: J33.9  ICD-9-CM: 471.9  Unknown - Present        Mixed hyperlipidemia (Chronic) ICD-10-CM: L63.5  ICD-9-CM: 272.2  6/2/2016 - Present        DNR (do not resuscitate) ICD-10-CM: Z66  ICD-9-CM: V49.86  5/12/2016 - Present        Thoracic aneurysm without mention of rupture (Chronic) ICD-10-CM: I71.2  ICD-9-CM: 441.2  12/2/2014 - Present    Overview Signed 4/4/2016 11:56 AM by Kasandra Gr     No chest or upper back pain. Echo shows a mildly dilated aortic root at 4.0 cm. There is mild LVH and normal systolic function.               COPD, moderate ([address] (Chronic) ICD-10-CM: J44.9  ICD-9-CM: 106  10/8/2013 - Present    Overview Addendum 7/17/2019 10:57 AM by Paulie Lewis NP     GOLD stage II  O2 dependent 2L             Migraine (Chronic) ICD-10-CM: G43.909  ICD-9-CM: 346.90  10/8/2013 - Present    Overview Signed 4/30/2019 11:52 AM by Valorie GUZMÁN DO     S/p insertion stimulator             CAD (coronary artery disease) (Chronic) ICD-10-CM: I25.10  ICD-9-CM: 414.00  10/8/2013 - Present    Overview Signed 4/4/2016 11:55 AM by Lemond Pollen     ASCAD of the native vessel             Allergic rhinitis (Chronic) ICD-10-CM: J30.9  ICD-9-CM: 477.9  10/8/2013 - Present        Gastroesophageal reflux disease without esophagitis (Chronic) ICD-10-CM: K21.9  ICD-9-CM: 530.81  10/8/2013 - Present        Osteoporosis (Chronic) ICD-10-CM: M81.0  ICD-9-CM: 733.00  10/8/2013 - Present        Encounter for long-term (current) use of antiplatelets/antithrombotics (Chronic) ICD-10-CM: Z79.02  ICD-9-CM: V58.63  10/8/2013 - Present    Overview Signed 10/8/2013 12:50 PM by Dorien Dandy, NP     Plavix             Personal history of tobacco use (Chronic) ICD-10-CM: V17.125  ICD-9-CM: V15.82  10/8/2013 - Present        RESOLVED: Pneumonia ICD-10-CM: J18.9  ICD-9-CM: 905  1/24/2019 - 2/13/2019        RESOLVED: Acute respiratory failure with hypoxia ([facility] ICD-10-CM: J96.01  ICD-9-CM: 518.81  1/24/2019 - 2/13/2019        RESOLVED: Sepsis ([facility] ICD-10-CM: A41.9  ICD-9-CM: 038.9, 995.91  1/24/2019 - 2/13/2019        RESOLVED: Abnormal CT scan, chest ICD-10-CM: R93.89  ICD-9-CM: 793.2  11/28/2017 - 7/17/2019        RESOLVED: History of migraine ICD-10-CM: Z86.69  ICD-9-CM: V12.49  10/9/2017 - 12/19/2018        RESOLVED: Tachycardia ICD-10-CM: R00.0  ICD-9-CM: 785.0  7/20/2017 - 10/9/2017        RESOLVED: Controlled type 2 diabetes mellitus without complication, without long-term current use of insulin ([facility] ICD-10-CM: E11.9  ICD-9-CM: 250.00  6/9/2017 - 10/9/2017        RESOLVED: Pneumonia ICD-10-CM: J18.9  ICD-9-CM: 486  6/9/2017 - 1/31/2018        RESOLVED: COPD exacerbation ([facility] [facility] ICD-10-CM: J44.1  ICD-9-CM: 491.21  6/9/2017 - 10/9/2017        RESOLVED: Hyponatremia ICD-10-CM: E87.1  ICD-9-CM: 276.1  6/9/2017 - 10/9/2017        RESOLVED: Pulmonary infiltrate ICD-10-CM: R91.8  ICD-9-CM: 793.19  9/15/2016 - 10/31/2016        RESOLVED: Acute sinusitis ICD-10-CM: J01.90  ICD-9-CM: 461.9  8/8/2016 - 10/31/2016        RESOLVED: Hx of migraines ICD-10-CM: Z86.69  ICD-9-CM: V12.49  8/8/2016 - 12/19/2018        RESOLVED: Nasal obstruction ICD-10-CM: J34.89  ICD-9-CM: 478.19  8/8/2016 - 10/9/2017        RESOLVED: Acute otitis media ICD-10-CM: H66.90  ICD-9-CM: 382.9  8/8/2016 - 10/31/2016        RESOLVED: Cerumen impaction ICD-10-CM: H61.20  ICD-9-CM: 380.4  8/8/2016 - 10/31/2016        RESOLVED: Chest pain ICD-10-CM: R07.9  ICD-9-CM: 786.50  Unknown - 10/9/2017        RESOLVED: Herpes zoster without complication T-61-ST: U25.2  ICD-9-CM: 053.9  3/21/2016 - 6/2/2016        RESOLVED: Ineffective airway clearance ICD-10-CM: R06.89  ICD-9-CM: 786.09  3/8/2016 - 7/17/2019        RESOLVED: Shingles ICD-10-CM: B02.9  ICD-9-CM: 053.9  10/1/2015 - 10/9/2017        RESOLVED: Hyperlipidemia (Chronic) ICD-10-CM: E78.5  ICD-9-CM: 272.4  10/8/2013 - 6/2/2016               Discharge Condition: [facility]:   Mr Ramón Christensen is a 78 y.o. male with a past medical history of dementia, DM type II, COPD on 2 liters home oxygen at nights, pulmonary nodules, GERD and HTN who was admitted for COPD exacerbation after been referred from [facility]. He had leukocytosis, elevated procalcitonin. A chest CT scan showed patchy scattered infiltrates, nodular density at left apex. He received iv antibiotics and solumedrol. Pulmonary consulted. His clinical status improved. Plan to follow up as outpatient in 1 week with repeated CXR. He developed insomnia and agitation after iv steroids. No significant wheezing. He is being discharged on po levaquin. No need for prednisone taper by pulmonary recommendation. Physical exam:  Physical Exam:   GENERAL: alert, cooperative, no distress, appears stated age  EYE: negative  LYMPHATIC: Cervical, supraclavicular, and axillary nodes normal.   THROAT & NECK: normal and no erythema or exudates noted.    LUNG: bilateral air entry, no wheezing, mild rhonchi   HEART: regular rate and rhythm, S1, S2 normal, no murmur, click, rub or gallop  ABDOMEN: soft, non-tender. Bowel sounds normal. No masses,  no organomegaly  EXTREMITIES:  extremities normal, atraumatic, no cyanosis or edema  SKIN: Normal.  NEUROLOGIC: negative  PSYCHIATRIC: non focal    Consults: Pulmonary/Critical Care    Significant Diagnostic Studies: see note     Disposition: home    Discharge Medications:   Current Discharge Medication List      START taking these medications    Details   acetaminophen (TYLENOL) 325 mg tablet Take 2 Tabs by mouth every four (4) hours as needed for Pain. Qty: 20 Tab, Refills: 0      levoFLOXacin (LEVAQUIN) 750 mg tablet Take 1 Tab by mouth daily for 5 days. Qty: 5 Tab, Refills: 0         CONTINUE these medications which have NOT CHANGED    Details   clopidogrel (PLAVIX) 75 mg tab TAKE 1 TAB BY MOUTH DAILY. Qty: 90 Tab, Refills: 1    Associated Diagnoses: Coronary artery disease involving native coronary artery of native heart without angina pectoris      PROAIR HFA 90 mcg/actuation inhaler INHALE 2 PUFFS BY MOUTH EVERY 4 HOURS AS NEEDED  Qty: 8.5 Inhaler, Refills: 2      tamsulosin (FLOMAX) 0.4 mg capsule TAKE 1 CAP BY MOUTH DAILY. INDICATIONS: BENIGN PROSTATIC HYPERPLASIA WITH LOWER URINARY TRACT SX  Qty: 90 Cap, Refills: 1    Associated Diagnoses: Benign prostatic hyperplasia with urinary frequency      gabapentin (NEURONTIN) 600 mg tablet Take 600 mg by mouth daily. triamcinolone (NASACORT AQ) 55 mcg nasal inhaler 2 SPRAYS BY BOTH NOSTRILS ROUTE DAILY. INDICATIONS: ALLERGIC RHINITIS  Qty: 1 Bottle, Refills: 11      SUMAtriptan (IMITREX) 100 mg tablet TAKE 1/2 TO 1 TABLET BY MOUTH AS NEEDED FOR MIGRAINE MAY REPEAT IN 2HRS MAX 2/24HRS  Qty: 9 Tab, Refills: 5    Associated Diagnoses: Migraine with aura and without status migrainosus, not intractable      lipase-protease-amylase (CREON 12,000) 12,000-38,000 -60,000 unit capsule Take 3 Caps by mouth three (3) times daily (with meals).       !! albuterol (PROVENTIL VENTOLIN) 2.5 mg /3 mL (0.083 %) nebulizer solution USE 3 ML BY NEBULIZATION ROUTE THREE (3) TIMES DAILY. Qty: 1 Package, Refills: 4      B.infantis-B.ani-B.long-B.bifi (PROBIOTIC 4X) 10-15 mg TbEC Take  by mouth. ADVAIR DISKUS 500-50 mcg/dose diskus inhaler INHALE 1 PUFF BY MOUTH 2 TIMES DAILY  Qty: 1 Inhaler, Refills: 11      SPIRIVA WITH HANDIHALER 18 mcg inhalation capsule INHALE 1 CAPSULE VIA HANDIHALER ONCE DAILY AT THE SAME TIME EVERY DAY  Qty: 30 Cap, Refills: 11      cholecalciferol (VITAMIN D3) 400 unit tab tablet Take  by mouth daily. latanoprost (XALATAN) 0.005 % ophthalmic solution Administer 1 Drop to both eyes nightly. Associated Diagnoses: Frequency of urination      glucose blood VI test strips (BLOOD GLUCOSE TEST) strip Check BS once a day fasting (DX: E11.9); one touch verio  Qty: 100 Strip, Refills: 11    Associated Diagnoses: Controlled type 2 diabetes mellitus without complication, without long-term current use of insulin ([facility])      Lancets misc Check BS once a day fasting (DX: E11.9); one touch verio  Qty: 100 Each, Refills: 11    Associated Diagnoses: Controlled type 2 diabetes mellitus without complication, without long-term current use of insulin ([facility])      VIT A/VIT C/VIT E/ZINC/COPPER (PRESERVISION AREDS PO) Take 1 Tab by mouth two (2) times a day. nitroglycerin (NITROSTAT) 0.4 mg SL tablet 1 Tab by SubLINGual route every five (5) minutes as needed for Chest Pain. Qty: 25 Tab, Refills: 6      !! albuterol (PROVENTIL VENTOLIN) 2.5 mg /3 mL (0.083 %) nebulizer solution 3 mL by Nebulization route three (3) times daily. Qty: 90 Each, Refills: 11    Comments: COPD J44.9      EPINEPHrine (EPIPEN) 0.3 mg/0.3 mL (1:1,000) injection 0.3 mg by IntraMUSCular route once as needed. !! - Potential duplicate medications found. Please discuss with provider.       STOP taking these medications       guaiFENesin ER (MUCINEX) 1,200 mg Ta12 ER tablet Comments:   Reason for Stopping:               Activity: Activity as tolerated  Diet: Regular Diet  Wound Care: None needed    Follow-up Appointments   Procedures    FOLLOW UP VISIT Appointment in: Other (Specify) Follow up appointment with [facility] in 3 weeks with CXR. Follow up appointment with [facility] in 3 weeks with CXR. Standing Status:   Standing     Number of Occurrences:   1     Order Specific Question:   Appointment in     Answer: Other (Specify)    FOLLOW UP VISIT Appointment in: One Week pcp     pcp     Standing Status:   Standing     Number of Occurrences:   1     Standing Expiration Date:   7/19/2019     Order Specific Question:   Appointment in     Answer:    One Week       Signed By: Smair Varner MD     July 18, 2019

## 2019-07-18 NOTE — PROGRESS NOTES
Bedside and Verbal shift change report given to self (oncoming nurse) by Adele August (offgoing nurse). Report included the following information SBAR, Kardex, Intake/Output and MAR.

## 2019-07-18 NOTE — PROGRESS NOTES
Josh Kim  Admission Date: 7/17/2019             Daily Progress Note: 7/18/2019    The patient's chart is reviewed and the patient is discussed with the staff.    78 y.o. CM evaluated at the request of Dr. Dick Lofton for COPD. Ambulating in the room on room air and states is feeling better today. Has occasional chronic cough with sputum production.       He presented to [facility] with exertional shortness of breath, chills, fever and right pleuritic chest pain in inspiration. Was told WBC was elevated and referred to ER. He is followed in our office and has pulmonary nodules followed since Aug 2015 with repeat CT July 2017 showing  new/enlarging soft tissue density peripherally within the RML with spiculated margins. Danni Hoots was also an new pulmonary nodule in the right lower lobe and right upper lobe.  The right middle lobe nodule measured 1.9 x 1.8 cm.  Follow-up PET scan performed on August 10, 2017 demonstrated a decrease in the right middle lobe nodule and none of the nodules were significantly PET avid (max SUV less than 3).  A CT on 2/1 revealed improved tree in bud opacities in THAD & f/u imaging is scheduled in one year.     Chronic medical:  COPD, chronic respiratory failure on 2L at night, uses vest therapy for secretion mobilizatiion, has pulmonary nodules, GERD, sinusitis, DM, HTN, mild dementia, IBS. Subjective:     Sitting up in the room, remains on room air and having some confusion. Family states he has not slept since admission. Reports productive cough at times.     Current Facility-Administered Medications   Medication Dose Route Frequency    budesonide (PULMICORT) 500 mcg/2 ml nebulizer suspension  500 mcg Nebulization BID RT    azithromycin (ZITHROMAX) 500 mg in 0.9% sodium chloride (MBP/ADV) 250 mL  500 mg IntraVENous Q24H    clopidogrel (PLAVIX) tablet 75 mg  75 mg Oral DAILY    insulin regular (NOVOLIN R, HUMULIN R) injection   SubCUTAneous AC&HS    latanoprost (XALATAN) 0.005 % ophthalmic solution 1 Drop  1 Drop Both Eyes QHS    guaiFENesin ER (MUCINEX) tablet 1,200 mg  1,200 mg Oral BID    lipase-protease-amylase (ZENPEP 20,000) capsule 2 Cap  2 Cap Oral TID WITH MEALS    tamsulosin (FLOMAX) capsule 0.4 mg  0.4 mg Oral DAILY    fluticasone propionate (FLONASE) 50 mcg/actuation nasal spray 1 Spray  1 Spray Both Nostrils DAILY    sodium chloride (NS) flush 5-40 mL  5-40 mL IntraVENous Q8H    sodium chloride (NS) flush 5-40 mL  5-40 mL IntraVENous PRN    acetaminophen (TYLENOL) tablet 650 mg  650 mg Oral Q4H PRN    ondansetron (ZOFRAN) injection 4 mg  4 mg IntraVENous Q4H PRN    enoxaparin (LOVENOX) injection 40 mg  40 mg SubCUTAneous Q24H    gabapentin (NEURONTIN) capsule 300 mg  300 mg Oral TID    albuterol (PROVENTIL VENTOLIN) nebulizer solution 2.5 mg  2.5 mg Nebulization QID RT    albuterol (PROVENTIL VENTOLIN) nebulizer solution 2.5 mg  2.5 mg Nebulization Q4H PRN    methylPREDNISolone (PF) (Solu-MEDROL) injection 40 mg  40 mg IntraVENous DAILY    cefTRIAXone (ROCEPHIN) 1 g in 0.9% sodium chloride (MBP/ADV) 50 mL  1 g IntraVENous Q24H       Review of Systems  Constitutional: negative for fever, chills, sweats  Cardiovascular: negative for chest pain, palpitations, syncope, edema  Gastrointestinal:  negative for dysphagia, reflux, vomiting, diarrhea, abdominal pain, or melena  Neurologic:  negative for focal weakness, numbness, headache    Objective:     Vitals:    07/17/19 2126 07/18/19 0037 07/18/19 0435 07/18/19 0757   BP: 112/71 142/81 112/78 127/83   Pulse: 99 (!) 102 93 99   Resp: 18 19 19 18   Temp: 97.8 °F (36.6 °C) 97.9 °F (36.6 °C) 98.1 °F (36.7 °C) 98 °F (36.7 °C)   SpO2: 92% 94% 94% 99%   Weight:   129 lb 8 oz (58.7 kg)    Height:         Intake and Output:   07/16 1901 - 07/18 0700  In: 1120 [P.O.:820;  I.V.:300]  Out: 175 [Urine:175]  07/18 0701 - 07/18 1900  In: 480 [P.O.:480]  Out: 200 [Urine:200]    Physical Exam: Constitution:  the patient is well developed and in no acute distress, room air sat 94%  EENMT:  Sclera clear, pupils equal, oral mucosa moist  Respiratory: clear anterior, wet -productive cough, few posterior basilar crackles, no wheezing  Cardiovascular:  RRR without M,G,R  Gastrointestinal: soft and non-tender; with positive bowel sounds. Musculoskeletal: warm without cyanosis. There is no lower extremity edema. Skin:  no jaundice or rashes, no wounds   Neurologic: no gross neuro deficits     Psychiatric:  alert and oriented x 3 now but with confusion at times    Chest CT 7/17/19:  Patchy scattered infiltrates as above which accounts for the chest x-ray abnormality. Nodular density left apex, likely inflammatory, repeat CT in 3 months recommended. CHEST XRAY:   7/17/19      LAB  Recent Labs     07/18/19  0625 07/17/19  2201 07/17/19  1605 07/17/19  1105 07/17/19  0612   GLUCPOC 104* 126* 173* 181* 116*      Recent Labs     07/18/19  0418 07/16/19  2210   WBC 15.7* 22.0*   HGB 14.0 16.4   HCT 42.5 50.2    236     Recent Labs     07/18/19  0418 07/16/19  2210    135*   K 4.1 4.0    99   CO2 23 26   * 129*   BUN 13 14   CREA 0.57* 0.76*   CA 8.6 9.1   ALB  --  4.1   TBILI  --  0.8   ALT  --  27   SGOT  --  18     No results for input(s): PH, PCO2, PO2, HCO3, PHI, PCO2I, PO2I, HCO3I in the last 72 hours. No results for input(s): LCAD, LAC in the last 72 hours.       Assessment:  (Medical Decision Making)     Patient Active Problem List   Diagnosis Code    COPD, moderate ([facility] J44.9    Migraine G43.909    CAD (coronary artery disease) I25.10    Allergic rhinitis J30.9    Gastroesophageal reflux disease without esophagitis K21.9    Osteoporosis M81.0    Encounter for long-term (current) use of antiplatelets/antithrombotics Z79.02    Personal history of tobacco use Z87.891    Thoracic aneurysm without mention of rupture I71.2    Pulmonary nodules R91.8    DNR (do not resuscitate) Z66    Mixed hyperlipidemia E78.2    ETD (eustachian tube dysfunction) H69.80    Chronic sinusitis J32.9    Nasal polyp J33.9    Fungal sinusitis B49, J32.9    Platelet inhibition due to Plavix Z79.02    Long term (current) use of antithrombotics/antiplatelets P95.34    Benign essential HTN I10    Dementia without behavioral disturbance F03.90    Impaired fasting blood sugar R73.01    Leukocytosis D72.829    Postherpetic neuralgia B02.29    Irritable bowel syndrome with diarrhea K58.0    Acute exacerbation of chronic obstructive pulmonary disease (COPD) (HCC) J44.1    Bronchiectasis (HCC) J47.9    Acute metabolic encephalopathy S46.87    SOB (shortness of breath) R06.02         Plan:  (Medical Decision Making)     Hospital Problems  Date Reviewed: 7/18/2019          Codes Class Noted POA    * (Principal) Acute metabolic encephalopathy QFL-07-MC: G93.41  ICD-9-CM: 348.31  7/17/2019 Yes    Some confusion--on steroids and has not slept    SOB (shortness of breath) ICD-10-CM: R06.02  ICD-9-CM: 786.05  7/17/2019 Yes    Less--on room air    Ac[facility] of [facility] ICD-10-CM: J44.1  ICD-9-CM: 491.21  1/23/2019 Unknown    No wheezing-stop steroids    Dementia without behavioral disturbance ICD-10-CM: F03.90  ICD-9-CM: 294.20  10/9/2017 Yes    chronic    Leukocytosis ICD-10-CM: D72.829  ICD-9-CM: 288.60  10/9/2017 Yes    Trending down    Pulmonary nodules (Chronic) ICD-10-CM: R91.8  ICD-9-CM: 793.19  6/9/2017 Yes    Overview Addendum 7/17/2019 10:59 AM by Rtuh Ann Roque NP     CT-8/10/2015:  Multiple  small  bilateral  pulmonary  nodules. CT-2/18/2016:    1.  Increased nodular airspace disease in both lungs, most prominent in the RLL. This is most consistent with an atypical infection such as BOO.  Consider follow-up to document resolution. 2.  Cholelithiasis.   July 2017: new/enlarging soft tissue density peripherally within the right middle lobe with spiculated margins. There was also an new pulmonary nodule in the right lower lobe and right upper lobe. The right middle lobe nodule measured 1.9 x 1.8 cm. Follow-up PET scan performed on August 10, 2017 demonstrated a decrease in the right middle lobe nodule and none of the nodules were significantly PET avid (max SUV less than 3). A CT on 2/1 revealed improved tree in bud opacities in THAD & f/u imaging is scheduled in one year. chronic    Benign essential HTN (Chronic) ICD-10-CM: I10  ICD-9-CM: 401.1  6/6/2017 Yes    chronic    Mixed hyperlipidemia (Chronic) ICD-10-CM: V26.2  ICD-9-CM: 272.2  6/2/2016 Yes    chronic    DNR (do not resuscitate) ICD-10-CM: Z66  ICD-9-CM: V49.86  5/12/2016 Yes    unchanged    COPD, moderate (HCC) (Chronic) ICD-10-CM: J44.9  ICD-9-CM: 067  10/8/2013 Yes    Overview Addendum 7/17/2019 10:57 AM by Doris Wall NP     GOLD stage II  O2 dependent 2L         chronic    CAD (coronary artery disease) (Chronic) ICD-10-CM: I25.10  ICD-9-CM: 414.00  10/8/2013 Yes    Overview Signed 4/4/2016 11:55 AM by Nela Beckford of the native vessel         Chronic--no angina    Gastroesophageal reflux disease without esophagitis (Chronic) ICD-10-CM: K21.9  ICD-9-CM: 530.81  10/8/2013 Yes    Chronic-no complaints        --Albuterol, Pulmicort, Mucinex  --Zithromax day 3, Rocephin day 2--changed to Levaquin  --Blood cultures:  NGTD:  Pending  --WBC 15.7 (was 22.0 yesterday), procal up to 0.8 (was 0.2)  --Solu Medrol 60 mg daily--stop, no wheezing  --OK to go home from pulmonary standpoint. Office follow up in 3 weeks with CXR.     More than 50% of the time documented was spent in face-to-face contact with the patient and in the care of the patient on the floor/unit where the patient is located. Anabella Asa, NP    The patient has been seen and examined by me personally, the chart,labs, and radiographic studies have been reviewed.     Chest: CTA  Extremities: 1+ edema    I agree with the above assessment and plan.     Nannette Yu MD.

## 2019-07-18 NOTE — DISCHARGE INSTRUCTIONS
Chronic Obstructive Pulmonary Disease (COPD) Flare-Ups: Care Instructions  Your Care Instructions    Chronic obstructive pulmonary disease (COPD) is a lung disease that makes it hard to breathe. It is caused by damage to the lungs over many years, usually from smoking. COPD is often a mix of two diseases:  · Chronic bronchitis: The airways that carry air to the lungs (bronchial tubes) get inflamed and make a lot of mucus. This can narrow or block the airways. · Emphysema: In a healthy person, the tiny air sacs in the lungs are like balloons. As you breathe in and out, they get bigger and smaller to move air through your lungs. But with emphysema, these air sacs are damaged and lose their stretch. Less air gets in and out of the lungs. Many people with COPD have attacks called flare-ups or exacerbations. This is when your usual symptoms quickly get worse and stay worse. The doctor has checked you carefully. But problems can develop later. If you notice any problems or new symptoms, get medical treatment right away. Follow-up care is a key part of your treatment and safety. Be sure to make and go to all appointments, and call your doctor if you are having problems. It's also a good idea to know your test results and keep a list of the medicines you take. How can you care for yourself at home? · Be safe with medicines. Take your medicines exactly as prescribed. Call your doctor if you think you are having a problem with your medicine. You may be taking medicines such as:  ? Bronchodilators. These help open your airways and make breathing easier. ? Corticosteroids. These reduce airway inflammation. They may be given as pills, in a vein, or in an inhaled form. You may go home with pills in addition to an inhaler that you already use. · A spacer may help you get more inhaled medicine to your lungs. Ask your doctor or pharmacist if a spacer is right for you. If it is, ask how to use it properly.   · If your doctor prescribed antibiotics, take them as directed. Do not stop taking them just because you feel better. You need to take the full course of antibiotics. · If your doctor prescribed oxygen, use the flow rate your doctor has recommended. Do not change it without talking to your doctor first.  · Do not smoke. Smoking makes COPD worse. If you need help quitting, talk to your doctor about stop-smoking programs and medicines. These can increase your chances of quitting for good. When should you call for help? Call 911 anytime you think you may need emergency care. For example, call if:    · You have severe trouble breathing.    Call your doctor now or seek immediate medical care if:    · You have new or worse trouble breathing.     · Your coughing or wheezing gets worse.     · You cough up dark brown or bloody mucus (sputum).     · You have a new or higher fever.    Watch closely for changes in your health, and be sure to contact your doctor if:    · You notice more mucus or a change in the color of your mucus.     · You need to use your antibiotic or steroid pills.     · You do not get better as expected. Where can you learn more? Go to http://kiran-justice.info/. Enter V685 in the search box to learn more about \"Chronic Obstructive Pulmonary Disease (COPD) Flare-Ups: Care Instructions. \"  Current as of: September 5, 2018  Content Version: 11.9  © 5803-6222 [employer]. Care instructions adapted under license by [employer] (which disclaims liability or warranty for this information). If you have questions about a medical condition or this instruction, always ask your healthcare professional. [place] [address] any warranty or liability for your use of this information. Learning About the Safe Use of Antibiotics  Introduction    Antibiotics are drugs used to kill bacteria. Bacteria can cause infections.  These include strep throat, ear infections, and pneumonia. These medicines can't cure everything. They don't kill viruses or help with allergies. They don't help illnesses such as the common cold, the flu, or a runny nose. And they can cause side effects. There are many types of antibiotics. Your doctor will decide which one will work best for your infection. Examples include:  · Amoxicillin. · Cephalexin (Keflex). · Ciprofloxacin (Cipro). What are the possible side effects? Side effects can include:  · Nausea. · Diarrhea. · Skin rash. · Yeast infection. · A severe allergic reaction. It may cause itching, swelling, and breathing problems. This is rare. You may have other side effects or reactions not listed here. Check the information that comes with your medicine. Should you take antibiotics just in case? Don't take antibiotics when you don't need them. If you do that, they may not work when you do need them. Each time you take antibiotics, you are more likely to have some bacteria that survive and aren't killed by the medicine. Bacteria that don't die can change and become even harder to kill. These are called antibiotic-resistant bacteria. They can cause longer and more serious infections. To treat them, you may need different, stronger antibiotics that have more side effects and may cost more. So always ask your doctor if antibiotics are the best treatment. Explain that you do not want antibiotics unless you need them. Help protect the community  Using antibiotics when they're not needed leads to the development of antibiotic-resistant bacteria. These tougher bacteria can spread to family members, children, and coworkers. People in your community will have a risk of getting an infection that is harder to cure and that costs more to treat. How can you take antibiotics safely? Be safe with medicine. Take your antibiotics as directed. Do not stop taking them just because you feel better. You need to take the full course of medicine.  This will help make sure your infection is cured. It will also help prevent the growth of antibiotic-resistant bacteria. Always take the exact amount that the label says to take. If the label says to take the medicine at a certain time, follow those directions. You might feel better after you take an antibiotic for a few days. But it is important to keep taking it for as long as prescribed. That will help you get rid of those bacteria that are a bit stronger and that survive the first few days of treatment. Where can you learn more? Go to http://kiran-justice.info/. Enter F695 in the search box to learn more about \"Learning About the Safe Use of Antibiotics. \"  Current as of: July 30, 2018  Content Version: 11.9  © 1317-3429 [employer]. Care instructions adapted under license by [employer] (which disclaims liability or warranty for this information). If you have questions about a medical condition or this instruction, always ask your healthcare professional. [place] [address] any warranty or liability for your use of this information. DISCHARGE SUMMARY from Nurse    PATIENT INSTRUCTIONS:    After general anesthesia or intravenous sedation, for 24 hours or while taking prescription Narcotics:  · Limit your activities  · Do not drive and operate hazardous machinery  · Do not make important personal or business decisions  · Do  not drink alcoholic beverages  · If you have not urinated within 8 hours after discharge, please contact your surgeon on call.     Report the following to your surgeon:  · Excessive pain, swelling, redness or odor of or around the surgical area  · Temperature over 100.5  · Nausea and vomiting lasting longer than 4 hours or if unable to take medications  · Any signs of decreased circulation or nerve impairment to extremity: change in color, persistent  numbness, tingling, coldness or increase pain  · Any questions    What to do at Home:  Recommended activity: Activity as tolerated,       *  Please give a list of your current medications to your Primary Care Provider. *  Please update this list whenever your medications are discontinued, doses are      changed, or new medications (including over-the-counter products) are added. *  Please carry medication information at all times in case of emergency situations. These are general instructions for a healthy lifestyle:    No smoking/ No tobacco products/ Avoid exposure to second hand smoke  Surgeon General's Warning:  Quitting smoking now greatly reduces serious risk to your health. Obesity, smoking, and sedentary lifestyle greatly increases your risk for illness    A healthy diet, regular physical exercise & weight monitoring are important for maintaining a healthy lifestyle    You may be retaining fluid if you have a history of heart failure or if you experience any of the following symptoms:  Weight gain of 3 pounds or more overnight or 5 pounds in a week, increased swelling in our hands or feet or shortness of breath while lying flat in bed. Please call your doctor as soon as you notice any of these symptoms; do not wait until your next office visit. The discharge information has been reviewed with the patient. The patient verbalized understanding. Discharge medications reviewed with the patient and appropriate educational materials and side effects teaching were provided.   ___________________________________________________________________________________________________________________________________

## 2019-07-18 NOTE — PROGRESS NOTES
Patient has a PCP appt 7/23/19. DC instructions given to patient and wife. All questions answered. RXs given. Patient and wife verbalized understanding. Patient ambulated to car with RN supervision and wife  Sign pad unavailable at time of discharge.

## 2019-07-18 NOTE — PROGRESS NOTES
Care Management Interventions  PCP Verified by CM: Yes  Last Visit to PCP: 04/30/19  Palliative Care Criteria Met (RRAT>21 & CHF Dx)?: No(RRAT 24 Dx COPD exacerbation )  Mode of Transport at Discharge: Other (see comment)(wife)  Transition of Care Consult (CM Consult): Discharge Planning  Discharge Durable Medical Equipment: No(Nebulizer, O2 with [employer] and vest that vibrates his lungs for his COPD)  Physical Therapy Consult: Yes  Occupational Therapy Consult: Yes  Speech Therapy Consult: No  Current Support Network: Lives with Spouse  Confirm Follow Up Transport: Self  Plan discussed with Pt/Family/Caregiver: Yes  Freedom of Choice Offered: Yes  Discharge Location  Discharge Placement: Home  Patient d/c home with wife. Voices no concerns or needs.

## 2019-07-18 NOTE — PROGRESS NOTES
Verbal shift change report given to Charla Mena (oncoming nurse) by self Glen Archuleta nurse). Report included the following information SBAR, Kardex, MAR and Recent Results.

## 2019-07-19 ENCOUNTER — PATIENT OUTREACH (OUTPATIENT)
Dept: CASE MANAGEMENT | Age: 79
End: 2019-07-19

## 2019-07-19 NOTE — PROGRESS NOTES
This note will not be viewable in [address] Second ANGEL outreach attempt made to patient's home/cell number was unsuccessful. Left message to return call. Will attempt third outreach within 5 business days.

## 2019-07-19 NOTE — PROGRESS NOTES
This note will not be viewable in [address] Initial ANGEL outreach attempt to patient's home/cell number was unsuccessful. Left message to return call. Will attempt second outreach within 24 hours.

## 2019-07-21 LAB
BACTERIA SPEC CULT: NORMAL
SERVICE CMNT-IMP: NORMAL

## 2019-07-22 ENCOUNTER — PATIENT OUTREACH (OUTPATIENT)
Dept: CASE MANAGEMENT | Age: 79
End: 2019-07-22

## 2019-07-22 NOTE — PROGRESS NOTES
This note will not be viewable in [address] Third ANGEL outreach attempt made to home/cell numbers. Left message to return calls. Unable to reach for [facility] program, will close case. Will reopen if call is returned.

## 2019-07-24 ENCOUNTER — PATIENT OUTREACH (OUTPATIENT)
Dept: CASE MANAGEMENT | Age: 79
End: 2019-07-24

## 2019-07-24 NOTE — PROGRESS NOTES
Patient in for PCP Hospital Follow up visit today. Transitions of Care completed    Transition of Care Discharge Follow-up Questionnaire   What was the patient hospitalized for? COPD Exacerbation  Overnight Visit   Does the patient understand his/her diagnosis and/or treatment and what happened during the hospitalization? Pt verbalizes understanding of dx and treatment. Did the patient receive discharge instructions? Yes   CM Assessed Risk for Readmission:      Patient stated Risk for Readmission: Mild to moderate risk related to chronic progressive illness/COPD   Review any discharge instructions (see discharge instructions/AVS in [facility]). Ask patient if they understand these. Do they have any questions? Reviewed with PCP   Were home services ordered (nursing, PT, OT, ST, etc.)? No order   If so, has the first visit occurred? If not, why? (Assist with coordination of services if necessary. )  NA   Was any DME ordered? No   (Home O2  2L at night already in place)   If so, has it been received? If not, why?  (Assist patient in obtaining DME orders &/or equipment if necessary. ) NA      Complete a review of all medications (new, continued and discontinued meds per the D/C instructions and medication tab in [facility]). Medications reviewed by PCP   Were all new prescriptions filled? If not, why?  (Assist patient in obtaining medications if necessary  escalate for CCM &/or SW if ongoing issues are verbalized by pt or anticipated)    Levaquin ordered, picked up and is taking   Does the patient understand the purpose and dosing instructions for all medications? (If patient has questions, provide explanation and education.) Yes   Does the patient have any problems in performing ADLs? (If patient is unable to perform ADLs  what is the limiting factor(s)? Do they have a support system that can assist? If no support system is present, discuss possible assistance that they may be able to obtain.  Escalate for CCM/SW if ongoing issues are verbalized by pt or anticipated)             Does the patient have all follow-up appointments scheduled? 7 day f/up with PCP?   (f/up with PCP may be w/in 14 days if patient has a f/up with their specialist w/in 7 days)     7-14 day f/up with specialist?   (or per discharge instructions)     If f/up has not been made  what actions has the care coordinator made to accomplish this? Has transportation been arranged? PCP Appointment today(7/24/2019) completed. Pulmonology 8/13                     Any other questions or concerns expressed by the patient?  none   Schedule next appointment with AUDI Maria or refer to RN Case Manager/ per the workflow guidelines. When is care coordinators next follow-up call scheduled? If referred for CCM  what RN care manager was the referral assigned?  21 day follow up scheduled

## 2019-07-30 PROBLEM — J44.1 ACUTE EXACERBATION OF CHRONIC OBSTRUCTIVE PULMONARY DISEASE (COPD) (HCC): Status: RESOLVED | Noted: 2019-01-23 | Resolved: 2019-07-30

## 2019-08-13 ENCOUNTER — HOSPITAL ENCOUNTER (OUTPATIENT)
Dept: GENERAL RADIOLOGY | Age: 79
Discharge: HOME | End: 2019-08-13
Attending: NURSE PRACTITIONER
Payer: MEDICARE

## 2019-08-13 DIAGNOSIS — J44.9 COPD, MODERATE (HCC): Chronic | ICD-10-CM

## 2019-08-13 PROCEDURE — 71046 X-RAY EXAM CHEST 2 VIEWS: CPT

## 2019-08-14 ENCOUNTER — PATIENT OUTREACH (OUTPATIENT)
Dept: CASE MANAGEMENT | Age: 79
End: 2019-08-14

## 2019-08-14 NOTE — PROGRESS NOTES
Transitions of Care - 21 day follow up call  Patient is doing well.   Has followed up with PCP and Pulmonary  Continues on home oxygen 2L/NC    Episode Resolved  Removed self from Care Team

## 2019-10-15 ENCOUNTER — HOSPITAL ENCOUNTER (OUTPATIENT)
Dept: CT IMAGING | Age: 79
Discharge: HOME | End: 2019-10-15
Attending: NURSE PRACTITIONER
Payer: MEDICARE

## 2019-10-15 DIAGNOSIS — R91.8 PULMONARY INFILTRATE: ICD-10-CM

## 2019-10-15 DIAGNOSIS — R91.1 PULMONARY NODULE: ICD-10-CM

## 2019-10-15 PROCEDURE — 71250 CT THORAX DX C-: CPT

## 2019-10-30 PROBLEM — R06.02 SOB (SHORTNESS OF BREATH): Status: RESOLVED | Noted: 2019-07-17 | Resolved: 2019-10-30

## 2020-02-19 ENCOUNTER — HOSPITAL ENCOUNTER (OUTPATIENT)
Dept: CT IMAGING | Age: 80
Discharge: HOME | End: 2020-02-19
Attending: NURSE PRACTITIONER
Payer: MEDICARE

## 2020-02-19 DIAGNOSIS — R91.1 PULMONARY NODULE: ICD-10-CM

## 2020-02-19 DIAGNOSIS — R91.8 PULMONARY INFILTRATE: ICD-10-CM

## 2020-02-19 PROCEDURE — 71250 CT THORAX DX C-: CPT

## 2020-02-25 ENCOUNTER — HOSPITAL ENCOUNTER (OUTPATIENT)
Dept: PET IMAGING | Age: 80
Discharge: HOME | End: 2020-02-25
Payer: MEDICARE

## 2020-02-25 DIAGNOSIS — R91.1 PULMONARY NODULE: ICD-10-CM

## 2020-02-25 PROCEDURE — 74011636320 HC RX REV CODE- 636/320: Performed by: NURSE PRACTITIONER

## 2020-02-25 PROCEDURE — A9552 F18 FDG: HCPCS

## 2020-02-25 RX ORDER — SODIUM CHLORIDE 0.9 % (FLUSH) 0.9 %
10 SYRINGE (ML) INJECTION
Status: COMPLETED | OUTPATIENT
Start: 2020-02-25 | End: 2020-02-25

## 2020-02-25 RX ADMIN — Medication 10 ML: at 07:58

## 2020-02-25 RX ADMIN — DIATRIZOATE MEGLUMINE AND DIATRIZOATE SODIUM 10 ML: 660; 100 LIQUID ORAL; RECTAL at 07:58

## 2020-02-25 NOTE — PROGRESS NOTES
Please let patient know the good news that PET shows inflammatory changes. Needs follow up CT of chest without contrast for lung nodules in 3 months. Please arrange if patient agrees. Thank you.

## 2020-05-28 ENCOUNTER — HOSPITAL ENCOUNTER (OUTPATIENT)
Dept: CT IMAGING | Age: 80
Discharge: HOME | End: 2020-05-28
Attending: NURSE PRACTITIONER
Payer: MEDICARE

## 2020-05-28 DIAGNOSIS — R91.8 PULMONARY NODULES: ICD-10-CM

## 2020-05-28 PROCEDURE — 71250 CT THORAX DX C-: CPT

## 2020-06-01 ENCOUNTER — APPOINTMENT (OUTPATIENT)
Dept: GENERAL RADIOLOGY | Age: 80
DRG: 194 | End: 2020-06-01
Attending: EMERGENCY MEDICINE
Payer: MEDICARE

## 2020-06-01 ENCOUNTER — HOSPITAL ENCOUNTER (INPATIENT)
Age: 80
LOS: 3 days | Discharge: HOME | DRG: 194 | End: 2020-06-04
Attending: EMERGENCY MEDICINE | Admitting: INTERNAL MEDICINE
Payer: MEDICARE

## 2020-06-01 DIAGNOSIS — R09.02 HYPOXIA: ICD-10-CM

## 2020-06-01 DIAGNOSIS — J44.1 ACUTE EXACERBATION OF CHRONIC OBSTRUCTIVE PULMONARY DISEASE (COPD) (HCC): ICD-10-CM

## 2020-06-01 DIAGNOSIS — J18.9 PNEUMONIA OF LEFT LOWER LOBE DUE TO INFECTIOUS ORGANISM: ICD-10-CM

## 2020-06-01 DIAGNOSIS — J47.9 BRONCHIECTASIS WITHOUT COMPLICATION (HCC): Chronic | ICD-10-CM

## 2020-06-01 DIAGNOSIS — J18.9 COMMUNITY ACQUIRED PNEUMONIA OF LEFT LOWER LOBE OF LUNG: Primary | ICD-10-CM

## 2020-06-01 DIAGNOSIS — J44.9 COPD, MODERATE (HCC): Chronic | ICD-10-CM

## 2020-06-01 LAB
ALBUMIN SERPL-MCNC: 3.4 G/DL (ref 3.2–4.6)
ALBUMIN/GLOB SERPL: 0.8 {RATIO} (ref 1.2–3.5)
ALP SERPL-CCNC: 115 U/L (ref 50–136)
ALT SERPL-CCNC: 29 U/L (ref 12–65)
ANION GAP SERPL CALC-SCNC: 7 MMOL/L (ref 7–16)
AST SERPL-CCNC: 36 U/L (ref 15–37)
BASOPHILS # BLD: 0.1 K/UL (ref 0–0.2)
BASOPHILS NFR BLD: 1 % (ref 0–2)
BILIRUB SERPL-MCNC: 0.6 MG/DL (ref 0.2–1.1)
BUN SERPL-MCNC: 10 MG/DL (ref 8–23)
CALCIUM SERPL-MCNC: 9.2 MG/DL (ref 8.3–10.4)
CHLORIDE SERPL-SCNC: 101 MMOL/L (ref 98–107)
CO2 SERPL-SCNC: 26 MMOL/L (ref 21–32)
CREAT SERPL-MCNC: 0.72 MG/DL (ref 0.8–1.5)
DIFFERENTIAL METHOD BLD: ABNORMAL
EOSINOPHIL # BLD: 0.3 K/UL (ref 0–0.8)
EOSINOPHIL NFR BLD: 2 % (ref 0.5–7.8)
ERYTHROCYTE [DISTWIDTH] IN BLOOD BY AUTOMATED COUNT: 13.8 % (ref 11.9–14.6)
GLOBULIN SER CALC-MCNC: 4.3 G/DL (ref 2.3–3.5)
GLUCOSE SERPL-MCNC: 104 MG/DL (ref 65–100)
HCT VFR BLD AUTO: 47.9 % (ref 41.1–50.3)
HGB BLD-MCNC: 16.1 G/DL (ref 13.6–17.2)
IMM GRANULOCYTES # BLD AUTO: 0.1 K/UL (ref 0–0.5)
IMM GRANULOCYTES NFR BLD AUTO: 1 % (ref 0–5)
LACTATE SERPL-SCNC: 1.4 MMOL/L (ref 0.4–2)
LYMPHOCYTES # BLD: 1.9 K/UL (ref 0.5–4.6)
LYMPHOCYTES NFR BLD: 11 % (ref 13–44)
MCH RBC QN AUTO: 28.6 PG (ref 26.1–32.9)
MCHC RBC AUTO-ENTMCNC: 33.6 G/DL (ref 31.4–35)
MCV RBC AUTO: 85.1 FL (ref 79.6–97.8)
MONOCYTES # BLD: 1.4 K/UL (ref 0.1–1.3)
MONOCYTES NFR BLD: 8 % (ref 4–12)
NEUTS SEG # BLD: 12.7 K/UL (ref 1.7–8.2)
NEUTS SEG NFR BLD: 77 % (ref 43–78)
NRBC # BLD: 0 K/UL (ref 0–0.2)
PLATELET # BLD AUTO: 329 K/UL (ref 150–450)
PMV BLD AUTO: 9.3 FL (ref 9.4–12.3)
POTASSIUM SERPL-SCNC: 4.9 MMOL/L (ref 3.5–5.1)
PROT SERPL-MCNC: 7.7 G/DL (ref 6.3–8.2)
RBC # BLD AUTO: 5.63 M/UL (ref 4.23–5.6)
SODIUM SERPL-SCNC: 134 MMOL/L (ref 136–145)
WBC # BLD AUTO: 16.4 K/UL (ref 4.3–11.1)

## 2020-06-01 PROCEDURE — 85025 COMPLETE CBC W/AUTO DIFF WBC: CPT

## 2020-06-01 PROCEDURE — 99284 EMERGENCY DEPT VISIT MOD MDM: CPT

## 2020-06-01 PROCEDURE — 94760 N-INVAS EAR/PLS OXIMETRY 1: CPT

## 2020-06-01 PROCEDURE — 74011000250 HC RX REV CODE- 250: Performed by: EMERGENCY MEDICINE

## 2020-06-01 PROCEDURE — 96367 TX/PROPH/DG ADDL SEQ IV INF: CPT

## 2020-06-01 PROCEDURE — 87040 BLOOD CULTURE FOR BACTERIA: CPT

## 2020-06-01 PROCEDURE — 74011250636 HC RX REV CODE- 250/636: Performed by: INTERNAL MEDICINE

## 2020-06-01 PROCEDURE — 71045 X-RAY EXAM CHEST 1 VIEW: CPT

## 2020-06-01 PROCEDURE — 96375 TX/PRO/DX INJ NEW DRUG ADDON: CPT

## 2020-06-01 PROCEDURE — 80053 COMPREHEN METABOLIC PANEL: CPT

## 2020-06-01 PROCEDURE — 74011000258 HC RX REV CODE- 258: Performed by: EMERGENCY MEDICINE

## 2020-06-01 PROCEDURE — 74011000250 HC RX REV CODE- 250: Performed by: INTERNAL MEDICINE

## 2020-06-01 PROCEDURE — 74011250637 HC RX REV CODE- 250/637: Performed by: INTERNAL MEDICINE

## 2020-06-01 PROCEDURE — 94640 AIRWAY INHALATION TREATMENT: CPT

## 2020-06-01 PROCEDURE — 96365 THER/PROPH/DIAG IV INF INIT: CPT

## 2020-06-01 PROCEDURE — 65660000000 HC RM CCU STEPDOWN

## 2020-06-01 PROCEDURE — 74011250636 HC RX REV CODE- 250/636: Performed by: EMERGENCY MEDICINE

## 2020-06-01 PROCEDURE — 83605 ASSAY OF LACTIC ACID: CPT

## 2020-06-01 PROCEDURE — 93005 ELECTROCARDIOGRAM TRACING: CPT | Performed by: EMERGENCY MEDICINE

## 2020-06-01 RX ORDER — TAMSULOSIN HYDROCHLORIDE 0.4 MG/1
0.8 CAPSULE ORAL DAILY
Status: DISCONTINUED | OUTPATIENT
Start: 2020-06-02 | End: 2020-06-04 | Stop reason: HOSPADM

## 2020-06-01 RX ORDER — BUDESONIDE AND FORMOTEROL FUMARATE DIHYDRATE 160; 4.5 UG/1; UG/1
2 AEROSOL RESPIRATORY (INHALATION)
Status: DISCONTINUED | OUTPATIENT
Start: 2020-06-01 | End: 2020-06-01 | Stop reason: SDUPTHER

## 2020-06-01 RX ORDER — GABAPENTIN 300 MG/1
300 CAPSULE ORAL
Status: DISCONTINUED | OUTPATIENT
Start: 2020-06-01 | End: 2020-06-04 | Stop reason: HOSPADM

## 2020-06-01 RX ORDER — SODIUM CHLORIDE 0.9 % (FLUSH) 0.9 %
5-40 SYRINGE (ML) INJECTION EVERY 8 HOURS
Status: DISCONTINUED | OUTPATIENT
Start: 2020-06-01 | End: 2020-06-04 | Stop reason: HOSPADM

## 2020-06-01 RX ORDER — IPRATROPIUM BROMIDE 0.5 MG/2.5ML
0.5 SOLUTION RESPIRATORY (INHALATION)
Status: DISCONTINUED | OUTPATIENT
Start: 2020-06-02 | End: 2020-06-04 | Stop reason: HOSPADM

## 2020-06-01 RX ORDER — IPRATROPIUM BROMIDE AND ALBUTEROL SULFATE 2.5; .5 MG/3ML; MG/3ML
3 SOLUTION RESPIRATORY (INHALATION)
Status: COMPLETED | OUTPATIENT
Start: 2020-06-01 | End: 2020-06-01

## 2020-06-01 RX ORDER — ALBUTEROL SULFATE 0.83 MG/ML
2.5 SOLUTION RESPIRATORY (INHALATION)
Status: DISCONTINUED | OUTPATIENT
Start: 2020-06-02 | End: 2020-06-04 | Stop reason: HOSPADM

## 2020-06-01 RX ORDER — ENOXAPARIN SODIUM 100 MG/ML
40 INJECTION SUBCUTANEOUS EVERY 24 HOURS
Status: DISCONTINUED | OUTPATIENT
Start: 2020-06-01 | End: 2020-06-04 | Stop reason: HOSPADM

## 2020-06-01 RX ORDER — ACETAMINOPHEN 325 MG/1
650 TABLET ORAL
Status: DISCONTINUED | OUTPATIENT
Start: 2020-06-01 | End: 2020-06-04 | Stop reason: HOSPADM

## 2020-06-01 RX ORDER — ONDANSETRON 2 MG/ML
4 INJECTION INTRAMUSCULAR; INTRAVENOUS
Status: DISCONTINUED | OUTPATIENT
Start: 2020-06-01 | End: 2020-06-04 | Stop reason: HOSPADM

## 2020-06-01 RX ORDER — SODIUM CHLORIDE 0.9 % (FLUSH) 0.9 %
5-40 SYRINGE (ML) INJECTION AS NEEDED
Status: DISCONTINUED | OUTPATIENT
Start: 2020-06-01 | End: 2020-06-04 | Stop reason: HOSPADM

## 2020-06-01 RX ORDER — SODIUM CHLORIDE 9 MG/ML
100 INJECTION, SOLUTION INTRAVENOUS CONTINUOUS
Status: DISCONTINUED | OUTPATIENT
Start: 2020-06-01 | End: 2020-06-02

## 2020-06-01 RX ORDER — CLOPIDOGREL BISULFATE 75 MG/1
75 TABLET ORAL DAILY
Status: DISCONTINUED | OUTPATIENT
Start: 2020-06-02 | End: 2020-06-04 | Stop reason: HOSPADM

## 2020-06-01 RX ORDER — AMOXICILLIN 250 MG
1 CAPSULE ORAL
Status: DISCONTINUED | OUTPATIENT
Start: 2020-06-01 | End: 2020-06-04 | Stop reason: HOSPADM

## 2020-06-01 RX ORDER — ONDANSETRON 4 MG/1
4 TABLET, ORALLY DISINTEGRATING ORAL
Status: DISCONTINUED | OUTPATIENT
Start: 2020-06-01 | End: 2020-06-04 | Stop reason: HOSPADM

## 2020-06-01 RX ORDER — GUAIFENESIN/DEXTROMETHORPHAN 100-10MG/5
5 SYRUP ORAL
Status: DISCONTINUED | OUTPATIENT
Start: 2020-06-01 | End: 2020-06-04 | Stop reason: HOSPADM

## 2020-06-01 RX ORDER — BUDESONIDE 0.5 MG/2ML
500 INHALANT ORAL
Status: DISCONTINUED | OUTPATIENT
Start: 2020-06-01 | End: 2020-06-04 | Stop reason: HOSPADM

## 2020-06-01 RX ADMIN — METHYLPREDNISOLONE SODIUM SUCCINATE 125 MG: 125 INJECTION, POWDER, FOR SOLUTION INTRAMUSCULAR; INTRAVENOUS at 19:43

## 2020-06-01 RX ADMIN — GABAPENTIN 300 MG: 300 CAPSULE ORAL at 23:26

## 2020-06-01 RX ADMIN — AZITHROMYCIN 500 MG: 500 INJECTION, POWDER, LYOPHILIZED, FOR SOLUTION INTRAVENOUS at 20:20

## 2020-06-01 RX ADMIN — ENOXAPARIN SODIUM 40 MG: 40 INJECTION SUBCUTANEOUS at 23:22

## 2020-06-01 RX ADMIN — Medication 10 ML: at 23:22

## 2020-06-01 RX ADMIN — SODIUM CHLORIDE 100 ML/HR: 900 INJECTION, SOLUTION INTRAVENOUS at 23:26

## 2020-06-01 RX ADMIN — IPRATROPIUM BROMIDE AND ALBUTEROL SULFATE 3 ML: .5; 3 SOLUTION RESPIRATORY (INHALATION) at 19:23

## 2020-06-01 RX ADMIN — CEFTRIAXONE SODIUM 1 G: 1 INJECTION, POWDER, FOR SOLUTION INTRAMUSCULAR; INTRAVENOUS at 20:04

## 2020-06-01 RX ADMIN — BUDESONIDE 500 MCG: 0.5 INHALANT RESPIRATORY (INHALATION) at 23:22

## 2020-06-01 RX ADMIN — ACETAMINOPHEN 650 MG: 325 TABLET, FILM COATED ORAL at 23:53

## 2020-06-01 NOTE — ED TRIAGE NOTES
Pt reports \"breathing harder\" than normal for 3 days. Some accessory muscle use noted in triage. Pt states hx of COPD. 86% on RA in triage. Pt wears 2L only at night at home. Pt denies any increase in normal cough. Denies fevers. NAD.  Mask on

## 2020-06-01 NOTE — ED PROVIDER NOTES
68-year-old male with history of bronchiectasis, COPD on 2 L oxygen at night, hypertension, diabetes, GERD, thoracic aortic aneurysm presents with increasing shortness of breath over the past 3 days. He has had cough productive of yellow and clear sputum. He denies chest pain or documented fevers. He has been using nebulizer twice daily without improvement. He was seen in urgent care and sent to the emergency department for further evaluation. Sats 86% upon arrival.  Denies travel or known exposure to coronavirus. No recent prednisone or antibiotics. Recently seen by [facility] pulmonary for worsening CT and scheduled for swallow study. CT report 5/58/20: IMPRESSION  IMPRESSION:  1. Waxing and waning of scattered lung nodules, most compatible with  chronic/recurrent, possibly atypical infectious etiology. There has been an  interval increase in reticulonodular opacities of the basal left lower lobe with  stable reticulation of the right lung base. Query chronic aspiration with acute  pneumonia in the left lower lobe. 2.  Interval slight decrease in size in the nodule in the left apex. Shortness of Breath   Associated symptoms include cough and wheezing. Pertinent negatives include no fever, no headaches, no chest pain, no vomiting, no abdominal pain, no rash and no leg swelling. Past Medical History:   Diagnosis Date    Arthritis     Benign essential HTN 6/6/2017    Chronic obstructive pulmonary disease (HCC)     Diabetes ([address]     Ear problems     Fungal sinusitis     GERD (gastroesophageal reflux disease)     History of multiple allergies     Hx of migraines     Hypertension     Irritable bowel syndrome with diarrhea 10/2/2018    Shingles 10/1/15    Sinus problem     Thoracic aneurysm without mention of rupture 12/2/2014    No chest or upper back pain. Echo shows a mildly dilated aortic root at 4.0 cm. There is mild LVH and normal systolic function.          Past Surgical History:   Procedure Laterality Date    HX APPENDECTOMY  age 10    HX CATARACT REMOVAL Bilateral     HX HEART CATHETERIZATION  12    stent x 1    HX HEENT  1999    sinus x3    HX HERNIA REPAIR Right 1978    inguinal    HX OTHER SURGICAL  2012    neurostimulator implant for migraines at [facility] in [place], [place]      childhood   [id number] [facility]      Neurostimulator implant for migraines 2012         Family History:   Problem Relation Age of Onset    No Known Problems Sister     No Known Problems Brother     No Known Problems Sister     COPD Mother     Asthma Father     Dementia Other         UNCLE       Social History     Socioeconomic History    Marital status:      Spouse name: RADHA    Number of children: 3    Years of education: 1 YR TRADE    Highest education level: Not on file   Occupational History    Occupation: ELECTRICAL MAINTENANCE   Social Needs    Financial resource strain: Not on file    Food insecurity     Worry: Not on file     Inability: Not on file    Transportation needs     Medical: Not on file     Non-medical: Not on file   Tobacco Use    Smoking status: Former Smoker     Packs/day: 1.50     Years: 40.00     Pack years: 60.00     Types: Cigarettes     Last attempt to quit: 1973     Years since quittin.4    Smokeless tobacco: Never Used   Substance and Sexual Activity    Alcohol use: No     Alcohol/week: 0.0 standard drinks    Drug use: No    Sexual activity: Not on file   Lifestyle    Physical activity     Days per week: Not on file     Minutes per session: Not on file    Stress: Not on file   Relationships    Social connections     Talks on phone: Not on file     Gets together: Not on file     Attends Latter day service: Not on file     Active member of club or organization: Not on file     Attends meetings of clubs or organizations: Not on file     Relationship status: Not on file    Intimate partner violence     Fear of current or ex partner: Not on file     Emotionally abused: Not on file     Physically abused: Not on file     Forced sexual activity: Not on file   Other Topics Concern    Not on file   Social History Narrative     and lives with wife. ALLERGIES: Aspirin; Aleve [naproxen sodium]; Augmentin [amoxicillin-pot clavulanate]; Codeine sulfate; Corn containing products; Crestor [rosuvastatin]; Septra [sulfamethoprim ds]; Zetia [ezetimibe]; and Zoloft [sertraline]    Review of Systems   Constitutional: Positive for fatigue. Negative for chills and fever. HENT: Negative for hearing loss. Eyes: Negative for visual disturbance. Respiratory: Positive for cough, shortness of breath and wheezing. Cardiovascular: Negative for chest pain, palpitations and leg swelling. Gastrointestinal: Negative for abdominal pain, diarrhea, nausea and vomiting. Musculoskeletal: Negative for back pain. Skin: Negative for rash. Neurological: Negative for weakness and headaches. Psychiatric/Behavioral: Negative for confusion. Vitals:    06/01/20 1751 06/01/20 1754 06/01/20 1929   BP: 136/88     Pulse: (!) 112     Resp: 20     Temp: 97.9 °F (36.6 °C)     SpO2: (!) 86% 93% 93%   Weight: 57.6 kg (127 lb)     Height: 5' 6\" (1.676 m)              Physical Exam  Vitals signs and nursing note reviewed. Constitutional:       Appearance: He is well-developed. HENT:      Head: Normocephalic and atraumatic. Eyes:      Pupils: Pupils are equal, round, and reactive to light. Neck:      Musculoskeletal: Normal range of motion and neck supple. Cardiovascular:      Rate and Rhythm: Regular rhythm. Tachycardia present. Heart sounds: Normal heart sounds. Pulmonary:      Effort: Pulmonary effort is normal. Tachypnea present. Breath sounds: Decreased breath sounds, wheezing and rhonchi present. Abdominal:      Palpations: Abdomen is soft. Tenderness:  There is no abdominal tenderness. Musculoskeletal: Normal range of motion. Skin:     General: Skin is warm and dry. Neurological:      Mental Status: He is alert. Psychiatric:         Behavior: Behavior normal.          MDM  Number of Diagnoses or Management Options  Diagnosis management comments: Parts of this document were created using dragon voice recognition software. The chart has been reviewed but errors may still be present. I wore appropriate PPE throughout this patient's ED visit. Claudene Larry, MD, 7:49 PM    8:15 PM  Worsening infiltrate with tachycardia and hypoxia. Discussed with hospitalist for admission.          Amount and/or Complexity of Data Reviewed  Clinical lab tests: reviewed and ordered (Results for orders placed or performed during the hospital encounter of 06/01/20  -CBC WITH AUTOMATED DIFF       Result                      Value             Ref Range           WBC                         16.4 (H)          4.3 - 11.1 K*       RBC                         5.63 (H)          4.23 - 5.6 M*       HGB                         16.1              13.6 - 17.2 *       HCT                         47.9              41.1 - 50.3 %       MCV                         85.1              79.6 - 97.8 *       MCH                         28.6              26.1 - 32.9 *       MCHC                        33.6              31.4 - 35.0 *       RDW                         13.8              11.9 - 14.6 %       PLATELET                    329               150 - 450 K/*       MPV                         9.3 (L)           9.4 - 12.3 FL       ABSOLUTE NRBC               0.00              0.0 - 0.2 K/*       DF                          AUTOMATED                             NEUTROPHILS                 77                43 - 78 %           LYMPHOCYTES                 11 (L)            13 - 44 %           MONOCYTES                   8                 4.0 - 12.0 %        EOSINOPHILS                 2                 0.5 - 7.8 % BASOPHILS                   1                 0.0 - 2.0 %         IMMATURE GRANULOCYTES       1                 0.0 - 5.0 %         ABS. NEUTROPHILS            12.7 (H)          1.7 - 8.2 K/*       ABS. LYMPHOCYTES            1.9               0.5 - 4.6 K/*       ABS. MONOCYTES              1.4 (H)           0.1 - 1.3 K/*       ABS. EOSINOPHILS            0.3               0.0 - 0.8 K/*       ABS. BASOPHILS              0.1               0.0 - 0.2 K/*       ABS. IMM. GRANS.            0.1               0.0 - 0.5 K/*  -METABOLIC PANEL, COMPREHENSIVE       Result                      Value             Ref Range           Sodium                      134 (L)           136 - 145 mm*       Potassium                   4.9               3.5 - 5.1 mm*       Chloride                    101               98 - 107 mmo*       CO2                         26                21 - 32 mmol*       Anion gap                   7                 7 - 16 mmol/L       Glucose                     104 (H)           65 - 100 mg/*       BUN                         10                8 - 23 MG/DL        Creatinine                  0.72 (L)          0.8 - 1.5 MG*       GFR est AA                  >60               >60 ml/min/1*       GFR est non-AA              >60               >60 ml/min/1*       Calcium                     9.2               8.3 - 10.4 M*       Bilirubin, total            0.6               0.2 - 1.1 MG*       ALT (SGPT)                  29                12 - 65 U/L         AST (SGOT)                  36                15 - 37 U/L         Alk.  phosphatase            115               50 - 136 U/L        Protein, total              7.7               6.3 - 8.2 g/*       Albumin                     3.4               3.2 - 4.6 g/*       Globulin                    4.3 (H)           2.3 - 3.5 g/*       A-G Ratio                   0.8 (L)           1.2 - 3.5      -LACTIC ACID       Result                      Value             Ref Range           Lactic acid                 1.4               0.4 - 2.0 MM*  -EKG, 12 LEAD, INITIAL       Result                      Value             Ref Range           Ventricular Rate            109               BPM                 Atrial Rate                 110               BPM                 P-R Interval                178               ms                  QRS Duration                82                ms                  Q-T Interval                334               ms                  QTC Calculation (Bezet)     449               ms                  Calculated P Axis           82                degrees             Calculated R Axis           31                degrees             Calculated T Axis           58                degrees             Diagnosis                                                     Sinus tachycardia   Low voltage QRS   Borderline ECG   When compared with ECG of 17-JUL-2019 04:06,   No significant change was found     )  Tests in the radiology section of CPT®: ordered and reviewed (Xr Chest Port    Result Date: 6/1/2020  Chest portable CLINICAL INDICATION: Acute moderate dyspnea with history of thoracic aneurysm, COPD. Hypoxic today. Home oxygen requirement. COMPARISON: August 13, 2019 and 7/17/2019 radiography, also CT 5/28/2020 TECHNIQUE: single AP portable view chest at 7:15 PM upright FINDINGS: Interval slight worsening of mild to moderate peribronchial opacities in the left base. No evidence of a dense lobar consolidation, increasing effusion, pneumothorax, or CHF. Stable mediastinal and hilar contours. Mild bibasilar linear atelectasis and scarring otherwise unchanged. Scattered lung nodules were better evaluated on prior CT. Wires and electrodes again project over soft tissues of the posterior right back.      IMPRESSION: Increased left basilar pneumonia, versus aspiration.     )  Tests in the medicine section of CPT®: ordered and reviewed           Procedures

## 2020-06-02 PROBLEM — Z20.822 SUSPECTED COVID-19 VIRUS INFECTION: Status: ACTIVE | Noted: 2020-06-02

## 2020-06-02 LAB
ANION GAP SERPL CALC-SCNC: 9 MMOL/L (ref 7–16)
APPEARANCE UR: CLEAR
ATRIAL RATE: 110 BPM
BILIRUB UR QL: NEGATIVE
BUN SERPL-MCNC: 9 MG/DL (ref 8–23)
CALCIUM SERPL-MCNC: 8.5 MG/DL (ref 8.3–10.4)
CALCULATED P AXIS, ECG09: 82 DEGREES
CALCULATED R AXIS, ECG10: 31 DEGREES
CALCULATED T AXIS, ECG11: 58 DEGREES
CHLORIDE SERPL-SCNC: 103 MMOL/L (ref 98–107)
CO2 SERPL-SCNC: 23 MMOL/L (ref 21–32)
COLOR UR: YELLOW
CREAT SERPL-MCNC: 0.65 MG/DL (ref 0.8–1.5)
CRP SERPL-MCNC: 1.6 MG/DL (ref 0–0.9)
DIAGNOSIS, 93000: NORMAL
ERYTHROCYTE [DISTWIDTH] IN BLOOD BY AUTOMATED COUNT: 13.5 % (ref 11.9–14.6)
GLUCOSE SERPL-MCNC: 187 MG/DL (ref 65–100)
GLUCOSE UR STRIP.AUTO-MCNC: NEGATIVE MG/DL
HCT VFR BLD AUTO: 44.8 % (ref 41.1–50.3)
HGB BLD-MCNC: 14.9 G/DL (ref 13.6–17.2)
HGB UR QL STRIP: NEGATIVE
KETONES UR QL STRIP.AUTO: NEGATIVE MG/DL
LEUKOCYTE ESTERASE UR QL STRIP.AUTO: NEGATIVE
MCH RBC QN AUTO: 28.2 PG (ref 26.1–32.9)
MCHC RBC AUTO-ENTMCNC: 33.3 G/DL (ref 31.4–35)
MCV RBC AUTO: 84.7 FL (ref 79.6–97.8)
NITRITE UR QL STRIP.AUTO: NEGATIVE
NRBC # BLD: 0 K/UL (ref 0–0.2)
P-R INTERVAL, ECG05: 178 MS
PH UR STRIP: 7 [PH] (ref 5–9)
PLATELET # BLD AUTO: 317 K/UL (ref 150–450)
PMV BLD AUTO: 9.3 FL (ref 9.4–12.3)
POTASSIUM SERPL-SCNC: 4.2 MMOL/L (ref 3.5–5.1)
PROCALCITONIN SERPL-MCNC: 0.07 NG/ML
PROT UR STRIP-MCNC: NEGATIVE MG/DL
Q-T INTERVAL, ECG07: 334 MS
QRS DURATION, ECG06: 82 MS
QTC CALCULATION (BEZET), ECG08: 449 MS
RBC # BLD AUTO: 5.29 M/UL (ref 4.23–5.6)
SODIUM SERPL-SCNC: 135 MMOL/L (ref 136–145)
SP GR UR REFRACTOMETRY: 1.01 (ref 1–1.02)
UROBILINOGEN UR QL STRIP.AUTO: 0.2 EU/DL (ref 0.2–1)
VENTRICULAR RATE, ECG03: 109 BPM
WBC # BLD AUTO: 22.3 K/UL (ref 4.3–11.1)

## 2020-06-02 PROCEDURE — 85027 COMPLETE CBC AUTOMATED: CPT

## 2020-06-02 PROCEDURE — 94640 AIRWAY INHALATION TREATMENT: CPT

## 2020-06-02 PROCEDURE — 81003 URINALYSIS AUTO W/O SCOPE: CPT

## 2020-06-02 PROCEDURE — 74011000302 HC RX REV CODE- 302: Performed by: INTERNAL MEDICINE

## 2020-06-02 PROCEDURE — 74011250636 HC RX REV CODE- 250/636: Performed by: INTERNAL MEDICINE

## 2020-06-02 PROCEDURE — 80048 BASIC METABOLIC PNL TOTAL CA: CPT

## 2020-06-02 PROCEDURE — 74011250637 HC RX REV CODE- 250/637: Performed by: INTERNAL MEDICINE

## 2020-06-02 PROCEDURE — 86580 TB INTRADERMAL TEST: CPT | Performed by: INTERNAL MEDICINE

## 2020-06-02 PROCEDURE — 74011000250 HC RX REV CODE- 250: Performed by: INTERNAL MEDICINE

## 2020-06-02 PROCEDURE — 84145 PROCALCITONIN (PCT): CPT

## 2020-06-02 PROCEDURE — 74011000258 HC RX REV CODE- 258: Performed by: INTERNAL MEDICINE

## 2020-06-02 PROCEDURE — 94760 N-INVAS EAR/PLS OXIMETRY 1: CPT

## 2020-06-02 PROCEDURE — 65660000000 HC RM CCU STEPDOWN

## 2020-06-02 PROCEDURE — 86140 C-REACTIVE PROTEIN: CPT

## 2020-06-02 PROCEDURE — 77010033678 HC OXYGEN DAILY

## 2020-06-02 PROCEDURE — 92610 EVALUATE SWALLOWING FUNCTION: CPT

## 2020-06-02 RX ADMIN — GABAPENTIN 300 MG: 300 CAPSULE ORAL at 22:26

## 2020-06-02 RX ADMIN — ENOXAPARIN SODIUM 40 MG: 40 INJECTION SUBCUTANEOUS at 22:26

## 2020-06-02 RX ADMIN — IPRATROPIUM BROMIDE 0.5 MG: 0.5 SOLUTION RESPIRATORY (INHALATION) at 20:58

## 2020-06-02 RX ADMIN — SODIUM CHLORIDE 100 ML/HR: 900 INJECTION, SOLUTION INTRAVENOUS at 08:48

## 2020-06-02 RX ADMIN — ALBUTEROL SULFATE 2.5 MG: 2.5 SOLUTION RESPIRATORY (INHALATION) at 14:15

## 2020-06-02 RX ADMIN — BUDESONIDE 500 MCG: 0.5 INHALANT RESPIRATORY (INHALATION) at 20:58

## 2020-06-02 RX ADMIN — Medication 10 ML: at 23:52

## 2020-06-02 RX ADMIN — IPRATROPIUM BROMIDE 0.5 MG: 0.5 SOLUTION RESPIRATORY (INHALATION) at 09:15

## 2020-06-02 RX ADMIN — AZITHROMYCIN 500 MG: 500 INJECTION, POWDER, LYOPHILIZED, FOR SOLUTION INTRAVENOUS at 20:18

## 2020-06-02 RX ADMIN — TAMSULOSIN HYDROCHLORIDE 0.8 MG: 0.4 CAPSULE ORAL at 08:48

## 2020-06-02 RX ADMIN — CEFTRIAXONE SODIUM 2 G: 2 INJECTION, POWDER, FOR SOLUTION INTRAMUSCULAR; INTRAVENOUS at 20:18

## 2020-06-02 RX ADMIN — ALBUTEROL SULFATE 2.5 MG: 2.5 SOLUTION RESPIRATORY (INHALATION) at 09:15

## 2020-06-02 RX ADMIN — ALBUTEROL SULFATE 2.5 MG: 2.5 SOLUTION RESPIRATORY (INHALATION) at 20:58

## 2020-06-02 RX ADMIN — TUBERCULIN PURIFIED PROTEIN DERIVATIVE 5 UNITS: 5 INJECTION, SOLUTION INTRADERMAL at 10:09

## 2020-06-02 RX ADMIN — Medication 10 ML: at 13:21

## 2020-06-02 RX ADMIN — CLOPIDOGREL BISULFATE 75 MG: 75 TABLET ORAL at 08:48

## 2020-06-02 RX ADMIN — BUDESONIDE 500 MCG: 0.5 INHALANT RESPIRATORY (INHALATION) at 09:15

## 2020-06-02 RX ADMIN — IPRATROPIUM BROMIDE 0.5 MG: 0.5 SOLUTION RESPIRATORY (INHALATION) at 14:15

## 2020-06-02 NOTE — PROGRESS NOTES
SPEECH LANGUAGE PATHOLOGY: DYSPHAGIA- Initial Assessment and Discharge    NAME/AGE/GENDER: Inessa Maloney is a 78 y.o. male  DATE: 6/2/2020  PRIMARY DIAGNOSIS: Pneumonia [J18.9]      ICD-10: Treatment Diagnosis: R13.12 Dysphagia, Oropharyngeal Phase    RECOMMENDATIONS   DIET:    PO:  Regular   Liquids:  regular thin    MEDICATIONS: With liquid     ASPIRATION PRECAUTIONS  · Slow rate of intake  · Small bites/sips  · Upright at 90 degrees during meal     COMPENSATORY STRATEGIES/MODIFICATIONS  · None     EDUCATION:  · Recommendations discussed with Nursing  · Patient     CONTINUATION OF SKILLED SERVICES/MEDICAL NECESSITY:   Oropharyngeal dysphagia- No symptoms identified     RECOMMENDATIONS for CONTINUED SPEECH THERAPY:   No further speech therapy indicated at this time. ASSESSMENT   Patient presents with grossly normal oropharyngeal swallow function. Single occurrence of throat clearing with thin by serial straw sips; however, no further overt s/sx airway compromise with 4 oz thin by straw, mixed, or solid consistency trials. Recommend regular consistency diet/thin liquids. Medications 1 at a time with liquid rinse. REHABILITATION POTENTIAL FOR STATED GOALS: Excellent    PLAN    FREQUENCY/DURATION: No further speech therapy indicated at this time as oropharyngeal swallow function is within normal limits. - Recommendations for next treatment session: No additional speech therapy indicated at this time. SUBJECTIVE   Patient alert upright at edge of bed for assessment. Adamantly denies any difficulty swallowing. Appears anxious stating he does not understand why he cannot go home, \"this whole thing is just a misunderstanding. \"  History of Present Injury/Illness: Mr. Conrado Colunga  has a past medical history of Arthritis, Benign essential HTN (6/6/2017), Chronic obstructive pulmonary disease ([address] Diabetes ([address] Ear problems, Fungal sinusitis, GERD (gastroesophageal reflux disease), History of multiple allergies, migraines, Hypertension, Irritable bowel syndrome with diarrhea (10/2/2018), Shingles (10/1/15), Sinus problem, and Thoracic aneurysm without mention of rupture (12/2/2014). He also has no past medical history of Arrhythmia, Asthma, Cancer ([address] Chronic kidney disease, Coagulation disorder ([address] Endocarditis, Heart failure ([address] Liver disease, Nicotine vapor product user, Non-nicotine vapor product user, Psychiatric disorder, PUD (peptic ulcer disease), Seizures ([address] Sleep apnea, Stroke ([address] Thromboembolus ([address] or Thyroid disease. Luisa Grey He also  has a past surgical history that includes hx heent (1999); hx hernia repair (Right, 1978); hx appendectomy (age 9); hx other surgical (2/2012); hx tonsillectomy; hx cataract removal (Bilateral); neurological procedure unlisted; and hx heart catheterization (2/27/12). Problem List:  (Impairments causing functional limitations):  1. Oropharyngeal dysphagia- No symptoms identified    Previous Dysphagia: YES Per chart review patient seen by GI at [facility] in November 2019 due to reports of globus sensation with bread; however, gary reports he is no longer having any difficulty swallowing. Diet Prior to Evaluation: Regular/thin    Orientation:   Person  Place  Time    Pain: Pain Scale 1: Numeric (0 - 10)  Pain Intensity 1: 0  Pain Location 1: Head  Pain Intervention(s) 1: Medication (see MAR)        OBJECTIVE   Oral Motor:   · Labial: No impairment  · Dentition: Upper partial; natural lower dentition  · Oral Hygiene: Adequate  · Lingual: No impairment    Swallow evaluation:   Patient consumed trials of thin by cup/straw/serial sips, mixed, and solid consistency. Throat clear post swallow x1 with serial sips thin by straw; however, no further overt s/sx airway compromise with serial sips  4 oz thin by straw, mixed, or solid consistency. Adequate oral prep time and oral clearance with all PO.      INTERDISCIPLINARY COLLABORATION: Registered Nurse  PRECAUTIONS/ALLERGIES: Aspirin; Aleve [naproxen sodium]; Augmentin [amoxicillin-pot clavulanate]; Codeine sulfate; Corn containing products; Crestor [rosuvastatin]; Septra [sulfamethoprim ds]; Zetia [ezetimibe]; and Zoloft [sertraline]     Tool Used: Dysphagia Outcome and Severity Scale (LING)    Score Comments   Normal Diet  [x] 7 With no strategies or extra time needed   Functional Swallow  [] 6 May have mild oral or pharyngeal delay   Mild Dysphagia  [] 5 Which may require one diet consistency restricted    Mild-Moderate Dysphagia  [] 4 With 1-2 diet consistencies restricted   Moderate Dysphagia  [] 3 With 2 or more diet consistencies restricted   Moderate-Severe Dysphagia  [] 2 With partial PO strategies (trials with ST only)   Severe Dysphagia  [] 1 With inability to tolerate any PO safely      Score:  Initial: 7 Most Recent: x (Date 06/02/20 )   Interpretation of Tool: The Dysphagia Outcome and Severity Scale (LING) is a simple, easy-to-use, 7-point scale developed to systematically rate the functional severity of dysphagia based on objective assessment and make recommendations for diet level, independence level, and type of nutrition. Current Medications:   No current facility-administered medications on file prior to encounter. Current Outpatient Medications on File Prior to Encounter   Medication Sig Dispense Refill    SUMAtriptan (IMITREX) 100 mg tablet TAKE 1/2 TO 1 TABLET BY MOUTH AS NEEDED FOR MIGRAINE MAY REPEAT IN 2HRS MAX 2/24HRS 9 Tab 5    tamsulosin (FLOMAX) 0.4 mg capsule TAKE 2 CAPS BY MOUTH DAILY. INDICATIONS: ENLARGED PROSTATE WITH URINATION PROBLEM 180 Cap 1    gabapentin (NEURONTIN) 300 mg capsule TAKE 1 CAPSULE BY MOUTH THREE TIMES A  Cap 1    triamcinolone (NASACORT AQ) 55 mcg nasal inhaler 2 Sprays by Both Nostrils route daily.  Indications: inflammation of the nose due to an allergy 1 Bottle 11    albuterol (PROAIR HFA) 90 mcg/actuation inhaler INHALE 2 PUFFS BY MOUTH EVERY 4 HOURS AS NEEDED 1 Inhaler 11    clopidogrel (PLAVIX) 75 mg tab TAKE 1 TAB BY MOUTH DAILY. 90 Tab 1    albuterol (PROVENTIL VENTOLIN) 2.5 mg /3 mL (0.083 %) nebu USE 3 ML BY NEBULIZATION ROUTE THREE (3) TIMES DAILY. Bill to Medicare Part B with Dx: J44.9 COPD. 90 Nebule 11    tiotropium (SPIRIVA WITH HANDIHALER) 18 mcg inhalation capsule Take 1 Cap by inhalation daily. 30 Cap 11    fluticasone propion-salmeterol (WIXELA INHUB) 500-50 mcg/dose diskus inhaler Take 1 Puff by inhalation every twelve (12) hours. 1 Inhaler 11    Lactobac no. 41-Bifidobact no.7 (PROBIOTIC-10) 70 mg (3 billion cell) cap Take  by mouth.  acetaminophen (TYLENOL) 325 mg tablet Take 2 Tabs by mouth every four (4) hours as needed for Pain. 20 Tab 0    B.infantis-B.ani-B.long-B.bifi (PROBIOTIC 4X) 10-15 mg TbEC Take  by mouth.  cholecalciferol (VITAMIN D3) 400 unit tab tablet Take  by mouth daily.  latanoprost (XALATAN) 0.005 % ophthalmic solution Administer 1 Drop to both eyes nightly.  glucose blood VI test strips (BLOOD GLUCOSE TEST) strip Check BS once a day fasting (DX: E11.9); one touch verio 100 Strip 11    Lancets misc Check BS once a day fasting (DX: E11.9); one touch verio 100 Each 11    VIT A/VIT C/VIT E/ZINC/COPPER (PRESERVISION AREDS PO) Take 1 Tab by mouth two (2) times a day.  nitroglycerin (NITROSTAT) 0.4 mg SL tablet 1 Tab by SubLINGual route every five (5) minutes as needed for Chest Pain. 25 Tab 6    lipase-protease-amylase (CREON 12,000) 12,000-38,000 -60,000 unit capsule Take 3 Caps by mouth three (3) times daily (with meals).  EPINEPHrine (EPIPEN) 0.3 mg/0.3 mL (1:1,000) injection 0.3 mg by IntraMUSCular route once as needed. After treatment position/precautions:  · RN notified  · Seated at edge of bed.     Total Treatment Duration:   Time In: 1106  Time Out: 555 [address]

## 2020-06-02 NOTE — PROGRESS NOTES
TRANSFER - IN REPORT:    Verbal report received from BLANCA Sandoval on Griselda Officer  being received from Emergency Department for routine progression of care. Report consisted of patients Situation, Background, Assessment and   Recommendations(SBAR). Information from the following report(s) SBAR, Kardex, ED Summary, [facility] and Recent Results was reviewed with the receiving nurse. Opportunity for questions and clarification was provided. Assessment will be completed upon patients arrival to unit and care assumed.

## 2020-06-02 NOTE — PROGRESS NOTES
Patient awake resting in bed. Respirations present. On 1 L NC. No signs of distress. No needs expressed. Bed low and locked. Call light within reach. Bedside report given to oncoming RNBell.

## 2020-06-02 NOTE — H&P
Hospitalist Note     Admit Date:  2020  6:41 PM   Name:  Florentino Kim   Age:  78 y.o.  :  1940   MRN:  [id number]   PCP:  Evy Hair DO  Treatment Team: Attending Provider: Dileep Sam MD; Primary Nurse: Wilber Duarte RN    HPI/Subjective:     Charlette Garcia is a 78 y.o. male with a history of COPD, hypertension, type 2 diabetes mellitus, and GERD presenting with mild respiratory distress. Patient states that for the last 3 days he is been breathing faster than normal.  He denies any difficulty getting air in and out or shortness of breath specifically. He has occasional cough but denies any sputum production. He denies fevers, chills, diaphoresis, chest pain, abdominal pain, nausea, vomiting, diarrhea, dysuria, flank pain, headache, lightheadedness, dizziness, or arthralgias/myalgias. He lives with his wife at home with 2 dogs. He denies any sick contact or recent travel. 10 systems reviewed and negative except as noted in HPI. Past Medical History:   Diagnosis Date    Arthritis     Benign essential HTN 2017    Chronic obstructive pulmonary disease (HCC)     Diabetes ([address]     Ear problems     Fungal sinusitis     GERD (gastroesophageal reflux disease)     History of multiple allergies     Hx of migraines     Hypertension     Irritable bowel syndrome with diarrhea 10/2/2018    Shingles 10/1/15    Sinus problem     Thoracic aneurysm without mention of rupture 2014    No chest or upper back pain. Echo shows a mildly dilated aortic root at 4.0 cm. There is mild LVH and normal systolic function.         Past Surgical History:   Procedure Laterality Date    HX APPENDECTOMY  age 10    HX CATARACT REMOVAL Bilateral     HX HEART CATHETERIZATION  12    stent x 1    HX HEENT      sinus x3    HX HERNIA REPAIR Right 1978    inguinal    HX OTHER SURGICAL  2012    neurostimulator implant for migraines at [facility] in [facility] TONSILLECTOMY      childhood    NEUROLOGICAL PROCEDURE UNLISTED      Neurostimulator implant for migraines 2012      Allergies   Allergen Reactions    Aspirin Swelling and Anaphylaxis    Aleve [Naproxen Sodium] Unknown (comments)    Augmentin [Amoxicillin-Pot Clavulanate] Nausea and Vomiting     Sever nausa, abdominal cramping and feels terrible    Codeine Sulfate Other (comments)     headache    Corn Containing Products Other (comments)     Not allergic    Crestor [Rosuvastatin] Unknown (comments)     unknown    Septra [Sulfamethoprim Ds] Unknown (comments)     Not allergic    Zetia [Ezetimibe] Unknown (comments)     Unknown allergy    Zoloft [Sertraline] Unknown (comments)     Not allergic      Social History     Tobacco Use    Smoking status: Former Smoker     Packs/day: 1.50     Years: 40.00     Pack years: 60.00     Types: Cigarettes     Last attempt to quit: 1973     Years since quittin.4    Smokeless tobacco: Never Used   Substance Use Topics    Alcohol use: No     Alcohol/week: 0.0 standard drinks      Family History   Problem Relation Age of Onset    No Known Problems Sister     No Known Problems Brother     No Known Problems Sister     COPD Mother     Asthma Father     Dementia Other         UNCLE           Immunization History   Administered Date(s) Administered    Influenza High Dose Vaccine PF 2015, 10/15/2016, 10/06/2017, 10/02/2018, 10/01/2019    Influenza Vaccine 2012, 2014, 10/01/2014    Influenza Vaccine (Quad) Mdck Pf 10/10/2019    Pneumococcal Conjugate (PCV-13) 2016    Pneumococcal Polysaccharide (PPSV-23) 2017    Pneumococcal Vaccine (Unspecified Type) 2010    TB Skin Test (PPD) Intradermal 2019    Zoster Vaccine, Live 2014       PTA Medications:  Prior to Admission Medications   Prescriptions Last Dose Informant Patient Reported? Taking?    B.infantis-B.ani-B.long-B.bifi (PROBIOTIC 4X) 10-15 mg TbEC   Yes No Sig: Take  by mouth. EPINEPHrine (EPIPEN) 0.3 mg/0.3 mL (1:1,000) injection   Yes No   Si.3 mg by IntraMUSCular route once as needed. Lactobac no. 41-Bifidobact no.7 (PROBIOTIC-10) 70 mg (3 billion cell) cap   Yes No   Sig: Take  by mouth. Lancets misc   No No   Sig: Check BS once a day fasting (DX: E11.9); one touch verio   SUMAtriptan (IMITREX) 100 mg tablet   No No   Sig: TAKE 1/2 TO 1 TABLET BY MOUTH AS NEEDED FOR MIGRAINE MAY REPEAT IN 2HRS MAX 2/24HRS   VIT A/VIT C/VIT E/ZINC/COPPER (PRESERVISION AREDS PO)   Yes No   Sig: Take 1 Tab by mouth two (2) times a day. acetaminophen (TYLENOL) 325 mg tablet   No No   Sig: Take 2 Tabs by mouth every four (4) hours as needed for Pain. albuterol (PROAIR HFA) 90 mcg/actuation inhaler   No No   Sig: INHALE 2 PUFFS BY MOUTH EVERY 4 HOURS AS NEEDED   albuterol (PROVENTIL VENTOLIN) 2.5 mg /3 mL (0.083 %) nebu   No No   Sig: USE 3 ML BY NEBULIZATION ROUTE THREE (3) TIMES DAILY. Bill to Medicare Part B with Dx: J44.9 COPD. cholecalciferol (VITAMIN D3) 400 unit tab tablet   Yes No   Sig: Take  by mouth daily. clopidogrel (PLAVIX) 75 mg tab   No No   Sig: TAKE 1 TAB BY MOUTH DAILY. fluticasone propion-salmeterol (WIXELA INHUB) 500-50 mcg/dose diskus inhaler   No No   Sig: Take 1 Puff by inhalation every twelve (12) hours. gabapentin (NEURONTIN) 300 mg capsule   No No   Sig: TAKE 1 CAPSULE BY MOUTH THREE TIMES A DAY   glucose blood VI test strips (BLOOD GLUCOSE TEST) strip   No No   Sig: Check BS once a day fasting (DX: E11.9); one touch verio   latanoprost (XALATAN) 0.005 % ophthalmic solution   Yes No   Sig: Administer 1 Drop to both eyes nightly. lipase-protease-amylase (CREON 12,000) 12,000-38,000 -60,000 unit capsule   Yes No   Sig: Take 3 Caps by mouth three (3) times daily (with meals).    nitroglycerin (NITROSTAT) 0.4 mg SL tablet   No No   Si Tab by SubLINGual route every five (5) minutes as needed for Chest Pain.   tamsulosin (FLOMAX) 0.4 mg capsule   No No   Sig: TAKE 2 CAPS BY MOUTH DAILY. INDICATIONS: ENLARGED PROSTATE WITH URINATION PROBLEM   tiotropium (SPIRIVA WITH HANDIHALER) 18 mcg inhalation capsule   No No   Sig: Take 1 Cap by inhalation daily. triamcinolone (NASACORT AQ) 55 mcg nasal inhaler   No No   Si Sprays by Both Nostrils route daily. Indications: inflammation of the nose due to an allergy      Facility-Administered Medications: None       Objective:     Patient Vitals for the past 24 hrs:   Temp Pulse Resp BP SpO2   20     93 %   20     93 %   20     93 %   20 97.9 °F (36.6 °C) (!) 112 20 136/88 (!) 86 %     Oxygen Therapy  O2 Sat (%): 93 % (20)  Pulse via Oximetry: 114 beats per minute (20)  O2 Device: Nasal cannula (20)  O2 Flow Rate (L/min): 3 l/min (20)    Estimated body mass index is 20.5 kg/m² as calculated from the following:    Height as of this encounter: 5' 6\" (1.676 m). Weight as of this encounter: 57.6 kg (127 lb). No intake or output data in the 24 hours ending 20    *Note that automatically entered I/Os may not be accurate; dependent on patient compliance with collection and accurate  by assistants. Physical Exam:  General:    No acute distress. Awake. Alert. Eyes:   Normal sclerae, no icterus or injection. PERRL. Extraocular movements intact. HENT:  Normocephalic, atraumatic. Moist mucous membranes. No cervical LAD. CV:   Tachycardia, regular rhythm. Normal S1/S2. No m/r/g. Lungs:  CTAB with faint LLL crackles. No wheezing. No increased work of breathing. Abdomen: Soft, nontender, nondistended. Extremities: Warm and dry. No cyanosis or edema. Neurologic: CN II-XII grossly intact. Sensation grossly intact. Able to move all extremities. Skin:     No rashes or jaundice. Normal coloration  Psych:  Normal mood and affect.     I reviewed the labs, imaging, EKGs, telemetry, and other studies done this admission. Data Review:   Recent Results (from the past 24 hour(s))   EKG, 12 LEAD, INITIAL    Collection Time: 06/01/20  5:54 PM   Result Value Ref Range    Ventricular Rate 109 BPM    Atrial Rate 110 BPM    P-R Interval 178 ms    QRS Duration 82 ms    Q-T Interval 334 ms    QTC Calculation (Bezet) 449 ms    Calculated P Axis 82 degrees    Calculated R Axis 31 degrees    Calculated T Axis 58 degrees    Diagnosis       Sinus tachycardia  Low voltage QRS  Borderline ECG  When compared with ECG of 17-JUL-2019 04:06,  No significant change was found     CBC WITH AUTOMATED DIFF    Collection Time: 06/01/20  6:00 PM   Result Value Ref Range    WBC 16.4 (H) 4.3 - 11.1 K/uL    RBC 5.63 (H) 4.23 - 5.6 M/uL    HGB 16.1 13.6 - 17.2 g/dL    HCT 47.9 41.1 - 50.3 %    MCV 85.1 79.6 - 97.8 FL    MCH 28.6 26.1 - 32.9 PG    MCHC 33.6 31.4 - 35.0 g/dL    RDW 13.8 11.9 - 14.6 %    PLATELET 937 043 - 112 K/uL    MPV 9.3 (L) 9.4 - 12.3 FL    ABSOLUTE NRBC 0.00 0.0 - 0.2 K/uL    DF AUTOMATED      NEUTROPHILS 77 43 - 78 %    LYMPHOCYTES 11 (L) 13 - 44 %    MONOCYTES 8 4.0 - 12.0 %    EOSINOPHILS 2 0.5 - 7.8 %    BASOPHILS 1 0.0 - 2.0 %    IMMATURE GRANULOCYTES 1 0.0 - 5.0 %    ABS. NEUTROPHILS 12.7 (H) 1.7 - 8.2 K/UL    ABS. LYMPHOCYTES 1.9 0.5 - 4.6 K/UL    ABS. MONOCYTES 1.4 (H) 0.1 - 1.3 K/UL    ABS. EOSINOPHILS 0.3 0.0 - 0.8 K/UL    ABS. BASOPHILS 0.1 0.0 - 0.2 K/UL    ABS. IMM.  GRANS. 0.1 0.0 - 0.5 K/UL   METABOLIC PANEL, COMPREHENSIVE    Collection Time: 06/01/20  6:00 PM   Result Value Ref Range    Sodium 134 (L) 136 - 145 mmol/L    Potassium 4.9 3.5 - 5.1 mmol/L    Chloride 101 98 - 107 mmol/L    CO2 26 21 - 32 mmol/L    Anion gap 7 7 - 16 mmol/L    Glucose 104 (H) 65 - 100 mg/dL    BUN 10 8 - 23 MG/DL    Creatinine 0.72 (L) 0.8 - 1.5 MG/DL    GFR est AA >60 >60 ml/min/1.73m2    GFR est non-AA >60 >60 ml/min/1.73m2    Calcium 9.2 8.3 - 10.4 MG/DL    Bilirubin, total 0.6 0.2 - 1.1 MG/DL    ALT (SGPT) 29 12 - 65 U/L    AST (SGOT) 36 15 - 37 U/L    Alk. phosphatase 115 50 - 136 U/L    Protein, total 7.7 6.3 - 8.2 g/dL    Albumin 3.4 3.2 - 4.6 g/dL    Globulin 4.3 (H) 2.3 - 3.5 g/dL    A-G Ratio 0.8 (L) 1.2 - 3.5     LACTIC ACID    Collection Time: 06/01/20  7:31 PM   Result Value Ref Range    Lactic acid 1.4 0.4 - 2.0 MMOL/L       All Micro Results     Procedure Component Value Units Date/Time    CULTURE, BLOOD [[id number]]     Order Status:  Sent Specimen:  Blood     CULTURE, BLOOD [[id number]]     Order Status:  Sent Specimen:  Blood           Current Facility-Administered Medications   Medication Dose Route Frequency    cefTRIAXone (ROCEPHIN) 1 g in 0.9% sodium chloride (MBP/ADV) 50 mL  1 g IntraVENous NOW    azithromycin (ZITHROMAX) 500 mg in 0.9% sodium chloride (MBP/ADV) 250 mL  500 mg IntraVENous NOW     Current Outpatient Medications   Medication Sig    SUMAtriptan (IMITREX) 100 mg tablet TAKE 1/2 TO 1 TABLET BY MOUTH AS NEEDED FOR MIGRAINE MAY REPEAT IN 2HRS MAX 2/24HRS    tamsulosin (FLOMAX) 0.4 mg capsule TAKE 2 CAPS BY MOUTH DAILY. INDICATIONS: ENLARGED PROSTATE WITH URINATION PROBLEM    gabapentin (NEURONTIN) 300 mg capsule TAKE 1 CAPSULE BY MOUTH THREE TIMES A DAY    triamcinolone (NASACORT AQ) 55 mcg nasal inhaler 2 Sprays by Both Nostrils route daily. Indications: inflammation of the nose due to an allergy    albuterol (PROAIR HFA) 90 mcg/actuation inhaler INHALE 2 PUFFS BY MOUTH EVERY 4 HOURS AS NEEDED    clopidogrel (PLAVIX) 75 mg tab TAKE 1 TAB BY MOUTH DAILY.  albuterol (PROVENTIL VENTOLIN) 2.5 mg /3 mL (0.083 %) nebu USE 3 ML BY NEBULIZATION ROUTE THREE (3) TIMES DAILY. Bill to Medicare Part B with Dx: J44.9 COPD.  tiotropium (SPIRIVA WITH HANDIHALER) 18 mcg inhalation capsule Take 1 Cap by inhalation daily.  fluticasone propion-salmeterol (WIXELA INHUB) 500-50 mcg/dose diskus inhaler Take 1 Puff by inhalation every twelve (12) hours.  Lactobac no. 41-Bifidobact no.7 (PROBIOTIC-10) 70 mg (3 billion cell) cap Take  by mouth.  acetaminophen (TYLENOL) 325 mg tablet Take 2 Tabs by mouth every four (4) hours as needed for Pain.  nitroglycerin (NITROSTAT) 0.4 mg SL tablet 1 Tab by SubLINGual route every five (5) minutes as needed for Chest Pain.  lipase-protease-amylase (CREON 12,000) 12,000-38,000 -60,000 unit capsule Take 3 Caps by mouth three (3) times daily (with meals).  B.infantis-B.ani-B.long-B.bifi (PROBIOTIC 4X) 10-15 mg TbEC Take  by mouth.  cholecalciferol (VITAMIN D3) 400 unit tab tablet Take  by mouth daily.  latanoprost (XALATAN) 0.005 % ophthalmic solution Administer 1 Drop to both eyes nightly.  glucose blood VI test strips (BLOOD GLUCOSE TEST) strip Check BS once a day fasting (DX: E11.9); one touch verio    Lancets misc Check BS once a day fasting (DX: E11.9); one touch verio    EPINEPHrine (EPIPEN) 0.3 mg/0.3 mL (1:1,000) injection 0.3 mg by IntraMUSCular route once as needed.  VIT A/VIT C/VIT E/ZINC/COPPER (PRESERVISION AREDS PO) Take 1 Tab by mouth two (2) times a day. Other Studies:  No results found for this visit on 06/01/20. Xr Chest Port    Result Date: 6/1/2020  Chest portable CLINICAL INDICATION: Acute moderate dyspnea with history of thoracic aneurysm, COPD. Hypoxic today. Home oxygen requirement. COMPARISON: August 13, 2019 and 7/17/2019 radiography, also CT 5/28/2020 TECHNIQUE: single AP portable view chest at 7:15 PM upright FINDINGS: Interval slight worsening of mild to moderate peribronchial opacities in the left base. No evidence of a dense lobar consolidation, increasing effusion, pneumothorax, or CHF. Stable mediastinal and hilar contours. Mild bibasilar linear atelectasis and scarring otherwise unchanged. Scattered lung nodules were better evaluated on prior CT. Wires and electrodes again project over soft tissues of the posterior right back. IMPRESSION: Increased left basilar pneumonia, versus aspiration. Assessment and Plan:     Hospital Problems as of 6/1/2020 Date Reviewed: 5/28/2020    None          Mor Watkins is a 78 y.o. male presenting with left lower lobe pneumonia    Plan:  #Left lower lobe pneumonia  Continue empiric antibiotics with ceftriaxone and azithromycin  Continue IVF hydration  Send SARS-COV-2 testing to rule out COVID-19   Continue supplemental oxygen as needed  Follow up cultures  Give Robitussin for cough as needed  Give tylenol PRN for fever  Give Zofran PRN for nausea  Monitor on telemetry    #COPD  Continue Spiriva  Continue Symbicort  Give albuterol PRN if needed    #Hypertension  Give hydralazine IV PRN if needed    DVT ppx ordered  Code status:  Full  Estimated LOS:  Greater than 2 midnights  Risk:  high    Signed:  Farzana Flanagan MD

## 2020-06-02 NOTE — PROGRESS NOTES
Hospitalist Note     Admit Date:  2020  6:41 PM   Name:  Sheridan Kim   Age:  78 y.o.  :  1940   MRN:  [id number]   PCP:  Za Barksdale DO  Treatment Team: Attending Provider: Chad Sow MD; Primary Nurse: Yasemin Spencer; Utilization Review: Daniel Johnson RN    HPI/Subjective:       Mr. Norma Smith is a 77 yo male with PMH of COPD on 2 L NC chronically and followed by pulmonary admitted with dyspnea. He was seen by outpatient pulm 20 s/p CT chest showing scattered nodules, increased reticular opacities left lower lobe. He is on rocephin/azithromycin for pneumonia. COVID pending. Discharge plans pending. 20 asking to go home, feels fine, not sure why he is here, ready to eat , hard of hearing      Objective:     Patient Vitals for the past 24 hrs:   Temp Pulse Resp BP SpO2   20 1600 98.5 °F (36.9 °C) (!) 105 18 117/70 92 %   20 1415     97 %   20 1200  97      20 1143 97.7 °F (36.5 °C) (!) 102 20 170/84 94 %   20 1033     94 %   20 0915     95 %   20 0809 98.2 °F (36.8 °C) 100 20 136/79 94 %   20 0800  97      20 0236 98.7 °F (37.1 °C) 93 20 116/82 97 %   20 2322     96 %   20 2243 98.7 °F (37.1 °C) (!) 104 20 (!) 146/97 97 %   20 2130  (!) 113  147/88 95 %   20 1951     93 %   20 1929     93 %     Oxygen Therapy  O2 Sat (%): 92 % (20 1600)  Pulse via Oximetry: 92 beats per minute (20 1415)  O2 Device: Nasal cannula (20 1415)  O2 Flow Rate (L/min): 1 l/min (20 1415)    Estimated body mass index is 20.51 kg/m² as calculated from the following:    Height as of this encounter: 5' 6\" (1.676 m). Weight as of this encounter: 57.7 kg (127 lb 1.6 oz).       Intake/Output Summary (Last 24 hours) at 2020 1803  Last data filed at 2020 1633  Gross per 24 hour   Intake 802 ml   Output 1700 ml   Net -898 ml       *Note that automatically entered I/Os may not be accurate; dependent on patient compliance with collection and accurate  by techs. General:    Well nourished. Alert. Elderly, no distress   CV:   RRR. No murmur, rub, or gallop. No edema  Lungs:   Mild crackles  Abdomen:   Soft, nontender, nondistended. Extremities: Warm and dry. Skin:     No rashes or jaundice.    Neuro:  No gross focal deficits    Data Review:  I have reviewed all labs, meds, and studies from the last 24 hours:  Recent Results (from the past 24 hour(s))   LACTIC ACID    Collection Time: 06/01/20  7:31 PM   Result Value Ref Range    Lactic acid 1.4 0.4 - 2.0 MMOL/L   CULTURE, BLOOD    Collection Time: 06/01/20  8:08 PM   Result Value Ref Range    Special Requests: RIGHT  Antecubital        Culture result: NO GROWTH AFTER 14 HOURS     METABOLIC PANEL, BASIC    Collection Time: 06/02/20  2:35 AM   Result Value Ref Range    Sodium 135 (L) 136 - 145 mmol/L    Potassium 4.2 3.5 - 5.1 mmol/L    Chloride 103 98 - 107 mmol/L    CO2 23 21 - 32 mmol/L    Anion gap 9 7 - 16 mmol/L    Glucose 187 (H) 65 - 100 mg/dL    BUN 9 8 - 23 MG/DL    Creatinine 0.65 (L) 0.8 - 1.5 MG/DL    GFR est AA >60 >60 ml/min/1.73m2    GFR est non-AA >60 >60 ml/min/1.73m2    Calcium 8.5 8.3 - 10.4 MG/DL   PROCALCITONIN    Collection Time: 06/02/20  2:35 AM   Result Value Ref Range    Procalcitonin 0.07 ng/mL   C REACTIVE PROTEIN, QT    Collection Time: 06/02/20  2:35 AM   Result Value Ref Range    C-Reactive protein 1.6 (H) 0.0 - 0.9 mg/dL   CBC W/O DIFF    Collection Time: 06/02/20  2:35 AM   Result Value Ref Range    WBC 22.3 (H) 4.3 - 11.1 K/uL    RBC 5.29 4.23 - 5.6 M/uL    HGB 14.9 13.6 - 17.2 g/dL    HCT 44.8 41.1 - 50.3 %    MCV 84.7 79.6 - 97.8 FL    MCH 28.2 26.1 - 32.9 PG    MCHC 33.3 31.4 - 35.0 g/dL    RDW 13.5 11.9 - 14.6 %    PLATELET 822 011 - 155 K/uL    MPV 9.3 (L) 9.4 - 12.3 FL    ABSOLUTE NRBC 0.00 0.0 - 0.2 K/uL   URINALYSIS W/ RFLX MICROSCOPIC    Collection Time: 06/02/20  4:32 PM   Result Value Ref Range    Color YELLOW      Appearance CLEAR      Specific gravity 1.006 1.001 - 1.023      pH (UA) 7.0 5.0 - 9.0      Protein Negative NEG mg/dL    Glucose Negative mg/dL    Ketone Negative NEG mg/dL    Bilirubin Negative NEG      Blood Negative NEG      Urobilinogen 0.2 0.2 - 1.0 EU/dL    Nitrites Negative NEG      Leukocyte Esterase Negative NEG          Current Meds:  Current Facility-Administered Medications   Medication Dose Route Frequency    cefTRIAXone (ROCEPHIN) 2 g in 0.9% sodium chloride (MBP/ADV) 50 mL  2 g IntraVENous Q24H    tuberculin injection 5 Units  5 Units IntraDERMal ONCE    clopidogreL (PLAVIX) tablet 75 mg  75 mg Oral DAILY    gabapentin (NEURONTIN) capsule 300 mg  300 mg Oral QHS    tamsulosin (FLOMAX) capsule 0.8 mg  0.8 mg Oral DAILY    sodium chloride (NS) flush 5-40 mL  5-40 mL IntraVENous Q8H    sodium chloride (NS) flush 5-40 mL  5-40 mL IntraVENous PRN    acetaminophen (TYLENOL) tablet 650 mg  650 mg Oral Q4H PRN    senna-docusate (PERICOLACE) 8.6-50 mg per tablet 1 Tab  1 Tab Oral BID PRN    azithromycin (ZITHROMAX) 500 mg in 0.9% sodium chloride (MBP/ADV) 250 mL  500 mg IntraVENous Q24H    enoxaparin (LOVENOX) injection 40 mg  40 mg SubCUTAneous Q24H    guaiFENesin-dextromethorphan (ROBITUSSIN DM) 100-10 mg/5 mL syrup 5 mL  5 mL Oral Q6H PRN    ondansetron (ZOFRAN ODT) tablet 4 mg  4 mg Oral Q6H PRN    Or    ondansetron (ZOFRAN) injection 4 mg  4 mg IntraVENous Q6H PRN    budesonide (PULMICORT) 500 mcg/2 ml nebulizer suspension  500 mcg Nebulization BID RT    And    albuterol (PROVENTIL VENTOLIN) nebulizer solution 2.5 mg  2.5 mg Nebulization Q6HWA RT    ipratropium (ATROVENT) 0.02 % nebulizer solution 0.5 mg  0.5 mg Nebulization Q6H RT       Other Studies:  No results found for this visit on 06/01/20.     Xr Chest Port    Result Date: 6/1/2020  Chest portable CLINICAL INDICATION: Acute moderate dyspnea with history of thoracic aneurysm, COPD. Hypoxic today. Home oxygen requirement. COMPARISON: August 13, 2019 and 7/17/2019 radiography, also CT 5/28/2020 TECHNIQUE: single AP portable view chest at 7:15 PM upright FINDINGS: Interval slight worsening of mild to moderate peribronchial opacities in the left base. No evidence of a dense lobar consolidation, increasing effusion, pneumothorax, or CHF. Stable mediastinal and hilar contours. Mild bibasilar linear atelectasis and scarring otherwise unchanged. Scattered lung nodules were better evaluated on prior CT. Wires and electrodes again project over soft tissues of the posterior right back. IMPRESSION: Increased left basilar pneumonia, versus aspiration.        All Micro Results     Procedure Component Value Units Date/Time    CULTURE, BLOOD [[id number]] Collected:  06/01/20 2008    Order Status:  Completed Specimen:  Blood Updated:  06/02/20 1143     Special Requests: --        RIGHT  Antecubital       Culture result: NO GROWTH AFTER 14 HOURS       EMERGENT DISEASE PANEL [[id number]] Collected:  06/01/20 1931    Order Status:  Completed Updated:  06/01/20 2111    CULTURE, BLOOD [[id number]]     Order Status:  Sent Specimen:  Blood           SARS-CoV-2 Lab Results  \"Novel Coronavirus\" Test: No results found for: COV2NT   \"Emergent Disease\" Test: No results found for: EDPR  \"SARS-COV-2\" Test: No results found for: XGCOVT  As of: 5:57 PM on 6/2/2020        Assessment and Plan:     Hospital Problems as of 6/2/2020 Date Reviewed: 5/28/2020          Codes Class Noted - Resolved POA    Suspected COVID-19 virus infection ICD-10-CM: Z20.828  ICD-9-CM: V01.79  6/2/2020 - Present Yes        Pneumonia ICD-10-CM: J18.9  ICD-9-CM: 781  6/1/2020 - Present Unknown        Bronchiectasis ([address] (Chronic) ICD-10-CM: J47.9  ICD-9-CM: 494.0  1/24/2019 - Present Yes        Benign essential HTN (Chronic) ICD-10-CM: I10  ICD-9-CM: 401.1  6/6/2017 - Present Yes        COPD, moderate ([address] (Chronic) ICD-10-CM: J44.9  ICD-9-CM: 708  10/8/2013 - Present Yes    Overview Addendum 7/17/2019 10:57 AM by Anoop Mendoza NP     GOLD stage II  O2 dependent 2L             CAD (coronary artery disease) (Chronic) ICD-10-CM: I25.10  ICD-9-CM: 414.00  10/8/2013 - Present Yes    Overview Signed 4/4/2016 11:55 AM by Jose Cespedes of the native vessel                   Plan:  · COPD, chronic hypoxia, pneumonia:  · Discussed remaining inpatient until COVID results   · pulm consult to review CT chest findings   · Continued rocephin/ azithromycin   · At baseline O2 needs  · Continued albuterol/pulmicort  · SLP consult  · Stop IVF      · CAD:  · Continued plavix    DC planning/Dispo:  Pending to home      Diet:  DIET REGULAR  DVT ppx:      Signed:  Jorden Bonner MD

## 2020-06-02 NOTE — PROGRESS NOTES
Report received from Elba Robbins, [address]. Pt up ambulating in room. No dyspnea or pain. 2L NC. No needs voiced at this time. Safety measures on place. Call light within reach.

## 2020-06-02 NOTE — PROGRESS NOTES
's visit via telephone call. I conveyed care and concern for Mr. Baron Watkins. He voiced no spiritual needs. Chaplains remain available for follow-up.       Adrian Crowley MDIV, [facility]

## 2020-06-03 LAB
ALBUMIN SERPL-MCNC: 3.2 G/DL (ref 3.2–4.6)
ALBUMIN/GLOB SERPL: 0.8 {RATIO} (ref 1.2–3.5)
ALP SERPL-CCNC: 100 U/L (ref 50–136)
ALT SERPL-CCNC: 24 U/L (ref 12–65)
ANION GAP SERPL CALC-SCNC: 9 MMOL/L (ref 7–16)
AST SERPL-CCNC: 19 U/L (ref 15–37)
BASOPHILS # BLD: 0 K/UL (ref 0–0.2)
BASOPHILS NFR BLD: 0 % (ref 0–2)
BILIRUB SERPL-MCNC: 0.5 MG/DL (ref 0.2–1.1)
BUN SERPL-MCNC: 10 MG/DL (ref 8–23)
CALCIUM SERPL-MCNC: 9.1 MG/DL (ref 8.3–10.4)
CHLORIDE SERPL-SCNC: 104 MMOL/L (ref 98–107)
CO2 SERPL-SCNC: 23 MMOL/L (ref 21–32)
CREAT SERPL-MCNC: 0.65 MG/DL (ref 0.8–1.5)
DIFFERENTIAL METHOD BLD: ABNORMAL
EOSINOPHIL # BLD: 0 K/UL (ref 0–0.8)
EOSINOPHIL NFR BLD: 0 % (ref 0.5–7.8)
ERYTHROCYTE [DISTWIDTH] IN BLOOD BY AUTOMATED COUNT: 13.7 % (ref 11.9–14.6)
GLOBULIN SER CALC-MCNC: 4.2 G/DL (ref 2.3–3.5)
GLUCOSE SERPL-MCNC: 96 MG/DL (ref 65–100)
HCT VFR BLD AUTO: 46.3 % (ref 41.1–50.3)
HGB BLD-MCNC: 15 G/DL (ref 13.6–17.2)
IMM GRANULOCYTES # BLD AUTO: 0.1 K/UL (ref 0–0.5)
IMM GRANULOCYTES NFR BLD AUTO: 1 % (ref 0–5)
LYMPHOCYTES # BLD: 2.8 K/UL (ref 0.5–4.6)
LYMPHOCYTES NFR BLD: 19 % (ref 13–44)
MCH RBC QN AUTO: 27.7 PG (ref 26.1–32.9)
MCHC RBC AUTO-ENTMCNC: 32.4 G/DL (ref 31.4–35)
MCV RBC AUTO: 85.6 FL (ref 79.6–97.8)
MM INDURATION POC: 0 MM (ref 0–5)
MONOCYTES # BLD: 1.3 K/UL (ref 0.1–1.3)
MONOCYTES NFR BLD: 9 % (ref 4–12)
NEUTS SEG # BLD: 10.7 K/UL (ref 1.7–8.2)
NEUTS SEG NFR BLD: 71 % (ref 43–78)
NRBC # BLD: 0 K/UL (ref 0–0.2)
PLATELET # BLD AUTO: 338 K/UL (ref 150–450)
PMV BLD AUTO: 9.2 FL (ref 9.4–12.3)
POTASSIUM SERPL-SCNC: 3.9 MMOL/L (ref 3.5–5.1)
PPD POC: NEGATIVE NEGATIVE
PROT SERPL-MCNC: 7.4 G/DL (ref 6.3–8.2)
RBC # BLD AUTO: 5.41 M/UL (ref 4.23–5.6)
SODIUM SERPL-SCNC: 136 MMOL/L (ref 136–145)
WBC # BLD AUTO: 15 K/UL (ref 4.3–11.1)

## 2020-06-03 PROCEDURE — 97530 THERAPEUTIC ACTIVITIES: CPT

## 2020-06-03 PROCEDURE — 97165 OT EVAL LOW COMPLEX 30 MIN: CPT

## 2020-06-03 PROCEDURE — 74011250636 HC RX REV CODE- 250/636: Performed by: INTERNAL MEDICINE

## 2020-06-03 PROCEDURE — 65660000000 HC RM CCU STEPDOWN

## 2020-06-03 PROCEDURE — 94640 AIRWAY INHALATION TREATMENT: CPT

## 2020-06-03 PROCEDURE — 94760 N-INVAS EAR/PLS OXIMETRY 1: CPT

## 2020-06-03 PROCEDURE — 97110 THERAPEUTIC EXERCISES: CPT

## 2020-06-03 PROCEDURE — 80053 COMPREHEN METABOLIC PANEL: CPT

## 2020-06-03 PROCEDURE — 74011000250 HC RX REV CODE- 250: Performed by: INTERNAL MEDICINE

## 2020-06-03 PROCEDURE — 99223 1ST HOSP IP/OBS HIGH 75: CPT | Performed by: INTERNAL MEDICINE

## 2020-06-03 PROCEDURE — 74011250637 HC RX REV CODE- 250/637: Performed by: INTERNAL MEDICINE

## 2020-06-03 PROCEDURE — 97161 PT EVAL LOW COMPLEX 20 MIN: CPT

## 2020-06-03 PROCEDURE — 85025 COMPLETE CBC W/AUTO DIFF WBC: CPT

## 2020-06-03 RX ADMIN — IPRATROPIUM BROMIDE 0.5 MG: 0.5 SOLUTION RESPIRATORY (INHALATION) at 08:27

## 2020-06-03 RX ADMIN — Medication 10 ML: at 06:26

## 2020-06-03 RX ADMIN — TAMSULOSIN HYDROCHLORIDE 0.8 MG: 0.4 CAPSULE ORAL at 08:22

## 2020-06-03 RX ADMIN — CLOPIDOGREL BISULFATE 75 MG: 75 TABLET ORAL at 08:22

## 2020-06-03 RX ADMIN — IPRATROPIUM BROMIDE 0.5 MG: 0.5 SOLUTION RESPIRATORY (INHALATION) at 20:26

## 2020-06-03 RX ADMIN — BUDESONIDE 500 MCG: 0.5 INHALANT RESPIRATORY (INHALATION) at 20:26

## 2020-06-03 RX ADMIN — Medication 10 ML: at 12:03

## 2020-06-03 RX ADMIN — IPRATROPIUM BROMIDE 0.5 MG: 0.5 SOLUTION RESPIRATORY (INHALATION) at 01:40

## 2020-06-03 RX ADMIN — ENOXAPARIN SODIUM 40 MG: 40 INJECTION SUBCUTANEOUS at 22:12

## 2020-06-03 RX ADMIN — BUDESONIDE 500 MCG: 0.5 INHALANT RESPIRATORY (INHALATION) at 08:26

## 2020-06-03 RX ADMIN — ALBUTEROL SULFATE 2.5 MG: 2.5 SOLUTION RESPIRATORY (INHALATION) at 20:26

## 2020-06-03 RX ADMIN — GABAPENTIN 300 MG: 300 CAPSULE ORAL at 22:10

## 2020-06-03 RX ADMIN — Medication 5 ML: at 22:00

## 2020-06-03 RX ADMIN — ALBUTEROL SULFATE 2.5 MG: 2.5 SOLUTION RESPIRATORY (INHALATION) at 08:27

## 2020-06-03 NOTE — PROGRESS NOTES
Pt remains up in chair. No distress noted at this time. Pt remains confused. Call light in reach, will monitor.

## 2020-06-03 NOTE — PROGRESS NOTES
SPEECH PATHOLOGY NOTE:    Patient seen for bedside swallowing evaluation yesterday, 6/2/20 with oropharyngeal swallow function judged to be WNL. Patient was scheduled to have outpatient modified barium swallow study prior to hospitalization. Will follow loosely for diet tolerance at bedside and plan for modified barium swallow study once covid test is back negative.        Karina Lema MS, CCC-SLP

## 2020-06-03 NOTE — PROGRESS NOTES
Problem: Mobility Impaired (Adult and Pediatric)  Goal: *Therapy Goal (Edit Goal, Insert Text)  Outcome: Progressing Towards Goal  Note: Continue for consistency   LTG:  (1.)Mr. Kim will move from supine to sit and sit to supine , scoot up and down, and roll side to side in bed with INDEPENDENT within 7 treatment day(s). (2.)Mr. Kim will transfer from bed to chair and chair to bed with INDEPENDENT using the least restrictive device within 7 treatment day(s). (3.)Mr. Kim will ambulate with INDEPENDENT for 500 feet with the least restrictive device within 7 treatment day(s). ________________________________________________________________________________________________      PHYSICAL THERAPY: Initial Assessment and PM 6/3/2020  INPATIENT: PT Visit Days : 1  [place] / [place] / Product Type: Medicare /       NAME/AGE/GENDER: Carlos Cheney is a 78 y.o. male   PRIMARY DIAGNOSIS: Pneumonia [J18.9] <principal problem not specified> <principal problem not specified>        ICD-10: Treatment Diagnosis:    Generalized Muscle Weakness (M62.81)  Other lack of cordination (R27.8)  Difficulty in walking, Not elsewhere classified (R26.2)  Other abnormalities of gait and mobility (R26.89)   Precaution/Allergies:  Aspirin; Aleve [naproxen sodium]; Augmentin [amoxicillin-pot clavulanate]; Codeine sulfate; Corn containing products; Crestor [rosuvastatin]; Septra [sulfamethoprim ds]; Zetia [ezetimibe]; and Zoloft [sertraline]      ASSESSMENT:     Mr. Dixon Goyal presents with mild debility. CV 19 rule out. He is ambulating in room independently but requires redirection and cuing at times. All transfers are also independent. He then ambulates for 75 feet with PT with mild side to side sway and decreased dynamic balance. Then he returns to seated in bedside chair. He is confused at the moment about situation. He is eager to go home.   Finally he ambulates to the bathroom with independent cuing. Then he returns into room and is seated in the bedside chair. Skilled PT is indicated for consistency and to assure safe transfers and ambulation. Home discharge is anticipated with no additional rehab services at this time. This section established at most recent assessment   PROBLEM LIST (Impairments causing functional limitations):  Decreased Strength  Decreased ADL/Functional Activities  Decreased Transfer Abilities  Decreased Ambulation Ability/Technique  Decreased Balance   INTERVENTIONS PLANNED: (Benefits and precautions of physical therapy have been discussed with the patient.)  Balance Exercise  Bed Mobility  Therapeutic Activites  Therapeutic Exercise/Strengthening     TREATMENT PLAN: Frequency/Duration: 4 times a week for duration of hospital stay  Rehabilitation Potential For Stated Goals: Good     REHAB RECOMMENDATIONS (at time of discharge pending progress):    Placement: It is my opinion, based on this patient's performance to date, that Mr. Luisito Rodriguez may benefit from being discharged with NO further skilled therapy due to the high likelihood of returning to baseline. Equipment:   None at this time              HISTORY:   History of Present Injury/Illness (Reason for Referral):  PER MD H&P   Roderick Lui is a 78 y.o. male with a history of COPD, hypertension, type 2 diabetes mellitus, and GERD presenting with mild respiratory distress. Patient states that for the last 3 days he is been breathing faster than normal.  He denies any difficulty getting air in and out or shortness of breath specifically. He has occasional cough but denies any sputum production. He denies fevers, chills, diaphoresis, chest pain, abdominal pain, nausea, vomiting, diarrhea, dysuria, flank pain, headache, lightheadedness, dizziness, or arthralgias/myalgias. He lives with his wife at home with 2 dogs. He denies any sick contact or recent travel.   Past Medical History/Comorbidities:   Mr. Bunny Feliciano  has a past medical history of Arthritis, Benign essential HTN (6/6/2017), Chronic obstructive pulmonary disease ([address] Diabetes ([address] Ear problems, Fungal sinusitis, GERD (gastroesophageal reflux disease), History of multiple allergies, migraines, Hypertension, Irritable bowel syndrome with diarrhea (10/2/2018), Shingles (10/1/15), Sinus problem, and Thoracic aneurysm without mention of rupture (12/2/2014). He also has no past medical history of Arrhythmia, Asthma, Cancer ([address] Chronic kidney disease, Coagulation disorder ([address] Endocarditis, Heart failure ([address] Liver disease, Nicotine vapor product user, Non-nicotine vapor product user, Psychiatric disorder, PUD (peptic ulcer disease), Seizures ([address] Sleep apnea, Stroke ([address] Thromboembolus ([address] or Thyroid disease. Mr. Bunny Feliciano  has a past surgical history that includes hx heent (1999); hx hernia repair (Right, 1978); hx appendectomy (age 9); hx other surgical (2/2012); hx tonsillectomy; hx cataract removal (Bilateral); neurological procedure unlisted; and hx heart catheterization (2/27/12). Social History/Living Environment:   Home Environment: Private residence  # Steps to Enter: 1  One/Two Story Residence: One story  Living Alone: No  Support Systems: Spouse/Significant Other/Partner  Patient Expects to be Discharged to[de-identified] Private residence  Current DME Used/Available at Home: None  Prior Level of Function/Work/Activity:  Had been independent with all functional mobility. Dominant Side:         RIGHT    Personal Factors:          Sex:  male        Age:  78 y.o.    Number of Personal Factors/Comorbidities that affect the Plan of Care: 0: LOW COMPLEXITY   EXAMINATION:   Most Recent Physical Functioning:   Gross Assessment:  AROM: Generally decreased, functional  PROM: Generally decreased, functional  Strength: Generally decreased, functional  Coordination: Generally decreased, functional  Tone: Normal  Sensation: Intact               Posture:  Posture (WDL): Exceptions to WDL  Posture Assessment: Cervical, Forward head  Balance:  Sitting: Intact  Sitting - Static: Good (unsupported)  Sitting - Dynamic: Good (unsupported)  Standing: Intact  Standing - Static: Good  Standing - Dynamic : Good Bed Mobility:  Rolling: Independent  Supine to Sit: Independent  Sit to Supine: Independent  Scooting: Independent  Wheelchair Mobility:     Transfers:  Sit to Stand: Independent  Stand to Sit: Independent  Gait:     Base of Support: Center of gravity altered  Speed/Tatyana: Fluctuations  Stance: Time;Weight shift  Gait Abnormalities: Decreased step clearance  Distance (ft): 75 Feet (ft)  Assistive Device: (no assistive device.   )  Ambulation - Level of Assistance: Independent  Interventions: Manual cues; Safety awareness training; Tactile cues; Verbal cues; Visual/Demos      Body Structures Involved:  Bones  Joints  Muscles  Ligaments Body Functions Affected:  Neuromusculoskeletal  Movement Related  Skin Related  Metobolic/Endocrine Activities and Participation Affected:  Communication  Mobility  [address] [place] [address] and [facility]   Number of elements that affect the Plan of Care: 1-2: LOW COMPLEXITY   CLINICAL PRESENTATION:   Presentation: Stable and uncomplicated: LOW COMPLEXITY   CLINICAL DECISION MAKIN[address] [place] [address] AM-PAC 6 Clicks   Basic Mobility Inpatient Short Form  How much difficulty does the patient currently have. .. Unable A Lot A Little None   1. Turning over in bed (including adjusting bedclothes, sheets and blankets)? [] 1   [] 2   [] 3   [x] 4   2. Sitting down on and standing up from a chair with arms ( e.g., wheelchair, bedside commode, etc.)   [] 1   [] 2   [] 3   [x] 4   3. Moving from lying on back to sitting on the side of the bed? [] 1   [] 2   [] 3   [x] 4   How much help from another person does the patient currently need. .. Total A Lot A Little None   4.   Moving to and from a bed to a chair (including a wheelchair)? [] 1   [] 2   [] 3   [x] 4   5. Need to walk in hospital room? [] 1   [] 2   [] 3   [x] 4   6. Climbing 3-5 steps with a railing? [] 1   [] 2   [x] 3   [] 4   © 2007, Trustees of [address] [place] [address] [address] [id number], under license to [employer]. All rights reserved      Score:  Initial: 23 Most Recent: X (Date: -- )    Interpretation of Tool:  Represents activities that are increasingly more difficult (i.e. Bed mobility, Transfers, Gait). Medical Necessity:     Patient demonstrates   excellent   rehab potential due to higher previous functional level. Reason for Services/Other Comments:  Patient continues to require skilled intervention due to   Debility and decreased mobility. .   Use of outcome tool(s) and clinical judgement create a POC that gives a: Clear prediction of patient's progress: LOW COMPLEXITY            TREATMENT:   (In addition to Assessment/Re-Assessment sessions the following treatments were rendered)   Pre-treatment Symptoms/Complaints:  0/10   Pain: Initial:   Pain Intensity 1: 0  Post Session: 0/10 no pain reported. Therapeutic Activity: (    23 mins): Therapeutic activities including Bed transfers, Chair transfers, and Ambulation on level ground to improve mobility, strength, balance, and coordination. Required minimal Manual cues; Safety awareness training; Tactile cues; Verbal cues; Visual/Demos to promote coordination of bilateral and promote motor control of bilateral, lower extremity(s). Braces/Orthotics/Lines/Etc:   O2 Device: Room air  Treatment/Session Assessment:    Response to Treatment:  improved transfers, ambulation and mobility. Interdisciplinary Collaboration:   Physical Therapist  Registered Nurse  After treatment position/precautions:   Supine in bed  Bed alarm/tab alert on  Bed/Chair-wheels locked  Bed in low position  Call light within reach  Side rails x 3   Compliance with Program/Exercises:  Will assess as treatment progresses  Recommendations/Intent for next treatment session: \"Next visit will focus on advancements to more challenging activities, reduction in assistance provided, and transfers, ambulation and mobility. \".   Total Treatment Duration:  PT Patient Time In/Time Out  Time In: 1100  Time Out: Alon Osman

## 2020-06-03 NOTE — ACP (ADVANCE CARE PLANNING)
Discussion completed with spouse via telephone due to patient's confusion at this time. Per spouse, patient has HCPOA / Advance Directive document which names her as patient's healthcare agent. Encouraged her to provide a copy of document to hospital for patient's chart, as no document is currently on file. Also encouraged spouse to discuss preferences for care with MD as she stated she and patient have discussed a desire for DNR. These are patient's current wishes as disclosed by wife via telephone contact today, and are not intended to take place of [facility]. Advance Care Planning     Advance Care Planning Activator (Inpatient)  Conversation Note      Date of ACP Conversation: 06/03/20     Conversation Conducted with:    Healthcare Decision Maker: Next of Kin by law (only applies in absence of above) (name) Pancho Edge - wife    ACP Activator: Jc Martinez    *When Decision Maker makes decisions on behalf of the incapacitated patient: Decision Maker is asked to consider and make decisions based on patient values, known preferences, or best interests. Health Care Decision Maker:    Current Designated Health Care Decision Maker:   (If there is a valid [place] named in the [address] [place] [address] Makers\" box in the ACP activity, but it is not visible above, be sure to open that field and then select the health care decision maker relationship (ie \"primary\") in the blank space to the right of the name.) Validate  this information as still accurate & up-to-date; edit [place] field as needed.)    Note: Assess and validate information in current ACP documents, as indicated. If no Decision Maker listed above or available through scanned documents, then:    If no Authorized Decision Maker has previously been identified, then patient chooses [place]:  \"Who would you like to name as your primary health care decision-maker? \"    Name: Parvin Kim   Relationship: Wife Phone number: [phone]  Zheng Alas this person be reached easily? \" YES        Note: If the relationship of these Decision-Makers to the patient does NOT follow your state's Next of Kin hierarchy, recommend that patient complete ACP document that meets state-specific requirements to allow them to act on the patient's behalf when appropriate. Care Preferences    Ventilation: \"If you were in your present state of health and suddenly became very ill and were unable to breathe on your own, what would your preference be about the use of a ventilator (breathing machine) if it were available to you? \"      If patient would desire the use of a ventilator (breathing machine), answer \"yes\", if not \"no\":no    \"If your health worsens and it becomes clear that your chance of recovery is unlikely, what would your preference be about the use of a ventilator (breathing machine) if it were available to you? \"     Would the patient desire the use of a ventilator (breathing machine)? NO      Resuscitation  \"CPR works best to restart the heart when there is a sudden event, like a heart attack, in someone who is otherwise healthy. Unfortunately, CPR does not typically restart the heart for people who have serious health conditions or who are very sick. \"    \"In the event your heart stopped as a result of an underlying serious health condition, would you want attempts to be made to restart your heart (answer \"yes\" for attempt to resuscitate) or would you prefer a natural death (answer \"no\" for do not attempt to resuscitate)? \" no      NOTE: If the patient has a valid advance directive AND now provides care preference(s) that are inconsistent with that prior directive, advise the patient to consider either: creating a new advance directive that complies with state-specific requirements; or, if that is not possible, orally revoking that prior directive in accordance with state-specific requirements, which must be documented in the EHR. [x] Yes  [] No   Educated Patient / Florene Billing regarding differences between Advance Directives and portable DNR orders.     Length of ACP Conversation in minutes:      Conversation Outcomes:  [x] ACP discussion completed  [] Existing advance directive reviewed with patient; no changes to patient's previously recorded wishes     [] New Advance Directive completed   [] Portable Do Not Resuscitate prepared for Provider review and signature  [] POLST/POST/MOLST/MOST prepared for Provider review and signature      Follow-up plan:    [] Schedule follow-up conversation to continue planning  [x] Referred individual to Provider for additional questions/concerns   [] Advised patient/agent/surrogate to review completed ACP document and update if needed with changes in condition, patient preferences or care setting     [x] This note routed to one or more involved healthcare providers

## 2020-06-03 NOTE — PROGRESS NOTES
Pt keeps walking out in the amato. 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 Pt has removed his IV and his Tele monitor. Reoriented pt to time place and situation. Pt states he still does not understand. Pt called his wife. IV restarted, telemetry reapplied and labs drawn.

## 2020-06-03 NOTE — CONSULTS
CONSULT NOTE    Florentino Kim    6/3/2020    Date of Admission:  6/1/2020    The patient's chart is reviewed and the patient is discussed with the staff. Subjective:     Patient is a 78 y.o.  male seen and evaluated at the request of Dr. Kenneth Farfan. He has a history of COPD (FEV1 47% June 2019), bronchiectasis, lung nodules, and chronic hypoxic respiratory failure on 2L O2 at baseline (wife later reported that this was only nocturnal use). He was admitted yesterday with increased dyspnea. He was seen as an outpatient 5/28/20. At that time he was on spiriva and wixela and using percussion therapy for airway clearance. Repeat CT was performed showing worsening reticular nodular opacities in the LLL and stable changes elsewhere. The possibility of chronic aspiration was raised by the radiologist. A swallow study was planned to evaluate this further. He then presented to the ER 6/1 with 3 days of increased shortness of breath associated with increased cough with yellow sputum. No fevers. W/u showed WBC of 16.4 and CXR showed increased L basilar pna vs aspiration. He was admitted by the hospitalist service and started on ceftriaxone/azithro. Covid test was sent and is still pending. Pulmonary was consulted to review his recent CT findings. The patient does not seem to recall coming to the ER or why that happened. He is currently resting on the bed, fully dressed on room air. He denies any symptoms above his baseline. Review of Systems  A comprehensive review of systems was negative except for that written in the HPI.     Patient Active Problem List   Diagnosis Code    COPD, moderate ([facility] [address] J44.9    Migraine G43.909    CAD (coronary artery disease) I25.10    Allergic rhinitis J30.9    Gastroesophageal reflux disease without esophagitis K21.9    Osteoporosis M81.0    Encounter for long-term (current) use of antiplatelets/antithrombotics Z79.02    Personal history of tobacco use Z87.891    Thoracic aneurysm without mention of rupture I71.2    Pulmonary nodules R91.8    DNR (do not resuscitate) Z66    Mixed hyperlipidemia E78.2    ETD (eustachian tube dysfunction) H69.80    Chronic sinusitis J32.9    Nasal polyp J33.9    Fungal sinusitis B49, J32.9    Platelet inhibition due to Plavix Z79.02    Long term (current) use of antithrombotics/antiplatelets O92.81    Benign essential HTN I10    Dementia without behavioral disturbance (HCC) F03.90    Impaired fasting blood sugar R73.01    Leukocytosis D72.829    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 Taking? B.infantis-B.ani-B.long-B.bifi (PROBIOTIC 4X) 10-15 mg TbEC 2020 at Unknown time  Yes Yes   Sig: Take  by mouth. EPINEPHrine (EPIPEN) 0.3 mg/0.3 mL (1:1,000) injection Unknown at Unknown time  Yes No   Si.3 mg by IntraMUSCular route once as needed. Lactobac no. 41-Bifidobact no.7 (PROBIOTIC-10) 70 mg (3 billion cell) cap 2020 at Unknown time  Yes Yes   Sig: Take  by mouth. Lancets misc 2020 at Unknown time  No Yes   Sig: Check BS once a day fasting (DX: E11.9); one touch verio   SUMAtriptan (IMITREX) 100 mg tablet 2020 at Unknown time  No Yes   Sig: TAKE 1/2 TO 1 TABLET BY MOUTH AS NEEDED FOR MIGRAINE MAY REPEAT IN 2HRS MAX 2/24HRS   VIT A/VIT C/VIT E/ZINC/COPPER (PRESERVISION AREDS PO) 2020 at Unknown time  Yes Yes   Sig: Take 1 Tab by mouth two (2) times a day. acetaminophen (TYLENOL) 325 mg tablet 2020 at Unknown time  No Yes   Sig: Take 2 Tabs by mouth every four (4) hours as needed for Pain.    albuterol (PROAIR HFA) 90 mcg/actuation inhaler 2020 at Unknown time  No Yes Sig: INHALE 2 PUFFS BY MOUTH EVERY 4 HOURS AS NEEDED   albuterol (PROVENTIL VENTOLIN) 2.5 mg /3 mL (0.083 %) nebu 2020 at Unknown time  No Yes   Sig: USE 3 ML BY NEBULIZATION ROUTE THREE (3) TIMES DAILY. Bill to Medicare Part B with Dx: J44.9 COPD. cholecalciferol (VITAMIN D3) 400 unit tab tablet 2020 at Unknown time  Yes Yes   Sig: Take  by mouth daily. clopidogrel (PLAVIX) 75 mg tab 2020 at Unknown time  No Yes   Sig: TAKE 1 TAB BY MOUTH DAILY. fluticasone propion-salmeterol (WIXELA INHUB) 500-50 mcg/dose diskus inhaler 2020 at Unknown time  No Yes   Sig: Take 1 Puff by inhalation every twelve (12) hours. gabapentin (NEURONTIN) 300 mg capsule 2020 at Unknown time  No Yes   Sig: TAKE 1 CAPSULE BY MOUTH THREE TIMES A DAY   glucose blood VI test strips (BLOOD GLUCOSE TEST) strip 2020 at Unknown time  No Yes   Sig: Check BS once a day fasting (DX: E11.9); one touch verio   latanoprost (XALATAN) 0.005 % ophthalmic solution 2020 at Unknown time  Yes Yes   Sig: Administer 1 Drop to both eyes nightly. lipase-protease-amylase (CREON 12,000) 12,000-38,000 -60,000 unit capsule Unknown at Unknown time  Yes No   Sig: Take 3 Caps by mouth three (3) times daily (with meals). nitroglycerin (NITROSTAT) 0.4 mg SL tablet Unknown at Unknown time  No No   Si Tab by SubLINGual route every five (5) minutes as needed for Chest Pain.   tamsulosin (FLOMAX) 0.4 mg capsule 2020 at Unknown time  No Yes   Sig: TAKE 2 CAPS BY MOUTH DAILY. INDICATIONS: ENLARGED PROSTATE WITH URINATION PROBLEM   tiotropium (SPIRIVA WITH HANDIHALER) 18 mcg inhalation capsule 2020 at Unknown time  No Yes   Sig: Take 1 Cap by inhalation daily. triamcinolone (NASACORT AQ) 55 mcg nasal inhaler 2020 at Unknown time  No Yes   Si Sprays by Both Nostrils route daily.  Indications: inflammation of the nose due to an allergy      Facility-Administered Medications: None       Past Medical History: Diagnosis Date    Arthritis     Benign essential HTN 2017    Chronic obstructive pulmonary disease (HCC)     Diabetes ([facility] [address]     Ear problems     Fungal sinusitis     GERD (gastroesophageal reflux disease)     History of multiple allergies     Hx of migraines     Hypertension     Irritable bowel syndrome with diarrhea 10/2/2018    Shingles 10/1/15    Sinus problem     Thoracic aneurysm without mention of rupture 2014    No chest or upper back pain. Echo shows a mildly dilated aortic root at 4.0 cm. There is mild LVH and normal systolic function.        Past Surgical History:   Procedure Laterality Date    HX APPENDECTOMY  age 10    HX CATARACT REMOVAL Bilateral     HX HEART CATHETERIZATION  12    stent x 1    HX HEENT      sinus x3    HX HERNIA REPAIR Right 1978    inguinal    HX OTHER SURGICAL  2012    neurostimulator implant for migraines at [facility] in [facility]      childhood   [id number] [facility]      Neurostimulator implant for migraines 2012     Social History     Socioeconomic History    Marital status:      Spouse name: RADHA    Number of children: 3    Years of education: 1 YR TRADE    Highest education level: Not on file   Occupational History    Occupation: ELECTRICAL MAINTENANCE   Social Needs    Financial resource strain: Not on file    Food insecurity     Worry: Not on file     Inability: Not on file    Transportation needs     Medical: Not on file     Non-medical: Not on file   Tobacco Use    Smoking status: Former Smoker     Packs/day: 1.50     Years: 40.00     Pack years: 60.00     Types: Cigarettes     Last attempt to quit: 1973     Years since quittin.4    Smokeless tobacco: Never Used   Substance and Sexual Activity    Alcohol use: No     Alcohol/week: 0.0 standard drinks    Drug use: No    Sexual activity: Not on file   Lifestyle    Physical activity     Days per week: Not on file Minutes per session: Not on file    Stress: Not on file   Relationships    Social connections     Talks on phone: Not on file     Gets together: Not on file     Attends Mu-ism service: Not on file     Active member of club or organization: Not on file     Attends meetings of clubs or organizations: Not on file     Relationship status: Not on file    Intimate partner violence     Fear of current or ex partner: Not on file     Emotionally abused: Not on file     Physically abused: Not on file     Forced sexual activity: Not on file   Other Topics Concern    Not on file   Social History Narrative     and lives with wife.      Family History   Problem Relation Age of Onset    No Known Problems Sister     No Known Problems Brother     No Known Problems Sister     COPD Mother     Asthma Father     Dementia Other         UNCLE     Allergies   Allergen Reactions    Aspirin Swelling and Anaphylaxis    Aleve [Naproxen Sodium] Unknown (comments)    Augmentin [Amoxicillin-Pot Clavulanate] Nausea and Vomiting     Sever nausa, abdominal cramping and feels terrible    Codeine Sulfate Other (comments)     headache    Corn Containing Products Other (comments)     Not allergic    Crestor [Rosuvastatin] Unknown (comments)     unknown    Septra [Sulfamethoprim Ds] Unknown (comments)     Not allergic    Zetia [Ezetimibe] Unknown (comments)     Unknown allergy    Zoloft [Sertraline] Unknown (comments)     Not allergic       Current Facility-Administered Medications   Medication Dose Route Frequency    cefTRIAXone (ROCEPHIN) 2 g in 0.9% sodium chloride (MBP/ADV) 50 mL  2 g IntraVENous Q24H    clopidogreL (PLAVIX) tablet 75 mg  75 mg Oral DAILY    gabapentin (NEURONTIN) capsule 300 mg  300 mg Oral QHS    tamsulosin (FLOMAX) capsule 0.8 mg  0.8 mg Oral DAILY    sodium chloride (NS) flush 5-40 mL  5-40 mL IntraVENous Q8H    sodium chloride (NS) flush 5-40 mL  5-40 mL IntraVENous PRN    acetaminophen (TYLENOL) tablet 650 mg  650 mg Oral Q4H PRN    senna-docusate (PERICOLACE) 8.6-50 mg per tablet 1 Tab  1 Tab Oral BID PRN    azithromycin (ZITHROMAX) 500 mg in 0.9% sodium chloride (MBP/ADV) 250 mL  500 mg IntraVENous Q24H    enoxaparin (LOVENOX) injection 40 mg  40 mg SubCUTAneous Q24H    guaiFENesin-dextromethorphan (ROBITUSSIN DM) 100-10 mg/5 mL syrup 5 mL  5 mL Oral Q6H PRN    ondansetron (ZOFRAN ODT) tablet 4 mg  4 mg Oral Q6H PRN    Or    ondansetron (ZOFRAN) injection 4 mg  4 mg IntraVENous Q6H PRN    budesonide (PULMICORT) 500 mcg/2 ml nebulizer suspension  500 mcg Nebulization BID RT    And    albuterol (PROVENTIL VENTOLIN) nebulizer solution 2.5 mg  2.5 mg Nebulization Q6HWA RT    ipratropium (ATROVENT) 0.02 % nebulizer solution 0.5 mg  0.5 mg Nebulization Q6H RT         Objective:     Vitals:    06/03/20 0856 06/03/20 1041 06/03/20 1149 06/03/20 1200   BP: 146/83 (!) 153/96 141/63    Pulse:  96  (!) 105   Resp:  22     Temp:  97.9 °F (36.6 °C)     SpO2:  98%     Weight:       Height:           PHYSICAL EXAM     Constitutional:  the patient is well developed and in no acute distress  EENMT:  Sclera clear, pupils equal, oral mucosa moist  Respiratory: Mild L basilar rhonchi. Cardiovascular:  RRR without M,G,R  Gastrointestinal: soft and non-tender; with positive bowel sounds. Musculoskeletal: warm without cyanosis. There is no lower extremity edema.   Skin:  no jaundice or rashes, no wounds   Neurologic: no gross neuro deficits     Psychiatric:  alert and oriented x ppt    CXR 6/1/20      CT Chest :    5/28/20      Recent Labs     06/03/20  0506 06/02/20  0235 06/01/20  1800   WBC 15.0* 22.3* 16.4*   HGB 15.0 14.9 16.1   HCT 46.3 44.8 47.9    317 329     Recent Labs     06/03/20  0506 06/02/20  0235 06/01/20  1800    135* 134*   K 3.9 4.2 4.9    103 101   GLU 96 187* 104*   CO2 23 23 26   BUN 10 9 10   CREA 0.65* 0.65* 0.72*   CA 9.1 8.5 9.2   ALB 3.2  --  3.4   TBILI 0.5  -- 0. 6   ALT 24  --  29     No results for input(s): PH, PCO2, PO2, HCO3, PHI, PCO2I, PO2I, HCO3I in the last 72 hours. Recent Labs     06/01/20  1931   LAC 1.4       Assessment:  (Medical Decision Making)     Hospital Problems  Date Reviewed: 5/28/2020          Codes Class Noted POA    Suspected COVID-19 virus infection ICD-10-CM: Z20.828  ICD-9-CM: V01.79  6/2/2020 Yes        Pneumonia ICD-10-CM: J18.9  ICD-9-CM: 288  6/1/2020 Unknown        Bronchiectasis ([address] (Chronic) ICD-10-CM: J47.9  ICD-9-CM: 494.0  1/24/2019 Yes        Benign essential HTN (Chronic) ICD-10-CM: I10  ICD-9-CM: 401.1  6/6/2017 Yes        COPD, moderate ([address] (Chronic) ICD-10-CM: J44.9  ICD-9-CM: 793  10/8/2013 Yes    Overview Addendum 7/17/2019 10:57 AM by Kendra Martínez NP     GOLD stage II  O2 dependent 2L             CAD (coronary artery disease) (Chronic) ICD-10-CM: I25.10  ICD-9-CM: 414.00  10/8/2013 Yes    Overview Signed 4/4/2016 11:55 AM by Ronni Ramos of the native vessel                 Patient with increasing nodular change at the left lung base. Concerning for aspiration or atypical infectious changes. No concern about malignancy. Previous sputum culture with pseudomonas. This is possible. In any event, he reports being at his recent baseline. Unclear how much O2 he is usually on as the chart mentions 2L but wife states only at night. Cannot either confirm or rule out chronic respiratory failure at this time. Plan:  (Medical Decision Making)     -outpatient plan had been to perform swallow study. Could consider getting this done while inpatient and having speech see him while he is admitted. This would likely best be done once covid test is back as negative.   -in the meantime may want to add anaerobic coverage to current abx, consider adding clinda.   -agree with trying to obtain sputum culture if possible. Patient has grown pseudomonas from sputum in the past which could be causing these changes now as well. -otherwise seems at his baseline. More than 50% of the time documented was spent in face-to-face contact with the patient and in the care of the patient on the floor/unit where the patient is located. Thank you very much for this referral.  We appreciate the opportunity to participate in this patient's care. Will follow along with above stated plan.     Abiel Cosby MD

## 2020-06-03 NOTE — PROGRESS NOTES
Pt resting in bed comfortably at this time, alert and oriented with periodic confusion. No distress noted, respirations even and unlabored on room air. Pt denies pain at this time. Pt instructed to call for assistance if needed, call light in place, will continue to monitor.

## 2020-06-03 NOTE — PROGRESS NOTES
Hospitalist Note     Admit Date:  2020  6:41 PM   Name:  Christiana Kim   Age:  78 y.o.  :  1940   MRN:  [id number]   PCP:  Bettye Recio DO  Treatment Team: Attending Provider: Gertrudis Monzon MD; Utilization Review: Leandro Khanna RN; Consulting Provider: Gerald Booker MD; Primary Nurse: Sierra Drake RN; Physical Therapist: Jennifer Escalante PT; Care Manager: Sixto Altamirano    HPI/Subjective:       Mr. Manjinder Thomas is a 77 yo male with PMH of COPD on 2 L NC chronically and followed by pulmonary admitted with dyspnea. He was seen by outpatient pulm 20 s/p CT chest showing scattered nodules, increased reticular opacities left lower lobe. He is on rocephin/azithromycin for pneumonia. COVID pending. Discharge plans pending. 6-3-20   Pt is anxious, dressed up with shoes on, ready to leave. I asked him if he is aware of what's going on, pt was able to tell me everything correctly. He wants to go home. He denies chest pain, nausea/vomiting, fever, chills. ROS: 10 point ROS negative except what mentioned above.       Objective:     Patient Vitals for the past 24 hrs:   Temp Pulse Resp BP SpO2   20 1200  (!) 105      20 1149    141/63    20 1041 97.9 °F (36.6 °C) 96 22 (!) 153/96 98 %   20 0856    146/83    20 0828     94 %   20 0800  (!) 101      20 0720 97.8 °F (36.6 °C) 93 20 (!) 152/95 92 %   20 0328 98 °F (36.7 °C) 73 19 114/76 95 %   20 0140     92 %   20 2250 98.3 °F (36.8 °C) (!) 107 18 122/76 91 %   20 2058     96 %   20 1959 98.7 °F (37.1 °C) 94 20 130/73 95 %   20 1600 98.5 °F (36.9 °C) (!) 105 18 117/70 92 %   20 1415     97 %     Oxygen Therapy  O2 Sat (%): 98 % (20 1041)  Pulse via Oximetry: 84 beats per minute (20 08)  O2 Device: Room air (20 08)  O2 Flow Rate (L/min): 1 l/min (20 0328)    Estimated body mass index is 20.51 kg/m² as calculated from the following:    Height as of this encounter: 5' 6\" (1.676 m). Weight as of this encounter: 57.7 kg (127 lb 1.6 oz). Intake/Output Summary (Last 24 hours) at 6/3/2020 1342  Last data filed at 6/3/2020 0909  Gross per 24 hour   Intake 360 ml   Output 150 ml   Net 210 ml       *Note that automatically entered I/Os may not be accurate; dependent on patient compliance with collection and accurate  by techs. General:    Well nourished. Alert. Elderly, no distress   CV:   RRR. No murmur, rub, or gallop. No edema  Lungs:   Mild crackles  Abdomen:   Soft, nontender, nondistended. Extremities: Warm and dry. Skin:     No rashes or jaundice. Neuro:  gcs 15, no motor or sensory deficits, CN 2-12 intact  Psych:             Anxious and restless, oriented to self, place.     Data Review:  I have reviewed all labs, meds, and studies from the last 24 hours:  Recent Results (from the past 24 hour(s))   URINALYSIS W/ RFLX MICROSCOPIC    Collection Time: 06/02/20  4:32 PM   Result Value Ref Range    Color YELLOW      Appearance CLEAR      Specific gravity 1.006 1.001 - 1.023      pH (UA) 7.0 5.0 - 9.0      Protein Negative NEG mg/dL    Glucose Negative mg/dL    Ketone Negative NEG mg/dL    Bilirubin Negative NEG      Blood Negative NEG      Urobilinogen 0.2 0.2 - 1.0 EU/dL    Nitrites Negative NEG      Leukocyte Esterase Negative NEG     CBC WITH AUTOMATED DIFF    Collection Time: 06/03/20  5:06 AM   Result Value Ref Range    WBC 15.0 (H) 4.3 - 11.1 K/uL    RBC 5.41 4.23 - 5.6 M/uL    HGB 15.0 13.6 - 17.2 g/dL    HCT 46.3 41.1 - 50.3 %    MCV 85.6 79.6 - 97.8 FL    MCH 27.7 26.1 - 32.9 PG    MCHC 32.4 31.4 - 35.0 g/dL    RDW 13.7 11.9 - 14.6 %    PLATELET 233 309 - 479 K/uL    MPV 9.2 (L) 9.4 - 12.3 FL    ABSOLUTE NRBC 0.00 0.0 - 0.2 K/uL    DF AUTOMATED      NEUTROPHILS 71 43 - 78 %    LYMPHOCYTES 19 13 - 44 %    MONOCYTES 9 4.0 - 12.0 %    EOSINOPHILS 0 (L) 0.5 - 7.8 %    BASOPHILS 0 0.0 - 2.0 %    IMMATURE GRANULOCYTES 1 0.0 - 5.0 %    ABS. NEUTROPHILS 10.7 (H) 1.7 - 8.2 K/UL    ABS. LYMPHOCYTES 2.8 0.5 - 4.6 K/UL    ABS. MONOCYTES 1.3 0.1 - 1.3 K/UL    ABS. EOSINOPHILS 0.0 0.0 - 0.8 K/UL    ABS. BASOPHILS 0.0 0.0 - 0.2 K/UL    ABS. IMM. GRANS. 0.1 0.0 - 0.5 K/UL   METABOLIC PANEL, COMPREHENSIVE    Collection Time: 06/03/20  5:06 AM   Result Value Ref Range    Sodium 136 136 - 145 mmol/L    Potassium 3.9 3.5 - 5.1 mmol/L    Chloride 104 98 - 107 mmol/L    CO2 23 21 - 32 mmol/L    Anion gap 9 7 - 16 mmol/L    Glucose 96 65 - 100 mg/dL    BUN 10 8 - 23 MG/DL    Creatinine 0.65 (L) 0.8 - 1.5 MG/DL    GFR est AA >60 >60 ml/min/1.73m2    GFR est non-AA >60 >60 ml/min/1.73m2    Calcium 9.1 8.3 - 10.4 MG/DL    Bilirubin, total 0.5 0.2 - 1.1 MG/DL    ALT (SGPT) 24 12 - 65 U/L    AST (SGOT) 19 15 - 37 U/L    Alk.  phosphatase 100 50 - 136 U/L    Protein, total 7.4 6.3 - 8.2 g/dL    Albumin 3.2 3.2 - 4.6 g/dL    Globulin 4.2 (H) 2.3 - 3.5 g/dL    A-G Ratio 0.8 (L) 1.2 - 3.5     PLEASE READ & DOCUMENT PPD TEST IN 24 HRS    Collection Time: 06/03/20 10:22 AM   Result Value Ref Range    PPD Negative Negative    mm Induration 0 0 - 5 mm        Current Meds:  Current Facility-Administered Medications   Medication Dose Route Frequency    cefTRIAXone (ROCEPHIN) 2 g in 0.9% sodium chloride (MBP/ADV) 50 mL  2 g IntraVENous Q24H    clopidogreL (PLAVIX) tablet 75 mg  75 mg Oral DAILY    gabapentin (NEURONTIN) capsule 300 mg  300 mg Oral QHS    tamsulosin (FLOMAX) capsule 0.8 mg  0.8 mg Oral DAILY    sodium chloride (NS) flush 5-40 mL  5-40 mL IntraVENous Q8H    sodium chloride (NS) flush 5-40 mL  5-40 mL IntraVENous PRN    acetaminophen (TYLENOL) tablet 650 mg  650 mg Oral Q4H PRN    senna-docusate (PERICOLACE) 8.6-50 mg per tablet 1 Tab  1 Tab Oral BID PRN    azithromycin (ZITHROMAX) 500 mg in 0.9% sodium chloride (MBP/ADV) 250 mL  500 mg IntraVENous Q24H    enoxaparin (LOVENOX) injection 40 mg  40 mg SubCUTAneous Q24H    guaiFENesin-dextromethorphan (ROBITUSSIN DM) 100-10 mg/5 mL syrup 5 mL  5 mL Oral Q6H PRN    ondansetron (ZOFRAN ODT) tablet 4 mg  4 mg Oral Q6H PRN    Or    ondansetron (ZOFRAN) injection 4 mg  4 mg IntraVENous Q6H PRN    budesonide (PULMICORT) 500 mcg/2 ml nebulizer suspension  500 mcg Nebulization BID RT    And    albuterol (PROVENTIL VENTOLIN) nebulizer solution 2.5 mg  2.5 mg Nebulization Q6HWA RT    ipratropium (ATROVENT) 0.02 % nebulizer solution 0.5 mg  0.5 mg Nebulization Q6H RT       Other Studies:  No results found for this visit on 06/01/20. No results found.     All Micro Results     Procedure Component Value Units Date/Time    CULTURE, BLOOD [[id number]] Collected:  06/01/20 2008    Order Status:  Completed Specimen:  Blood Updated:  06/03/20 0939     Special Requests: --        RIGHT  Antecubital       Culture result: NO GROWTH 2 DAYS       EMERGENT DISEASE PANEL [[id number]] Collected:  06/01/20 1931    Order Status:  Completed Updated:  06/01/20 2111    CULTURE, BLOOD [[id number]]     Order Status:  Sent Specimen:  Blood           SARS-CoV-2 Lab Results  \"Novel Coronavirus\" Test: No results found for: COV2NT   \"Emergent Disease\" Test: No results found for: EDPR  \"SARS-COV-2\" Test: No results found for: XGCOVT  As of: 5:57 PM on 6/3/2020        Assessment and Plan:     Hospital Problems as of 6/3/2020 Date Reviewed: 5/28/2020          Codes Class Noted - Resolved POA    Suspected COVID-19 virus infection ICD-10-CM: Z20.828  ICD-9-CM: V01.79  6/2/2020 - Present Yes        Pneumonia ICD-10-CM: J18.9  ICD-9-CM: 282  6/1/2020 - Present Unknown        Bronchiectasis ([address] (Chronic) ICD-10-CM: J47.9  ICD-9-CM: 494.0  1/24/2019 - Present Yes        Benign essential HTN (Chronic) ICD-10-CM: I10  ICD-9-CM: 401.1  6/6/2017 - Present Yes        COPD, moderate ([address] (Chronic) ICD-10-CM: J44.9  ICD-9-CM: 496  10/8/2013 - Present Yes    Overview Addendum 7/17/2019 10:57 AM by Nati Wan NP     GOLD stage II  O2 dependent 2L             CAD (coronary artery disease) (Chronic) ICD-10-CM: I25.10  ICD-9-CM: 414.00  10/8/2013 - Present Yes    Overview Signed 4/4/2016 11:55 AM by Epi Hurtado of the native vessel                   Plan:  · COPD, chronic hypoxia, pneumonia:  · COVID 19 test pending   · Continued rocephin/ azithromycin   · At baseline O2 needs, this morning pt seen at [facility]  · Continued albuterol/pulmicort  · SLP consult      · CAD:  · Continued plavix    DC planning/Dispo:  Pending to home      Diet:  DIET REGULAR  DVT ppx:    Dispo: I have discussed with pt's wife about possible plan of pt going home in next 1-2 days pending covid 19 results. Pt is clinically better, will monitor for 24 hours more.     Signed:  Jamey Raman MD

## 2020-06-03 NOTE — PROGRESS NOTES
Problem: Patient Education: Go to Patient Education Activity  Goal: Patient/Family Education  Description: 1. Patient will complete functional mobility for household distances with MOD I and adaptive equipment as needed  2. Patient will complete functional transfers with MOD I and adaptive equipment as needed. Timeframe: 1 visits      Outcome: Resolved/Met       OCCUPATIONAL THERAPY: Initial Assessment, Daily Note, Discharge, and AM 6/3/2020  INPATIENT: OT Visit Days: 1  [place] / Plan[place] PART [place] / Product Type: Medicare /      NAME/AGE/GENDER: Gilford Sender is a 78 y.o. male   PRIMARY DIAGNOSIS:  Pneumonia [J18.9] <principal problem not specified> <principal problem not specified>        ICD-10: Treatment Diagnosis:    Generalized Muscle Weakness (M62.81)   Precautions/Allergies:     Aspirin; Aleve [naproxen sodium]; Augmentin [amoxicillin-pot clavulanate]; Codeine sulfate; Corn containing products; Crestor [rosuvastatin]; Septra [sulfamethoprim ds]; Zetia [ezetimibe]; and Zoloft [sertraline]      ASSESSMENT:     Mr. Chacho Centeno presents for pneumonia. Upon arrival, pt sitting edge of bed and agreeable to OT evaluation. Pt is alert and oriented x 4. Pt reports living with spouse in a 1-story home with 1 step to enter. At baseline, pt notes independence with all ADLs and functional mobility with no DME use. Reports being very active prior to hospitalization; still hikes, drives, grocery shops, etc. Endorses no prior hx of falls. Today, pt completed all transfers with independence. Static and dynamic sitting balance are intact with no additional support required. BUE AROM and strength (4+/5) are generally decreased but WFL; coordination and sensation are intact. Pt stood and ambulated in room for household distances with independence and no DME required.  At this time, Gilford Sender seems to be functioning close to baseline for ADL performance; will defer to PT for functional mobility. Will discharge pt from further therapy services. This section established at most recent assessment   PROBLEM LIST (Impairments causing functional limitations):  Decreased Strength  Decreased Activity Tolerance   INTERVENTIONS PLANNED: (Benefits and precautions of occupational therapy have been discussed with the patient.)  discharge     TREATMENT PLAN: Frequency/Duration: discharge  Rehabilitation Potential For Stated Goals: discharge     REHAB RECOMMENDATIONS (at time of discharge pending progress):    Placement: It is my opinion, based on this patient's performance to date, that Mr. Humberto Martinez may benefit from being discharged with NO further skilled therapy due to the high likelihood of returning to baseline. Equipment:   None at this time              OCCUPATIONAL PROFILE AND HISTORY:   History of Present Injury/Illness (Reason for Referral):  See H&P  Past Medical History/Comorbidities:   Mr. Humberto Martinez  has a past medical history of Arthritis, Benign essential HTN (6/6/2017), Chronic obstructive pulmonary disease ([address] Diabetes ([address] Ear problems, Fungal sinusitis, GERD (gastroesophageal reflux disease), History of multiple allergies, migraines, Hypertension, Irritable bowel syndrome with diarrhea (10/2/2018), Shingles (10/1/15), Sinus problem, and Thoracic aneurysm without mention of rupture (12/2/2014). He also has no past medical history of Arrhythmia, Asthma, Cancer ([address] Chronic kidney disease, Coagulation disorder ([address] Endocarditis, Heart failure ([address] Liver disease, Nicotine vapor product user, Non-nicotine vapor product user, Psychiatric disorder, PUD (peptic ulcer disease), Seizures ([address] Sleep apnea, Stroke ([address] Thromboembolus ([address] or Thyroid disease.   Mr. Humberto Martinez  has a past surgical history that includes hx heent (1999); hx hernia repair (Right, 1978); hx appendectomy (age 9); hx other surgical (2/2012); hx tonsillectomy; hx cataract removal (Bilateral); neurological procedure unlisted; and hx heart catheterization (2/27/12). Social History/Living Environment:   Home Environment: Private residence  # Steps to Enter: 1  One/Two Story Residence: One story  Living Alone: No  Support Systems: Spouse/Significant Other/Partner  Patient Expects to be Discharged to[de-identified] Private residence  Current DME Used/Available at Home: None  Prior Level of Function/Work/Activity:  Independent with ADLs and functional mobility with no DME required. Personal Factors:          Sex:  male        Age:  78 y.o.         Other factors that influence how disability is experienced by the patient:  multiple co-morbidities    Number of Personal Factors/Comorbidities that affect the Plan of Care: Brief history (0):  LOW COMPLEXITY   ASSESSMENT OF OCCUPATIONAL PERFORMANCE[de-identified]   Activities of Daily Living:   Basic ADLs (From Assessment) Complex ADLs (From Assessment)   Feeding: Independent  Oral Facial Hygiene/Grooming: Independent  Bathing: Supervision  Upper Body Dressing: Independent  Lower Body Dressing: Independent  Toileting: Independent Instrumental ADL  Meal Preparation: Minimum assistance  Homemaking: Minimum assistance   Grooming/Bathing/Dressing Activities of Daily Living     Cognitive Retraining  Safety/Judgement: Awareness of environment                       Bed/Mat Mobility  Rolling: Independent  Supine to Sit: Independent  Sit to Stand: Independent  Stand to Sit: Independent  Scooting: Independent     Most Recent Physical Functioning:   Gross Assessment:  AROM: Generally decreased, functional  PROM: Generally decreased, functional  Strength: Generally decreased, functional  Coordination: Generally decreased, functional  Tone: Normal  Sensation: Intact               Posture:     Balance:  Sitting: Intact  Sitting - Static: Good (unsupported)  Sitting - Dynamic: Good (unsupported)  Standing: Intact Bed Mobility:  Rolling: Independent  Supine to Sit: Independent  Scooting: Independent  Wheelchair Mobility: Transfers:  Sit to Stand: Independent  Stand to Sit: Independent            Patient Vitals for the past 6 hrs:   BP SpO2 Pulse   20 0720 (!) 152/95 92 % 93   20 0800 -- -- (!) 101   20 0828 -- 94 % --   20 0856 146/83 -- --   20 1041 (!) 153/96 98 % 96       Mental Status  Neurologic State: Alert  Orientation Level: Oriented X4  Cognition: Follows commands  Perception: Appears intact  Perseveration: No perseveration noted  Safety/Judgement: Awareness of environment                          Physical Skills Involved:  Balance  Strength  Activity Tolerance Cognitive Skills Affected (resulting in the inability to perform in a timely and safe manner):  Perception  Executive Function Psychosocial Skills Affected:  Habits/Routines  Environmental Adaptation   Number of elements that affect the Plan of Care: 5+:  HIGH COMPLEXITY   CLINICAL DECISION MAKIN[address] [place] [id number] AM-PAC 6 Clicks   Daily Activity Inpatient Short Form  How much help from another person does the patient currently need. .. Total A Lot A Little None   1. Putting on and taking off regular lower body clothing? [] 1   [] 2   [] 3   [x] 4   2. Bathing (including washing, rinsing, drying)? [] 1   [] 2   [x] 3   [] 4   3. Toileting, which includes using toilet, bedpan or urinal?   [] 1   [] 2   [] 3   [x] 4   4. Putting on and taking off regular upper body clothing? [] 1   [] 2   [] 3   [x] 4   5. Taking care of personal grooming such as brushing teeth? [] 1   [] 2   [] 3   [x] 4   6. Eating meals? [] 1   [] 2   [] 3   [x] 4   © , Trustees of [address] [place] [address], under license to [employer]. All rights reserved      Score:  Initial: 23 Most Recent: X (Date: -- )    Interpretation of Tool:  Represents activities that are increasingly more difficult (i.e. Bed mobility, Transfers, Gait).     Medical Necessity:     discharge  Reason for Services/Other Comments:  discharge   Use of outcome tool(s) and clinical judgement create a POC that gives a: LOW COMPLEXITY         TREATMENT:   (In addition to Assessment/Re-Assessment sessions the following treatments were rendered)     Pre-treatment Symptoms/Complaints:    Pain: Initial:   Pain Intensity 1: 0  Post Session:  same      Therapeutic Activity: (    10 minutes): Therapeutic activities including Bed transfers, Chair transfers, Ambulation on level ground, to improve mobility, strength, balance, and coordination. Required minimal   to promote static and dynamic balance in standing. Braces/Orthotics/Lines/Etc:   O2 Device: Room air  Treatment/Session Assessment:    Response to Treatment:  tolerated well with no issues noted. Interdisciplinary Collaboration:   Occupational Therapist  Registered Nurse  After treatment position/precautions:   Up in chair  Call light within reach  RN notified   Compliance with Program/Exercises: Will assess as treatment progresses. Recommendations/Intent for next treatment session: \"Next visit will focus on advancements to more challenging activities and reduction in assistance provided\".   Total Treatment Duration:  OT Patient Time In/Time Out  Time In: 1045  Time Out: [address], OT

## 2020-06-03 NOTE — PROGRESS NOTES
Pt ambulating in room. Pt confused with situation and wanting to speak with his wife. Pt called wife on the phone. Pt on RA no distress noted at this time. Call light in reach, will monitor.

## 2020-06-03 NOTE — PROGRESS NOTES
Pt resting in bed comfortably at this time, alert and oriented times 3 with periodic confusion/forgetfulness. No distress noted, respirations even and unlabored on room air. Pt denies pain at this time. Pt instructed to call for assistance if needed, will continue to monitor.

## 2020-06-03 NOTE — PROGRESS NOTES
SW reviewed patient's chart and spoke with wife via telephone to discuss discharge planning and advance care planning (see ACP note). Patient admitted under inpatient status by hospitalist on 6-1-2020 due to pneumonia / rule-out COVID19 (test results pending). SOCIAL:  Patient lives with his wife, Jean-Paul Amaro ([phone]), in a 1 level home with 1 step entry. Additional support available from sister-in-law (retired) and [employer] family. Patient is not a . His insurance is confirmed as Medicare and a secondary policy (listed as generic commercial). PCP confirmed as Melita Lewis. Wife's cell phone is capable of receiving video calls for virtual visits if needed. No difficulties affording RX. No history of substance abuse or mental health issues. No HCPOA or advance directive on file, but wife reports patient does have document listing her as healthcare agent. Encouraged her to bring copy of document to hospital for patient's file. Source of income: social security for patient and spouse. 401K for patient and spouse. MOBILITY / HISTORY:  At baseline, patient is independent with ADLs and ambulation. Wife reports baseline confusion. DME at home - nebulizer via [address] at night via [address] No dialysis history. No HH or STR history. PLAN FOR DISCHARGE:  Anticipate return to home with no needs. OT/ST evaluations completed and recommend no continued services. PT evaluation pending. Care Management Interventions  PCP Verified by CM: Yes(Jose Johns)  Mode of Transport at Discharge:  Other (see comment)  Transition of Care Consult (CM Consult): Discharge Planning  Discharge Durable Medical Equipment: No  Physical Therapy Consult: Yes  Occupational Therapy Consult: Yes  Speech Therapy Consult: Yes  Current Support Network: Lives with Spouse  Confirm Follow Up Transport: Family  The Plan for Transition of Care is Related to the Following Treatment Goals : safe discharge plan to home  The Patient and/or Patient Representative was Provided with a Choice of Provider and Agrees with the Discharge Plan?: Yes  Name of the Patient Representative Who was Provided with a Choice of Provider and Agrees with the Discharge Plan: Zhou Streeter - wife  Discharge Location  Discharge Placement: Home

## 2020-06-03 NOTE — PROGRESS NOTES
Follow-up phone contact with pt in isolation. Pt's voice sounded strong, as he talked. Per patient, he \"doesn't know what the outcome of my hospitalization will be. \"  No specific spiritual issues identified by patient, however. Conveyed care/concern for pt. Pt confirmed that he is unable to have visitors due to isolation, but he stated that he has been able to talk with family by phone. Assured pt of 's prayers and offered to be of support, as needed, during his hospitalization.     Sabina Wick MDiv, [address] [place] [address]

## 2020-06-04 VITALS
WEIGHT: 127.1 LBS | HEIGHT: 66 IN | DIASTOLIC BLOOD PRESSURE: 82 MMHG | BODY MASS INDEX: 20.43 KG/M2 | HEART RATE: 99 BPM | TEMPERATURE: 98 F | RESPIRATION RATE: 19 BRPM | SYSTOLIC BLOOD PRESSURE: 151 MMHG | OXYGEN SATURATION: 96 %

## 2020-06-04 LAB
ALBUMIN SERPL-MCNC: 2.7 G/DL (ref 3.2–4.6)
ALBUMIN/GLOB SERPL: 0.8 {RATIO} (ref 1.2–3.5)
ALP SERPL-CCNC: 86 U/L (ref 50–136)
ALT SERPL-CCNC: 21 U/L (ref 12–65)
ANION GAP SERPL CALC-SCNC: 8 MMOL/L (ref 7–16)
AST SERPL-CCNC: 19 U/L (ref 15–37)
BASOPHILS # BLD: 0.1 K/UL (ref 0–0.2)
BASOPHILS NFR BLD: 1 % (ref 0–2)
BILIRUB SERPL-MCNC: 0.5 MG/DL (ref 0.2–1.1)
BUN SERPL-MCNC: 8 MG/DL (ref 8–23)
CALCIUM SERPL-MCNC: 8.1 MG/DL (ref 8.3–10.4)
CHLORIDE SERPL-SCNC: 100 MMOL/L (ref 98–107)
CO2 SERPL-SCNC: 24 MMOL/L (ref 21–32)
CREAT SERPL-MCNC: 0.49 MG/DL (ref 0.8–1.5)
DIFFERENTIAL METHOD BLD: ABNORMAL
EMERGENT DISEASE PANEL, EDPR: NOT DETECTED
EOSINOPHIL # BLD: 0.2 K/UL (ref 0–0.8)
EOSINOPHIL NFR BLD: 1 % (ref 0.5–7.8)
ERYTHROCYTE [DISTWIDTH] IN BLOOD BY AUTOMATED COUNT: 13.4 % (ref 11.9–14.6)
GLOBULIN SER CALC-MCNC: 3.5 G/DL (ref 2.3–3.5)
GLUCOSE SERPL-MCNC: 98 MG/DL (ref 65–100)
HCT VFR BLD AUTO: 44.3 % (ref 41.1–50.3)
HGB BLD-MCNC: 14.8 G/DL (ref 13.6–17.2)
IMM GRANULOCYTES # BLD AUTO: 0.1 K/UL (ref 0–0.5)
IMM GRANULOCYTES NFR BLD AUTO: 1 % (ref 0–5)
LYMPHOCYTES # BLD: 3.3 K/UL (ref 0.5–4.6)
LYMPHOCYTES NFR BLD: 29 % (ref 13–44)
MCH RBC QN AUTO: 28 PG (ref 26.1–32.9)
MCHC RBC AUTO-ENTMCNC: 33.4 G/DL (ref 31.4–35)
MCV RBC AUTO: 83.7 FL (ref 79.6–97.8)
MM INDURATION POC: 0 MM (ref 0–5)
MONOCYTES # BLD: 1.3 K/UL (ref 0.1–1.3)
MONOCYTES NFR BLD: 11 % (ref 4–12)
NEUTS SEG # BLD: 6.5 K/UL (ref 1.7–8.2)
NEUTS SEG NFR BLD: 57 % (ref 43–78)
NRBC # BLD: 0 K/UL (ref 0–0.2)
PLATELET # BLD AUTO: 310 K/UL (ref 150–450)
PMV BLD AUTO: 9.4 FL (ref 9.4–12.3)
POTASSIUM SERPL-SCNC: 3.4 MMOL/L (ref 3.5–5.1)
PPD POC: NEGATIVE NEGATIVE
PROT SERPL-MCNC: 6.2 G/DL (ref 6.3–8.2)
RBC # BLD AUTO: 5.29 M/UL (ref 4.23–5.6)
SODIUM SERPL-SCNC: 132 MMOL/L (ref 136–145)
WBC # BLD AUTO: 11.4 K/UL (ref 4.3–11.1)

## 2020-06-04 PROCEDURE — 74011250636 HC RX REV CODE- 250/636: Performed by: INTERNAL MEDICINE

## 2020-06-04 PROCEDURE — 97530 THERAPEUTIC ACTIVITIES: CPT

## 2020-06-04 PROCEDURE — 94640 AIRWAY INHALATION TREATMENT: CPT

## 2020-06-04 PROCEDURE — 77010033678 HC OXYGEN DAILY

## 2020-06-04 PROCEDURE — 74011000250 HC RX REV CODE- 250: Performed by: INTERNAL MEDICINE

## 2020-06-04 PROCEDURE — 74011250637 HC RX REV CODE- 250/637: Performed by: INTERNAL MEDICINE

## 2020-06-04 PROCEDURE — 85025 COMPLETE CBC W/AUTO DIFF WBC: CPT

## 2020-06-04 PROCEDURE — 94760 N-INVAS EAR/PLS OXIMETRY 1: CPT

## 2020-06-04 PROCEDURE — 80053 COMPREHEN METABOLIC PANEL: CPT

## 2020-06-04 PROCEDURE — 99232 SBSQ HOSP IP/OBS MODERATE 35: CPT | Performed by: INTERNAL MEDICINE

## 2020-06-04 PROCEDURE — 74011000258 HC RX REV CODE- 258: Performed by: INTERNAL MEDICINE

## 2020-06-04 RX ORDER — DOXYCYCLINE 100 MG/1
100 TABLET ORAL 2 TIMES DAILY
Qty: 10 TAB | Refills: 0 | Status: SHIPPED | OUTPATIENT
Start: 2020-06-04 | End: 2020-06-09

## 2020-06-04 RX ORDER — GUAIFENESIN/DEXTROMETHORPHAN 100-10MG/5
5 SYRUP ORAL
Qty: 1 BOTTLE | Refills: 1 | Status: SHIPPED | OUTPATIENT
Start: 2020-06-04 | End: 2020-06-14

## 2020-06-04 RX ADMIN — IPRATROPIUM BROMIDE 0.5 MG: 0.5 SOLUTION RESPIRATORY (INHALATION) at 07:48

## 2020-06-04 RX ADMIN — IPRATROPIUM BROMIDE 0.5 MG: 0.5 SOLUTION RESPIRATORY (INHALATION) at 14:10

## 2020-06-04 RX ADMIN — TAMSULOSIN HYDROCHLORIDE 0.8 MG: 0.4 CAPSULE ORAL at 10:08

## 2020-06-04 RX ADMIN — ALBUTEROL SULFATE 2.5 MG: 2.5 SOLUTION RESPIRATORY (INHALATION) at 07:48

## 2020-06-04 RX ADMIN — CLOPIDOGREL BISULFATE 75 MG: 75 TABLET ORAL at 10:08

## 2020-06-04 RX ADMIN — Medication 5 ML: at 05:00

## 2020-06-04 RX ADMIN — ALBUTEROL SULFATE 2.5 MG: 2.5 SOLUTION RESPIRATORY (INHALATION) at 14:10

## 2020-06-04 RX ADMIN — BUDESONIDE 500 MCG: 0.5 INHALANT RESPIRATORY (INHALATION) at 07:48

## 2020-06-04 RX ADMIN — IPRATROPIUM BROMIDE 0.5 MG: 0.5 SOLUTION RESPIRATORY (INHALATION) at 02:25

## 2020-06-04 RX ADMIN — AZITHROMYCIN 500 MG: 500 INJECTION, POWDER, LYOPHILIZED, FOR SOLUTION INTRAVENOUS at 04:56

## 2020-06-04 RX ADMIN — CEFTRIAXONE SODIUM 2 G: 2 INJECTION, POWDER, FOR SOLUTION INTRAMUSCULAR; INTRAVENOUS at 04:26

## 2020-06-04 NOTE — PROGRESS NOTES
Received report from Melo Nicole, [facility]. Patient awake resting in bed. Respirations present. On 2 L NC. No signs of distress. AxO x3. No needs expressed. Bilateral soft wrist restraints present for patient safety. Bed low and locked. Call light within reach. Will continue to monitor.

## 2020-06-04 NOTE — PROGRESS NOTES
Madeline Kim  Admission Date: 6/1/2020             Daily Progress Note: 6/4/2020    The patient's chart is reviewed and the patient is discussed with the staff. 71yo WM with h/o COPD (FEV1 47% June 2019), bronchiectasis, lung nodules, and chronic hypoxic respiratory failure on 2L O2 at baseline. He was admitted 6/1/20 with increased dyspnea. He was seen in pulmonary office 5/28/20. At that time he was on spiriva and wixela and using percussion therapy for airway clearance. Repeat CT was performed showing worsening reticular nodular opacities in the LLL and stable changes elsewhere. The possibility of chronic aspiration was raised by the radiologist. A swallow study was planned to evaluate this further.      He then presented to the ER 6/1 with 3 days of increased shortness of breath associated with increased cough with yellow sputum. No fevers. W/u showed WBC of 16.4 and CXR showed increased L basilar pna vs aspiration.      He was admitted by the hospitalist service and started on ceftriaxone/azithro. Covid test was sent and is still pending.      Pulmonary was consulted to review his recent CT findings. Subjective:     Patient continues to have no complaints above baseline. Was seen by speech and feel swallowing seems normal. Covid test now back and negative.      Current Facility-Administered Medications   Medication Dose Route Frequency    cefTRIAXone (ROCEPHIN) 2 g in 0.9% sodium chloride (MBP/ADV) 50 mL  2 g IntraVENous Q24H    clopidogreL (PLAVIX) tablet 75 mg  75 mg Oral DAILY    gabapentin (NEURONTIN) capsule 300 mg  300 mg Oral QHS    tamsulosin (FLOMAX) capsule 0.8 mg  0.8 mg Oral DAILY    sodium chloride (NS) flush 5-40 mL  5-40 mL IntraVENous Q8H    sodium chloride (NS) flush 5-40 mL  5-40 mL IntraVENous PRN    acetaminophen (TYLENOL) tablet 650 mg  650 mg Oral Q4H PRN    senna-docusate (PERICOLACE) 8.6-50 mg per tablet 1 Tab  1 Tab Oral BID PRN    azithromycin (ZITHROMAX) 500 mg in 0.9% sodium chloride (MBP/ADV) 250 mL  500 mg IntraVENous Q24H    enoxaparin (LOVENOX) injection 40 mg  40 mg SubCUTAneous Q24H    guaiFENesin-dextromethorphan (ROBITUSSIN DM) 100-10 mg/5 mL syrup 5 mL  5 mL Oral Q6H PRN    ondansetron (ZOFRAN ODT) tablet 4 mg  4 mg Oral Q6H PRN    Or    ondansetron (ZOFRAN) injection 4 mg  4 mg IntraVENous Q6H PRN    budesonide (PULMICORT) 500 mcg/2 ml nebulizer suspension  500 mcg Nebulization BID RT    And    albuterol (PROVENTIL VENTOLIN) nebulizer solution 2.5 mg  2.5 mg Nebulization Q6HWA RT    ipratropium (ATROVENT) 0.02 % nebulizer solution 0.5 mg  0.5 mg Nebulization Q6H RT       Review of Systems  Constitutional: negative for fever, chills, sweats  Cardiovascular: negative for chest pain, palpitations, syncope, edema  Gastrointestinal: negative for dysphagia, reflux, vomiting, diarrhea, abdominal pain, or melena  Neurologic: negative for focal weakness, numbness, headache    Objective:     Vitals:    06/04/20 0442 06/04/20 0719 06/04/20 0748 06/04/20 1050   BP: 158/75 160/89  151/82   Pulse: (!) 115 63  99   Resp: 20 20 19   Temp: 98.2 °F (36.8 °C) 98.3 °F (36.8 °C)  98 °F (36.7 °C)   SpO2: 92% 92% 91% 92%   Weight:       Height:         Intake and Output:   06/02 1901 - 06/04 0700  In: 900 [P.O.:600; I.V.:300]  Out: 800 [Urine:800]  06/04 0701 - 06/04 1900  In: 240 [P.O.:240]  Out: 300 [Urine:300]    Physical Exam:   Constitution:  the patient is well developed and in no acute distress  EENMT:  Sclera clear, pupils equal, oral mucosa moist  Respiratory: CTA B, no w/r/r  Cardiovascular:  RRR without M,G,R  Gastrointestinal: soft and non-tender; with positive bowel sounds. Musculoskeletal: warm without cyanosis. There is no lower leg edema.   Skin:  no jaundice or rashes, no wounds   Neurologic: no gross neuro deficits     Psychiatric:  alert and oriented x ppt    CHEST XRAY:   No new imaging    LAB  No lab exists for component: GLPOC   Recent Labs     06/04/20  0425 06/03/20  0506 06/02/20  0235 06/01/20  1800   WBC 11.4* 15.0* 22.3* 16.4*   HGB 14.8 15.0 14.9 16.1   HCT 44.3 46.3 44.8 47.9    338 317 329     Recent Labs     06/04/20  0544 06/03/20  0506 06/02/20  0235 06/01/20  1800   * 136 135* 134*   K 3.4* 3.9 4.2 4.9    104 103 101   CO2 24 23 23 26   GLU 98 96 187* 104*   BUN 8 10 9 10   CREA 0.49* 0.65* 0.65* 0.72*   CA 8.1* 9.1 8.5 9.2   ALB 2.7* 3.2  --  3.4     ABG:  No results found for: PH, PHI, PCO2, PCO2I, PO2, PO2I, HCO3, HCO3I, FIO2, FIO2I      Assessment:  (Medical Decision Making)     Hospital Problems  Date Reviewed: 5/28/2020          Codes Class Noted POA    Suspected COVID-19 virus infection ICD-10-CM: Z20.828  ICD-9-CM: V01.79  6/2/2020 Yes        Pneumonia ICD-10-CM: J18.9  ICD-9-CM: 835  6/1/2020 Unknown        Bronchiectasis ([facility] (Chronic) ICD-10-CM: J47.9  ICD-9-CM: 494.0  1/24/2019 Yes        Benign essential HTN (Chronic) ICD-10-CM: I10  ICD-9-CM: 401.1  6/6/2017 Yes        COPD, moderate ([facility] (Chronic) ICD-10-CM: J44.9  ICD-9-CM: 880  10/8/2013 Yes    Overview Addendum 7/17/2019 10:57 AM by Larry Duran NP     GOLD stage II  O2 dependent 2L             CAD (coronary artery disease) (Chronic) ICD-10-CM: I25.10  ICD-9-CM: 414.00  10/8/2013 Yes    Overview Signed 4/4/2016 11:55 AM by Margot Zambrano of the native vessel                 Patient seeming to be at baseline. Infectious/inflammatory nodules in LLL but without acute complaints. Covid negative. Seen by speech and plan for MBS. Plan:  (Medical Decision Making)   -MBS, possibly prior to discharge.   -otherwise he seems at baseline and ready for discharge.   -will likely need repeat CT in 3-6 months.        Gómez Ortega MD

## 2020-06-04 NOTE — PROGRESS NOTES
Scheduled morning labs collected at approximately 0425 and sent to lab via tube system at 0429. Primary RN, Xochitl Danielle, doris.

## 2020-06-04 NOTE — ROUTINE PROCESS
Patient taken off oxygen and ambulated in room. O2 sats remained >92%, wife notified and will be coming to transport patient home. IV removed by primary RN, remote telemetry removed.

## 2020-06-04 NOTE — PROGRESS NOTES
Notified by lab that the patient's CMP hemolyzed and would need to be recollected. Recollected patient's morning labs at approximately [phone] and sent to lab via tube system at 3567.

## 2020-06-04 NOTE — PROGRESS NOTES
Problem: Mobility Impaired (Adult and Pediatric)  Goal: *Therapy Goal (Edit Goal, Insert Text)  Outcome: Progressing Towards Goal  Note: Continue for consistency   LTG:  (1.)Mr. Kim will move from supine to sit and sit to supine , scoot up and down, and roll side to side in bed with INDEPENDENT within 7 treatment day(s). (2.)Mr. Kim will transfer from bed to chair and chair to bed with INDEPENDENT using the least restrictive device within 7 treatment day(s). (3.)Mr. Kim will ambulate with INDEPENDENT for 500 feet with the least restrictive device within 7 treatment day(s). ________________________________________________________________________________________________      PHYSICAL THERAPY: Daily Note and AM 6/4/2020  INPATIENT: PT Visit Days : 1  [place] / [place] / Product Type: Medicare /       NAME/AGE/GENDER: Viktoria Tavera is a 78 y.o. male   PRIMARY DIAGNOSIS: Pneumonia [J18.9] <principal problem not specified> <principal problem not specified>       ICD-10: Treatment Diagnosis:    · Generalized Muscle Weakness (M62.81)  · Other lack of cordination (R27.8)  · Difficulty in walking, Not elsewhere classified (R26.2)  · Other abnormalities of gait and mobility (R26.89)   Precaution/Allergies:  Aspirin; Aleve [naproxen sodium]; Augmentin [amoxicillin-pot clavulanate]; Codeine sulfate; Corn containing products; Crestor [rosuvastatin]; Septra [sulfamethoprim ds]; Zetia [ezetimibe]; and Zoloft [sertraline]      ASSESSMENT:     Mr. Mayela Napoles presents with mild debility. CV 19 ruled out. He is ambulating in room independently but requires redirection and cuing at times. All transfers are also independent. He then ambulates for 75 feet with PT with mild side to side sway and decreased dynamic balance like yesterday. Then he returns to seated in bedside chair. He is less confused at the moment about situation. He is eager to go home.   Finally he ambulates to the bathroom with independent cuing. Then he returns into room and is seated in the bedside chair. Skilled PT is indicated for consistency and to assure safe transfers and ambulation. Home discharge is anticipated with no additional rehab services at this time. This section established at most recent assessment   PROBLEM LIST (Impairments causing functional limitations):  1. Decreased Strength  2. Decreased ADL/Functional Activities  3. Decreased Transfer Abilities  4. Decreased Ambulation Ability/Technique  5. Decreased Balance   INTERVENTIONS PLANNED: (Benefits and precautions of physical therapy have been discussed with the patient.)  1. Balance Exercise  2. Bed Mobility  3. Therapeutic Activites  4. Therapeutic Exercise/Strengthening     TREATMENT PLAN: Frequency/Duration: 4 times a week for duration of hospital stay  Rehabilitation Potential For Stated Goals: Good     REHAB RECOMMENDATIONS (at time of discharge pending progress):    Placement: It is my opinion, based on this patient's performance to date, that Mr. Andreia Naranjo may benefit from being discharged with NO further skilled therapy due to the high likelihood of returning to baseline. Equipment:    None at this time              HISTORY:   History of Present Injury/Illness (Reason for Referral):  PER MD H&P   Carlos A Blank is a 78 y.o. male with a history of COPD, hypertension, type 2 diabetes mellitus, and GERD presenting with mild respiratory distress. Patient states that for the last 3 days he is been breathing faster than normal.  He denies any difficulty getting air in and out or shortness of breath specifically. He has occasional cough but denies any sputum production. He denies fevers, chills, diaphoresis, chest pain, abdominal pain, nausea, vomiting, diarrhea, dysuria, flank pain, headache, lightheadedness, dizziness, or arthralgias/myalgias. He lives with his wife at home with 2 dogs.   He denies any sick contact or recent travel. Past Medical History/Comorbidities:   Mr. Augustus Pedro  has a past medical history of Arthritis, Benign essential HTN (6/6/2017), Chronic obstructive pulmonary disease ([address] Diabetes ([address] Ear problems, Fungal sinusitis, GERD (gastroesophageal reflux disease), History of multiple allergies, migraines, Hypertension, Irritable bowel syndrome with diarrhea (10/2/2018), Shingles (10/1/15), Sinus problem, and Thoracic aneurysm without mention of rupture (12/2/2014). He also has no past medical history of Arrhythmia, Asthma, Cancer ([address] Chronic kidney disease, Coagulation disorder ([address] Endocarditis, Heart failure ([address] Liver disease, Nicotine vapor product user, Non-nicotine vapor product user, Psychiatric disorder, PUD (peptic ulcer disease), Seizures ([address] Sleep apnea, Stroke ([address] Thromboembolus ([address] or Thyroid disease. Mr. Augustus Pedro  has a past surgical history that includes hx heent (1999); hx hernia repair (Right, 1978); hx appendectomy (age 9); hx other surgical (2/2012); hx tonsillectomy; hx cataract removal (Bilateral); neurological procedure unlisted; and hx heart catheterization (2/27/12). Social History/Living Environment:   Home Environment: Private residence  # Steps to Enter: 1  One/Two Story Residence: One story  Living Alone: No  Support Systems: Spouse/Significant Other/Partner  Patient Expects to be Discharged to[de-identified] Private residence  Current DME Used/Available at Home: None  Prior Level of Function/Work/Activity:  Had been independent with all functional mobility. Dominant Side:         RIGHT    Personal Factors:          Sex:  male        Age:  78 y.o.    Number of Personal Factors/Comorbidities that affect the Plan of Care: 0: LOW COMPLEXITY   EXAMINATION:   Most Recent Physical Functioning:   Gross Assessment:  AROM: Generally decreased, functional  PROM: Generally decreased, functional  Strength: Generally decreased, functional  Coordination: Generally decreased, functional  Tone: Normal  Sensation: Intact               Posture:  Posture (WDL): Exceptions to WDL  Posture Assessment: Cervical, Forward head  Balance:  Sitting: Intact  Sitting - Static: Good (unsupported)  Sitting - Dynamic: Good (unsupported)  Standing: Intact  Standing - Static: Good  Standing - Dynamic : Good Bed Mobility:  Rolling: Independent  Supine to Sit: Independent  Sit to Supine: Independent  Scooting: Independent  Wheelchair Mobility:     Transfers:  Sit to Stand: Independent  Stand to Sit: Independent  Bed to Chair: Independent  Gait:     Base of Support: Center of gravity altered  Speed/Tatyana: Fluctuations  Stance: Left decreased;Right decreased;Time;Weight shift  Gait Abnormalities: Decreased step clearance  Distance (ft): 75 Feet (ft)  Assistive Device: (no assistive device)  Ambulation - Level of Assistance: Independent  Interventions: Manual cues; Safety awareness training; Tactile cues; Verbal cues; Visual/Demos      Body Structures Involved:  1. Bones  2. Joints  3. Muscles  4. Ligaments Body Functions Affected:  1. Neuromusculoskeletal  2. Movement Related  3. Skin Related  4. Metobolic/Endocrine Activities and Participation Affected:  1. Communication  2. Mobility  3. Self Care  4. Domestic Life  5. Community, Social and [place]   Number of elements that affect the Plan of Care: 1-2: LOW COMPLEXITY   CLINICAL PRESENTATION:   Presentation: Stable and uncomplicated: LOW COMPLEXITY   CLINICAL DECISION MAKIN [facility] Mobility Inpatient Short Form  How much difficulty does the patient currently have. .. Unable A Lot A Little None   1. Turning over in bed (including adjusting bedclothes, sheets and blankets)? [] 1   [] 2   [] 3   [x] 4   2. Sitting down on and standing up from a chair with arms ( e.g., wheelchair, bedside commode, etc.)   [] 1   [] 2   [] 3   [x] 4   3. Moving from lying on back to sitting on the side of the bed?    [] 1   [] 2   [] 3 [x] 4   How much help from another person does the patient currently need. .. Total A Lot A Little None   4. Moving to and from a bed to a chair (including a wheelchair)? [] 1   [] 2   [] 3   [x] 4   5. Need to walk in hospital room? [] 1   [] 2   [] 3   [x] 4   6. Climbing 3-5 steps with a railing? [] 1   [] 2   [x] 3   [] 4   © 2007, Trustees of [address] [place] [address] [address] [id number], under license to [employer]. All rights reserved      Score:  Initial: 23 Most Recent: X (Date: -- )    Interpretation of Tool:  Represents activities that are increasingly more difficult (i.e. Bed mobility, Transfers, Gait). Medical Necessity:     · Patient demonstrates   · excellent  ·  rehab potential due to higher previous functional level. Reason for Services/Other Comments:  · Patient continues to require skilled intervention due to   · Debility and decreased mobility. · .   Use of outcome tool(s) and clinical judgement create a POC that gives a: Clear prediction of patient's progress: LOW COMPLEXITY            TREATMENT:   (In addition to Assessment/Re-Assessment sessions the following treatments were rendered)   Pre-treatment Symptoms/Complaints:  0/10   Pain: Initial:   Pain Intensity 1: 0  Post Session: 0/10 no pain reported. Therapeutic Activity: (    25 mins): Therapeutic activities including Bed transfers, Chair transfers, and Ambulation on level ground to improve mobility, strength, balance, and coordination. Required minimal Manual cues; Safety awareness training; Tactile cues; Verbal cues; Visual/Demos to promote coordination of bilateral and promote motor control of bilateral, lower extremity(s). Braces/Orthotics/Lines/Etc:   · O2 Device: Room air  Treatment/Session Assessment:    · Response to Treatment:  improved transfers, ambulation and mobility.       · Interdisciplinary Collaboration:   o Physical Therapist  o Registered Nurse  · After treatment position/precautions:   o Supine in bed  o Bed alarm/tab alert on  o Bed/Chair-wheels locked  o Bed in low position  o Call light within reach  o Side rails x 3   · Compliance with Program/Exercises: Will assess as treatment progresses  · Recommendations/Intent for next treatment session: \"Next visit will focus on advancements to more challenging activities, reduction in assistance provided, and transfers, ambulation and mobility. \".   Total Treatment Duration:  PT Patient Time In/Time Out  Time In: 1030  Time Out: [address] Stephania Goldmann

## 2020-06-04 NOTE — PROGRESS NOTES
Attempted to place new IV so that patient could receive his ordered IV antibiotics. Patient became agitated as he is confused and was adamant that \"you will not inflict pain upon me and I am not in the hospital\". Attempts to reorient patient are unsuccessful. Notified Dr. Costa Robins at 2005 via [employer] that patient is confused, agitated, and will not allow RN to place IV. Dr. Brenda Garcia advised at [address] [place] [address] to [facility] IV for now\". Primary RN, Paola Burroughs, aware of all of the above stated.

## 2020-06-04 NOTE — DISCHARGE INSTRUCTIONS
DISCHARGE SUMMARY from Nurse    PATIENT INSTRUCTIONS:    After general anesthesia or intravenous sedation, for 24 hours or while taking prescription Narcotics:  · Limit your activities  · Do not drive and operate hazardous machinery  · Do not make important personal or business decisions  · Do  not drink alcoholic beverages  · If you have not urinated within 8 hours after discharge, please contact your surgeon on call. What to do at Home:  Recommended activity: Activity as tolerated. If you experience any of the following symptoms temp > 101.5, worsening cough or wheezing, shortness of breath or fatigue not relieved with rest, please follow up with MMD.    *  Please give a list of your current medications to your Primary Care Provider. *  Please update this list whenever your medications are discontinued, doses are      changed, or new medications (including over-the-counter products) are added. *  Please carry medication information at all times in case of emergency situations. These are general instructions for a healthy lifestyle:    No smoking/ No tobacco products/ Avoid exposure to second hand smoke  Surgeon General's Warning:  Quitting smoking now greatly reduces serious risk to your health. Obesity, smoking, and sedentary lifestyle greatly increases your risk for illness    A healthy diet, regular physical exercise & weight monitoring are important for maintaining a healthy lifestyle    You may be retaining fluid if you have a history of heart failure or if you experience any of the following symptoms:  Weight gain of 3 pounds or more overnight or 5 pounds in a week, increased swelling in our hands or feet or shortness of breath while lying flat in bed. Please call your doctor as soon as you notice any of these symptoms; do not wait until your next office visit. The discharge information has been reviewed with the patient. The patient verbalized understanding.   Discharge medications reviewed with the patient and appropriate educational materials and side effects teaching were provided. Patient Education        Pneumonia: Care Instructions  Your Care Instructions     Pneumonia is an infection of the lungs. Most cases are caused by infections from bacteria or viruses. Pneumonia may be mild or very severe. If it is caused by bacteria, you will be treated with antibiotics. It may take a few weeks to a few months to recover fully from pneumonia, depending on how sick you were and whether your overall health is good. Follow-up care is a key part of your treatment and safety. Be sure to make and go to all appointments, and call your doctor if you are having problems. It's also a good idea to know your test results and keep a list of the medicines you take. How can you care for yourself at home? · Take your antibiotics exactly as directed. Do not stop taking the medicine just because you are feeling better. You need to take the full course of antibiotics. · Take your medicines exactly as prescribed. Call your doctor if you think you are having a problem with your medicine. · Get plenty of rest and sleep. You may feel weak and tired for a while, but your energy level will improve with time. · To prevent dehydration, drink plenty of fluids, enough so that your urine is light yellow or clear like water. Choose water and other caffeine-free clear liquids until you feel better. If you have kidney, heart, or liver disease and have to limit fluids, talk with your doctor before you increase the amount of fluids you drink. · Take care of your cough so you can rest. A cough that brings up mucus from your lungs is common with pneumonia. It is one way your body gets rid of the infection. But if coughing keeps you from resting or causes severe fatigue and chest-wall pain, talk to your doctor. He or she may suggest that you take a medicine to reduce the cough.   · Use a vaporizer or humidifier to add moisture to your bedroom. Follow the directions for cleaning the machine. · Do not smoke or allow others to smoke around you. Smoke will make your cough last longer. If you need help quitting, talk to your doctor about stop-smoking programs and medicines. These can increase your chances of quitting for good. · Take an over-the-counter pain medicine, such as acetaminophen (Tylenol), ibuprofen (Advil, Motrin), or naproxen (Aleve). Read and follow all instructions on the label. · Do not take two or more pain medicines at the same time unless the doctor told you to. Many pain medicines have acetaminophen, which is Tylenol. Too much acetaminophen (Tylenol) can be harmful. · If you were given a spirometer to measure how well your lungs are working, use it as instructed. This can help your doctor tell how your recovery is going. · To prevent pneumonia in the future, talk to your doctor about getting a flu vaccine (once a year) and a pneumococcal vaccine (one time only for most people). When should you call for help? [id number] anytime you think you may need emergency care. For example, call if:  · You have severe trouble breathing. Call your doctor now or seek immediate medical care if:  · You cough up dark brown or bloody mucus (sputum). · You have new or worse trouble breathing. · You are dizzy or lightheaded, or you feel like you may faint. Watch closely for changes in your health, and be sure to contact your doctor if:  · You have a new or higher fever. · You are coughing more deeply or more often. · You are not getting better after 2 days (48 hours). · You do not get better as expected. Where can you learn more? Go to http://kiran-justice.info/  Enter D336 in the search box to learn more about \"Pneumonia: Care Instructions. \"  Current as of: February 24, 2020               Content Version: 12.5  © 3193-9537 [employer].    Care instructions adapted under license by [employer] (which disclaims liability or warranty for this information). If you have questions about a medical condition or this instruction, always ask your healthcare professional. [address] any warranty or liability for your use of this information.          ___________________________________________________________________________________________________________________________________

## 2020-06-04 NOTE — DISCHARGE SUMMARY
Hospitalist Discharge Summary     Patient ID:  Cindy Kowalski  [id number]  17 y.o.  1940  Admit date: 6/1/2020  6:41 PM  Discharge date and time: 6/4/2020  Attending: Sheba Mckinley MD  PCP:  Jenelle Adams DO  Treatment Team: Attending Provider: Sheba Mckinley MD; Utilization Review: Lonnie Camarillo RN; Consulting Provider: Sohail Maguire MD; Care Manager: Kristofer Wolfe; Speech Language Pathologist: Angela Gorman, KATLIN; Primary Nurse: Sohan Tate    Principal Diagnosis <principal problem not specified>   Active Problems:    COPD, moderate ([address] (10/8/2013)      Overview: GOLD stage II      O2 dependent 2L      CAD (coronary artery disease) (10/8/2013)      Overview: ASCAD of the native vessel      Benign essential HTN (6/6/2017)      Bronchiectasis ([facility] [address] (1/24/2019)      Pneumonia (6/1/2020)      Suspected COVID-19 virus infection (6/2/2020)             Hospital Course:  Please refer to the admission H&P for details of presentation. In summary, the patient is 77 yo male with PMH of COPD on 2 L NC chronically and followed by pulmonary admitted with dyspnea. He was seen by outpatient pulm 5-28-20 s/p CT chest showing scattered nodules, increased reticular opacities left lower lobe. He was kept on rocephin/azithromycin for pneumonia. COVID testing is negative. Pt has responded well to pulmicort, albuterol nebs along with mucinex and atrovent. Pt is at his baseline 2 ltrs home O2. He did well with speech therapy. Pt is medically cleared and hemodynamically stable for discharge today to home with [facility]HA. Rest of the hospital course was uneventful. For further details, please refer to daily progress notes. Significant Diagnostic Studies:   Chest portable     CLINICAL INDICATION: Acute moderate dyspnea with history of thoracic aneurysm,  COPD. Hypoxic today.  Home oxygen requirement.     COMPARISON: August 13, 2019 and 7/17/2019 radiography, also CT 5/28/2020     TECHNIQUE: single AP portable view chest at 7:15 PM upright      FINDINGS: Interval slight worsening of mild to moderate peribronchial opacities  in the left base. No evidence of a dense lobar consolidation, increasing  effusion, pneumothorax, or CHF. Stable mediastinal and hilar contours. Mild  bibasilar linear atelectasis and scarring otherwise unchanged. Scattered lung  nodules were better evaluated on prior CT. Wires and electrodes again project  over soft tissues of the posterior right back.         IMPRESSION  IMPRESSION: Increased left basilar pneumonia, versus aspiration. Labs: Results:       Chemistry Recent Labs     06/04/20  0544 06/03/20  0506 06/02/20  0235 06/01/20  1800   GLU 98 96 187* 104*   * 136 135* 134*   K 3.4* 3.9 4.2 4.9    104 103 101   CO2 24 23 23 26   BUN 8 10 9 10   CREA 0.49* 0.65* 0.65* 0.72*   CA 8.1* 9.1 8.5 9.2   AGAP 8 9 9 7   AP 86 100  --  115   TP 6.2* 7.4  --  7.7   ALB 2.7* 3.2  --  3.4   GLOB 3.5 4.2*  --  4.3*   AGRAT 0.8* 0.8*  --  0.8*      CBC w/Diff Recent Labs     06/04/20  0425 06/03/20  0506 06/02/20  0235 06/01/20  1800   WBC 11.4* 15.0* 22.3* 16.4*   RBC 5.29 5.41 5.29 5.63*   HGB 14.8 15.0 14.9 16.1   HCT 44.3 46.3 44.8 47.9    338 317 329   GRANS 57 71  --  77   LYMPH 29 19  --  11*   EOS 1 0*  --  2      Cardiac Enzymes No results for input(s): CPK, CKND1, LKAISHA in the last 72 hours. No lab exists for component: CKRMB, TROIP   Coagulation No results for input(s): PTP, INR, APTT, INREXT in the last 72 hours. Lipid Panel Lab Results   Component Value Date/Time    Cholesterol, total 210 (H) 10/30/2019 10:04 AM    HDL Cholesterol 48 10/30/2019 10:04 AM    LDL, calculated 134 (H) 10/30/2019 10:04 AM    VLDL, calculated 28 10/30/2019 10:04 AM    Triglyceride 142 10/30/2019 10:04 AM      BNP No results for input(s): BNPP in the last 72 hours.    Liver Enzymes Recent Labs     06/04/20  0544   TP 6.2*   ALB 2.7*   AP 86      Thyroid Studies Lab Results   Component Value Date/Time    TSH 0.563 06/25/2018 03:48 PM    TSH 0.797 09/27/2017 08:38 AM            Discharge Exam:  Visit Vitals  /89   Pulse 63   Temp 98.3 °F (36.8 °C)   Resp 20   Ht 5' 6\" (1.676 m)   Wt 57.7 kg (127 lb 1.6 oz)   SpO2 92%   BMI 20.51 kg/m²     General appearance: alert, cooperative, no distress, appears stated age  Lungs: clear to auscultation bilaterally  Heart: regular rate and rhythm, S1, S2 normal, no murmur, click, rub or gallop  Abdomen: soft, non-tender. Bowel sounds normal. No masses,  no organomegaly  Extremities: no cyanosis or edema  Neurologic: Grossly normal    Disposition: home with [facility]  Discharge Condition: stable  Patient Instructions:   Current Discharge Medication List      START taking these medications    Details   guaiFENesin-dextromethorphan (ROBITUSSIN DM) 100-10 mg/5 mL syrup Take 5 mL by mouth every six (6) hours as needed for Cough or Congestion for up to 10 days. Qty: 1 Bottle, Refills: 1      doxycycline (ADOXA) 100 mg tablet Take 1 Tab by mouth two (2) times a day for 5 days. Qty: 10 Tab, Refills: 0         CONTINUE these medications which have NOT CHANGED    Details   SUMAtriptan (IMITREX) 100 mg tablet TAKE 1/2 TO 1 TABLET BY MOUTH AS NEEDED FOR MIGRAINE MAY REPEAT IN 2HRS MAX 2/24HRS  Qty: 9 Tab, Refills: 5    Associated Diagnoses: Migraine with aura and without status migrainosus, not intractable      tamsulosin (FLOMAX) 0.4 mg capsule TAKE 2 CAPS BY MOUTH DAILY. INDICATIONS: ENLARGED PROSTATE WITH URINATION PROBLEM  Qty: 180 Cap, Refills: 1    Associated Diagnoses: Benign prostatic hyperplasia with urinary frequency      gabapentin (NEURONTIN) 300 mg capsule TAKE 1 CAPSULE BY MOUTH THREE TIMES A DAY  Qty: 270 Cap, Refills: 1    Associated Diagnoses: Postherpetic neuralgia      triamcinolone (NASACORT AQ) 55 mcg nasal inhaler 2 Sprays by Both Nostrils route daily.  Indications: inflammation of the nose due to an allergy  Qty: 1 Bottle, Refills: 11      albuterol (PROAIR HFA) 90 mcg/actuation inhaler INHALE 2 PUFFS BY MOUTH EVERY 4 HOURS AS NEEDED  Qty: 1 Inhaler, Refills: 11      clopidogrel (PLAVIX) 75 mg tab TAKE 1 TAB BY MOUTH DAILY. Qty: 80 Tab, Refills: 1    Associated Diagnoses: Coronary artery disease involving native coronary artery of native heart without angina pectoris      albuterol (PROVENTIL VENTOLIN) 2.5 mg /3 mL (0.083 %) nebu USE 3 ML BY NEBULIZATION ROUTE THREE (3) TIMES DAILY. Bill to Medicare Part B with Dx: J44.9 COPD. Qty: 90 Nebule, Refills: 11    Comments: Please bill to Medicare part B  Associated Diagnoses: Chronic obstructive pulmonary disease, unspecified COPD type ([facility] [address] Bronchiectasis without acute exacerbation ([facility] [address] Ineffective airway clearance      tiotropium (SPIRIVA WITH HANDIHALER) 18 mcg inhalation capsule Take 1 Cap by inhalation daily. Qty: 30 Cap, Refills: 11      fluticasone propion-salmeterol (WIXELA INHUB) 500-50 mcg/dose diskus inhaler Take 1 Puff by inhalation every twelve (12) hours. Qty: 1 Inhaler, Refills: 11      Lactobac no. 41-Bifidobact no.7 (PROBIOTIC-10) 70 mg (3 billion cell) cap Take  by mouth. acetaminophen (TYLENOL) 325 mg tablet Take 2 Tabs by mouth every four (4) hours as needed for Pain. Qty: 20 Tab, Refills: 0      B.infantis-B.ani-B.long-B.bifi (PROBIOTIC 4X) 10-15 mg TbEC Take  by mouth. cholecalciferol (VITAMIN D3) 400 unit tab tablet Take  by mouth daily. latanoprost (XALATAN) 0.005 % ophthalmic solution Administer 1 Drop to both eyes nightly.     Associated Diagnoses: Frequency of urination      glucose blood VI test strips (BLOOD GLUCOSE TEST) strip Check BS once a day fasting (DX: E11.9); one touch verio  Qty: 100 Strip, Refills: 11    Associated Diagnoses: Controlled type 2 diabetes mellitus without complication, without long-term current use of insulin (HCC)      Lancets misc Check BS once a day fasting (DX: E11.9); one touch verio  Qty: 100 Each, Refills: 11    Associated Diagnoses: Controlled type 2 diabetes mellitus without complication, without long-term current use of insulin (HCC)      VIT A/VIT C/VIT E/ZINC/COPPER (PRESERVISION AREDS PO) Take 1 Tab by mouth two (2) times a day. nitroglycerin (NITROSTAT) 0.4 mg SL tablet 1 Tab by SubLINGual route every five (5) minutes as needed for Chest Pain. Qty: 25 Tab, Refills: 6      lipase-protease-amylase (CREON 12,000) 12,000-38,000 -60,000 unit capsule Take 3 Caps by mouth three (3) times daily (with meals). EPINEPHrine (EPIPEN) 0.3 mg/0.3 mL (1:1,000) injection 0.3 mg by IntraMUSCular route once as needed.              Activity: PT/OT per Home Health  Diet: Regular Diet  Wound Care: None needed    Follow-up  ·   Follow up with PCP in 1-2 weeks  Time spent to discharge patient 35 minutes  Signed:  Vashti Benavides MD  6/4/2020  7:24 AM

## 2020-06-04 NOTE — ROUTINE PROCESS
Discharge instructions, follow up information, medication list, and prescription information provided and explained to the patient's wife via telephone. Patient's wife states patient uses oxygen at home but only at night and does not have any portable tanks for transport home. Order place for respiratory to do oxygen qualifier to determine current oxygen needs. Opportunity for questions provided. Will call wife back when oxygen qualifier completed.

## 2020-06-04 NOTE — PROGRESS NOTES
Patient is discharging today to home with no needs. PT/OT evaluations both recommended no continued skilled therapy. SW confirmed with DC nurse that patient has weaned off of oxygen. No HH indications at this time. Home today.

## 2020-06-05 ENCOUNTER — PATIENT OUTREACH (OUTPATIENT)
Dept: CASE MANAGEMENT | Age: 80
End: 2020-06-05

## 2020-06-05 NOTE — PROGRESS NOTES
Transition of Care Hospital Discharge Follow-Up      Date/Time:  2020 11:05 AM    Patient was admitted to [facility] on 2020 and discharged on 2020 for Pneumonia. The physician discharge summary was available at the time of outreach. Patient was contacted within 7 business days of discharge. Inpatient RUR score: 3%  Was this a readmission? no   Patient stated reason for the readmission: none  Patients top risk factors for readmission: Pneumonia    LPN Care Coordinator contacted the patient by telephone to perform post hospital discharge assessment. Verified name and  with patient as identifiers. Provided introduction to self, and explanation of the Care Coordinator role. Patient received hospital discharge instructions. LPN reviewed discharge instructions and red flags with patient who verbalized understanding. Patient given an opportunity to ask questions and does not have any further questions or concerns at this time. The patient agrees to contact the PCP office for questions related to their healthcare. LPN provided contact information for future reference. Home Health orders at discharge: [address]: N/A  Date of initial or scheduled visit: n/A  (Assist with coordination of services if necessary.)    Durable Medical Equipment ordered at discharge: none  [address] [place] [address] received: N/A  (Assist patient in obtaining DME orders &/or equipment if necessary.)    Medication(s):   Medication review was performed with patient, who verbalizes understanding of administration of home medications. There were no barriers to obtaining medications identified at this time. Current Outpatient Medications   Medication Sig    guaiFENesin-dextromethorphan (ROBITUSSIN DM) 100-10 mg/5 mL syrup Take 5 mL by mouth every six (6) hours as needed for Cough or Congestion for up to 10 days.     doxycycline (ADOXA) 100 mg tablet Take 1 Tab by mouth two (2) times a day for 5 days.  SUMAtriptan (IMITREX) 100 mg tablet TAKE 1/2 TO 1 TABLET BY MOUTH AS NEEDED FOR MIGRAINE MAY REPEAT IN 2HRS MAX 2/24HRS    tamsulosin (FLOMAX) 0.4 mg capsule TAKE 2 CAPS BY MOUTH DAILY. INDICATIONS: ENLARGED PROSTATE WITH URINATION PROBLEM    gabapentin (NEURONTIN) 300 mg capsule TAKE 1 CAPSULE BY MOUTH THREE TIMES A DAY    triamcinolone (NASACORT AQ) 55 mcg nasal inhaler 2 Sprays by Both Nostrils route daily. Indications: inflammation of the nose due to an allergy    albuterol (PROAIR HFA) 90 mcg/actuation inhaler INHALE 2 PUFFS BY MOUTH EVERY 4 HOURS AS NEEDED    clopidogrel (PLAVIX) 75 mg tab TAKE 1 TAB BY MOUTH DAILY.  albuterol (PROVENTIL VENTOLIN) 2.5 mg /3 mL (0.083 %) nebu USE 3 ML BY NEBULIZATION ROUTE THREE (3) TIMES DAILY. Bill to Medicare Part B with Dx: J44.9 COPD.  tiotropium (SPIRIVA WITH HANDIHALER) 18 mcg inhalation capsule Take 1 Cap by inhalation daily.  fluticasone propion-salmeterol (WIXELA INHUB) 500-50 mcg/dose diskus inhaler Take 1 Puff by inhalation every twelve (12) hours.  Lactobac no. 41-Bifidobact no.7 (PROBIOTIC-10) 70 mg (3 billion cell) cap Take  by mouth.  acetaminophen (TYLENOL) 325 mg tablet Take 2 Tabs by mouth every four (4) hours as needed for Pain.  nitroglycerin (NITROSTAT) 0.4 mg SL tablet 1 Tab by SubLINGual route every five (5) minutes as needed for Chest Pain.  lipase-protease-amylase (CREON 12,000) 12,000-38,000 -60,000 unit capsule Take 3 Caps by mouth three (3) times daily (with meals).  B.infantis-B.ani-B.long-B.bifi (PROBIOTIC 4X) 10-15 mg TbEC Take  by mouth.  cholecalciferol (VITAMIN D3) 400 unit tab tablet Take  by mouth daily.  latanoprost (XALATAN) 0.005 % ophthalmic solution Administer 1 Drop to both eyes nightly.     glucose blood VI test strips (BLOOD GLUCOSE TEST) strip Check BS once a day fasting (DX: E11.9); one touch verio    Lancets misc Check BS once a day fasting (DX: E11.9); one touch verio    EPINEPHrine (EPIPEN) 0.3 mg/0.3 mL (1:1,000) injection 0.3 mg by IntraMUSCular route once as needed.  VIT A/VIT C/VIT E/ZINC/COPPER (PRESERVISION AREDS PO) Take 1 Tab by mouth two (2) times a day. No current facility-administered medications for this visit. There are no discontinued medications. ADL assessment:   (If patient is unable to perform ADLs  what is the limiting factor(s)? Do they have a support system that can assist? If no support system is present, discuss possible assistance that they may be able to obtain. Escalate for SW if ongoing issues are verbalized by pt or anticipated)    [facility] follow up appointment(s):   Future Appointments   Date Time Provider Ivon Ramos   6/11/2020  1:40 PM CONTRERAS Morales [facility]   7/13/2020  9:45 AM Ana M Snider MD [facility] ENT      Non-[facility] follow up appointment(s):   7 Day follow up with PCP or [address] [place] [address] [place] 6/11/2020   Transportation arranged: none    Covid Risk Education    Patient has following risk factors of: Pneumonia. Education provided regarding infection prevention, and signs and symptoms of COVID-19 and when to seek medical attention with patient who verbalized understanding. Discussed exposure protocols and quarantine From CDC: Are you at higher risk for severe illness?  and given an opportunity for questions and concerns. The patient agrees to contact the COVID-19 hotline [phone] o PCP office for questions related to COVID-19.      For more information on steps you can take to protect yourself, see CDC's How to Protect Yourself     Patient/family/caregiver given information for Quinten Gant and agrees to enroll no  Patient's preferred e-mail: declines  Patient's preferred phone number: declines      Any other questions or concerns expressed by patient?none    Scheduled next follow up call or referral to CTN/ ACM:  Next follow up call:within 14 days    Goals      Takes Medications as prescribed

## 2020-06-06 LAB
BACTERIA SPEC CULT: NORMAL
SERVICE CMNT-IMP: NORMAL

## 2020-06-11 NOTE — CDMP QUERY
Patient admitted with Pneumonia . Noted documentation of Chronic Respiratory Failure; O2 dependent 2L. Nursing documentation states that the wife says the patient only wears oxygen at night. If possible, please document in progress notes and d/c summary if you are evaluating and /or treating any of the following: 
 
Chronic Respiratory Failure Confirmed Chronic Respiratory Failure Ruled Out Other Explanation Unable to Determine The medical record reflects the following: 
  Risk Factors: COPD Gold stage II Clinical Indicators:Saturation with ambulation at discharge , without oxygen was >92%. Saturation on admission off oxygen 86% improved to 93% on 3L. Treatment: Patient was not wearing oxygen on arrival or discharge. Wore oxygen 1-3L during hospital stay.

## 2020-06-11 NOTE — CDMP QUERY
Dr Joni Levy,  
I tried to call the office and ask the Np on call this question,  but I could not get through on the phone. Perhaps the office is having technical issues. Patient admitted with bronchiectasis and pneumonia. Noted documentation of O2 dependence and chronic resp failure since 2013 per patients past medical history. The nursing documentation states the patient was without home O2 on arrival and left without a portable tank. The wife states patient only wears his oxygen at night If possible, please document in progress notes and d/c summary if you are evaluating and /or treating any of the following: 
 
Chronic respiratory failure confirmed Chronic respiratory failure resolved Other Unable to determine. The medical record reflects the following: 
  Risk Factors: COPD Gold II, bronchiectasis with O2 dependence and chronic resp failure since 2013 Clinical Indicators: wife says patient only wears O2 at night and did not bring a tank when he came to the hospital per RN Treatment: left without O2 tank after nurse checked him on room air and sat remained > 92%. Thank you, [facility] Dominga Byrd RN, BSN, CDS Clinical Documentation Management Program 
[facility] AT [address] 
[phone] [email or web address]

## 2020-06-11 NOTE — CDMP QUERY
Pt admitted with pneumonia. Pt noted to have leukocytosis and tachycardia. If possible, please document in the progress notes and d/c summary if you are evaluating and / or treating any of the following: 
 
? Sepsis, present on admission, 
? Sepsis, not present on admission, 
? No Sepsis,  
? Other, please specify ? Clinically unable to determine The medical record reflects the following: 
   Risk Factors: 78 yo on home O2, copd, bronchiectasis, cad, current pneumonia Clinical Indicators: wbc 16.4 > 22.3 ~~~> 11.4, tachycardia intermittently throughout entire stay Treatment: rocephin, Zithromax, IVF, lab monitoring. Thanks, Sai Rhodes, RN, BSN, CDS Clinical Documentation Management Program 
[facility] AT [address] 
[phone] [email or web address]

## 2020-06-19 ENCOUNTER — PATIENT OUTREACH (OUTPATIENT)
Dept: CASE MANAGEMENT | Age: 80
End: 2020-06-19

## 2020-06-19 NOTE — PROGRESS NOTES
Attempted to contact patient for f/u ANGEL call. Unable to reach patient or to leave a voice message. According to [facility] patient had a f/u appointment with Angelina Wills on 6/11/2020. This Care Coordinator will graduate this patient  From this program, patient will be added back to [facility] if phone call is returned.

## 2020-07-14 ENCOUNTER — HOSPITAL ENCOUNTER (OUTPATIENT)
Dept: GENERAL RADIOLOGY | Age: 80
Discharge: HOME | End: 2020-07-14
Payer: MEDICARE

## 2020-07-14 DIAGNOSIS — J44.1 COPD EXACERBATION (HCC): ICD-10-CM

## 2020-07-14 PROBLEM — G93.41 ACUTE METABOLIC ENCEPHALOPATHY: Status: RESOLVED | Noted: 2019-07-17 | Resolved: 2020-07-14

## 2020-07-14 PROBLEM — J18.9 PNEUMONIA: Status: RESOLVED | Noted: 2020-06-01 | Resolved: 2020-07-14

## 2020-07-14 PROBLEM — Z20.822 SUSPECTED COVID-19 VIRUS INFECTION: Status: RESOLVED | Noted: 2020-06-02 | Resolved: 2020-07-14

## 2020-07-14 PROCEDURE — 71046 X-RAY EXAM CHEST 2 VIEWS: CPT

## 2020-07-20 NOTE — THERAPY EVALUATION
Eric Kim  : 1940  [place]  Secondary: [place] at [facility] [address], [address], [place], [address]  [phone]   [phone]        OUTPATIENT SPEECH LANGUAGE PATHOLOGY: MODIFIED BARIUM SWALLOW    ICD-10: Treatment Diagnosis: dysphagia, oropharyngeal R 13.12  DATE: 2020  REFERRING PHYSICIAN: Viktoria Tan NP MD Orders: modified barium swallow study   PAST MEDICAL HISTORY:    Mr. Sera Gilliland is a [de-identified] y.o. male who  has a past medical history of Arthritis, Benign essential HTN (2017), Chronic obstructive pulmonary disease ([address] Diabetes ([address] Ear problems, Fungal sinusitis, GERD (gastroesophageal reflux disease), History of multiple allergies, migraines, Hypertension, Irritable bowel syndrome with diarrhea (10/2/2018), Shingles (10/1/15), Sinus problem, and Thoracic aneurysm without mention of rupture (2014). He also has no past medical history of Arrhythmia, Asthma, Cancer ([address] Chronic kidney disease, Coagulation disorder ([address] Endocarditis, Heart failure ([address] Liver disease, Nicotine vapor product user, Non-nicotine vapor product user, Psychiatric disorder, PUD (peptic ulcer disease), Seizures ([address] Sleep apnea, Stroke ([address] Thromboembolus ([address] or Thyroid disease. He also  has a past surgical history that includes hx heent (); hx hernia repair (Right, ); hx appendectomy (age 9); hx other surgical (2012); hx tonsillectomy; hx cataract removal (Bilateral); neurological procedure unlisted; and hx heart catheterization (12). MEDICAL/REFERRING DIAGNOSIS: Other dysphagia [R13.19]  Pulmonary nodules [R91.8]  Bronchiectasis without acute exacerbation ([address] [J47.9]  DATE OF ONSET: 2020   PRIOR LEVEL OF FUNCTION: with spouse  PRECAUTIONS/ALLERGIES: Aspirin; Aleve [naproxen sodium]; Augmentin [amoxicillin-pot clavulanate]; Codeine sulfate;  Corn containing products; Crestor [rosuvastatin]; Septra [sulfamethoprim ds]; Zetia [ezetimibe]; and Zoloft [sertraline]   ASSESSMENT/PLAN OF CARE:  Pt received a bedside swallowing evaluation during hospitalization in June of this year. He was discharged as swallowing was considered WFL. Pt reported no deficits with swallowing this date. He then stated, \"sometimes it might go down slow but I don't choke or anything like that\". He later reported he had pna in June. Based on the objective data described below, the patient presents with mild oral and mild-moderate pharyngeal dysphagia characterized by decreased laryngeal excursion and closure. Premature spillage occurred to the valleculae with all consistencies with delays of 1-2 seconds. Deep penetration occurred during the swallow with thin liquids via cup with no cough response. Silent aspiration occurred during the swallow with thin liquids via straw. Cued coughs were ineffective in clearing the penetrated/aspirated material.  When asked to use an effortful swallow with thin liquids, penetration was transient. A chin tuck eliminated penetration 2/2 trials. No penetration or aspiration with remaining consistencies. Trace vallecular residue observed after pudding, mixed and solids and min after liquids. Recommend a regular diet with nectar thickened liquids. Results/recommendations discussed with pt with a handout provided re: thickened liquids. Recommend ST to address dysphagia including compensatory strategy training. Pt did report he has \"some problems\" with his memory. Therefore, unsure if he will be able to recall strategies. Pt in agreement with plan. Orders to be requested.       RECOMMENDATIONS AND PLANNED INTERVENTIONS (Benefits and precautions of therapy have been discussed with the patient.):  · PO:  Regular  · Liquids:  nectar   · May have thin liquids after training in compensatory strategies in speech therapy (either use effortful swallows or a chin tuck with thin liquids via cup)  MEDICATIONS:  · With liquid (thickened)  COMPENSATORY STRATEGIES/MODIFICATIONS INCLUDING:  · Chin tuck with thin liquids with SLP  · Effortful swallows with thin liquids with SLP  OTHER RECOMMENDATIONS (including follow up treatment recommendations):   · Laryngeal exercises   · Pt education    Thank you for this referral,  Raven Herron MSP, CCC-SLP             SUBJECTIVE:  Pt cooperative. Present Symptoms: recent pna       Current Dietary Status:  Regular    Radiologist: Dr. Caro Quintero  History of reflux:  []YES     [x]NO      Reflux medication:  Social History/Home Situation: with spouse     Work/Activity History: retired     OBJECTIVE:  Objective Measure: Tool Used: National Outcomes Measurement System: Functional Communication Measures: SWALLOWING  Score:  Initial: 5 Most Recent: X (Date: -- )   Interpretation of Tool: This measure describes the change in functional communication status subsequent to speech-language pathology treatment of patients with dysphagia.  o Level 1:  Individual is not able to swallow anything safely by mouth. All nutrition and hydration is received through non-oral means (e.g., nasogastric tube, PEG). o Level 2: Individual is not able to swallow safely by mouth for nutrition and hydration, but may take some consistency with consistent maximal cues in therapy only. Alternative method of feeding required. o Level 3:  Alternative method of feeding required as individual takes less than 50% of nutrition and hydration by mouth, and/or swallowing is safe with consistent use of moderate cues to use compensatory strategies and/or requires maximum diet restriction. o Level 4:  Swallowing is safe, but usually requires moderate cues to use compensatory strategies, and/or the individual has moderate diet restrictions and/or still requires tube feeding and/or oral supplements.   o Level 5:  Swallowing is safe with minimal diet restriction and/or occasionally requires minimal cueing to use compensatory strategies. The individual may occasionally self-cue. All nutrition and hydration needs are met by mouth at mealtime. o Level 6:  Swallowing is safe, and the individual eats and drinks independently and may rarely require minimal cueing. The individual usually self-cues when difficulty occurs. May need to avoid specific food items (e.g., popcorn and nuts), or require additional time (due to dysphagia). o Level 7: The individuals ability to eat independently is not limited by swallow function. Swallowing would be safe and efficient for all consistencies. Compensatory strategies are effectively used when needed.   Score Level 7 Level 6 Level 5 Level 4 Level 3 Level 2 Level 1   Modifier CH CI CJ CK CL CM CN     Cognitive/Communication Status:  Mental Status  Neurologic State: Alert    Oral Assessment:  Oral Assessment  Dentition: Intact, Natural, Partials (comment)(upper partials; intact bottom dentition)  Oral Hygiene: adequate    Vocal Quality: no impairment    Patient Viewed: Patient Position: upright in chair  Film Views: Lateral, Fluoro    Oral Prepatory:  The patient was given the following: Consistency Presented: Mixed consistency, Nectar thick liquid, Pudding, Solid, Thin liquid  How Presented: Self-fed/presented, SLP-fed/presented, Cup/sip, Spoon, Straw    Oral Phase:  Bolus Acceptance: No impairment  Bolus Formation/Control: Impaired  Propulsion: No impairment  Type of Impairment: Premature spillage  Oral Residue: None  Initiation of Swallow: Triggered at vallecula  Oral Phase Severity: Mild    Pharyngeal Phase:  Timing: Pooling 1-5 sec  Decreased Tongue Base Retraction?: No  Laryngeal Elevation: Incomplete laryngeal closure, Reduced excursion with laryngeal vestibule gap  Penetration: Flash/transient, During swallow  Aspiration/Timing: Silent , During  Aspiration/Penetration Score: 8 (Aspiration-Contrast passes cords/glottis with no effort to eject, ie/silent aspiration)  Pharyngeal Symmetry: Not assessed  Pharyngeal Dysfunction: Decreased elevation/closure  Pharyngeal Phase Severity: Mild moderate  Pharyngeal-Esophageal Segment: No impairment    Assessment only;  No treatment(s) provided today  __________________________________________________________________________________________________  Recommendations for treatment: laryngeal exercises, compensatory strategy training  Total Treatment Duration:  Time In: 0900   Time Out: KIM Bean, CCC-SLP

## 2020-07-21 ENCOUNTER — HOSPITAL ENCOUNTER (OUTPATIENT)
Dept: GENERAL RADIOLOGY | Age: 80
Discharge: HOME | End: 2020-07-21
Attending: NURSE PRACTITIONER
Payer: MEDICARE

## 2020-07-21 DIAGNOSIS — R13.19 OTHER DYSPHAGIA: ICD-10-CM

## 2020-07-21 DIAGNOSIS — J47.9 BRONCHIECTASIS WITHOUT ACUTE EXACERBATION (HCC): ICD-10-CM

## 2020-07-21 DIAGNOSIS — R91.8 PULMONARY NODULES: ICD-10-CM

## 2020-07-21 PROCEDURE — 92611 MOTION FLUOROSCOPY/SWALLOW: CPT

## 2020-07-21 PROCEDURE — 74011000255 HC RX REV CODE- 255: Performed by: NURSE PRACTITIONER

## 2020-07-21 PROCEDURE — 74230 X-RAY XM SWLNG FUNCJ C+: CPT

## 2020-07-21 RX ADMIN — BARIUM SULFATE 30 ML: 980 POWDER, FOR SUSPENSION ORAL at 09:20

## 2020-07-21 RX ADMIN — BARIUM SULFATE 20 ML: 400 SUSPENSION ORAL at 09:21

## 2020-07-21 RX ADMIN — BARIUM SULFATE 15 ML: 400 PASTE ORAL at 09:21

## 2020-07-21 NOTE — PROGRESS NOTES
Patient was notified that there is mild aspiration. He was told the Speech pathologist recommended speech therapy to help with swallowing techniques and Zi Landis agreed with this. Speech therapy will be in contact with him to arrange this. He verbalized understanding.

## 2020-07-21 NOTE — PROGRESS NOTES
Outpatient Rehab Services  Referral Form/Physician Order  Central Scheduling Fax: [phone]  Speak to a :  Call [phone]   Please review and co-sign (electronically) OR sign and return to the below indicated clinic's fax number if you agree with this request.  Thank you! NAME:  Laverne Kim SSN:  [id number] :  1940   ADDRESS:  [address] [address] Daytime Phone:  [phone] (HKPS)[phone] ([employer])    Diagnosis & Date of Onset:  Dysphagia, oropharyngeal R 13.12 Special Precautions:   Frequency:  [x] to be determined by therapist after evaluation   OR   ____ X per week X ____ weeks    Services    [x] Evaluate & Treat  [] Special Orders________________________   [] Physical Therapy  [] Occupational Therapy   [x] Speech Therapy  [] MBS w/ Speech Therapy    Specialized Programs    [] Aquatic Therapy [] Lymphedema [] Oncology Rehab   [] Balance Rehab [] Parkinson's Program [] Osteoporosis   [] Fibromyalgia [] Motion Analysis [] Spine Rehab   [] Industrial Rehab [] Hand & Upper Extremity [] Sports Injury Rehab    [] Urinary Incontinence     Locations    @ [address] [facility], [address]  [facility] [address] [facility]  Ph: [phone]  Fax: [phone] @ [address] [place] [address], [facility],[address]  [place], [address]  Ph: [phone]  Fax: [phone] @ [facility], [address] [place], [address] [place] [address]  Ph: [phone]  Fax: [phone]        @ [address] [place] [address] [address]  Ph: [phone]  Fax: [phone] @ [address] [place] [address] Gary  [place], [address]   Ph: [phone]  Fax: [phone] @ [address]  [place], [address]  Ph: [phone]  Fax: [phone]        @ [address]  Ph: [phone]  Fax: [phone] @ [address]  Ph: [phone]  Fax: [phone] @ [address], [address] [facility], [address]   Ph: [phone]         @ [address] [place] [address], [address] [place] [address]  Ph: [phone]  Fax: [phone]      This section is not needed if signing electronically   I certify that I have examined the above patient and outpatient rehab services are medically necessary.    ___________________________________________           Signature of Physician/Provider    ___________________________________________            Practice Name   ______________________   Date    ______________________   Referral Contact

## 2020-07-21 NOTE — PROGRESS NOTES
Please let patient know that there is mild aspiration. Speech pathologist recommended speech therapy to help with swallowing techniques and I agree with this. Speech therapy will be in contact with him to arrange (no further orders need to be placed by us at this time).

## 2020-07-27 ENCOUNTER — HOSPITAL ENCOUNTER (OUTPATIENT)
Dept: PHYSICAL THERAPY | Age: 80
Discharge: HOME | End: 2020-07-27
Payer: MEDICARE

## 2020-07-27 PROCEDURE — 92610 EVALUATE SWALLOWING FUNCTION: CPT

## 2020-07-27 NOTE — THERAPY EVALUATION
Eric Kim  : 1940  [place]  Secondary: [facility] Generic Medicare [place] at [address] [facility], [address] [facility], [address]  [phone]   [phone]      OUTPATIENT SPEECH LANGUAGE PATHOLOGY: Initial Assessment  ICD-10: Treatment Diagnosis: dysphagia, oropharyngeal R 13.12  REFERRING PHYSICIAN: Viktoria Tan NP MD Orders: speech evaluate and treat   PAST MEDICAL HISTORY:    Mr. Sera Gilliland is a [de-identified] y.o. male who  has a past medical history of Arthritis, Benign essential HTN (2017), Chronic obstructive pulmonary disease ([address] Diabetes ([address] Ear problems, Fungal sinusitis, GERD (gastroesophageal reflux disease), History of multiple allergies, migraines, Hypertension, Irritable bowel syndrome with diarrhea (10/2/2018), Shingles (10/1/15), Sinus problem, and Thoracic aneurysm without mention of rupture (2014). He also has no past medical history of Arrhythmia, Asthma, Cancer ([address] Chronic kidney disease, Coagulation disorder ([address] Endocarditis, Heart failure ([address] Liver disease, Nicotine vapor product user, Non-nicotine vapor product user, Psychiatric disorder, PUD (peptic ulcer disease), Seizures ([address] Sleep apnea, Stroke ([address] Thromboembolus ([address] or Thyroid disease. He also  has a past surgical history that includes hx heent (); hx hernia repair (Right, ); hx appendectomy (age 9); hx other surgical (2012); hx tonsillectomy; hx cataract removal (Bilateral); neurological procedure unlisted; and hx heart catheterization (12). MEDICAL/REFERRING DIAGNOSIS: Dysphagia, oropharyngeal [R13.12]  DATE OF ONSET: 2020  PRIOR LEVEL OF FUNCTION: with spouse  PRECAUTIONS/ALLERGIES: Aspirin; Aleve [naproxen sodium]; Augmentin [amoxicillin-pot clavulanate]; Codeine sulfate; Corn containing products; Crestor [rosuvastatin]; Septra [sulfamethoprim ds];  Zetia [ezetimibe]; and Zoloft [sertraline] ASSESSMENT:  Mr. Julio Metcalf was referred to ST due to dysphagia. He reported this date that \"some things go down slowly but I don't get choked or anything like that\". He reported he can't identify and particular foods/liquids that go down slowly. He had a MBS 7/21 with the following recommendations: regular diet with nectar thick liquids. ST was recommended for training in compensatory strategies (either use effortful swallows with thin liquids or a chin tuck with thin liquids via cup). He reported that he was told at the [facility] that \"some things act like they want to go down the wrong way\". He reported he thought it was the \"real liquidy stuff\" that was going the wrong way. He asked how he would know and if he would get pna. Informed him he should be coughing but he wasn't as he silently aspirated on the MBS. He reported he tried the thickener \"in a few things\" but mostly he isn't using it. He stated, \"it's terrible\". He reported he added 8 tsps of thickener to 4 ounces of tea. He then reported he had 8 tsps in 8 ounces. He reported he could poor the liquids but it was slow. He reported he doesn't eat meat and asked if he would have more problems if he did. Informed him likely not as the liquids are the issue. Based on the objective data described below, the patient presents with dysphagia. Pt given trials nectar thick liquids, mixed consistencies and solids. Throat clears after each consistency with unknown etiology. However, he exhibited chronic throat clears throughout the session. He reported he didn't really notice it much but it could be that he doesn't notice it when he does it. He reported the consistency of the thickened liquids in therapy this date \"are not that objectionable\". He reported his liquids at home were thicker. Introduced laryngeal exercises with a handout provided for the pt for home practice.   Discussed need for thickened liquids until pt is able to use the compensatory strategies. Question pt's ability to recall compensatory strategies as pt with questions throughout the session re: thickener and how much to add though SLP explained it several times. Pt also commented several times he doesn't feel he has dysphagia other than food going down slowly at times. Patient will benefit from skilled intervention to address the below impairments. ?????? ? ? This section established at most recent assessment??????????  PROBLEM LIST (Impairments causing functional limitations):  1. Dysphagia   GOALS: (Goals have been discussed and agreed upon with patient.)  SHORT-TERM FUNCTIONAL GOALS: Time Frame: 3 months  Pt will complete laryngeal exercises with 80% accuracy. Pt will complete exercises a minimum of 5 days weekly for a home exercise program.  Pt will consume thin liquids with use of compensatory strategies (either effortful swallows or chin tuck with thin liquids) with only min cues needed. DISCHARGE GOALS: Time Frame: 4-5 months  1. Pt will tolerate least restrictive diet without signs/sx aspiration 100% for safe swallow function. REHABILITATION POTENTIAL FOR STATED GOALS: GoodPLAN OF CARE:  Patient will benefit from skilled intervention to address the following impairments.   RECOMMENDATIONS AND PLANNED INTERVENTIONS (Benefits and precautions of therapy have been discussed with the patient.):  · PO:  Regular  · Liquids:  nectar   · May have thin liquids after training in compensatory strategies in speech therapy (either use effortful swallows or a chin tuck with thin liquids via cup)  MEDICATIONS:  · With liquid (thickened)  COMPENSATORY STRATEGIES/MODIFICATIONS INCLUDING:  · Chin tuck with thin liquids with SLP  · Effortful swallows with thin liquids with SLP  OTHER RECOMMENDATIONS (including follow up treatment recommendations):   · Laryngeal exercises  · Patient education  RECOMMENDED DIET MODIFICATIONS DISCUSSED WITH:  · Patient  TREATMENT PLAN EFFECTIVE DATES: 7/27/2020 TO 10/28/2020 (90 days). FREQUENCY/DURATION: Continue to follow patient 2 times a week for 90 days to address above goals. Regarding Saima Kim's therapy, I certify that the treatment plan above will be carried out by a therapist or under their direction. Thank you for this referral,  [address] [address] [facility]                    Referring Physician Signature: Nya Rodas NP    Date      SUBJECTIVE:  Pt cooperative. Present Symptoms: penetration on a recent MBS      Current Medications: see hard chart   Date Last Reviewed: 7/27/2020  Current Dietary Status:  Regular/thin liquids, uses thickener on occasion      History of reflux:  YES    Reflux medication: Omeprazole   Social History/Home Situation: with spouse      Work/Activity History: retired     OBJECTIVE:  Objective Measure: Tool Used: National Outcomes Measurement System: Functional Communication Measures: SWALLOWING  Score:  Initial: 5 Most Recent: X (Date: -- )   Interpretation of Tool: This measure describes the change in functional communication status subsequent to speech-language pathology treatment of patients with dysphagia.  o Level 1:  Individual is not able to swallow anything safely by mouth. All nutrition and hydration is received through non-oral means (e.g., nasogastric tube, PEG). o Level 2: Individual is not able to swallow safely by mouth for nutrition and hydration, but may take some consistency with consistent maximal cues in therapy only. Alternative method of feeding required. o Level 3:  Alternative method of feeding required as individual takes less than 50% of nutrition and hydration by mouth, and/or swallowing is safe with consistent use of moderate cues to use compensatory strategies and/or requires maximum diet restriction.   o Level 4:  Swallowing is safe, but usually requires moderate cues to use compensatory strategies, and/or the individual has moderate diet restrictions and/or still requires tube feeding and/or oral supplements. o Level 5:  Swallowing is safe with minimal diet restriction and/or occasionally requires minimal cueing to use compensatory strategies. The individual may occasionally self-cue. All nutrition and hydration needs are met by mouth at mealtime. o Level 6:  Swallowing is safe, and the individual eats and drinks independently and may rarely require minimal cueing. The individual usually self-cues when difficulty occurs. May need to avoid specific food items (e.g., popcorn and nuts), or require additional time (due to dysphagia). o Level 7: The individuals ability to eat independently is not limited by swallow function. Swallowing would be safe and efficient for all consistencies. Compensatory strategies are effectively used when needed. Score Level 7 Level 6 Level 5 Level 4 Level 3 Level 2 Level 1   Modifier CH CI CJ CK CL CM CN       Oral Motor Structure/Speech:  Oral-Motor Structure/Motor Speech  Dentition: Upper dentures, Intact, Natural  Oral Hygiene: adequate  Lingual: No impairment    Cognitive and Communication Status:  Neurologic State: Alert                   BEDSIDE SWALLOW EVALUATION  Oral Assessment:  Oral Assessment  Dentition: Upper dentures; Intact; Natural  Oral Hygiene: adequate  Lingual: No impairment  P.O. Trials:  Patient Position: upright in chair    The patient was given teaspoon to cup amounts of the following:   Consistency Presented: Mixed consistency; Nectar thick liquid; Solid  How Presented: Self-fed/presented;Cup/sip;Spoon; Other (comment)    ORAL PHASE:  Bolus Acceptance: No impairment  Bolus Formation/Control: No impairment  Propulsion: No impairment     Oral Residue: None    PHARYNGEAL PHASE:  Initiation of Swallow: No impairment  Laryngeal Elevation: Functional  Aspiration Signs/Symptoms: Clear throat  Vocal Quality: No impairment                OTHER OBSERVATIONS:  Rate/bite size: WNL   Endurance:   WNL      TREATMENT:    (In addition to Assessment/Re-Assessment sessions the following treatments were rendered)  Assessment only; No treatment(s) provided today         LARYNGEAL / PHARYNGEAL EXERCISES:           Effortful Swallow: Yes  Reps : 5  Sets : 1  Hard Glottal Attack: Yes  Reps : 5(mod cues to keep it short and forceful)  Sets : 1                       Muna: Yes  Reps : 5  Sets : 1                            Sing \"EEE\": Yes  Reps : 5  Sets : 1                    Sustained \"ah\": Yes  Sets : 1  Reps : 5              __________________________________________________________________________________________________  Treatment Assessment:   . Progression/Medical Necessity:   · Skilled intervention continues to be required due to patient still consuming a modified diet. Compliance with Program/Exercises: Will assess as treatment progresses. Reason for Continuation of Services/Other Comments:  · Patient continues to require skilled intervention due to dysphagia. Recommendations/Intent for next treatment session: \"Treatment next visit will focus on laryngeal exercises, pt education\".      Total Treatment Duration:  Time In: 1030  Time Out: [address], MSP, CCC-SLP

## 2020-07-30 ENCOUNTER — HOSPITAL ENCOUNTER (OUTPATIENT)
Dept: PHYSICAL THERAPY | Age: 80
Discharge: HOME | End: 2020-07-30
Payer: MEDICARE

## 2020-07-30 PROCEDURE — 92526 ORAL FUNCTION THERAPY: CPT

## 2020-07-30 NOTE — PROGRESS NOTES
Lucía Kim  : 1940  [place]  Secondary: [place] at [facility] [address], [address] [facility], [address]  [phone]   [phone]      OUTPATIENT SPEECH LANGUAGE PATHOLOGY: Daily Note: 1  ICD-10: Treatment Diagnosis: dysphagia, oropharyngeal R 13.12  REFERRING PHYSICIAN: Seema Smith NP MD Orders: speech evaluate and treat   PAST MEDICAL HISTORY:    Mr. Meri Angelo is a [de-identified] y.o. male who  has a past medical history of Arthritis, Benign essential HTN (2017), Chronic obstructive pulmonary disease ([address] Diabetes ([address] Ear problems, Fungal sinusitis, GERD (gastroesophageal reflux disease), History of multiple allergies, migraines, Hypertension, Irritable bowel syndrome with diarrhea (10/2/2018), Shingles (10/1/15), Sinus problem, and Thoracic aneurysm without mention of rupture (2014). He also has no past medical history of Arrhythmia, Asthma, Cancer ([address] Chronic kidney disease, Coagulation disorder ([address] Endocarditis, Heart failure ([address] Liver disease, Nicotine vapor product user, Non-nicotine vapor product user, Psychiatric disorder, PUD (peptic ulcer disease), Seizures ([address] Sleep apnea, Stroke ([address] Thromboembolus ([address] or Thyroid disease. He also  has a past surgical history that includes hx heent (); hx hernia repair (Right, ); hx appendectomy (age 9); hx other surgical (2012); hx tonsillectomy; hx cataract removal (Bilateral); neurological procedure unlisted; and hx heart catheterization (12). MEDICAL/REFERRING DIAGNOSIS: Dysphagia, oropharyngeal [R13.12]  DATE OF ONSET: 2020  PRIOR LEVEL OF FUNCTION: with spouse  PRECAUTIONS/ALLERGIES: Aspirin; Aleve [naproxen sodium]; Augmentin [amoxicillin-pot clavulanate]; Codeine sulfate; Corn containing products; Crestor [rosuvastatin]; Septra [sulfamethoprim ds];  Zetia [ezetimibe]; and Zoloft [sertraline] ASSESSMENT:  Pt had questions about how much thickener to add to liquids. He brought a piece of paper with him that his wife had typed up with instructions on how much thickener to add. On that paper were instructions for using 3 1/2-4 tsps per 4 ounces. Informed him I am not sure how many teaspoons he should add as there is usually a scoop with the thickener. Educated him re: the consistency should be the same consistency as tomato juice or buttermilk. He reported he used 2 teaspoons with 8 ounces of coffee this morning and said it worked pretty good but he didn't think that it was as thick as buttermilk. Informed him 2 tsps with 8 ounces is not enough consistency. He asked if it is critical to have the correct consistency. Educated re: aspiration risk if it is not thick enough. Verbal understanding expressed. However, pt asked more questions later about the consistency and how he can tell if it's correct. Gave pt a nectar thick packet and suggested for him to measure that at home in tsps so that will let him know how many to add per 4 ounces. Understanding expressed. Also suggested for him to bring his thickener and a teaspoon to therapy next session so we can measure it and look at it together. He reported he has been mixing tea with thickener and then adding that to a glass of ice. Informed him ice will dilute the thickener. He asked if he can put the drink in the refridgerator to keep it cold. Informed him that is fine. Pt required mild-mod cues with some of the exercises this date. Pt with chronic throat clears during the session that were not associated with po. Pt stated, \"I think it's a habit\". Informed him it does appear to be a habit as he was clearing his throat prior to having po. Had pt use effortful swallows with water. Informed pt if he does drink anything without thickener to use an effortful swallow like he's swallowing a big pill.   He asked if that's how he's supposed to take his pills. Informed him he is supposed to pretend he is consuming a big pill when consuming liquids without thickener. Understanding expressed. Patient will benefit from skilled intervention to address the below impairments. ?????? ? ? This section established at most recent assessment??????????  PROBLEM LIST (Impairments causing functional limitations):  1. Dysphagia   GOALS: (Goals have been discussed and agreed upon with patient.)  SHORT-TERM FUNCTIONAL GOALS: Time Frame: 3 months  Pt will complete laryngeal exercises with 80% accuracy. Pt will complete exercises a minimum of 5 days weekly for a home exercise program.  Pt will consume thin liquids with use of compensatory strategies (either effortful swallows or chin tuck with thin liquids) with only min cues needed. DISCHARGE GOALS: Time Frame: 4-5 months  1. Pt will tolerate least restrictive diet without signs/sx aspiration 100% for safe swallow function. REHABILITATION POTENTIAL FOR STATED GOALS: GoodPLAN OF CARE:  Patient will benefit from skilled intervention to address the following impairments. RECOMMENDATIONS AND PLANNED INTERVENTIONS (Benefits and precautions of therapy have been discussed with the patient.):  · PO:  Regular  · Liquids:  nectar   · May have thin liquids after training in compensatory strategies in speech therapy (either use effortful swallows or a chin tuck with thin liquids via cup)  MEDICATIONS:  · With liquid (thickened)  COMPENSATORY STRATEGIES/MODIFICATIONS INCLUDING:  · Chin tuck with thin liquids with SLP  · Effortful swallows with thin liquids with SLP  OTHER RECOMMENDATIONS (including follow up treatment recommendations):   · Laryngeal exercises  · Patient education  RECOMMENDED DIET MODIFICATIONS DISCUSSED WITH:  · Patient  TREATMENT PLAN EFFECTIVE DATES: 7/27/2020 TO 10/28/2020 (90 days).   FREQUENCY/DURATION: Continue to follow patient 2 times a week for 90 days to address above goals.  Regarding Jeremy Kim's therapy, I certify that the treatment plan above will be carried out by a therapist or under their direction. Thank you for this referral,  Radha Huerta, [facility], [facility], [address] [facility]                    Referring Physician Signature: Nikita Brito NP    Date      SUBJECTIVE:  Pt cooperative. Present Symptoms: penetration on a recent MBS      Current Medications: see hard chart   Date Last Reviewed: 7/27/2020  Current Dietary Status:  Regular/thin liquids, uses thickener on occasion      History of reflux:  YES    Reflux medication: Omeprazole   Social History/Home Situation: with spouse      Work/Activity History: retired     OBJECTIVE:  Objective Measure: Tool Used: National Outcomes Measurement System: Functional Communication Measures: SWALLOWING  Score:  Initial: 5 Most Recent: X (Date: -- )   Interpretation of Tool: This measure describes the change in functional communication status subsequent to speech-language pathology treatment of patients with dysphagia.  o Level 1:  Individual is not able to swallow anything safely by mouth. All nutrition and hydration is received through non-oral means (e.g., nasogastric tube, PEG). o Level 2: Individual is not able to swallow safely by mouth for nutrition and hydration, but may take some consistency with consistent maximal cues in therapy only. Alternative method of feeding required. o Level 3:  Alternative method of feeding required as individual takes less than 50% of nutrition and hydration by mouth, and/or swallowing is safe with consistent use of moderate cues to use compensatory strategies and/or requires maximum diet restriction. o Level 4:  Swallowing is safe, but usually requires moderate cues to use compensatory strategies, and/or the individual has moderate diet restrictions and/or still requires tube feeding and/or oral supplements.   o Level 5:  Swallowing is safe with minimal diet restriction and/or occasionally requires minimal cueing to use compensatory strategies. The individual may occasionally self-cue. All nutrition and hydration needs are met by mouth at mealtime. o Level 6:  Swallowing is safe, and the individual eats and drinks independently and may rarely require minimal cueing. The individual usually self-cues when difficulty occurs. May need to avoid specific food items (e.g., popcorn and nuts), or require additional time (due to dysphagia). o Level 7: The individuals ability to eat independently is not limited by swallow function. Swallowing would be safe and efficient for all consistencies. Compensatory strategies are effectively used when needed. Score Level 7 Level 6 Level 5 Level 4 Level 3 Level 2 Level 1   Modifier CH CI CJ CK CL CM CN       Cognitive and Communication Status:  Alert     TREATMENT:    (In addition to Assessment/Re-Assessment sessions the following treatments were rendered)  Dysphagia Activities: Activities/Procedures listed utilized to improve progress in swallow function and swallow safety. Required moderate cueing to improve swallow safety. LARYNGEAL / PHARYNGEAL EXERCISES:           Effortful Swallow: Yes  Reps : 10(with water)  Sets : 2  Hard Glottal Attack: Yes  Reps : 10(60%; 70% with force)  Sets : 2                       Muna: Yes  Reps : 10  Sets : 2  Mendelsohn Maneuver: Yes  Reps : 10                      Sing \"EEE\": Yes  Reps : 10(mild-mod cues to use a high pitch with 1st set)  Sets : 2                    Sustained \"ah\": Yes  Sets : 2  Reps : 10              __________________________________________________________________________________________________  Treatment Assessment:   . Progression/Medical Necessity:   · Skilled intervention continues to be required due to patient still consuming a modified diet. Compliance with Program/Exercises: Will assess as treatment progresses.    Reason for Continuation of Services/Other Comments:  · Patient continues to require skilled intervention due to dysphagia. Recommendations/Intent for next treatment session: \"Treatment next visit will focus on laryngeal exercises, pt education\".      Total Treatment Duration:  Time In: 1120  Time Out: [address] [place], MSP, CCC-SLP

## 2020-08-04 ENCOUNTER — HOSPITAL ENCOUNTER (OUTPATIENT)
Dept: PHYSICAL THERAPY | Age: 80
Discharge: HOME | End: 2020-08-04
Payer: MEDICARE

## 2020-08-04 PROCEDURE — 92526 ORAL FUNCTION THERAPY: CPT

## 2020-08-04 NOTE — PROGRESS NOTES
Pancho Kim  : 1940  [place]  Secondary: [facility] Generic Medicare [place] at [address] [facility] [address], 101 [facility], [address]  [phone]   [phone]      OUTPATIENT SPEECH LANGUAGE PATHOLOGY: Daily Note: 2  ICD-10: Treatment Diagnosis: dysphagia, oropharyngeal R 13.12  REFERRING PHYSICIAN: Aldo Bruno NP MD Orders: speech evaluate and treat   PAST MEDICAL HISTORY:    Mr. Angelo Taylor is a [de-identified] y.o. male who  has a past medical history of Arthritis, Benign essential HTN (2017), Chronic obstructive pulmonary disease ([address] Diabetes ([address] Ear problems, Fungal sinusitis, GERD (gastroesophageal reflux disease), History of multiple allergies, migraines, Hypertension, Irritable bowel syndrome with diarrhea (10/2/2018), Shingles (10/1/15), Sinus problem, and Thoracic aneurysm without mention of rupture (2014). He also has no past medical history of Arrhythmia, Asthma, Cancer ([address] Chronic kidney disease, Coagulation disorder ([address] Endocarditis, Heart failure ([address] Liver disease, Nicotine vapor product user, Non-nicotine vapor product user, Psychiatric disorder, PUD (peptic ulcer disease), Seizures ([address] Sleep apnea, Stroke ([address] Thromboembolus ([address] or Thyroid disease. He also  has a past surgical history that includes hx heent (); hx hernia repair (Right, ); hx appendectomy (age 9); hx other surgical (2012); hx tonsillectomy; hx cataract removal (Bilateral); neurological procedure unlisted; and hx heart catheterization (12). MEDICAL/REFERRING DIAGNOSIS: Dysphagia, oropharyngeal [R13.12]  DATE OF ONSET: 2020  PRIOR LEVEL OF FUNCTION: with spouse  PRECAUTIONS/ALLERGIES: Aspirin; Aleve [naproxen sodium]; Augmentin [amoxicillin-pot clavulanate]; Codeine sulfate; Corn containing products; Crestor [rosuvastatin]; Septra [sulfamethoprim ds];  Zetia [ezetimibe]; and Zoloft [sertraline] ASSESSMENT:  Pt brought the packet of thickener that SLP had given him and he reported that was 3 1/2 teaspoons. He brought his empty thickener container with him and it did say 3 1/2-4 teaspoons for 4 ounces depending upon the liquid. He reported he is using 4 teaspoons for 8 ounces. Informed him it is supposed to be 4 teaspoons for 4 ounces. He then stated he may be using 4 ounces but he isn't sure. Had him add a packet of SLP's thickener this date to water this date to determine the consistency. He added a half of the package and said that was about the consistency he was using at home. However, it wasn't quite nectar thick. SLP added more thickener to achieve the right consistency and he reported that was twice as thick as what he has been using at home. He reported he has been practicing his exercises at least twice daily-once in the morning and once in the evening. He stated, \"I guess I have a habit of clearing my throat as I do it when I'm not eating or drinking\". Pt did well with exercises this date though persisting cues needed for hard glottal attack and falsettos. He reported he wasn't sure if he was completing the [facility] correctly but informed him he is. Asked him to bring thickener, a teaspoon and the cup he uses next session. Understanding expressed. Patient will benefit from skilled intervention to address the below impairments. ?????? ? ? This section established at most recent assessment??????????  PROBLEM LIST (Impairments causing functional limitations):  1. Dysphagia   GOALS: (Goals have been discussed and agreed upon with patient.)  SHORT-TERM FUNCTIONAL GOALS: Time Frame: 3 months  Pt will complete laryngeal exercises with 80% accuracy.    Pt will complete exercises a minimum of 5 days weekly for a home exercise program.  Pt will consume thin liquids with use of compensatory strategies (either effortful swallows or chin tuck with thin liquids) with only min cues needed. DISCHARGE GOALS: Time Frame: 4-5 months  1. Pt will tolerate least restrictive diet without signs/sx aspiration 100% for safe swallow function. REHABILITATION POTENTIAL FOR STATED GOALS: GoodPLAN OF CARE:  Patient will benefit from skilled intervention to address the following impairments. RECOMMENDATIONS AND PLANNED INTERVENTIONS (Benefits and precautions of therapy have been discussed with the patient.):  · PO:  Regular  · Liquids:  nectar   · May have thin liquids after training in compensatory strategies in speech therapy (either use effortful swallows or a chin tuck with thin liquids via cup)  MEDICATIONS:  · With liquid (thickened)  COMPENSATORY STRATEGIES/MODIFICATIONS INCLUDING:  · Chin tuck with thin liquids with SLP  · Effortful swallows with thin liquids with SLP  OTHER RECOMMENDATIONS (including follow up treatment recommendations):   · Laryngeal exercises  · Patient education  RECOMMENDED DIET MODIFICATIONS DISCUSSED WITH:  · Patient  TREATMENT PLAN EFFECTIVE DATES: 7/27/2020 TO 10/28/2020 (90 days). FREQUENCY/DURATION: Continue to follow patient 2 times a week for 90 days to address above goals. Regarding Jimmy Kim's therapy, I certify that the treatment plan above will be carried out by a therapist or under their direction. Thank you for this referral,  Jennifer Ruelas, [facility], [facility], [address] [facility]                    Referring Physician Signature: Karen Burns NP    Date      SUBJECTIVE:  Pt cooperative. Present Symptoms: penetration on a recent MBS      Current Medications: see hard chart   Date Last Reviewed: 7/27/2020  Current Dietary Status:  Regular/thin liquids, uses thickener on occasion      History of reflux:  YES    Reflux medication: Omeprazole   Social History/Home Situation: with spouse      Work/Activity History: retired     OBJECTIVE:  Objective Measure:   Tool Used: National Outcomes Measurement System: Functional Communication Measures: SWALLOWING  Score: Initial: 5 Most Recent: X (Date: -- )   Interpretation of Tool: This measure describes the change in functional communication status subsequent to speech-language pathology treatment of patients with dysphagia.  o Level 1:  Individual is not able to swallow anything safely by mouth. All nutrition and hydration is received through non-oral means (e.g., nasogastric tube, PEG). o Level 2: Individual is not able to swallow safely by mouth for nutrition and hydration, but may take some consistency with consistent maximal cues in therapy only. Alternative method of feeding required. o Level 3:  Alternative method of feeding required as individual takes less than 50% of nutrition and hydration by mouth, and/or swallowing is safe with consistent use of moderate cues to use compensatory strategies and/or requires maximum diet restriction. o Level 4:  Swallowing is safe, but usually requires moderate cues to use compensatory strategies, and/or the individual has moderate diet restrictions and/or still requires tube feeding and/or oral supplements. o Level 5:  Swallowing is safe with minimal diet restriction and/or occasionally requires minimal cueing to use compensatory strategies. The individual may occasionally self-cue. All nutrition and hydration needs are met by mouth at mealtime. o Level 6:  Swallowing is safe, and the individual eats and drinks independently and may rarely require minimal cueing. The individual usually self-cues when difficulty occurs. May need to avoid specific food items (e.g., popcorn and nuts), or require additional time (due to dysphagia). o Level 7: The individuals ability to eat independently is not limited by swallow function. Swallowing would be safe and efficient for all consistencies. Compensatory strategies are effectively used when needed.   Score Level 7 Level 6 Level 5 Level 4 Level 3 Level 2 Level 1   Modifier CH CI CJ CK CL CM CN       Cognitive and Communication Status:  Alert     TREATMENT:    (In addition to Assessment/Re-Assessment sessions the following treatments were rendered)  Dysphagia Activities: Activities/Procedures listed utilized to improve progress in swallow function and swallow safety. Required moderate cueing to improve swallow safety. LARYNGEAL / PHARYNGEAL EXERCISES:           Effortful Swallow: Yes  Reps : 10  Sets : 2  Hard Glottal Attack: Yes  Reps : 10(mod cues to make it forceful)  Sets : 2                       Muna: Yes  Reps : 10  Sets : 2  Mendelsohn Maneuver: Yes  Reps : 10  Sets : 2                   Sing \"EEE\": Yes  Reps : 10(mod difficulites with achieving a high pitch)  Sets : 2                    Sustained \"ah\": Yes  Sets : 2  Reps : 10              __________________________________________________________________________________________________  Treatment Assessment:   . Progression/Medical Necessity:   · Skilled intervention continues to be required due to patient still consuming a modified diet. Compliance with Program/Exercises: Will assess as treatment progresses. Reason for Continuation of Services/Other Comments:  · Patient continues to require skilled intervention due to dysphagia. Recommendations/Intent for next treatment session: \"Treatment next visit will focus on laryngeal exercises, pt education\".      Total Treatment Duration:  Time In: 1030  Time Out: KIM Lopez, CCC-SLP

## 2020-08-06 ENCOUNTER — HOSPITAL ENCOUNTER (OUTPATIENT)
Dept: PHYSICAL THERAPY | Age: 80
Discharge: HOME | End: 2020-08-06
Payer: MEDICARE

## 2020-08-06 PROCEDURE — 92526 ORAL FUNCTION THERAPY: CPT

## 2020-08-06 NOTE — PROGRESS NOTES
Dario Kim  : 1940  [place]  Secondary: [place] [facility] [address], [facility], [address]  [phone]   [phone]      OUTPATIENT SPEECH LANGUAGE PATHOLOGY: Daily Note: 3  ICD-10: Treatment Diagnosis: dysphagia, oropharyngeal R 13.12  REFERRING PHYSICIAN: Kary Main NP MD Orders: speech evaluate and treat   PAST MEDICAL HISTORY:    Mr. Jae Garcia is a [de-identified] y.o. male who  has a past medical history of Arthritis, Benign essential HTN (2017), Chronic obstructive pulmonary disease ([address] Diabetes ([address] Ear problems, Fungal sinusitis, GERD (gastroesophageal reflux disease), History of multiple allergies, migraines, Hypertension, Irritable bowel syndrome with diarrhea (10/2/2018), Shingles (10/1/15), Sinus problem, and Thoracic aneurysm without mention of rupture (2014). He also has no past medical history of Arrhythmia, Asthma, Cancer ([address] Chronic kidney disease, Coagulation disorder ([address] Endocarditis, Heart failure ([address] Liver disease, Nicotine vapor product user, Non-nicotine vapor product user, Psychiatric disorder, PUD (peptic ulcer disease), Seizures ([address] Sleep apnea, Stroke ([address] Thromboembolus ([address] or Thyroid disease. He also  has a past surgical history that includes hx heent (); hx hernia repair (Right, ); hx appendectomy (age 9); hx other surgical (2012); hx tonsillectomy; hx cataract removal (Bilateral); neurological procedure unlisted; and hx heart catheterization (12). MEDICAL/REFERRING DIAGNOSIS: Dysphagia, oropharyngeal [R13.12]  DATE OF ONSET: 2020  PRIOR LEVEL OF FUNCTION: with spouse  PRECAUTIONS/ALLERGIES: Aspirin; Aleve [naproxen sodium]; Augmentin [amoxicillin-pot clavulanate]; Codeine sulfate; Corn containing products; Crestor [rosuvastatin]; Septra [sulfamethoprim ds];  Zetia [ezetimibe]; and Zoloft [sertraline] ASSESSMENT:  Pt brought his thickener this date along with a teaspoon. He added 2 tsps of thickener to 4 ounces and reported that was the consistency he has been using at home. However, it was not quite nectar thick. Informed him he needs to add 3 teaspoons for 4 ounces. Understanding expressed. Began training pt with compensatory strategy of chin tuck with thin liquids. Pt was able to complete x5 consecutively with no further cues needed after the 1st trial.  He asked what the advantage is to drinking thin liquids. Informed him so he doesn't have to use the thickener. Informed him once he can recall to either use a chin tuck or effortful swallow he can eliminate the thickener. Understanding expressed. He did well with exercises this date. Patient will benefit from skilled intervention to address the below impairments. ?????? ? ? This section established at most recent assessment??????????  PROBLEM LIST (Impairments causing functional limitations):  1. Dysphagia   GOALS: (Goals have been discussed and agreed upon with patient.)  SHORT-TERM FUNCTIONAL GOALS: Time Frame: 3 months  Pt will complete laryngeal exercises with 80% accuracy. Pt will complete exercises a minimum of 5 days weekly for a home exercise program.  Pt will consume thin liquids with use of compensatory strategies (either effortful swallows or chin tuck with thin liquids) with only min cues needed. DISCHARGE GOALS: Time Frame: 4-5 months  1. Pt will tolerate least restrictive diet without signs/sx aspiration 100% for safe swallow function. REHABILITATION POTENTIAL FOR STATED GOALS: GoodPLAN OF CARE:  Patient will benefit from skilled intervention to address the following impairments.   RECOMMENDATIONS AND PLANNED INTERVENTIONS (Benefits and precautions of therapy have been discussed with the patient.):  · PO:  Regular  · Liquids:  nectar   · May have thin liquids after training in compensatory strategies in speech therapy (either use effortful swallows or a chin tuck with thin liquids via cup)  MEDICATIONS:  · With liquid (thickened)  COMPENSATORY STRATEGIES/MODIFICATIONS INCLUDING:  · Chin tuck with thin liquids with SLP  · Effortful swallows with thin liquids with SLP  OTHER RECOMMENDATIONS (including follow up treatment recommendations):   · Laryngeal exercises  · Patient education  RECOMMENDED DIET MODIFICATIONS DISCUSSED WITH:  · Patient  TREATMENT PLAN EFFECTIVE DATES: 7/27/2020 TO 10/28/2020 (90 days). FREQUENCY/DURATION: Continue to follow patient 2 times a week for 90 days to address above goals. Regarding Isak Macielas Judy's therapy, I certify that the treatment plan above will be carried out by a therapist or under their direction. Thank you for this referral,  Delfina Sainz, [facility], [facility], [address] [place] [address]                    Referring Physician Signature: Faheem Irwin NP    Date      SUBJECTIVE:  Pt cooperative. Present Symptoms: penetration on a recent MBS      Current Medications: see hard chart   Date Last Reviewed: 8/6/2020  Current Dietary Status:  Regular/thin liquids, uses thickener on occasion      History of reflux:  YES    Reflux medication: Omeprazole   Social History/Home Situation: with spouse      Work/Activity History: retired     OBJECTIVE:  Objective Measure: Tool Used: National Outcomes Measurement System: Functional Communication Measures: SWALLOWING  Score:  Initial: 5 Most Recent: X (Date: -- )   Interpretation of Tool: This measure describes the change in functional communication status subsequent to speech-language pathology treatment of patients with dysphagia.  o Level 1:  Individual is not able to swallow anything safely by mouth. All nutrition and hydration is received through non-oral means (e.g., nasogastric tube, PEG). o Level 2: Individual is not able to swallow safely by mouth for nutrition and hydration, but may take some consistency with consistent maximal cues in therapy only. Alternative method of feeding required. o Level 3:  Alternative method of feeding required as individual takes less than 50% of nutrition and hydration by mouth, and/or swallowing is safe with consistent use of moderate cues to use compensatory strategies and/or requires maximum diet restriction. o Level 4:  Swallowing is safe, but usually requires moderate cues to use compensatory strategies, and/or the individual has moderate diet restrictions and/or still requires tube feeding and/or oral supplements. o Level 5:  Swallowing is safe with minimal diet restriction and/or occasionally requires minimal cueing to use compensatory strategies. The individual may occasionally self-cue. All nutrition and hydration needs are met by mouth at mealtime. o Level 6:  Swallowing is safe, and the individual eats and drinks independently and may rarely require minimal cueing. The individual usually self-cues when difficulty occurs. May need to avoid specific food items (e.g., popcorn and nuts), or require additional time (due to dysphagia). o Level 7: The individuals ability to eat independently is not limited by swallow function. Swallowing would be safe and efficient for all consistencies. Compensatory strategies are effectively used when needed. Score Level 7 Level 6 Level 5 Level 4 Level 3 Level 2 Level 1   Modifier CH CI CJ CK CL CM CN       Cognitive and Communication Status:  Alert     TREATMENT:    (In addition to Assessment/Re-Assessment sessions the following treatments were rendered)  Dysphagia Activities: Activities/Procedures listed utilized to improve progress in swallow function and swallow safety. Required moderate cueing to improve swallow safety. LARYNGEAL / PHARYNGEAL EXERCISES:           Effortful Swallow: Yes  Reps : 10  Sets : 2  Hard Glottal Attack: Yes  Reps : 10(used force this date with min cues needed)  Sets : 2                       Muna:  Yes  Reps : 10  Sets : 2  Mendelsohn Maneuver: Yes  Reps : 10  Sets : 2                   Sing \"EEE\": Yes  Reps : 10  Sets : 2                    Sustained \"ah\": Yes  Sets : 2  Reps : 10              Thin liquids with use of chin tuck: x5 with instruction by SLP.      __________________________________________________________________________________________________  Treatment Assessment:   . Progression/Medical Necessity:   · Skilled intervention continues to be required due to patient still consuming a modified diet. Compliance with Program/Exercises: Will assess as treatment progresses. Reason for Continuation of Services/Other Comments:  · Patient continues to require skilled intervention due to dysphagia. Recommendations/Intent for next treatment session: \"Treatment next visit will focus on laryngeal exercises, pt education\".      Total Treatment Duration:  Time In: 1030  Time Out: [address], MSP, CCC-SLP

## 2020-08-11 ENCOUNTER — HOSPITAL ENCOUNTER (OUTPATIENT)
Dept: PHYSICAL THERAPY | Age: 80
Discharge: HOME | End: 2020-08-11
Payer: MEDICARE

## 2020-08-11 PROCEDURE — 92526 ORAL FUNCTION THERAPY: CPT

## 2020-08-11 NOTE — PROGRESS NOTES
Prateek Kim  : 1940  [place]  Secondary: [place] [facility] [address], [facility], [address]  [phone]   [phone]      OUTPATIENT SPEECH LANGUAGE PATHOLOGY: Daily Note: 3  ICD-10: Treatment Diagnosis: dysphagia, oropharyngeal R 13.12  REFERRING PHYSICIAN: Vane Reyes NP MD Orders: speech evaluate and treat   PAST MEDICAL HISTORY:    Mr. Sangita Castellanos is a [de-identified] y.o. male who  has a past medical history of Arthritis, Benign essential HTN (2017), Chronic obstructive pulmonary disease ([address] Diabetes ([address] Ear problems, Fungal sinusitis, GERD (gastroesophageal reflux disease), History of multiple allergies, migraines, Hypertension, Irritable bowel syndrome with diarrhea (10/2/2018), Shingles (10/1/15), Sinus problem, and Thoracic aneurysm without mention of rupture (2014). He also has no past medical history of Arrhythmia, Asthma, Cancer ([address] Chronic kidney disease, Coagulation disorder ([address] Endocarditis, Heart failure ([address] Liver disease, Nicotine vapor product user, Non-nicotine vapor product user, Psychiatric disorder, PUD (peptic ulcer disease), Seizures ([address] Sleep apnea, Stroke ([address] Thromboembolus ([address] or Thyroid disease. He also  has a past surgical history that includes hx heent (); hx hernia repair (Right, ); hx appendectomy (age 9); hx other surgical (2012); hx tonsillectomy; hx cataract removal (Bilateral); neurological procedure unlisted; and hx heart catheterization (12). MEDICAL/REFERRING DIAGNOSIS: Dysphagia, oropharyngeal [R13.12]  DATE OF ONSET: 2020  PRIOR LEVEL OF FUNCTION: with spouse  PRECAUTIONS/ALLERGIES: Aspirin; Aleve [naproxen sodium]; Augmentin [amoxicillin-pot clavulanate]; Codeine sulfate; Corn containing products; Crestor [rosuvastatin]; Septra [sulfamethoprim ds];  Zetia [ezetimibe]; and Zoloft [sertraline] ASSESSMENT:  Pt reported he's been using 4 teaspoons of thickener for 8 ounces. He reported he thinks it's about as thick as it was in therapy last session. He reported he can tell the thickener is in the beverages. He reported he's been using it in tea and coffee. He reported he keeps the tea in the refridgerator so it's cold so not having ice is not an issue. He reported he's been practicing his exercises twice daily. Cues needed for the 1st sip of thin liquids to use a chin tuck. Pt spontaneously used a chin tuck when consuming regular water the remainder of the session. Feel pt can likely advance to thin liquids with a chin tuck at home if he continues to use strategies in therapy. Less cues needed this date for exercises also. Patient will benefit from skilled intervention to address the below impairments. ?????? ? ? This section established at most recent assessment??????????  PROBLEM LIST (Impairments causing functional limitations):  1. Dysphagia   GOALS: (Goals have been discussed and agreed upon with patient.)  SHORT-TERM FUNCTIONAL GOALS: Time Frame: 3 months  Pt will complete laryngeal exercises with 80% accuracy. Pt will complete exercises a minimum of 5 days weekly for a home exercise program.  Pt will consume thin liquids with use of compensatory strategies (either effortful swallows or chin tuck with thin liquids) with only min cues needed. DISCHARGE GOALS: Time Frame: 4-5 months  1. Pt will tolerate least restrictive diet without signs/sx aspiration 100% for safe swallow function. REHABILITATION POTENTIAL FOR STATED GOALS: GoodPLAN OF CARE:  Patient will benefit from skilled intervention to address the following impairments.   RECOMMENDATIONS AND PLANNED INTERVENTIONS (Benefits and precautions of therapy have been discussed with the patient.):  · PO:  Regular  · Liquids:  nectar   · May have thin liquids after training in compensatory strategies in speech therapy (either use effortful swallows or a chin tuck with thin liquids via cup)  MEDICATIONS:  · With liquid (thickened)  COMPENSATORY STRATEGIES/MODIFICATIONS INCLUDING:  · Chin tuck with thin liquids with SLP  · Effortful swallows with thin liquids with SLP  OTHER RECOMMENDATIONS (including follow up treatment recommendations):   · Laryngeal exercises  · Patient education  RECOMMENDED DIET MODIFICATIONS DISCUSSED WITH:  · Patient  TREATMENT PLAN EFFECTIVE DATES: 7/27/2020 TO 10/28/2020 (90 days). FREQUENCY/DURATION: Continue to follow patient 2 times a week for 90 days to address above goals. Regarding Jimym Kmi's therapy, I certify that the treatment plan above will be carried out by a therapist or under their direction. Thank you for this referral,  Jennifer Ruelas, [facility], [facility], [address] [facility]                    Referring Physician Signature: Karen Burns NP    Date      SUBJECTIVE:  Pt cooperative. Present Symptoms: penetration on a recent MBS      Current Medications: see hard chart   Date Last Reviewed: 8/6/2020  Current Dietary Status:  Regular/thin liquids, uses thickener on occasion      History of reflux:  YES    Reflux medication: Omeprazole   Social History/Home Situation: with spouse      Work/Activity History: retired     OBJECTIVE:  Objective Measure: Tool Used: National Outcomes Measurement System: Functional Communication Measures: SWALLOWING  Score:  Initial: 5 Most Recent: X (Date: -- )   Interpretation of Tool: This measure describes the change in functional communication status subsequent to speech-language pathology treatment of patients with dysphagia.  o Level 1:  Individual is not able to swallow anything safely by mouth. All nutrition and hydration is received through non-oral means (e.g., nasogastric tube, PEG). o Level 2: Individual is not able to swallow safely by mouth for nutrition and hydration, but may take some consistency with consistent maximal cues in therapy only.  Alternative method of feeding required. o Level 3:  Alternative method of feeding required as individual takes less than 50% of nutrition and hydration by mouth, and/or swallowing is safe with consistent use of moderate cues to use compensatory strategies and/or requires maximum diet restriction. o Level 4:  Swallowing is safe, but usually requires moderate cues to use compensatory strategies, and/or the individual has moderate diet restrictions and/or still requires tube feeding and/or oral supplements. o Level 5:  Swallowing is safe with minimal diet restriction and/or occasionally requires minimal cueing to use compensatory strategies. The individual may occasionally self-cue. All nutrition and hydration needs are met by mouth at mealtime. o Level 6:  Swallowing is safe, and the individual eats and drinks independently and may rarely require minimal cueing. The individual usually self-cues when difficulty occurs. May need to avoid specific food items (e.g., popcorn and nuts), or require additional time (due to dysphagia). o Level 7: The individuals ability to eat independently is not limited by swallow function. Swallowing would be safe and efficient for all consistencies. Compensatory strategies are effectively used when needed. Score Level 7 Level 6 Level 5 Level 4 Level 3 Level 2 Level 1   Modifier CH CI CJ CK CL CM CN       Cognitive and Communication Status:  Alert     TREATMENT:    (In addition to Assessment/Re-Assessment sessions the following treatments were rendered)  Dysphagia Activities: Activities/Procedures listed utilized to improve progress in swallow function and swallow safety. Required moderate cueing to improve swallow safety. LARYNGEAL / PHARYNGEAL EXERCISES:           Effortful Swallow: Yes  Reps : 10  Sets : 2  Hard Glottal Attack: Yes  Reps : 10(no cues needed this date to use force)  Sets : 2                       Muna:  Yes  Reps : 10  Sets : 2  Mendelsohn Maneuver: Yes  Reps : 10  Sets : 2                   Sing \"EEE\": Yes  Reps : 10  Sets : 2                    Sustained \"ah\": Yes  Sets : 2  Reps : 10              Thin liquids with use of chin tuck: x5 with instruction by SLP.      __________________________________________________________________________________________________  Treatment Assessment:   . Progression/Medical Necessity:   · Skilled intervention continues to be required due to patient still consuming a modified diet. Compliance with Program/Exercises: Will assess as treatment progresses. Reason for Continuation of Services/Other Comments:  · Patient continues to require skilled intervention due to dysphagia. Recommendations/Intent for next treatment session: \"Treatment next visit will focus on laryngeal exercises, pt education\".      Total Treatment Duration:  Time In: 1030  Time Out: [address], MSP, CCC-SLP

## 2020-08-13 ENCOUNTER — HOSPITAL ENCOUNTER (OUTPATIENT)
Dept: PHYSICAL THERAPY | Age: 80
Discharge: HOME | End: 2020-08-13
Payer: MEDICARE

## 2020-08-13 PROCEDURE — 92526 ORAL FUNCTION THERAPY: CPT

## 2020-08-13 NOTE — PROGRESS NOTES
Jeremy Kim  : 1940  [place]  Secondary: [place] at [facility]  [address], [address] [facility], [address]  [phone]   [phone]      OUTPATIENT SPEECH LANGUAGE PATHOLOGY: Daily Note: 4  ICD-10: Treatment Diagnosis: dysphagia, oropharyngeal R 13.12  REFERRING PHYSICIAN: Nikita Brito NP MD Orders: speech evaluate and treat   PAST MEDICAL HISTORY:    Mr. Hi Brooks is a [de-identified] y.o. male who  has a past medical history of Arthritis, Benign essential HTN (2017), Chronic obstructive pulmonary disease ([address] Diabetes ([address] Ear problems, Fungal sinusitis, GERD (gastroesophageal reflux disease), History of multiple allergies, migraines, Hypertension, Irritable bowel syndrome with diarrhea (10/2/2018), Shingles (10/1/15), Sinus problem, and Thoracic aneurysm without mention of rupture (2014). He also has no past medical history of Arrhythmia, Asthma, Cancer ([address] Chronic kidney disease, Coagulation disorder ([address] Endocarditis, Heart failure ([address] Liver disease, Nicotine vapor product user, Non-nicotine vapor product user, Psychiatric disorder, PUD (peptic ulcer disease), Seizures ([address] Sleep apnea, Stroke ([address] Thromboembolus ([address] or Thyroid disease. He also  has a past surgical history that includes hx heent (); hx hernia repair (Right, ); hx appendectomy (age 9); hx other surgical (2012); hx tonsillectomy; hx cataract removal (Bilateral); neurological procedure unlisted; and hx heart catheterization (12). MEDICAL/REFERRING DIAGNOSIS: Dysphagia, oropharyngeal [R13.12]  DATE OF ONSET: 2020  PRIOR LEVEL OF FUNCTION: with spouse  PRECAUTIONS/ALLERGIES: Aspirin; Aleve [naproxen sodium]; Augmentin [amoxicillin-pot clavulanate]; Codeine sulfate; Corn containing products; Crestor [rosuvastatin]; Septra [sulfamethoprim ds];  Zetia [ezetimibe]; and Zoloft [sertraline] ASSESSMENT:  Pt brought a cup of thickened cranberry juice from home to show SLP the consistency. The cranberry juice was much closer to thin liquids than nectar. SLP added 3/4 of a packet of nectar thickener to the liquids he brought from home. Informed him that was the correct consistency. However, also reminded him once he uses a chin tuck spontaneously he can have thin liquids. Understanding expressed. Pt used a chin tuck spontaneously 75% of the time this date when consuming thin liquids. Encouraged pt to try to start trying thin liquids at home with 1 meal day with a chin tuck. Understanding expressed. Pt tolerated an increase in repetitions to 15 without difficulty. Patient will benefit from skilled intervention to address the below impairments. ?????? ? ? This section established at most recent assessment??????????  PROBLEM LIST (Impairments causing functional limitations):  1. Dysphagia   GOALS: (Goals have been discussed and agreed upon with patient.)  SHORT-TERM FUNCTIONAL GOALS: Time Frame: 3 months  Pt will complete laryngeal exercises with 80% accuracy. Pt will complete exercises a minimum of 5 days weekly for a home exercise program.  Pt will consume thin liquids with use of compensatory strategies (either effortful swallows or chin tuck with thin liquids) with only min cues needed. DISCHARGE GOALS: Time Frame: 4-5 months  1. Pt will tolerate least restrictive diet without signs/sx aspiration 100% for safe swallow function. REHABILITATION POTENTIAL FOR STATED GOALS: GoodPLAN OF CARE:  Patient will benefit from skilled intervention to address the following impairments.   RECOMMENDATIONS AND PLANNED INTERVENTIONS (Benefits and precautions of therapy have been discussed with the patient.):  · PO:  Regular  · Liquids:  nectar   · May have thin liquids after training in compensatory strategies in speech therapy (either use effortful swallows or a chin tuck with thin liquids via cup)  MEDICATIONS:  · With liquid (thickened)  COMPENSATORY STRATEGIES/MODIFICATIONS INCLUDING:  · Chin tuck with thin liquids with SLP  · Effortful swallows with thin liquids with SLP  OTHER RECOMMENDATIONS (including follow up treatment recommendations):   · Laryngeal exercises  · Patient education  RECOMMENDED DIET MODIFICATIONS DISCUSSED WITH:  · Patient  TREATMENT PLAN EFFECTIVE DATES: 7/27/2020 TO 10/28/2020 (90 days). FREQUENCY/DURATION: Continue to follow patient 2 times a week for 90 days to address above goals. Regarding Alex Kim's therapy, I certify that the treatment plan above will be carried out by a therapist or under their direction. Thank you for this referral,  Reymundo Mon, [facility] MED[facility], [facility], [address] [facility]                    Referring Physician Signature: Chandra Awad NP    Date      SUBJECTIVE:  Pt cooperative. Present Symptoms: penetration on a recent MBS      Current Medications: see hard chart   Date Last Reviewed: 8/6/2020  Current Dietary Status:  Regular/thin liquids, uses thickener on occasion      History of reflux:  YES    Reflux medication: Omeprazole   Social History/Home Situation: with spouse      Work/Activity History: retired     OBJECTIVE:  Objective Measure: Tool Used: National Outcomes Measurement System: Functional Communication Measures: SWALLOWING  Score:  Initial: 5 Most Recent: X (Date: -- )   Interpretation of Tool: This measure describes the change in functional communication status subsequent to speech-language pathology treatment of patients with dysphagia.  o Level 1:  Individual is not able to swallow anything safely by mouth. All nutrition and hydration is received through non-oral means (e.g., nasogastric tube, PEG). o Level 2: Individual is not able to swallow safely by mouth for nutrition and hydration, but may take some consistency with consistent maximal cues in therapy only. Alternative method of feeding required.   o Level 3:  Alternative method of feeding required as individual takes less than 50% of nutrition and hydration by mouth, and/or swallowing is safe with consistent use of moderate cues to use compensatory strategies and/or requires maximum diet restriction. o Level 4:  Swallowing is safe, but usually requires moderate cues to use compensatory strategies, and/or the individual has moderate diet restrictions and/or still requires tube feeding and/or oral supplements. o Level 5:  Swallowing is safe with minimal diet restriction and/or occasionally requires minimal cueing to use compensatory strategies. The individual may occasionally self-cue. All nutrition and hydration needs are met by mouth at mealtime. o Level 6:  Swallowing is safe, and the individual eats and drinks independently and may rarely require minimal cueing. The individual usually self-cues when difficulty occurs. May need to avoid specific food items (e.g., popcorn and nuts), or require additional time (due to dysphagia). o Level 7: The individuals ability to eat independently is not limited by swallow function. Swallowing would be safe and efficient for all consistencies. Compensatory strategies are effectively used when needed. Score Level 7 Level 6 Level 5 Level 4 Level 3 Level 2 Level 1   Modifier CH CI CJ CK CL CM CN       Cognitive and Communication Status:  Alert     TREATMENT:    (In addition to Assessment/Re-Assessment sessions the following treatments were rendered)  Dysphagia Activities: Activities/Procedures listed utilized to improve progress in swallow function and swallow safety. Required moderate cueing to improve swallow safety. LARYNGEAL / PHARYNGEAL EXERCISES:           Effortful Swallow: Yes  Reps : 15  Sets : 2  Hard Glottal Attack: Yes  Reps : 15  Sets : 2                       Muna:  Yes  Reps : 15  Sets : 2  Mendelsohn Maneuver: Yes  Reps : 15  Sets : 2                   Sing \"EEE\": Yes  Reps : 15  Sets : 2                    Sustained \"ah\": Yes  Sets : 2  Reps : 15                  __________________________________________________________________________________________________  Treatment Assessment:   . Progression/Medical Necessity:   · Skilled intervention continues to be required due to patient still consuming a modified diet. Compliance with Program/Exercises: Will assess as treatment progresses. Reason for Continuation of Services/Other Comments:  · Patient continues to require skilled intervention due to dysphagia. Recommendations/Intent for next treatment session: \"Treatment next visit will focus on laryngeal exercises, pt education\".      Total Treatment Duration:  Time In: 1030  Time Out: [address] [facility], KIM, CCC-SLP

## 2020-08-19 ENCOUNTER — HOSPITAL ENCOUNTER (OUTPATIENT)
Dept: PHYSICAL THERAPY | Age: 80
Discharge: HOME | End: 2020-08-19
Payer: MEDICARE

## 2020-08-19 PROCEDURE — 92526 ORAL FUNCTION THERAPY: CPT

## 2020-08-19 NOTE — PROGRESS NOTES
Laverne Kim  : 1940  [place]  Secondary: [facility] Generic Medicare [place] at [address] [facility] [address], [address] [facility], [address]  [phone]   [phone]      OUTPATIENT SPEECH LANGUAGE PATHOLOGY: Daily Note: 5  ICD-10: Treatment Diagnosis: dysphagia, oropharyngeal R 13.12  REFERRING PHYSICIAN: Raymond Girard NP MD Orders: speech evaluate and treat   PAST MEDICAL HISTORY:    Mr. Perlita Lion is a [de-identified] y.o. male who  has a past medical history of Arthritis, Benign essential HTN (2017), Chronic obstructive pulmonary disease ([address] Diabetes ([address] Ear problems, Fungal sinusitis, GERD (gastroesophageal reflux disease), History of multiple allergies, migraines, Hypertension, Irritable bowel syndrome with diarrhea (10/2/2018), Shingles (10/1/15), Sinus problem, and Thoracic aneurysm without mention of rupture (2014). He also has no past medical history of Arrhythmia, Asthma, Cancer ([address] Chronic kidney disease, Coagulation disorder ([address] Endocarditis, Heart failure ([address] Liver disease, Nicotine vapor product user, Non-nicotine vapor product user, Psychiatric disorder, PUD (peptic ulcer disease), Seizures ([address] Sleep apnea, Stroke ([address] Thromboembolus ([address] or Thyroid disease. He also  has a past surgical history that includes hx heent (); hx hernia repair (Right, ); hx appendectomy (age 9); hx other surgical (2012); hx tonsillectomy; hx cataract removal (Bilateral); neurological procedure unlisted; and hx heart catheterization (12). MEDICAL/REFERRING DIAGNOSIS: Dysphagia, oropharyngeal [R13.12]  DATE OF ONSET: 2020  PRIOR LEVEL OF FUNCTION: with spouse  PRECAUTIONS/ALLERGIES: Aspirin; Aleve [naproxen sodium]; Augmentin [amoxicillin-pot clavulanate]; Codeine sulfate; Corn containing products; Crestor [rosuvastatin]; Septra [sulfamethoprim ds];  Zetia [ezetimibe]; and Zoloft [sertraline] ASSESSMENT:  Pt reported he accidentally took some medication with water though he doesn't do that normally. He didn't recall SLP's suggestion to try thin liquids with a chin tuck with 1 meal daily. Suggested for him to do prior to his next appointment. Pt in agreement. Informed him that is to transition him into being able to use a chin tuck with all liquids so he can upgrade to thin liquids eventually. Understanding expressed. Pt spontaneously used a chin tuck with liquids 75% of the time this date. Patient will benefit from skilled intervention to address the below impairments. ?????? ? ? This section established at most recent assessment??????????  PROBLEM LIST (Impairments causing functional limitations):  1. Dysphagia   GOALS: (Goals have been discussed and agreed upon with patient.)  SHORT-TERM FUNCTIONAL GOALS: Time Frame: 3 months  Pt will complete laryngeal exercises with 80% accuracy. Pt will complete exercises a minimum of 5 days weekly for a home exercise program.  Pt will consume thin liquids with use of compensatory strategies (either effortful swallows or chin tuck with thin liquids) with only min cues needed. DISCHARGE GOALS: Time Frame: 4-5 months  1. Pt will tolerate least restrictive diet without signs/sx aspiration 100% for safe swallow function. REHABILITATION POTENTIAL FOR STATED GOALS: GoodPLAN OF CARE:  Patient will benefit from skilled intervention to address the following impairments.   RECOMMENDATIONS AND PLANNED INTERVENTIONS (Benefits and precautions of therapy have been discussed with the patient.):  · PO:  Regular  · Liquids:  nectar   · May have thin liquids after training in compensatory strategies in speech therapy (either use effortful swallows or a chin tuck with thin liquids via cup)  MEDICATIONS:  · With liquid (thickened)  COMPENSATORY STRATEGIES/MODIFICATIONS INCLUDING:  · Chin tuck with thin liquids with SLP  · Effortful swallows with thin liquids with SLP  OTHER RECOMMENDATIONS (including follow up treatment recommendations):   · Laryngeal exercises  · Patient education  RECOMMENDED DIET MODIFICATIONS DISCUSSED WITH:  · Patient  TREATMENT PLAN EFFECTIVE DATES: 7/27/2020 TO 10/28/2020 (90 days). FREQUENCY/DURATION: Continue to follow patient 2 times a week for 90 days to address above goals. Regarding Jeremy Kim's therapy, I certify that the treatment plan above will be carried out by a therapist or under their direction. Thank you for this referral,  Sandra Estes  [address] [address] [facility], [place], operated [facility]                    Referring Physician Signature: Nikita Brito NP    Date      SUBJECTIVE:  Pt cooperative. Present Symptoms: penetration on a recent MBS      Current Medications: see hard chart   Date Last Reviewed: 8/6/2020  Current Dietary Status:  Regular/thin liquids, uses thickener on occasion      History of reflux:  YES    Reflux medication: Omeprazole   Social History/Home Situation: with spouse      Work/Activity History: retired     OBJECTIVE:  Objective Measure: Tool Used: National Outcomes Measurement System: Functional Communication Measures: SWALLOWING  Score:  Initial: 5 Most Recent: X (Date: -- )   Interpretation of Tool: This measure describes the change in functional communication status subsequent to speech-language pathology treatment of patients with dysphagia.  o Level 1:  Individual is not able to swallow anything safely by mouth. All nutrition and hydration is received through non-oral means (e.g., nasogastric tube, PEG). o Level 2: Individual is not able to swallow safely by mouth for nutrition and hydration, but may take some consistency with consistent maximal cues in therapy only. Alternative method of feeding required.   o Level 3:  Alternative method of feeding required as individual takes less than 50% of nutrition and hydration by mouth, and/or swallowing is safe with consistent use of moderate cues to use compensatory strategies and/or requires maximum diet restriction. o Level 4:  Swallowing is safe, but usually requires moderate cues to use compensatory strategies, and/or the individual has moderate diet restrictions and/or still requires tube feeding and/or oral supplements. o Level 5:  Swallowing is safe with minimal diet restriction and/or occasionally requires minimal cueing to use compensatory strategies. The individual may occasionally self-cue. All nutrition and hydration needs are met by mouth at mealtime. o Level 6:  Swallowing is safe, and the individual eats and drinks independently and may rarely require minimal cueing. The individual usually self-cues when difficulty occurs. May need to avoid specific food items (e.g., popcorn and nuts), or require additional time (due to dysphagia). o Level 7: The individuals ability to eat independently is not limited by swallow function. Swallowing would be safe and efficient for all consistencies. Compensatory strategies are effectively used when needed. Score Level 7 Level 6 Level 5 Level 4 Level 3 Level 2 Level 1   Modifier CH CI CJ CK CL CM CN       Cognitive and Communication Status:  Alert     TREATMENT:    (In addition to Assessment/Re-Assessment sessions the following treatments were rendered)  Dysphagia Activities: Activities/Procedures listed utilized to improve progress in swallow function and swallow safety. Required moderate cueing to improve swallow safety. LARYNGEAL / PHARYNGEAL EXERCISES:           Effortful Swallow: Yes  Reps : 15  Sets : 2  Hard Glottal Attack: Yes  Reps : 15  Sets : 2                       Muna:  Yes  Reps : 15  Sets : 2  Mendelsohn Maneuver: Yes  Reps : 15  Sets : 2                   Sing \"EEE\": Yes  Reps : 15  Sets : 2                    Sustained \"ah\": Yes  Sets : 2  Reps : 15                  __________________________________________________________________________________________________  Treatment Assessment: .  Progression/Medical Necessity:   · Skilled intervention continues to be required due to patient still consuming a modified diet. Compliance with Program/Exercises: Will assess as treatment progresses. Reason for Continuation of Services/Other Comments:  · Patient continues to require skilled intervention due to dysphagia. Recommendations/Intent for next treatment session: \"Treatment next visit will focus on laryngeal exercises, pt education\".      Total Treatment Duration:  Time In: 1035  Time Out: KIM Lopez, CCC-SLP

## 2020-08-21 ENCOUNTER — HOSPITAL ENCOUNTER (OUTPATIENT)
Dept: PHYSICAL THERAPY | Age: 80
Discharge: HOME | End: 2020-08-21
Payer: MEDICARE

## 2020-08-21 PROCEDURE — 92526 ORAL FUNCTION THERAPY: CPT

## 2020-08-21 NOTE — PROGRESS NOTES
Roosevelt Kim  : 1940  [place]  Secondary: [place] at [facility] [address], [address]  [phone]   [phone]      OUTPATIENT SPEECH LANGUAGE PATHOLOGY: Daily Note: 6  ICD-10: Treatment Diagnosis: dysphagia, oropharyngeal R 13.12  REFERRING PHYSICIAN: Cory Naranjo NP MD Orders: speech evaluate and treat   PAST MEDICAL HISTORY:    Mr. Micheal Gomez is a [de-identified] y.o. male who  has a past medical history of Arthritis, Benign essential HTN (2017), Chronic obstructive pulmonary disease ([address] Diabetes ([address] Ear problems, Fungal sinusitis, GERD (gastroesophageal reflux disease), History of multiple allergies, migraines, Hypertension, Irritable bowel syndrome with diarrhea (10/2/2018), Shingles (10/1/15), Sinus problem, and Thoracic aneurysm without mention of rupture (2014). He also has no past medical history of Arrhythmia, Asthma, Cancer ([address] Chronic kidney disease, Coagulation disorder ([address] Endocarditis, Heart failure ([address] Liver disease, Nicotine vapor product user, Non-nicotine vapor product user, Psychiatric disorder, PUD (peptic ulcer disease), Seizures ([address] Sleep apnea, Stroke ([address] Thromboembolus ([address] or Thyroid disease. He also  has a past surgical history that includes hx heent (); hx hernia repair (Right, ); hx appendectomy (age 9); hx other surgical (2012); hx tonsillectomy; hx cataract removal (Bilateral); neurological procedure unlisted; and hx heart catheterization (12). MEDICAL/REFERRING DIAGNOSIS: Dysphagia, oropharyngeal [R13.12]  DATE OF ONSET: 2020  PRIOR LEVEL OF FUNCTION: with spouse  PRECAUTIONS/ALLERGIES: Aspirin; Aleve [naproxen sodium]; Augmentin [amoxicillin-pot clavulanate]; Codeine sulfate; Corn containing products; Crestor [rosuvastatin]; Septra [sulfamethoprim ds];  Zetia [ezetimibe]; and Zoloft [sertraline] ASSESSMENT:  Pt reported he used a chin tuck with tea with 1 meal since has last therapy session. He reported he wasn't sure if he needed to do it with one meal daily or just once. Pt used a chin tuck or effortful swallow with thin liquids 100% of the time in therapy this date. Informed him he can use 1 or the other. We both felt it would be easier for him to recall using effortful swallows vs chin tucks. Informed pt he can try thin liquids at home this weekend with using 1 of those strategies. Pt in agreement. Patient will benefit from skilled intervention to address the below impairments. ?????? ? ? This section established at most recent assessment??????????  PROBLEM LIST (Impairments causing functional limitations):  1. Dysphagia   GOALS: (Goals have been discussed and agreed upon with patient.)  SHORT-TERM FUNCTIONAL GOALS: Time Frame: 3 months  Pt will complete laryngeal exercises with 80% accuracy. Pt will complete exercises a minimum of 5 days weekly for a home exercise program.  Pt will consume thin liquids with use of compensatory strategies (either effortful swallows or chin tuck with thin liquids) with only min cues needed. DISCHARGE GOALS: Time Frame: 4-5 months  1. Pt will tolerate least restrictive diet without signs/sx aspiration 100% for safe swallow function. REHABILITATION POTENTIAL FOR STATED GOALS: GoodPLAN OF CARE:  Patient will benefit from skilled intervention to address the following impairments.   RECOMMENDATIONS AND PLANNED INTERVENTIONS (Benefits and precautions of therapy have been discussed with the patient.):  · PO:  Regular  · Liquids:  nectar   · May have thin liquids after training in compensatory strategies in speech therapy (either use effortful swallows or a chin tuck with thin liquids via cup)  MEDICATIONS:  · With liquid (thickened)  COMPENSATORY STRATEGIES/MODIFICATIONS INCLUDING:  · Chin tuck with thin liquids with SLP  · Effortful swallows with thin liquids with SLP  OTHER RECOMMENDATIONS (including follow up treatment recommendations):   · Laryngeal exercises  · Patient education  RECOMMENDED DIET MODIFICATIONS DISCUSSED WITH:  · Patient  TREATMENT PLAN EFFECTIVE DATES: 7/27/2020 TO 10/28/2020 (90 days). FREQUENCY/DURATION: Continue to follow patient 2 times a week for 90 days to address above goals. Regarding Praveen Kim's therapy, I certify that the treatment plan above will be carried out by a therapist or under their direction. Thank you for this referral,  Tae Spears, [facility], [facility] [place], [address] [facility]                    Referring Physician Signature: Analy Jaramillo NP    Date      SUBJECTIVE:  Pt cooperative. Present Symptoms: penetration on a recent MBS      Current Medications: see hard chart   Date Last Reviewed: 8/6/2020  Current Dietary Status:  Regular/thin liquids, uses thickener on occasion      History of reflux:  YES    Reflux medication: Omeprazole   Social History/Home Situation: with spouse      Work/Activity History: retired     OBJECTIVE:  Objective Measure: Tool Used: National Outcomes Measurement System: Functional Communication Measures: SWALLOWING  Score:  Initial: 5 Most Recent: X (Date: -- )   Interpretation of Tool: This measure describes the change in functional communication status subsequent to speech-language pathology treatment of patients with dysphagia.  o Level 1:  Individual is not able to swallow anything safely by mouth. All nutrition and hydration is received through non-oral means (e.g., nasogastric tube, PEG). o Level 2: Individual is not able to swallow safely by mouth for nutrition and hydration, but may take some consistency with consistent maximal cues in therapy only. Alternative method of feeding required.   o Level 3:  Alternative method of feeding required as individual takes less than 50% of nutrition and hydration by mouth, and/or swallowing is safe with consistent use of moderate cues to use compensatory strategies and/or requires maximum diet restriction. o Level 4:  Swallowing is safe, but usually requires moderate cues to use compensatory strategies, and/or the individual has moderate diet restrictions and/or still requires tube feeding and/or oral supplements. o Level 5:  Swallowing is safe with minimal diet restriction and/or occasionally requires minimal cueing to use compensatory strategies. The individual may occasionally self-cue. All nutrition and hydration needs are met by mouth at mealtime. o Level 6:  Swallowing is safe, and the individual eats and drinks independently and may rarely require minimal cueing. The individual usually self-cues when difficulty occurs. May need to avoid specific food items (e.g., popcorn and nuts), or require additional time (due to dysphagia). o Level 7: The individuals ability to eat independently is not limited by swallow function. Swallowing would be safe and efficient for all consistencies. Compensatory strategies are effectively used when needed. Score Level 7 Level 6 Level 5 Level 4 Level 3 Level 2 Level 1   Modifier CH CI CJ CK CL CM CN       Cognitive and Communication Status:  Alert     TREATMENT:    (In addition to Assessment/Re-Assessment sessions the following treatments were rendered)  Dysphagia Activities: Activities/Procedures listed utilized to improve progress in swallow function and swallow safety. Required moderate cueing to improve swallow safety. LARYNGEAL / PHARYNGEAL EXERCISES:           Effortful Swallow: Yes  Reps : 15  Sets : 2  Hard Glottal Attack: Yes  Reps : 15                          Muna:  Yes  Reps : 15  Sets : 2  Mendelsohn Maneuver: Yes  Reps : 15  Sets : 2                   Sing \"EEE\": Yes  Reps : 15(difficulty with achieving a high pitch)  Sets : 2                    Sustained \"ah\": Yes  Sets : 2  Reps : 15 __________________________________________________________________________________________________  Treatment Assessment:   . Progression/Medical Necessity:   · Skilled intervention continues to be required due to patient still consuming a modified diet. Compliance with Program/Exercises: Will assess as treatment progresses. Reason for Continuation of Services/Other Comments:  · Patient continues to require skilled intervention due to dysphagia. Recommendations/Intent for next treatment session: \"Treatment next visit will focus on laryngeal exercises, pt education\".      Total Treatment Duration:  Time In: 1020  Time Out: 17 Aminata , KIM, CCC-SLP

## 2020-08-25 ENCOUNTER — HOSPITAL ENCOUNTER (OUTPATIENT)
Dept: PHYSICAL THERAPY | Age: 80
Discharge: HOME | End: 2020-08-25
Payer: MEDICARE

## 2020-08-25 PROCEDURE — 92526 ORAL FUNCTION THERAPY: CPT

## 2020-08-25 NOTE — PROGRESS NOTES
Eric Kim  : 1940  [place]  Secondary: [place] [facility], [address]  [phone]   [phone]      OUTPATIENT SPEECH LANGUAGE PATHOLOGY: Progress Report  ICD-10: Treatment Diagnosis: dysphagia, oropharyngeal R 13.12  REFERRING PHYSICIAN: Viktoria Tan NP MD Orders: speech evaluate and treat   PAST MEDICAL HISTORY:    Mr. Sera Gilliland is a [de-identified] y.o. male who  has a past medical history of Arthritis, Benign essential HTN (2017), Chronic obstructive pulmonary disease ([address] Diabetes ([address] Ear problems, Fungal sinusitis, GERD (gastroesophageal reflux disease), History of multiple allergies, migraines, Hypertension, Irritable bowel syndrome with diarrhea (10/2/2018), Shingles (10/1/15), Sinus problem, and Thoracic aneurysm without mention of rupture (2014). He also has no past medical history of Arrhythmia, Asthma, Cancer ([address] Chronic kidney disease, Coagulation disorder ([address] Endocarditis, Heart failure ([address] Liver disease, Nicotine vapor product user, Non-nicotine vapor product user, Psychiatric disorder, PUD (peptic ulcer disease), Seizures ([address] Sleep apnea, Stroke ([address] Thromboembolus ([address] or Thyroid disease. He also  has a past surgical history that includes hx heent (); hx hernia repair (Right, ); hx appendectomy (age 9); hx other surgical (2012); hx tonsillectomy; hx cataract removal (Bilateral); neurological procedure unlisted; and hx heart catheterization (12). MEDICAL/REFERRING DIAGNOSIS: Dysphagia, oropharyngeal [R13.12]  DATE OF ONSET: 2020  PRIOR LEVEL OF FUNCTION: with spouse  PRECAUTIONS/ALLERGIES: Aspirin; Aleve [naproxen sodium]; Augmentin [amoxicillin-pot clavulanate]; Codeine sulfate; Corn containing products; Crestor [rosuvastatin]; Septra [sulfamethoprim ds];  Zetia [ezetimibe]; and Zoloft [sertraline] ASSESSMENT:  Pt has attended 9 sessions due to dysphagia. He is doing very well with his exercises and is complaint with his home exercise program.  He reported he practices his exercises twice daily, every day. He used a chin tuck 100% of the time this date when consuming thin liquids. He reported he also used a chin tuck or effortful swallows with liquids at home over the weekend and it went well. He reported he thinks he forgot once or twice but otherwise did well. Recommend continuing with ST to ensure use of compensatory strategies and for laryngeal exercises. Pt in agreement. Patient will benefit from skilled intervention to address the below impairments. ?????? ? ? This section established at most recent assessment??????????  PROBLEM LIST (Impairments causing functional limitations):  1. Dysphagia   GOALS: (Goals have been discussed and agreed upon with patient.)  SHORT-TERM FUNCTIONAL GOALS: Time Frame: 3 months  Pt will complete laryngeal exercises with 80% accuracy. Goal ongoing. Pt will complete exercises a minimum of 5 days weekly for a home exercise program.  Goal met. Pt will consume thin liquids with use of compensatory strategies (either effortful swallows or chin tuck with thin liquids) with only min cues needed. Goal ongoing. DISCHARGE GOALS: Time Frame: 4-5 months  1. Pt will tolerate least restrictive diet without signs/sx aspiration 100% for safe swallow function. REHABILITATION POTENTIAL FOR STATED GOALS: GoodPLAN OF CARE:  Patient will benefit from skilled intervention to address the following impairments.   RECOMMENDATIONS AND PLANNED INTERVENTIONS (Benefits and precautions of therapy have been discussed with the patient.):  · PO:  Regular  · Liquids:  nectar   · May have thin liquids after training in compensatory strategies in speech therapy (either use effortful swallows or a chin tuck with thin liquids via cup)  MEDICATIONS:  · With liquid (thickened)  COMPENSATORY STRATEGIES/MODIFICATIONS INCLUDING:  · Chin tuck with thin liquids with SLP  · Effortful swallows with thin liquids with SLP  OTHER RECOMMENDATIONS (including follow up treatment recommendations):   · Laryngeal exercises  · Patient education  RECOMMENDED DIET MODIFICATIONS DISCUSSED WITH:  · Patient  TREATMENT PLAN EFFECTIVE DATES: 7/27/2020 TO 10/28/2020 (90 days). FREQUENCY/DURATION: Continue to follow patient 2 times a week for 90 days to address above goals. Regarding Viviana Kim's therapy, I certify that the treatment plan above will be carried out by a therapist or under their direction. Thank you for this referral,  Renée Peoples, [facility], [facility] [place], [address] [facility]                    Referring Physician Signature: Kandy Brown NP    Date      SUBJECTIVE:  Pt cooperative. Present Symptoms: penetration on a recent MBS      Current Medications: see hard chart   Date Last Reviewed: 8/25/2020  Current Dietary Status:  Regular/thin liquids, uses thickener on occasion      History of reflux:  YES    Reflux medication: Omeprazole   Social History/Home Situation: with spouse      Work/Activity History: retired     OBJECTIVE:  Objective Measure: Tool Used: National Outcomes Measurement System: Functional Communication Measures: SWALLOWING  Score:  Initial: 5 Most Recent: 5 (Date: 8/25/2020)   Interpretation of Tool: This measure describes the change in functional communication status subsequent to speech-language pathology treatment of patients with dysphagia.  o Level 1:  Individual is not able to swallow anything safely by mouth. All nutrition and hydration is received through non-oral means (e.g., nasogastric tube, PEG). o Level 2: Individual is not able to swallow safely by mouth for nutrition and hydration, but may take some consistency with consistent maximal cues in therapy only. Alternative method of feeding required.   o Level 3:  Alternative method of feeding required as individual takes less than 50% of nutrition and hydration by mouth, and/or swallowing is safe with consistent use of moderate cues to use compensatory strategies and/or requires maximum diet restriction. o Level 4:  Swallowing is safe, but usually requires moderate cues to use compensatory strategies, and/or the individual has moderate diet restrictions and/or still requires tube feeding and/or oral supplements. o Level 5:  Swallowing is safe with minimal diet restriction and/or occasionally requires minimal cueing to use compensatory strategies. The individual may occasionally self-cue. All nutrition and hydration needs are met by mouth at mealtime. o Level 6:  Swallowing is safe, and the individual eats and drinks independently and may rarely require minimal cueing. The individual usually self-cues when difficulty occurs. May need to avoid specific food items (e.g., popcorn and nuts), or require additional time (due to dysphagia). o Level 7: The individuals ability to eat independently is not limited by swallow function. Swallowing would be safe and efficient for all consistencies. Compensatory strategies are effectively used when needed. Score Level 7 Level 6 Level 5 Level 4 Level 3 Level 2 Level 1   Modifier CH CI CJ CK CL CM CN       Cognitive and Communication Status:  Alert     TREATMENT:    (In addition to Assessment/Re-Assessment sessions the following treatments were rendered)  Dysphagia Activities: Activities/Procedures listed utilized to improve progress in swallow function and swallow safety. Required moderate cueing to improve swallow safety. LARYNGEAL / PHARYNGEAL EXERCISES:           Effortful Swallow: Yes  Reps : 15  Sets : 2  Hard Glottal Attack: Yes  Reps : 15  Sets : 2                       Muna:  Yes  Reps : 15  Sets : 2  Mendelsohn Maneuver: Yes  Reps : 15  Sets : 2                   Sing \"EEE\": Yes  Reps : 15  Sets : 2                    Sustained \"ah\": Yes  Sets : 2  Reps : 15 __________________________________________________________________________________________________  Treatment Assessment:   . Progression/Medical Necessity:   · Skilled intervention continues to be required due to patient still consuming a modified diet. Compliance with Program/Exercises: Will assess as treatment progresses. Reason for Continuation of Services/Other Comments:  · Patient continues to require skilled intervention due to dysphagia. Recommendations/Intent for next treatment session: \"Treatment next visit will focus on laryngeal exercises\".      Total Treatment Duration:  Time In: 1120  Time Out: 1200 [place], MSP, CCC-SLP

## 2020-08-27 ENCOUNTER — HOSPITAL ENCOUNTER (OUTPATIENT)
Dept: PHYSICAL THERAPY | Age: 80
Discharge: HOME | End: 2020-08-27
Payer: MEDICARE

## 2020-08-27 PROCEDURE — 92526 ORAL FUNCTION THERAPY: CPT

## 2020-08-27 NOTE — PROGRESS NOTES
Lizette Kim  : 1940  [place]  Secondary: [place] [facility], [facility], [address]  [phone]   [phone]      OUTPATIENT SPEECH LANGUAGE PATHOLOGY: Daily Note: 1  ICD-10: Treatment Diagnosis: dysphagia, oropharyngeal R 13.12  REFERRING PHYSICIAN: Cyndie Mcghee NP MD Orders: speech evaluate and treat   PAST MEDICAL HISTORY:    Mr. Leah Kay is a [de-identified] y.o. male who  has a past medical history of Arthritis, Benign essential HTN (2017), Chronic obstructive pulmonary disease ([address] Diabetes ([address] Ear problems, Fungal sinusitis, GERD (gastroesophageal reflux disease), History of multiple allergies, migraines, Hypertension, Irritable bowel syndrome with diarrhea (10/2/2018), Shingles (10/1/15), Sinus problem, and Thoracic aneurysm without mention of rupture (2014). He also has no past medical history of Arrhythmia, Asthma, Cancer ([address] Chronic kidney disease, Coagulation disorder ([address] Endocarditis, Heart failure ([address] Liver disease, Nicotine vapor product user, Non-nicotine vapor product user, Psychiatric disorder, PUD (peptic ulcer disease), Seizures ([address] Sleep apnea, Stroke ([address] Thromboembolus ([address] or Thyroid disease. He also  has a past surgical history that includes hx heent (); hx hernia repair (Right, ); hx appendectomy (age 9); hx other surgical (2012); hx tonsillectomy; hx cataract removal (Bilateral); neurological procedure unlisted; and hx heart catheterization (12). MEDICAL/REFERRING DIAGNOSIS: Dysphagia, oropharyngeal [R13.12]  DATE OF ONSET: 2020  PRIOR LEVEL OF FUNCTION: with spouse  PRECAUTIONS/ALLERGIES: Aspirin; Aleve [naproxen sodium]; Augmentin [amoxicillin-pot clavulanate]; Codeine sulfate; Corn containing products; Crestor [rosuvastatin]; Septra [sulfamethoprim ds];  Zetia [ezetimibe]; and Zoloft [sertraline] ASSESSMENT:  Pt asked if he can drink regular liquids with pills as long as he uses a chin tuck. Informed him he can though it may be easier to use an effortful swallow as gravity may make swallowing the pills harder with a chin tuck. However, informed him it is up to him. He reported no difficulties with using a chin tuck with liquids and pills. He used a chin tuck with liquids this date 100% of the time. Patient will benefit from skilled intervention to address the below impairments. ?????? ? ? This section established at most recent assessment??????????  PROBLEM LIST (Impairments causing functional limitations):  1. Dysphagia   GOALS: (Goals have been discussed and agreed upon with patient.)  SHORT-TERM FUNCTIONAL GOALS: Time Frame: 3 months  Pt will complete laryngeal exercises with 80% accuracy. Pt will consume thin liquids with use of compensatory strategies (either effortful swallows or chin tuck with thin liquids) with only min cues needed. DISCHARGE GOALS: Time Frame: 4-5 months  1. Pt will tolerate least restrictive diet without signs/sx aspiration 100% for safe swallow function. REHABILITATION POTENTIAL FOR STATED GOALS: GoodPLAN OF CARE:  Patient will benefit from skilled intervention to address the following impairments.   RECOMMENDATIONS AND PLANNED INTERVENTIONS (Benefits and precautions of therapy have been discussed with the patient.):  · PO:  Regular  · Liquids:  nectar   · May have thin liquids after training in compensatory strategies in speech therapy (either use effortful swallows or a chin tuck with thin liquids via cup)  MEDICATIONS:  · With liquid (thickened)  COMPENSATORY STRATEGIES/MODIFICATIONS INCLUDING:  · Chin tuck with thin liquids with SLP  · Effortful swallows with thin liquids with SLP  OTHER RECOMMENDATIONS (including follow up treatment recommendations):   · Laryngeal exercises  · Patient education  RECOMMENDED DIET MODIFICATIONS DISCUSSED WITH:  · Patient  TREATMENT PLAN EFFECTIVE DATES: 7/27/2020 TO 10/28/2020 (90 days). FREQUENCY/DURATION: Continue to follow patient 2 times a week for 90 days to address above goals. Regarding Lizette Kim's therapy, I certify that the treatment plan above will be carried out by a therapist or under their direction. Thank you for this referral,  [address]  [address] [address] [facility]                    Referring Physician Signature: Cyndie Mcghee NP    Date      SUBJECTIVE:  Pt cooperative. Present Symptoms: penetration on a recent MBS      Current Medications: see hard chart   Date Last Reviewed: 8/25/2020  Current Dietary Status:  Regular/thin liquids, uses thickener on occasion      History of reflux:  YES    Reflux medication: Omeprazole   Social History/Home Situation: with spouse      Work/Activity History: retired     OBJECTIVE:  Objective Measure: Tool Used: National Outcomes Measurement System: Functional Communication Measures: SWALLOWING  Score:  Initial: 5 Most Recent: 5 (Date: 8/25/2020)   Interpretation of Tool: This measure describes the change in functional communication status subsequent to speech-language pathology treatment of patients with dysphagia.  o Level 1:  Individual is not able to swallow anything safely by mouth. All nutrition and hydration is received through non-oral means (e.g., nasogastric tube, PEG). o Level 2: Individual is not able to swallow safely by mouth for nutrition and hydration, but may take some consistency with consistent maximal cues in therapy only. Alternative method of feeding required. o Level 3:  Alternative method of feeding required as individual takes less than 50% of nutrition and hydration by mouth, and/or swallowing is safe with consistent use of moderate cues to use compensatory strategies and/or requires maximum diet restriction.   o Level 4:  Swallowing is safe, but usually requires moderate cues to use compensatory strategies, and/or the individual has moderate diet restrictions and/or still requires tube feeding and/or oral supplements. o Level 5:  Swallowing is safe with minimal diet restriction and/or occasionally requires minimal cueing to use compensatory strategies. The individual may occasionally self-cue. All nutrition and hydration needs are met by mouth at mealtime. o Level 6:  Swallowing is safe, and the individual eats and drinks independently and may rarely require minimal cueing. The individual usually self-cues when difficulty occurs. May need to avoid specific food items (e.g., popcorn and nuts), or require additional time (due to dysphagia). o Level 7: The individuals ability to eat independently is not limited by swallow function. Swallowing would be safe and efficient for all consistencies. Compensatory strategies are effectively used when needed. Score Level 7 Level 6 Level 5 Level 4 Level 3 Level 2 Level 1   Modifier CH CI CJ CK CL CM CN       Cognitive and Communication Status:  Alert     TREATMENT:    (In addition to Assessment/Re-Assessment sessions the following treatments were rendered)  Dysphagia Activities: Activities/Procedures listed utilized to improve progress in swallow function and swallow safety. Required moderate cueing to improve swallow safety. LARYNGEAL / PHARYNGEAL EXERCISES:           Effortful Swallow: Yes  Reps : 15  Sets : 2  Hard Glottal Attack: Yes  Reps : 15  Sets : 2                       Muna: Yes  Reps : 15  Sets : 2  Mendelsohn Maneuver: Yes  Reps : 15  Sets : 2                   Sing \"EEE\": Yes  Reps : 15  Sets : 2                    Sustained \"ah\": Yes  Sets : 2  Reps : 15                  __________________________________________________________________________________________________  Treatment Assessment:   . Progression/Medical Necessity:   · Skilled intervention continues to be required due to patient still consuming a modified diet. Compliance with Program/Exercises:  Will assess as treatment progresses. Reason for Continuation of Services/Other Comments:  · Patient continues to require skilled intervention due to dysphagia. Recommendations/Intent for next treatment session: \"Treatment next visit will focus on laryngeal exercises\".      Total Treatment Duration:  Time In: 1125  Time Out: [address], MSP, CCC-SLP

## 2020-08-31 ENCOUNTER — HOSPITAL ENCOUNTER (OUTPATIENT)
Dept: PHYSICAL THERAPY | Age: 80
Discharge: HOME | End: 2020-08-31
Payer: MEDICARE

## 2020-08-31 PROCEDURE — 92526 ORAL FUNCTION THERAPY: CPT

## 2020-08-31 NOTE — PROGRESS NOTES
Saima Kim  : 1940  [place]  Secondary: [place] at [facility] [address], [address] [facility], [address]  [phone]   [phone]      OUTPATIENT SPEECH LANGUAGE PATHOLOGY: Daily Note: 2  ICD-10: Treatment Diagnosis: dysphagia, oropharyngeal R 13.12  REFERRING PHYSICIAN: Nya Rodas NP MD Orders: speech evaluate and treat   PAST MEDICAL HISTORY:    Mr. Alannah Garcia is a [de-identified] y.o. male who  has a past medical history of Arthritis, Benign essential HTN (2017), Chronic obstructive pulmonary disease ([address] Diabetes ([address] Ear problems, Fungal sinusitis, GERD (gastroesophageal reflux disease), History of multiple allergies, migraines, Hypertension, Irritable bowel syndrome with diarrhea (10/2/2018), Shingles (10/1/15), Sinus problem, and Thoracic aneurysm without mention of rupture (2014). He also has no past medical history of Arrhythmia, Asthma, Cancer ([address] Chronic kidney disease, Coagulation disorder ([address] Endocarditis, Heart failure ([address] Liver disease, Nicotine vapor product user, Non-nicotine vapor product user, Psychiatric disorder, PUD (peptic ulcer disease), Seizures ([address] Sleep apnea, Stroke ([address] Thromboembolus ([address] or Thyroid disease. He also  has a past surgical history that includes hx heent (); hx hernia repair (Right, ); hx appendectomy (age 9); hx other surgical (2012); hx tonsillectomy; hx cataract removal (Bilateral); neurological procedure unlisted; and hx heart catheterization (12). MEDICAL/REFERRING DIAGNOSIS: Dysphagia, oropharyngeal [R13.12]  DATE OF ONSET: 2020  PRIOR LEVEL OF FUNCTION: with spouse  PRECAUTIONS/ALLERGIES: Aspirin; Aleve [naproxen sodium]; Augmentin [amoxicillin-pot clavulanate]; Codeine sulfate; Corn containing products; Crestor [rosuvastatin]; Septra [sulfamethoprim ds];  Zetia [ezetimibe]; and Zoloft [sertraline] ASSESSMENT:  Pt asked how much longer he will need to have ST. Discussed completing another MBS the week of September 15th as that is almost 2 months since his 1st one. Discussed continuing with therapy past then pending on results. Pt in agreement. He has appointments through September 15th currently. He reported he finds using a chin tuck easier than using effortful swallows at home as he isn't sure if he's completing the effortful swallows correctly. He does well with exercises and uses a chin tuck independently in therapy. Patient will benefit from skilled intervention to address the below impairments. ?????? ? ? This section established at most recent assessment??????????  PROBLEM LIST (Impairments causing functional limitations):  1. Dysphagia   GOALS: (Goals have been discussed and agreed upon with patient.)  SHORT-TERM FUNCTIONAL GOALS: Time Frame: 3 months  Pt will complete laryngeal exercises with 80% accuracy. Pt will consume thin liquids with use of compensatory strategies (either effortful swallows or chin tuck with thin liquids) with only min cues needed. DISCHARGE GOALS: Time Frame: 4-5 months  1. Pt will tolerate least restrictive diet without signs/sx aspiration 100% for safe swallow function. REHABILITATION POTENTIAL FOR STATED GOALS: GoodPLAN OF CARE:  Patient will benefit from skilled intervention to address the following impairments.   RECOMMENDATIONS AND PLANNED INTERVENTIONS (Benefits and precautions of therapy have been discussed with the patient.):  · PO:  Regular  · Liquids:  nectar   · May have thin liquids after training in compensatory strategies in speech therapy (either use effortful swallows or a chin tuck with thin liquids via cup)  MEDICATIONS:  · With liquid (thickened)  COMPENSATORY STRATEGIES/MODIFICATIONS INCLUDING:  · Chin tuck with thin liquids with SLP  · Effortful swallows with thin liquids with SLP  OTHER RECOMMENDATIONS (including follow up treatment recommendations):   · Laryngeal exercises  · Patient education  RECOMMENDED DIET MODIFICATIONS DISCUSSED WITH:  · Patient  TREATMENT PLAN EFFECTIVE DATES: 7/27/2020 TO 10/28/2020 (90 days). FREQUENCY/DURATION: Continue to follow patient 2 times a week for 90 days to address above goals. Regarding Pam Kim's therapy, I certify that the treatment plan above will be carried out by a therapist or under their direction. Thank you for this referral,  [address] [facility] [address] [address] [facility]                    Referring Physician Signature: Samaria Tony NP    Date      SUBJECTIVE:  Pt cooperative. Present Symptoms: penetration on a recent MBS      Current Medications: see hard chart   Date Last Reviewed: 8/25/2020  Current Dietary Status:  Regular/thin liquids, uses thickener on occasion      History of reflux:  YES    Reflux medication: Omeprazole   Social History/Home Situation: with spouse      Work/Activity History: retired     OBJECTIVE:  Objective Measure: Tool Used: National Outcomes Measurement System: Functional Communication Measures: SWALLOWING  Score:  Initial: 5 Most Recent: 5 (Date: 8/25/2020)   Interpretation of Tool: This measure describes the change in functional communication status subsequent to speech-language pathology treatment of patients with dysphagia.  o Level 1:  Individual is not able to swallow anything safely by mouth. All nutrition and hydration is received through non-oral means (e.g., nasogastric tube, PEG). o Level 2: Individual is not able to swallow safely by mouth for nutrition and hydration, but may take some consistency with consistent maximal cues in therapy only. Alternative method of feeding required. o Level 3:  Alternative method of feeding required as individual takes less than 50% of nutrition and hydration by mouth, and/or swallowing is safe with consistent use of moderate cues to use compensatory strategies and/or requires maximum diet restriction.   o Level 4:  Swallowing is safe, but usually requires moderate cues to use compensatory strategies, and/or the individual has moderate diet restrictions and/or still requires tube feeding and/or oral supplements. o Level 5:  Swallowing is safe with minimal diet restriction and/or occasionally requires minimal cueing to use compensatory strategies. The individual may occasionally self-cue. All nutrition and hydration needs are met by mouth at mealtime. o Level 6:  Swallowing is safe, and the individual eats and drinks independently and may rarely require minimal cueing. The individual usually self-cues when difficulty occurs. May need to avoid specific food items (e.g., popcorn and nuts), or require additional time (due to dysphagia). o Level 7: The individuals ability to eat independently is not limited by swallow function. Swallowing would be safe and efficient for all consistencies. Compensatory strategies are effectively used when needed. Score Level 7 Level 6 Level 5 Level 4 Level 3 Level 2 Level 1   Modifier CH CI CJ CK CL CM CN       Cognitive and Communication Status:  Alert     TREATMENT:    (In addition to Assessment/Re-Assessment sessions the following treatments were rendered)  Dysphagia Activities: Activities/Procedures listed utilized to improve progress in swallow function and swallow safety. Required moderate cueing to improve swallow safety. LARYNGEAL / PHARYNGEAL EXERCISES:           Effortful Swallow: Yes  Reps : 15  Sets : 2  Hard Glottal Attack: Yes  Reps : 15  Sets : 2                       Muna: Yes  Reps : 15  Sets : 2  Mendelsohn Maneuver: Yes  Reps : 15  Sets : 2                   Sing \"EEE\": Yes  Reps : 15  Sets : 2                    Sustained \"ah\": Yes  Sets : 2  Reps : 15                  __________________________________________________________________________________________________  Treatment Assessment:   .   Progression/Medical Necessity:   · Skilled intervention continues to be required due to patient still consuming a modified diet. Compliance with Program/Exercises: Will assess as treatment progresses. Reason for Continuation of Services/Other Comments:  · Patient continues to require skilled intervention due to dysphagia. Recommendations/Intent for next treatment session: \"Treatment next visit will focus on laryngeal exercises\".      Total Treatment Duration:  Time In: 1050  Time Out: [address], MSP, CCC-SLP

## 2020-09-03 ENCOUNTER — HOSPITAL ENCOUNTER (OUTPATIENT)
Dept: PHYSICAL THERAPY | Age: 80
Discharge: HOME | End: 2020-09-03
Payer: MEDICARE

## 2020-09-03 PROCEDURE — 92526 ORAL FUNCTION THERAPY: CPT

## 2020-09-03 NOTE — PROGRESS NOTES
Outpatient Rehab Services  Referral Form/Physician Order  Central Scheduling Fax: [phone]  Speak to a :  Call [phone]   Please review and co-sign (electronically) OR sign and return to the below indicated clinic's fax number if you agree with this request.  Thank you! NAME:  Vini Kim SSN:  [id number] :  1940   ADDRESS:  [address] [address] Daytime Phone:  [phone] (LF)[phone] (mobile)    Diagnosis & Date of Onset:  Dysphagia, oropharyngeal [R13.12] Special Precautions:   Frequency:  [x] to be determined by therapist after evaluation   OR   ____ X per week X ____ weeks    Services    [] Evaluate & Treat  [] Special Orders________________________   [] Physical Therapy  [] Occupational Therapy   [] Speech Therapy  [x] MBS w/ Speech Therapy    Specialized Programs    [] Aquatic Therapy [] Lymphedema [] Oncology Rehab   [] Balance Rehab [] Parkinson's Program [] Osteoporosis   [] Fibromyalgia [] Motion Analysis [] Spine Rehab   [] Industrial Rehab [] Hand & Upper Extremity [] Sports Injury Rehab    [] Urinary Incontinence     Locations    @ [address] [place] [address] 68, [address]  [address]  Ph: [phone]  Fax: [phone] @ [address] [place] [address] [address] [facility]  [place], [address]  Ph: [phone]  Fax: [phone] @ [facility] [address] [place], [address] [place] [address]  Ph: [phone]  Fax: [phone]        @ [address] [place] [address] Ariana Carvajal [address]  Ph: [phone]  Fax: [phone] @ [address] [place], [address]   Ph: [phone]  Fax: [phone] @ [address] [place] [address] 25  [place], [address]  Ph: [phone]  Fax: [phone]        @ [address]  Ph: [phone]  Fax: [phone] @ [address]  Ph: [phone]  Fax: [phone] @ [address], [address]  [address]   Ph: [phone]         @ [address] [place] [address]  Ph: [phone]  Fax: [phone]      This section is not needed if signing electronically   I certify that I have examined the above patient and outpatient rehab services are medically necessary.    ___________________________________________           Signature of Physician/Provider    ___________________________________________            Practice Name   ______________________   Date    ______________________   Referral Contact

## 2020-09-03 NOTE — PROGRESS NOTES
Dong Kim  : 1940  [place]  Secondary: [place] at [facility] [address], [address]  [phone]   [phone]      OUTPATIENT SPEECH LANGUAGE PATHOLOGY: Daily Note: 3  ICD-10: Treatment Diagnosis: dysphagia, oropharyngeal R 13.12  REFERRING PHYSICIAN: Justin Velazquez NP MD Orders: speech evaluate and treat   PAST MEDICAL HISTORY:    Mr. Astrid Cruz is a [de-identified] y.o. male who  has a past medical history of Arthritis, Benign essential HTN (2017), Chronic obstructive pulmonary disease ([address] Diabetes ([address] Ear problems, Fungal sinusitis, GERD (gastroesophageal reflux disease), History of multiple allergies, migraines, Hypertension, Irritable bowel syndrome with diarrhea (10/2/2018), Shingles (10/1/15), Sinus problem, and Thoracic aneurysm without mention of rupture (2014). He also has no past medical history of Arrhythmia, Asthma, Cancer ([address] Chronic kidney disease, Coagulation disorder ([address] Endocarditis, Heart failure ([address] Liver disease, Nicotine vapor product user, Non-nicotine vapor product user, Psychiatric disorder, PUD (peptic ulcer disease), Seizures ([address] Sleep apnea, Stroke ([address] Thromboembolus ([address] or Thyroid disease. He also  has a past surgical history that includes hx heent (); hx hernia repair (Right, ); hx appendectomy (age 9); hx other surgical (2012); hx tonsillectomy; hx cataract removal (Bilateral); neurological procedure unlisted; and hx heart catheterization (12). MEDICAL/REFERRING DIAGNOSIS: Dysphagia, oropharyngeal [R13.12]  DATE OF ONSET: 2020  PRIOR LEVEL OF FUNCTION: with spouse  PRECAUTIONS/ALLERGIES: Aspirin; Aleve [naproxen sodium]; Augmentin [amoxicillin-pot clavulanate]; Codeine sulfate; Corn containing products; Crestor [rosuvastatin]; Septra [sulfamethoprim ds];  Zetia [ezetimibe]; and Zoloft [sertraline] ASSESSMENT:  Pt used a chin tuck 100% of the time this date when completing laryngeal exercises. Patient will benefit from skilled intervention to address the below impairments. ?????? ? ? This section established at most recent assessment??????????  PROBLEM LIST (Impairments causing functional limitations):  1. Dysphagia   GOALS: (Goals have been discussed and agreed upon with patient.)  SHORT-TERM FUNCTIONAL GOALS: Time Frame: 3 months  Pt will complete laryngeal exercises with 80% accuracy. Pt will consume thin liquids with use of compensatory strategies (either effortful swallows or chin tuck with thin liquids) with only min cues needed. DISCHARGE GOALS: Time Frame: 4-5 months  1. Pt will tolerate least restrictive diet without signs/sx aspiration 100% for safe swallow function. REHABILITATION POTENTIAL FOR STATED GOALS: GoodPLAN OF CARE:  Patient will benefit from skilled intervention to address the following impairments. RECOMMENDATIONS AND PLANNED INTERVENTIONS (Benefits and precautions of therapy have been discussed with the patient.):  · PO:  Regular  · Liquids:  nectar   · May have thin liquids after training in compensatory strategies in speech therapy (either use effortful swallows or a chin tuck with thin liquids via cup)  MEDICATIONS:  · With liquid (thickened)  COMPENSATORY STRATEGIES/MODIFICATIONS INCLUDING:  · Chin tuck with thin liquids with SLP  · Effortful swallows with thin liquids with SLP  OTHER RECOMMENDATIONS (including follow up treatment recommendations):   · Laryngeal exercises  · Patient education  RECOMMENDED DIET MODIFICATIONS DISCUSSED WITH:  · Patient  TREATMENT PLAN EFFECTIVE DATES: 7/27/2020 TO 10/28/2020 (90 days). FREQUENCY/DURATION: Continue to follow patient 2 times a week for 90 days to address above goals. Regarding Jeremy Kim's therapy, I certify that the treatment plan above will be carried out by a therapist or under their direction.   Thank you for this referral,  [address] [facility]-SLP                    Referring Physician Signature: Fareed Post NP    Date      SUBJECTIVE:  Pt cooperative. Present Symptoms: penetration on a recent MBS      Current Medications: see hard chart   Date Last Reviewed: 9/3/2020  Current Dietary Status:  Regular/thin liquids with use of a chin tuck with thin liquids       History of reflux:  YES    Reflux medication: Omeprazole   Social History/Home Situation: with spouse      Work/Activity History: retired     OBJECTIVE:  Objective Measure: Tool Used: National Outcomes Measurement System: Functional Communication Measures: SWALLOWING  Score:  Initial: 5 Most Recent: 5 (Date: 8/25/2020)   Interpretation of Tool: This measure describes the change in functional communication status subsequent to speech-language pathology treatment of patients with dysphagia.  o Level 1:  Individual is not able to swallow anything safely by mouth. All nutrition and hydration is received through non-oral means (e.g., nasogastric tube, PEG). o Level 2: Individual is not able to swallow safely by mouth for nutrition and hydration, but may take some consistency with consistent maximal cues in therapy only. Alternative method of feeding required. o Level 3:  Alternative method of feeding required as individual takes less than 50% of nutrition and hydration by mouth, and/or swallowing is safe with consistent use of moderate cues to use compensatory strategies and/or requires maximum diet restriction. o Level 4:  Swallowing is safe, but usually requires moderate cues to use compensatory strategies, and/or the individual has moderate diet restrictions and/or still requires tube feeding and/or oral supplements. o Level 5:  Swallowing is safe with minimal diet restriction and/or occasionally requires minimal cueing to use compensatory strategies. The individual may occasionally self-cue.  All nutrition and hydration needs are met by mouth at mealtime. o Level 6:  Swallowing is safe, and the individual eats and drinks independently and may rarely require minimal cueing. The individual usually self-cues when difficulty occurs. May need to avoid specific food items (e.g., popcorn and nuts), or require additional time (due to dysphagia). o Level 7: The individuals ability to eat independently is not limited by swallow function. Swallowing would be safe and efficient for all consistencies. Compensatory strategies are effectively used when needed. Score Level 7 Level 6 Level 5 Level 4 Level 3 Level 2 Level 1   Modifier CH CI CJ CK CL CM CN       Cognitive and Communication Status:  Alert     TREATMENT:    (In addition to Assessment/Re-Assessment sessions the following treatments were rendered)  Dysphagia Activities: Activities/Procedures listed utilized to improve progress in swallow function and swallow safety. Required mild cueing to improve swallow safety. LARYNGEAL / PHARYNGEAL EXERCISES:           Effortful Swallow: Yes  Reps : 15  Sets : 2  Hard Glottal Attack: Yes  Reps : 15                          Muna: Yes  Reps : 15  Sets : 2  Mendelsohn Maneuver: Yes  Reps : 15  Sets : 2                   Sing \"EEE\": Yes  Reps : 15  Sets : 2                    Sustained \"ah\": Yes  Sets : 2  Reps : 15                  __________________________________________________________________________________________________  Treatment Assessment:   . Progression/Medical Necessity:   · Skilled intervention continues to be required due to patient still consuming a modified diet. Compliance with Program/Exercises: Will assess as treatment progresses. Reason for Continuation of Services/Other Comments:  · Patient continues to require skilled intervention due to dysphagia. Recommendations/Intent for next treatment session: \"Treatment next visit will focus on laryngeal exercises\".      Total Treatment Duration:  Time In: 1015  Time Out: [address] Javon Baugh, MSP, CCC-SLP

## 2020-09-08 ENCOUNTER — HOSPITAL ENCOUNTER (OUTPATIENT)
Dept: PHYSICAL THERAPY | Age: 80
Discharge: HOME | End: 2020-09-08
Payer: MEDICARE

## 2020-09-08 PROCEDURE — 92526 ORAL FUNCTION THERAPY: CPT

## 2020-09-08 NOTE — PROGRESS NOTES
Praveen Kim  : 1940  [place]  Secondary: [place] [facility], [address]  [phone]   [phone]      OUTPATIENT SPEECH LANGUAGE PATHOLOGY: Daily Note: 4  ICD-10: Treatment Diagnosis: dysphagia, oropharyngeal R 13.12  REFERRING PHYSICIAN: Analy Jaramillo NP MD Orders: speech evaluate and treat   PAST MEDICAL HISTORY:    Mr. Krystal Kenny is a [de-identified] y.o. male who  has a past medical history of Arthritis, Benign essential HTN (2017), Chronic obstructive pulmonary disease ([address] Diabetes ([address] Ear problems, Fungal sinusitis, GERD (gastroesophageal reflux disease), History of multiple allergies, migraines, Hypertension, Irritable bowel syndrome with diarrhea (10/2/2018), Shingles (10/1/15), Sinus problem, and Thoracic aneurysm without mention of rupture (2014). He also has no past medical history of Arrhythmia, Asthma, Cancer ([address] Chronic kidney disease, Coagulation disorder ([address] Endocarditis, Heart failure ([address] Liver disease, Nicotine vapor product user, Non-nicotine vapor product user, Psychiatric disorder, PUD (peptic ulcer disease), Seizures ([address] Sleep apnea, Stroke ([address] Thromboembolus ([address] or Thyroid disease. He also  has a past surgical history that includes hx heent (); hx hernia repair (Right, ); hx appendectomy (age 9); hx other surgical (2012); hx tonsillectomy; hx cataract removal (Bilateral); neurological procedure unlisted; and hx heart catheterization (12). MEDICAL/REFERRING DIAGNOSIS: Dysphagia, oropharyngeal [R13.12]  DATE OF ONSET: 2020  PRIOR LEVEL OF FUNCTION: with spouse  PRECAUTIONS/ALLERGIES: Aspirin; Aleve [naproxen sodium]; Augmentin [amoxicillin-pot clavulanate]; Codeine sulfate; Corn containing products; Crestor [rosuvastatin]; Septra [sulfamethoprim ds];  Zetia [ezetimibe]; and Zoloft [sertraline] ASSESSMENT:  Pt used a chin tuck 100% of the time this date when completing laryngeal exercises. Planned a repeat MBS for 9/16 at 9:30. Pt confirmed date and time. Patient will benefit from skilled intervention to address the below impairments. ?????? ? ? This section established at most recent assessment??????????  PROBLEM LIST (Impairments causing functional limitations):  1. Dysphagia   GOALS: (Goals have been discussed and agreed upon with patient.)  SHORT-TERM FUNCTIONAL GOALS: Time Frame: 3 months  Pt will complete laryngeal exercises with 80% accuracy. Pt will consume thin liquids with use of compensatory strategies (either effortful swallows or chin tuck with thin liquids) with only min cues needed. DISCHARGE GOALS: Time Frame: 4-5 months  1. Pt will tolerate least restrictive diet without signs/sx aspiration 100% for safe swallow function. REHABILITATION POTENTIAL FOR STATED GOALS: GoodPLAN OF CARE:  Patient will benefit from skilled intervention to address the following impairments. RECOMMENDATIONS AND PLANNED INTERVENTIONS (Benefits and precautions of therapy have been discussed with the patient.):  · PO:  Regular  · Liquids:  nectar   · May have thin liquids after training in compensatory strategies in speech therapy (either use effortful swallows or a chin tuck with thin liquids via cup)  MEDICATIONS:  · With liquid (thickened)  COMPENSATORY STRATEGIES/MODIFICATIONS INCLUDING:  · Chin tuck with thin liquids with SLP  · Effortful swallows with thin liquids with SLP  OTHER RECOMMENDATIONS (including follow up treatment recommendations):   · Laryngeal exercises  · Patient education  RECOMMENDED DIET MODIFICATIONS DISCUSSED WITH:  · Patient  TREATMENT PLAN EFFECTIVE DATES: 7/27/2020 TO 10/28/2020 (90 days). FREQUENCY/DURATION: Continue to follow patient 2 times a week for 90 days to address above goals.   Regarding Lucíasaskia Kim's therapy, I certify that the treatment plan above will be carried out by a therapist or under their direction. Thank you for this referral,  Saida Gonzalez, [facility], [facility] [place], [address] [facility]                    Referring Physician Signature: Tennille Long NP    Date      SUBJECTIVE:  Pt cooperative. Present Symptoms: penetration on a recent MBS      Current Medications: see hard chart   Date Last Reviewed: 9/3/2020  Current Dietary Status:  Regular/thin liquids with use of a chin tuck with thin liquids       History of reflux:  YES    Reflux medication: Omeprazole   Social History/Home Situation: with spouse      Work/Activity History: retired     OBJECTIVE:  Objective Measure: Tool Used: National Outcomes Measurement System: Functional Communication Measures: SWALLOWING  Score:  Initial: 5 Most Recent: 5 (Date: 8/25/2020)   Interpretation of Tool: This measure describes the change in functional communication status subsequent to speech-language pathology treatment of patients with dysphagia.  o Level 1:  Individual is not able to swallow anything safely by mouth. All nutrition and hydration is received through non-oral means (e.g., nasogastric tube, PEG). o Level 2: Individual is not able to swallow safely by mouth for nutrition and hydration, but may take some consistency with consistent maximal cues in therapy only. Alternative method of feeding required. o Level 3:  Alternative method of feeding required as individual takes less than 50% of nutrition and hydration by mouth, and/or swallowing is safe with consistent use of moderate cues to use compensatory strategies and/or requires maximum diet restriction. o Level 4:  Swallowing is safe, but usually requires moderate cues to use compensatory strategies, and/or the individual has moderate diet restrictions and/or still requires tube feeding and/or oral supplements. o Level 5:  Swallowing is safe with minimal diet restriction and/or occasionally requires minimal cueing to use compensatory strategies.  The individual may occasionally self-cue. All nutrition and hydration needs are met by mouth at mealtime. o Level 6:  Swallowing is safe, and the individual eats and drinks independently and may rarely require minimal cueing. The individual usually self-cues when difficulty occurs. May need to avoid specific food items (e.g., popcorn and nuts), or require additional time (due to dysphagia). o Level 7: The individuals ability to eat independently is not limited by swallow function. Swallowing would be safe and efficient for all consistencies. Compensatory strategies are effectively used when needed. Score Level 7 Level 6 Level 5 Level 4 Level 3 Level 2 Level 1   Modifier CH CI CJ CK CL CM CN       Cognitive and Communication Status:  Alert     TREATMENT:    (In addition to Assessment/Re-Assessment sessions the following treatments were rendered)  Dysphagia Activities: Activities/Procedures listed utilized to improve progress in swallow function and swallow safety. Required mild cueing to improve swallow safety. LARYNGEAL / PHARYNGEAL EXERCISES:           Effortful Swallow: Yes  Reps : 15  Sets : 2  Hard Glottal Attack: Yes  Reps : 15  Sets : 2                       Muna: Yes  Reps : 15  Sets : 2  Mendelsohn Maneuver: Yes  Reps : 15  Sets : 2                   Sing \"EEE\": Yes  Reps : 15  Sets : 2                    Sustained \"ah\": Yes  Sets : 2  Reps : 15                  __________________________________________________________________________________________________  Treatment Assessment:   . Progression/Medical Necessity:   · Skilled intervention continues to be required due to patient still consuming a modified diet. Compliance with Program/Exercises: Will assess as treatment progresses. Reason for Continuation of Services/Other Comments:  · Patient continues to require skilled intervention due to dysphagia.   Recommendations/Intent for next treatment session: \"Treatment next visit will focus on laryngeal exercises\".      Total Treatment Duration:  Time In: 1520  Time Out: [address], MSP, CCC-SLP

## 2020-09-10 ENCOUNTER — HOSPITAL ENCOUNTER (OUTPATIENT)
Dept: PHYSICAL THERAPY | Age: 80
Discharge: HOME | End: 2020-09-10
Payer: MEDICARE

## 2020-09-10 PROCEDURE — 92526 ORAL FUNCTION THERAPY: CPT

## 2020-09-10 NOTE — PROGRESS NOTES
Day Kim  : 1940  [place]  Secondary: [facility] Generic Medicare [place] at [address], [address]  [phone]   [phone]      OUTPATIENT SPEECH LANGUAGE PATHOLOGY: Daily Note: 5  ICD-10: Treatment Diagnosis: dysphagia, oropharyngeal R 13.12  REFERRING PHYSICIAN: Mera Herrera NP MD Orders: speech evaluate and treat   PAST MEDICAL HISTORY:    Mr. Geoffrey Stover is a [de-identified] y.o. male who  has a past medical history of Arthritis, Benign essential HTN (2017), Chronic obstructive pulmonary disease ([address] Diabetes ([address] Ear problems, Fungal sinusitis, GERD (gastroesophageal reflux disease), History of multiple allergies, migraines, Hypertension, Irritable bowel syndrome with diarrhea (10/2/2018), Shingles (10/1/15), Sinus problem, and Thoracic aneurysm without mention of rupture (2014). He also has no past medical history of Arrhythmia, Asthma, Cancer ([address] Chronic kidney disease, Coagulation disorder ([address] Endocarditis, Heart failure ([address] Liver disease, Nicotine vapor product user, Non-nicotine vapor product user, Psychiatric disorder, PUD (peptic ulcer disease), Seizures ([address] Sleep apnea, Stroke ([address] Thromboembolus ([address] or Thyroid disease. He also  has a past surgical history that includes hx heent (); hx hernia repair (Right, ); hx appendectomy (age 9); hx other surgical (2012); hx tonsillectomy; hx cataract removal (Bilateral); neurological procedure unlisted; and hx heart catheterization (12). MEDICAL/REFERRING DIAGNOSIS: Dysphagia, oropharyngeal [R13.12]  DATE OF ONSET: 2020  PRIOR LEVEL OF FUNCTION: with spouse  PRECAUTIONS/ALLERGIES: Aspirin; Aleve [naproxen sodium]; Augmentin [amoxicillin-pot clavulanate]; Codeine sulfate; Corn containing products; Crestor [rosuvastatin]; Septra [sulfamethoprim ds];  Zetia [ezetimibe]; and Zoloft [sertraline] ASSESSMENT:  Pt used a chin tuck 100% of the time when consuming thin liquids. Reminded him he can also use an effortful swallow vs a chin tuck if he wants. He reported he doesn't feel he quite understands an effortful swallow. However, he is always able to complete it in therapy. Patient will benefit from skilled intervention to address the below impairments. ?????? ? ? This section established at most recent assessment??????????  PROBLEM LIST (Impairments causing functional limitations):  1. Dysphagia   GOALS: (Goals have been discussed and agreed upon with patient.)  SHORT-TERM FUNCTIONAL GOALS: Time Frame: 3 months  Pt will complete laryngeal exercises with 80% accuracy. Pt will consume thin liquids with use of compensatory strategies (either effortful swallows or chin tuck with thin liquids) with only min cues needed. DISCHARGE GOALS: Time Frame: 4-5 months  1. Pt will tolerate least restrictive diet without signs/sx aspiration 100% for safe swallow function. REHABILITATION POTENTIAL FOR STATED GOALS: GoodPLAN OF CARE:  Patient will benefit from skilled intervention to address the following impairments. RECOMMENDATIONS AND PLANNED INTERVENTIONS (Benefits and precautions of therapy have been discussed with the patient.):  · PO:  Regular  · Liquids:  nectar   · May have thin liquids after training in compensatory strategies in speech therapy (either use effortful swallows or a chin tuck with thin liquids via cup)  MEDICATIONS:  · With liquid (thickened)  COMPENSATORY STRATEGIES/MODIFICATIONS INCLUDING:  · Chin tuck with thin liquids with SLP  · Effortful swallows with thin liquids with SLP  OTHER RECOMMENDATIONS (including follow up treatment recommendations):   · Laryngeal exercises  · Patient education  RECOMMENDED DIET MODIFICATIONS DISCUSSED WITH:  · Patient  TREATMENT PLAN EFFECTIVE DATES: 7/27/2020 TO 10/28/2020 (90 days).   FREQUENCY/DURATION: Continue to follow patient 2 times a week for 90 days to address above goals. Regarding Jimmy Kim's therapy, I certify that the treatment plan above will be carried out by a therapist or under their direction. Thank you for this referral,  Jennifer Ruelas, [facility], [facility] [place] [place] [place], [address] [facility]                    Referring Physician Signature: Karen Burns NP    Date      SUBJECTIVE:  Pt cooperative. Present Symptoms: penetration on a recent MBS      Current Medications: see hard chart   Date Last Reviewed: 9/10/2020  Current Dietary Status:  Regular/thin liquids with use of a chin tuck with thin liquids       History of reflux:  YES    Reflux medication: Omeprazole   Social History/Home Situation: with spouse      Work/Activity History: retired     OBJECTIVE:  Objective Measure: Tool Used: National Outcomes Measurement System: Functional Communication Measures: SWALLOWING  Score:  Initial: 5 Most Recent: 5 (Date: 8/25/2020)   Interpretation of Tool: This measure describes the change in functional communication status subsequent to speech-language pathology treatment of patients with dysphagia.  o Level 1:  Individual is not able to swallow anything safely by mouth. All nutrition and hydration is received through non-oral means (e.g., nasogastric tube, PEG). o Level 2: Individual is not able to swallow safely by mouth for nutrition and hydration, but may take some consistency with consistent maximal cues in therapy only. Alternative method of feeding required. o Level 3:  Alternative method of feeding required as individual takes less than 50% of nutrition and hydration by mouth, and/or swallowing is safe with consistent use of moderate cues to use compensatory strategies and/or requires maximum diet restriction. o Level 4:  Swallowing is safe, but usually requires moderate cues to use compensatory strategies, and/or the individual has moderate diet restrictions and/or still requires tube feeding and/or oral supplements.   o Level 5:  Swallowing is safe with minimal diet restriction and/or occasionally requires minimal cueing to use compensatory strategies. The individual may occasionally self-cue. All nutrition and hydration needs are met by mouth at mealtime. o Level 6:  Swallowing is safe, and the individual eats and drinks independently and may rarely require minimal cueing. The individual usually self-cues when difficulty occurs. May need to avoid specific food items (e.g., popcorn and nuts), or require additional time (due to dysphagia). o Level 7: The individuals ability to eat independently is not limited by swallow function. Swallowing would be safe and efficient for all consistencies. Compensatory strategies are effectively used when needed. Score Level 7 Level 6 Level 5 Level 4 Level 3 Level 2 Level 1   Modifier CH CI CJ CK CL CM CN       Cognitive and Communication Status:  Alert     TREATMENT:    (In addition to Assessment/Re-Assessment sessions the following treatments were rendered)  Dysphagia Activities: Activities/Procedures listed utilized to improve progress in swallow function and swallow safety. Required mild cueing to improve swallow safety. LARYNGEAL / PHARYNGEAL EXERCISES:           Effortful Swallow: Yes  Reps : 15  Sets : 2  Hard Glottal Attack: Yes  Reps : 15  Sets : 2                       Muna: Yes  Reps : 15  Sets : 2  Mendelsohn Maneuver: Yes  Reps : 15  Sets : 2                   Sing \"EEE\": Yes  Reps : 15  Sets : 2                    Sustained \"ah\": Yes  Sets : 2  Reps : 15                  __________________________________________________________________________________________________  Treatment Assessment:   . Progression/Medical Necessity:   · Skilled intervention continues to be required due to patient still consuming a modified diet. Compliance with Program/Exercises: Will assess as treatment progresses.    Reason for Continuation of Services/Other Comments:  · Patient continues to require skilled intervention due to dysphagia. Recommendations/Intent for next treatment session: \"Treatment next visit will focus on laryngeal exercises\".      Total Treatment Duration:  Time In: 1015  Time Out: [address], MSP, CCC-SLP

## 2020-09-15 ENCOUNTER — HOSPITAL ENCOUNTER (OUTPATIENT)
Dept: PHYSICAL THERAPY | Age: 80
Discharge: HOME | End: 2020-09-15
Payer: MEDICARE

## 2020-09-15 PROCEDURE — 92526 ORAL FUNCTION THERAPY: CPT

## 2020-09-15 NOTE — PROGRESS NOTES
Madison Kim  : 1940  [place]  Secondary: [address] at [facility], [address] [facility], [address]  [phone]   [phone]      OUTPATIENT SPEECH LANGUAGE PATHOLOGY: Daily Note: 6  ICD-10: Treatment Diagnosis: dysphagia, oropharyngeal R 13.12  REFERRING PHYSICIAN: Nathalie Castillo NP MD Orders: speech evaluate and treat   PAST MEDICAL HISTORY:    Mr. Belen Weller is a [de-identified] y.o. male who  has a past medical history of Arthritis, Benign essential HTN (2017), Chronic obstructive pulmonary disease ([address] Diabetes ([address] Ear problems, Fungal sinusitis, GERD (gastroesophageal reflux disease), History of multiple allergies, migraines, Hypertension, Irritable bowel syndrome with diarrhea (10/2/2018), Shingles (10/1/15), Sinus problem, and Thoracic aneurysm without mention of rupture (2014). He also has no past medical history of Arrhythmia, Asthma, Cancer ([address] Chronic kidney disease, Coagulation disorder ([address] Endocarditis, Heart failure ([address] Liver disease, Nicotine vapor product user, Non-nicotine vapor product user, Psychiatric disorder, PUD (peptic ulcer disease), Seizures ([address] Sleep apnea, Stroke ([address] Thromboembolus ([address] or Thyroid disease. He also  has a past surgical history that includes hx heent (); hx hernia repair (Right, ); hx appendectomy (age 9); hx other surgical (2012); hx tonsillectomy; hx cataract removal (Bilateral); neurological procedure unlisted; and hx heart catheterization (12). MEDICAL/REFERRING DIAGNOSIS: Dysphagia, oropharyngeal [R13.12]  DATE OF ONSET: 2020  PRIOR LEVEL OF FUNCTION: with spouse  PRECAUTIONS/ALLERGIES: Aspirin; Aleve [naproxen sodium]; Augmentin [amoxicillin-pot clavulanate]; Codeine sulfate; Corn containing products; Crestor [rosuvastatin]; Septra [sulfamethoprim ds];  Zetia [ezetimibe]; and Zoloft [sertraline] ASSESSMENT:  Pt did well with exercises. He used a chin tuck 100% of the time when consuming thin liquids. He is consuming thin liquids at home with use of a chin tuck. He reported he \"goofs up every once a while\" and forgets to use a chin tuck. He stated, \"I hope it's ok\". He also reported taking single sips with liquids. A repeat MBS is scheduled for tomorrow. Will determine further need for ST pending results. Pt in agreement with plan. Patient will benefit from skilled intervention to address the below impairments. ?????? ? ? This section established at most recent assessment??????????  PROBLEM LIST (Impairments causing functional limitations):  1. Dysphagia   GOALS: (Goals have been discussed and agreed upon with patient.)  SHORT-TERM FUNCTIONAL GOALS: Time Frame: 3 months  Pt will complete laryngeal exercises with 80% accuracy. Pt will consume thin liquids with use of compensatory strategies (either effortful swallows or chin tuck with thin liquids) with only min cues needed. DISCHARGE GOALS: Time Frame: 4-5 months  1. Pt will tolerate least restrictive diet without signs/sx aspiration 100% for safe swallow function. REHABILITATION POTENTIAL FOR STATED GOALS: GoodPLAN OF CARE:  Patient will benefit from skilled intervention to address the following impairments.   RECOMMENDATIONS AND PLANNED INTERVENTIONS (Benefits and precautions of therapy have been discussed with the patient.):  · PO:  Regular  · Liquids:  nectar   · May have thin liquids after training in compensatory strategies in speech therapy (either use effortful swallows or a chin tuck with thin liquids via cup)  MEDICATIONS:  · With liquid (thickened)  COMPENSATORY STRATEGIES/MODIFICATIONS INCLUDING:  · Chin tuck with thin liquids with SLP  · Effortful swallows with thin liquids with SLP  OTHER RECOMMENDATIONS (including follow up treatment recommendations):   · Laryngeal exercises  · Patient education  RECOMMENDED DIET MODIFICATIONS DISCUSSED WITH:  · Patient  TREATMENT PLAN EFFECTIVE DATES: 7/27/2020 TO 10/28/2020 (90 days). FREQUENCY/DURATION: Continue to follow patient 2 times a week for 90 days to address above goals. Regarding Alex Kim's therapy, I certify that the treatment plan above will be carried out by a therapist or under their direction. Thank you for this referral,  Reymundo Mon, [address] [address] [facility]                    Referring Physician Signature: Chandra Awad NP    Date      SUBJECTIVE:  Pt cooperative. Present Symptoms: penetration on a recent MBS      Current Medications: see hard chart   Date Last Reviewed: 9/10/2020  Current Dietary Status:  Regular/thin liquids with use of a chin tuck with thin liquids       History of reflux:  YES    Reflux medication: Omeprazole   Social History/Home Situation: with spouse      Work/Activity History: retired     OBJECTIVE:  Objective Measure: Tool Used: National Outcomes Measurement System: Functional Communication Measures: SWALLOWING  Score:  Initial: 5 Most Recent: 5 (Date: 8/25/2020)   Interpretation of Tool: This measure describes the change in functional communication status subsequent to speech-language pathology treatment of patients with dysphagia.  o Level 1:  Individual is not able to swallow anything safely by mouth. All nutrition and hydration is received through non-oral means (e.g., nasogastric tube, PEG). o Level 2: Individual is not able to swallow safely by mouth for nutrition and hydration, but may take some consistency with consistent maximal cues in therapy only. Alternative method of feeding required. o Level 3:  Alternative method of feeding required as individual takes less than 50% of nutrition and hydration by mouth, and/or swallowing is safe with consistent use of moderate cues to use compensatory strategies and/or requires maximum diet restriction.   o Level 4:  Swallowing is safe, but usually requires moderate cues to use compensatory strategies, and/or the individual has moderate diet restrictions and/or still requires tube feeding and/or oral supplements. o Level 5:  Swallowing is safe with minimal diet restriction and/or occasionally requires minimal cueing to use compensatory strategies. The individual may occasionally self-cue. All nutrition and hydration needs are met by mouth at mealtime. o Level 6:  Swallowing is safe, and the individual eats and drinks independently and may rarely require minimal cueing. The individual usually self-cues when difficulty occurs. May need to avoid specific food items (e.g., popcorn and nuts), or require additional time (due to dysphagia). o Level 7: The individuals ability to eat independently is not limited by swallow function. Swallowing would be safe and efficient for all consistencies. Compensatory strategies are effectively used when needed. Score Level 7 Level 6 Level 5 Level 4 Level 3 Level 2 Level 1   Modifier CH CI CJ CK CL CM CN       Cognitive and Communication Status:  Alert     TREATMENT:    (In addition to Assessment/Re-Assessment sessions the following treatments were rendered)  Dysphagia Activities: Activities/Procedures listed utilized to improve progress in swallow function and swallow safety. Required mild cueing to improve swallow safety. LARYNGEAL / PHARYNGEAL EXERCISES:           Effortful Swallow: Yes  Reps : 15  Sets : 2  Hard Glottal Attack: Yes  Reps : 15  Sets : 2                       Muna: Yes  Reps : 15  Sets : 2  Mendelsohn Maneuver: Yes  Reps : 15  Sets : 2                   Sing \"EEE\": Yes  Reps : 15  Sets : 2                    Sustained \"ah\": Yes  Sets : 2  Reps : 15                  __________________________________________________________________________________________________  Treatment Assessment:   .   Progression/Medical Necessity:   · Skilled intervention continues to be required due to patient still consuming a modified diet.  Compliance with Program/Exercises: Will assess as treatment progresses. Reason for Continuation of Services/Other Comments:  · Patient continues to require skilled intervention due to dysphagia. Recommendations/Intent for next treatment session: \"Treatment next visit will focus on laryngeal exercises\".      Total Treatment Duration:  Time In: 1030  Time Out: KIM Lopez, CCC-SLP

## 2020-09-16 ENCOUNTER — HOSPITAL ENCOUNTER (OUTPATIENT)
Dept: GENERAL RADIOLOGY | Age: 80
Discharge: HOME | End: 2020-09-16
Payer: MEDICARE

## 2020-09-16 DIAGNOSIS — R13.12 DYSPHAGIA, OROPHARYNGEAL: ICD-10-CM

## 2020-09-16 PROCEDURE — 92611 MOTION FLUOROSCOPY/SWALLOW: CPT

## 2020-09-16 PROCEDURE — 74011000255 HC RX REV CODE- 255: Performed by: NURSE PRACTITIONER

## 2020-09-16 PROCEDURE — 74230 X-RAY XM SWLNG FUNCJ C+: CPT

## 2020-09-16 RX ADMIN — BARIUM SULFATE 15 ML: 400 PASTE ORAL at 10:03

## 2020-09-16 RX ADMIN — BARIUM SULFATE 45 ML: 980 POWDER, FOR SUSPENSION ORAL at 10:03

## 2020-09-16 RX ADMIN — BARIUM SULFATE 30 ML: 400 SUSPENSION ORAL at 10:03

## 2020-09-16 NOTE — THERAPY EVALUATION
[place] Kim  : 1940  [place]  Secondary: [address] at [facility], [facility], [address]  [phone]   [phone]        OUTPATIENT SPEECH LANGUAGE PATHOLOGY: MODIFIED BARIUM SWALLOW    ICD-10: Treatment Diagnosis: dysphagia, oropharyngeal R 13.12  DATE: 2020  REFERRING PHYSICIAN: Veronica Purvis NP MD Orders: modified barium swallow study    PAST MEDICAL HISTORY:    Mr. Siobhna Adair is a [de-identified] y.o. male who  has a past medical history of Arthritis, Benign essential HTN (2017), Chronic obstructive pulmonary disease ([address] Diabetes ([address] Ear problems, Fungal sinusitis, GERD (gastroesophageal reflux disease), History of multiple allergies, migraines, Hypertension, Irritable bowel syndrome with diarrhea (10/2/2018), Shingles (10/1/15), Sinus problem, and Thoracic aneurysm without mention of rupture (2014). He also has no past medical history of Arrhythmia, Asthma, Cancer ([address] Chronic kidney disease, Coagulation disorder ([address] Endocarditis, Heart failure ([address] Liver disease, Nicotine vapor product user, Non-nicotine vapor product user, Psychiatric disorder, PUD (peptic ulcer disease), Seizures ([address] Sleep apnea, Stroke ([address] Thromboembolus ([address] or Thyroid disease. He also  has a past surgical history that includes hx heent (); hx hernia repair (Right, ); hx appendectomy (age 9); hx other surgical (2012); hx tonsillectomy; hx cataract removal (Bilateral); neurological procedure unlisted; and hx heart catheterization (12). MEDICAL/REFERRING DIAGNOSIS: Dysphagia, oropharyngeal [R13.12]  DATE OF ONSET: 2020  PRIOR LEVEL OF FUNCTION: independent  PRECAUTIONS/ALLERGIES: Aspirin; Aleve [naproxen sodium]; Augmentin [amoxicillin-pot clavulanate]; Codeine sulfate; Corn containing products; Crestor [rosuvastatin]; Septra [sulfamethoprim ds];  Zetia [ezetimibe]; and Zoloft [sertraline]    ASSESSMENT/PLAN OF CARE:  Pt has been attending outpatient ST since July of this year due to silent aspiration on a MBS in July. Based on the objective data described below, the patient presents with min oral and mild pharyngeal dysphagia. Premature spillage occurred to the valleculae with thin and nectar thick liquids via cup, mixed consistencies and solids. Delays were 1 second. Trace penetration occurred during the swallow with thin via spoon and nectar via cup. Penetration occurred with 1/2 trials via cup which reached the level of the vocal cords with no cough response. However, a cued cough and double swallow was effective in clearing the laryngeal vestibule. And effortful swallow and a chin tuck were both effective in eliminating penetration. No penetration with thin or nectar thick liquids via straw. Min vallecular residue observed after mixed consistencies only. Recommend continuing with a regular diet with thin liquids. Must use effortful swallows or a chin tuck with thin liquids via cup. May have single sips of thin liquids via straw without the need for strategies. Results/recommendations discussed with pt. No further ST indicated as he is successful with his home exercise program and recalls strategies in therapy. Pt in agreement. RECOMMENDATIONS AND PLANNED INTERVENTIONS (Benefits and precautions of therapy have been discussed with the patient.):  · continue prescribed diet  MEDICATIONS:  · With liquid  COMPENSATORY STRATEGIES/MODIFICATIONS INCLUDING:  · Either use an effortful swallow or a chin tuck if consuming thin liquids via cup  · May have thin liquids via straw  OTHER RECOMMENDATIONS (including follow up treatment recommendations):   · continue with home exercise program    Thank you for this referral,  Raven Herron, KIM, CCC-SLP             SUBJECTIVE:  Pt cooperative.     Present Symptoms: silent aspiration on a MBS in July of this year      Current Dietary Status:  Regular diet with thin liquids via cup only; chin tuck with thin liquids    Radiologist: Dr. Sondra Huertas  History of reflux:  []YES     []NO     Reflux medication:  Social History/Home Situation: independent      Work/Activity History: retired     OBJECTIVE:  Objective Measure: Tool Used: National Outcomes Measurement System: Functional Communication Measures: SWALLOWING  Score:  Initial: 6 Most Recent: X (Date: -- )   Interpretation of Tool: This measure describes the change in functional communication status subsequent to speech-language pathology treatment of patients with dysphagia.  o Level 1:  Individual is not able to swallow anything safely by mouth. All nutrition and hydration is received through non-oral means (e.g., nasogastric tube, PEG). o Level 2: Individual is not able to swallow safely by mouth for nutrition and hydration, but may take some consistency with consistent maximal cues in therapy only. Alternative method of feeding required. o Level 3:  Alternative method of feeding required as individual takes less than 50% of nutrition and hydration by mouth, and/or swallowing is safe with consistent use of moderate cues to use compensatory strategies and/or requires maximum diet restriction. o Level 4:  Swallowing is safe, but usually requires moderate cues to use compensatory strategies, and/or the individual has moderate diet restrictions and/or still requires tube feeding and/or oral supplements. o Level 5:  Swallowing is safe with minimal diet restriction and/or occasionally requires minimal cueing to use compensatory strategies. The individual may occasionally self-cue. All nutrition and hydration needs are met by mouth at mealtime. o Level 6:  Swallowing is safe, and the individual eats and drinks independently and may rarely require minimal cueing. The individual usually self-cues when difficulty occurs.  May need to avoid specific food items (e.g., popcorn and nuts), or require additional time (due to dysphagia). o Level 7: The individuals ability to eat independently is not limited by swallow function. Swallowing would be safe and efficient for all consistencies. Compensatory strategies are effectively used when needed. Score Level 7 Level 6 Level 5 Level 4 Level 3 Level 2 Level 1   Modifier CH CI CJ CK CL CM CN       Cognitive/Communication Status:  Mental Status  Neurologic State: Alert    Oral Assessment:  Oral Assessment  Dentition: Natural, Partials (comment)  Oral Hygiene: adequate    Vocal Quality: no impairment     Patient Viewed: Patient Position: upright in chair  Film Views: Lateral, Fluoro    Oral Prepatory:  The patient was given the following: Consistency Presented: Mixed consistency, Nectar thick liquid, Pudding, Solid, Thin liquid  How Presented: Self-fed/presented, SLP-fed/presented, Cup/sip, Spoon, Straw, Successive swallows    Oral Phase:  Bolus Acceptance: No impairment  Bolus Formation/Control: Impaired  Propulsion: No impairment  Type of Impairment: Premature spillage  Oral Residue: None  Initiation of Swallow: Triggered at base of tongue, Triggered at vallecula  Oral Phase Severity: Minimal    Pharyngeal Phase:  Timing: Pooling 1-5 sec  Decreased Tongue Base Retraction?: No  Laryngeal Elevation: Incomplete laryngeal closure  Penetration: During swallow, To cords, To laryngeal vestibule  Aspiration/Timing: No evidence of aspiration  Aspiration/Penetration Score: 5 (Penetration/Visible residue-Contrast contacts the folds, but is not ejected)   Pharyngeal Symmetry: Not assessed  Pharyngeal Dysfunction: Decreased elevation/closure  Pharyngeal Phase Severity: Mild  Pharyngeal-Esophageal Segment: No impairment    Assessment only;  No treatment(s) provided today  __________________________________________________________________________________________________  Recommendations for treatment: NA  Total Treatment Duration:  Time In: 2501 Time Out: 2965 [address] Erin, CCC-SLP

## 2020-09-17 NOTE — PROGRESS NOTES
Please let patient know that swallowing has improved! Continue with recommendations from speech therapy.

## 2020-09-18 NOTE — PROGRESS NOTES
Patient was notified that swallowing has improved. He was told to continue following recommendations from speech therapy. He verbalized understanding. He was encouraged to keep his follow in Nov with Rosamaria Roblero.

## 2020-10-23 NOTE — THERAPY DISCHARGE
[place] Kim  : 1940  [place]  Secondary: [address] at [facility], [address] [facility], [address]  [phone]   [phone]      OUTPATIENT SPEECH LANGUAGE PATHOLOGY: Discontinuation Summary  ICD-10: Treatment Diagnosis: dysphagia, oropharyngeal R 13.12  REFERRING PHYSICIAN: Veronica Purvis NP MD Orders: speech evaluate and treat   PAST MEDICAL HISTORY:    Mr. Siobhan Adair is a [de-identified] y.o. male who  has a past medical history of Arthritis, Benign essential HTN (2017), Chronic obstructive pulmonary disease ([address] Diabetes ([address] Ear problems, Fungal sinusitis, GERD (gastroesophageal reflux disease), History of multiple allergies, migraines, Hypertension, Irritable bowel syndrome with diarrhea (10/2/2018), Shingles (10/1/15), Sinus problem, and Thoracic aneurysm without mention of rupture (2014). He also has no past medical history of Arrhythmia, Asthma, Cancer ([address] Chronic kidney disease, Coagulation disorder ([address] Endocarditis, Heart failure ([address] Liver disease, Nicotine vapor product user, Non-nicotine vapor product user, Psychiatric disorder, PUD (peptic ulcer disease), Seizures ([address] Sleep apnea, Stroke ([address] Thromboembolus ([address] or Thyroid disease. He also  has a past surgical history that includes hx heent (); hx hernia repair (Right, ); hx appendectomy (age 9); hx other surgical (2012); hx tonsillectomy; hx cataract removal (Bilateral); neurological procedure unlisted; and hx heart catheterization (12). MEDICAL/REFERRING DIAGNOSIS: Dysphagia, oropharyngeal [R13.12]  DATE OF ONSET: 2020  PRIOR LEVEL OF FUNCTION: with spouse  PRECAUTIONS/ALLERGIES: Aspirin; Aleve [naproxen sodium]; Augmentin [amoxicillin-pot clavulanate]; Codeine sulfate; Corn containing products; Crestor [rosuvastatin]; Septra [sulfamethoprim ds];  Zetia [ezetimibe]; and Zoloft [sertraline] ASSESSMENT:  Pt attended 16 sessions from 7/27-9/15 due to dysphagia. He had a MBS completed 9/16 with recommendations for a regular diet with thin liquids with use of effortful swallows or a chin tuck with liquids. He independently used strategies in therapy and was compliant with his home exercise program (HEP). He reported at the [facility] he felt confident with his HEP and preferred to continue with exercises at home. SLP in agreement. Therefore, will DC at this time. Patient will benefit from skilled intervention to address the below impairments. ?????? ? ? This section established at most recent assessment??????????  PROBLEM LIST (Impairments causing functional limitations):  1. Dysphagia   GOALS:   SHORT-TERM FUNCTIONAL GOALS:   Pt will complete laryngeal exercises with 80% accuracy. Goal met. Pt will consume thin liquids with use of compensatory strategies (either effortful swallows or chin tuck with thin liquids) with only min cues needed. Goal met. DISCHARGE GOALS:   1. Pt will tolerate least restrictive diet without signs/sx aspiration 100% for safe swallow function. Goal not re-assessed. PLAN OF CARE:  Discharge from [address] [place] [address] Thank you for this referral,  KIM Roman, CCC-SLP    SUBJECTIVE:  Pt cooperative in therapy. OBJECTIVE:  Objective Measure: Tool Used: National Outcomes Measurement System: Functional Communication Measures: SWALLOWING  Score:  Initial: 5 Most Recent: 5 (Date: 8/25/2020)   Interpretation of Tool: This measure describes the change in functional communication status subsequent to speech-language pathology treatment of patients with dysphagia.  o Level 1:  Individual is not able to swallow anything safely by mouth. All nutrition and hydration is received through non-oral means (e.g., nasogastric tube, PEG). o Level 2:   Individual is not able to swallow safely by mouth for nutrition and hydration, but may take some consistency with consistent maximal cues in therapy only. Alternative method of feeding required. o Level 3:  Alternative method of feeding required as individual takes less than 50% of nutrition and hydration by mouth, and/or swallowing is safe with consistent use of moderate cues to use compensatory strategies and/or requires maximum diet restriction. o Level 4:  Swallowing is safe, but usually requires moderate cues to use compensatory strategies, and/or the individual has moderate diet restrictions and/or still requires tube feeding and/or oral supplements. o Level 5:  Swallowing is safe with minimal diet restriction and/or occasionally requires minimal cueing to use compensatory strategies. The individual may occasionally self-cue. All nutrition and hydration needs are met by mouth at mealtime. o Level 6:  Swallowing is safe, and the individual eats and drinks independently and may rarely require minimal cueing. The individual usually self-cues when difficulty occurs. May need to avoid specific food items (e.g., popcorn and nuts), or require additional time (due to dysphagia). o Level 7: The individuals ability to eat independently is not limited by swallow function. Swallowing would be safe and efficient for all consistencies. Compensatory strategies are effectively used when needed.   Score Level 7 Level 6 Level 5 Level 4 Level 3 Level 2 Level 1   Modifier CH CI CJ CK CL CM CN       Bere Herron, MSP, CCC-SLP

## 2020-11-14 ENCOUNTER — HOSPITAL ENCOUNTER (OUTPATIENT)
Dept: CT IMAGING | Age: 80
Discharge: HOME | End: 2020-11-14
Attending: NURSE PRACTITIONER
Payer: MEDICARE

## 2020-11-14 DIAGNOSIS — R91.8 LUNG NODULES: ICD-10-CM

## 2020-11-14 PROCEDURE — 71250 CT THORAX DX C-: CPT

## 2020-12-23 PROBLEM — F03.90 DEMENTIA WITHOUT BEHAVIORAL DISTURBANCE (HCC): Status: RESOLVED | Noted: 2017-10-09 | Resolved: 2020-12-23

## 2021-06-25 PROBLEM — R35.0 BENIGN PROSTATIC HYPERPLASIA WITH URINARY FREQUENCY: Status: ACTIVE | Noted: 2021-06-25

## 2021-06-25 PROBLEM — N40.1 BENIGN PROSTATIC HYPERPLASIA WITH URINARY FREQUENCY: Status: ACTIVE | Noted: 2021-06-25

## 2021-08-20 PROBLEM — R41.89 COGNITIVE DEFICITS: Status: ACTIVE | Noted: 2021-08-20

## 2021-11-02 ENCOUNTER — HOSPITAL ENCOUNTER (OUTPATIENT)
Dept: CT IMAGING | Age: 81
Discharge: HOME | End: 2021-11-02
Attending: NURSE PRACTITIONER
Payer: MEDICARE

## 2021-11-02 DIAGNOSIS — R91.8 PULMONARY NODULES: ICD-10-CM

## 2021-11-02 PROCEDURE — 71250 CT THORAX DX C-: CPT

## 2021-11-04 NOTE — PROGRESS NOTES
Please let patient know he has several stable nodules that have not changed from previous imaging, but also has several new nodules. We will repeat CT in 6 months to assess for any changes.

## 2022-03-18 PROBLEM — K58.0 IRRITABLE BOWEL SYNDROME WITH DIARRHEA: Status: ACTIVE | Noted: 2018-10-02

## 2022-03-18 PROBLEM — R91.8 PULMONARY NODULES: Status: ACTIVE | Noted: 2017-06-09

## 2022-03-19 PROBLEM — R73.01 IMPAIRED FASTING BLOOD SUGAR: Status: ACTIVE | Noted: 2017-10-09

## 2022-03-19 PROBLEM — N40.1 BENIGN PROSTATIC HYPERPLASIA WITH URINARY FREQUENCY: Status: ACTIVE | Noted: 2021-06-25

## 2022-03-19 PROBLEM — I10 BENIGN ESSENTIAL HTN: Status: ACTIVE | Noted: 2017-06-06

## 2022-03-19 PROBLEM — B02.29 POSTHERPETIC NEURALGIA: Status: ACTIVE | Noted: 2017-10-09

## 2022-03-19 PROBLEM — R41.89 COGNITIVE DEFICITS: Status: ACTIVE | Noted: 2021-08-20

## 2022-03-19 PROBLEM — R35.0 BENIGN PROSTATIC HYPERPLASIA WITH URINARY FREQUENCY: Status: ACTIVE | Noted: 2021-06-25

## 2022-03-19 PROBLEM — J47.9 BRONCHIECTASIS (HCC): Status: ACTIVE | Noted: 2019-01-24

## 2022-03-20 PROBLEM — D72.829 LEUKOCYTOSIS: Status: ACTIVE | Noted: 2017-10-09

## 2022-03-23 PROBLEM — F02.818 LATE ONSET ALZHEIMER'S DEMENTIA WITH BEHAVIORAL DISTURBANCE (HCC): Status: ACTIVE | Noted: 2021-08-20

## 2022-03-23 PROBLEM — G30.1 LATE ONSET ALZHEIMER'S DEMENTIA WITH BEHAVIORAL DISTURBANCE (HCC): Status: ACTIVE | Noted: 2021-08-20

## 2022-03-23 PROBLEM — F51.04 PSYCHOPHYSIOLOGICAL INSOMNIA: Status: ACTIVE | Noted: 2022-03-23

## 2022-03-24 PROBLEM — F02.818 LATE ONSET ALZHEIMER'S DEMENTIA WITH BEHAVIORAL DISTURBANCE (HCC): Status: ACTIVE | Noted: 2021-08-20

## 2022-03-24 PROBLEM — F51.04 PSYCHOPHYSIOLOGICAL INSOMNIA: Status: ACTIVE | Noted: 2022-03-23

## 2022-03-24 PROBLEM — G30.1 LATE ONSET ALZHEIMER'S DEMENTIA WITH BEHAVIORAL DISTURBANCE (HCC): Status: ACTIVE | Noted: 2021-08-20

## 2022-05-03 ENCOUNTER — HOSPITAL ENCOUNTER (OUTPATIENT)
Dept: CT IMAGING | Age: 82
Discharge: HOME | End: 2022-05-03
Attending: NURSE PRACTITIONER
Payer: MEDICARE

## 2022-05-03 DIAGNOSIS — R91.8 LUNG NODULES: ICD-10-CM

## 2022-05-03 PROCEDURE — 71250 CT THORAX DX C-: CPT

## 2022-05-09 NOTE — PROGRESS NOTES
Please let patient know that previously noted nodules are slightly smaller. There are multiple new nodules. We will discuss follow up scanning at upcoming appt. Message sent to patient via [address]

## 2022-06-27 ENCOUNTER — OFFICE VISIT (OUTPATIENT)
Dept: INTERNAL MEDICINE CLINIC | Facility: CLINIC | Age: 82
End: 2022-06-27
Payer: MEDICARE

## 2022-06-27 VITALS
SYSTOLIC BLOOD PRESSURE: 112 MMHG | OXYGEN SATURATION: 96 % | DIASTOLIC BLOOD PRESSURE: 66 MMHG | HEIGHT: 65 IN | WEIGHT: 125 LBS | TEMPERATURE: 96.7 F | BODY MASS INDEX: 20.83 KG/M2 | HEART RATE: 93 BPM

## 2022-06-27 DIAGNOSIS — H61.23 BILATERAL IMPACTED CERUMEN: ICD-10-CM

## 2022-06-27 DIAGNOSIS — J47.9 BRONCHIECTASIS WITHOUT COMPLICATION (HCC): ICD-10-CM

## 2022-06-27 DIAGNOSIS — Z12.5 PROSTATE CANCER SCREENING: ICD-10-CM

## 2022-06-27 DIAGNOSIS — I71.20 THORACIC AORTIC ANEURYSM WITHOUT RUPTURE: ICD-10-CM

## 2022-06-27 DIAGNOSIS — I10 BENIGN ESSENTIAL HTN: Primary | ICD-10-CM

## 2022-06-27 DIAGNOSIS — J44.9 COPD, MODERATE (HCC): ICD-10-CM

## 2022-06-27 DIAGNOSIS — R73.01 IMPAIRED FASTING BLOOD SUGAR: ICD-10-CM

## 2022-06-27 DIAGNOSIS — H91.93 DECREASED HEARING OF BOTH EARS: ICD-10-CM

## 2022-06-27 DIAGNOSIS — E78.2 MIXED HYPERLIPIDEMIA: ICD-10-CM

## 2022-06-27 LAB
BASOPHILS # BLD: 0.1 K/UL (ref 0–0.2)
BASOPHILS NFR BLD: 1 % (ref 0–2)
CHOLEST SERPL-MCNC: 159 MG/DL
DIFFERENTIAL METHOD BLD: NORMAL
EOSINOPHIL # BLD: 0.5 K/UL (ref 0–0.8)
EOSINOPHIL NFR BLD: 5 % (ref 0.5–7.8)
ERYTHROCYTE [DISTWIDTH] IN BLOOD BY AUTOMATED COUNT: 14.3 % (ref 11.9–14.6)
HCT VFR BLD AUTO: 46 % (ref 41.1–50.3)
HDLC SERPL-MCNC: 50 MG/DL (ref 40–60)
HDLC SERPL: 3.2 {RATIO}
HGB BLD-MCNC: 14.6 G/DL (ref 13.6–17.2)
IMM GRANULOCYTES # BLD AUTO: 0.1 K/UL (ref 0–0.5)
IMM GRANULOCYTES NFR BLD AUTO: 1 % (ref 0–5)
LDLC SERPL CALC-MCNC: 79.4 MG/DL
LYMPHOCYTES # BLD: 1.7 K/UL (ref 0.5–4.6)
LYMPHOCYTES NFR BLD: 17 % (ref 13–44)
MCH RBC QN AUTO: 26.9 PG (ref 26.1–32.9)
MCHC RBC AUTO-ENTMCNC: 31.7 G/DL (ref 31.4–35)
MCV RBC AUTO: 84.9 FL (ref 79.6–97.8)
MONOCYTES # BLD: 1 K/UL (ref 0.1–1.3)
MONOCYTES NFR BLD: 10 % (ref 4–12)
NEUTS SEG # BLD: 6.7 K/UL (ref 1.7–8.2)
NEUTS SEG NFR BLD: 66 % (ref 43–78)
NRBC # BLD: 0 K/UL (ref 0–0.2)
PLATELET # BLD AUTO: 263 K/UL (ref 150–450)
PMV BLD AUTO: 9.6 FL (ref 9.4–12.3)
PSA SERPL-MCNC: 2.8 NG/ML
RBC # BLD AUTO: 5.42 M/UL (ref 4.23–5.6)
TRIGL SERPL-MCNC: 148 MG/DL (ref 35–150)
TSH, 3RD GENERATION: 0.41 UIU/ML (ref 0.36–3.74)
VLDLC SERPL CALC-MCNC: 29.6 MG/DL (ref 6–23)
WBC # BLD AUTO: 10 K/UL (ref 4.3–11.1)

## 2022-06-27 PROCEDURE — G8420 CALC BMI NORM PARAMETERS: HCPCS | Performed by: NURSE PRACTITIONER

## 2022-06-27 PROCEDURE — 1036F TOBACCO NON-USER: CPT | Performed by: NURSE PRACTITIONER

## 2022-06-27 PROCEDURE — 99214 OFFICE O/P EST MOD 30 MIN: CPT | Performed by: NURSE PRACTITIONER

## 2022-06-27 PROCEDURE — G8427 DOCREV CUR MEDS BY ELIG CLIN: HCPCS | Performed by: NURSE PRACTITIONER

## 2022-06-27 PROCEDURE — 1123F ACP DISCUSS/DSCN MKR DOCD: CPT | Performed by: NURSE PRACTITIONER

## 2022-06-27 PROCEDURE — 3023F SPIROM DOC REV: CPT | Performed by: NURSE PRACTITIONER

## 2022-06-27 RX ORDER — CETIRIZINE HYDROCHLORIDE 10 MG/1
10 TABLET ORAL DAILY
COMMUNITY
Start: 2021-07-05 | End: 2022-08-16

## 2022-06-27 RX ORDER — ONDANSETRON 4 MG/1
TABLET, FILM COATED ORAL
COMMUNITY
Start: 2022-03-31

## 2022-06-27 RX ORDER — NITROGLYCERIN 0.4 MG/1
0.4 TABLET SUBLINGUAL
COMMUNITY
Start: 2022-04-20

## 2022-06-27 RX ORDER — PRAVASTATIN SODIUM 10 MG
TABLET ORAL
COMMUNITY
Start: 2022-06-18

## 2022-06-27 ASSESSMENT — PATIENT HEALTH QUESTIONNAIRE - PHQ9
SUM OF ALL RESPONSES TO PHQ9 QUESTIONS 1 & 2: 0
SUM OF ALL RESPONSES TO PHQ QUESTIONS 1-9: 0
1. LITTLE INTEREST OR PLEASURE IN DOING THINGS: 0
SUM OF ALL RESPONSES TO PHQ QUESTIONS 1-9: 0
SUM OF ALL RESPONSES TO PHQ QUESTIONS 1-9: 0
2. FEELING DOWN, DEPRESSED OR HOPELESS: 0
SUM OF ALL RESPONSES TO PHQ QUESTIONS 1-9: 0

## 2022-06-27 NOTE — PROGRESS NOTES
Theodore Avila (:  1940) is a 80 y.o. male,Established patient, here for evaluation of the following chief complaint(s):  Follow-up         ASSESSMENT/PLAN:  1. Benign essential HTN  -     CBC with Auto Differential; Future  -     Comprehensive Metabolic Panel; Future  -     TSH; Future  2. COPD, moderate ([address]  3. Thoracic aortic aneurysm without rupture ([address]  4. Impaired fasting blood sugar  -     Hemoglobin A1C; Future  5. Mixed hyperlipidemia  -     Lipid Panel; Future  6. Prostate cancer screening  -     PSA Screening; Future  7. Bronchiectasis without complication ([address]  8. Decreased hearing of both ears  -     Remove impacted ear wax  9. Bilateral impacted cerumen  -     Remove impacted ear wax      No follow-ups on file. Check lipids, continue statin  Check psa  Check a1c  Continue aricept for dementia  Continue wixela and spiriva for copd, some wheeze but asymptomatic   Fu with cardiology for TAA    Subjective   SUBJECTIVE/OBJECTIVE:  Patient is here for follow up of bp, glucose, and lipids. He is on meds w/o side effects. He has noticed decreased hearing. He wears hearing aids but thinks his ears are full of wax. This has happened before. Review of Systems       Objective   Physical Exam  Vitals reviewed. Constitutional:       Appearance: Normal appearance. He is not ill-appearing. HENT:      Head: Normocephalic. Right Ear: External ear normal.      Left Ear: External ear normal.      Ears:      Comments: Cerumen impaction bilateral    Eyes:      Extraocular Movements: Extraocular movements intact. Pupils: Pupils are equal, round, and reactive to light. Cardiovascular:      Rate and Rhythm: Normal rate and regular rhythm. Pulmonary:      Effort: Pulmonary effort is normal.      Breath sounds: Wheezing and rhonchi present. Musculoskeletal:      Cervical back: Neck supple. Skin:     General: Skin is warm and dry.    Neurological:      General: No focal deficit present. Mental Status: He is alert and oriented to person, place, and time. Psychiatric:         Mood and Affect: Mood normal.         Behavior: Behavior normal.                  An electronic signature was used to authenticate this note. --KATELYN Isaac - CNP   Subjective:      Cheryle Antonio is a 80 y.o. male who presents for evaluation of a plugged ear. He noticed the symptoms in both ears, a few weeks ago. Brittney Wiley denies ear pain. Patient's medications, allergies, past medical, surgical, social and family histories were reviewed and updated as appropriate. Review of Systems  Pertinent items are noted in HPI. Objective:      /66 (Site: Left Upper Arm, Position: Sitting, Cuff Size: Medium Adult)   Pulse 93   Temp (!) 96.7 °F (35.9 °C) (Temporal)   Ht 5' 5\" (1.651 m)   Wt 125 lb (56.7 kg)   SpO2 96%   BMI 20.80 kg/m²   General: alert, appears stated age and cooperative   Right Ear: cerumen impaction   Left Ear:  cerumen impaction   After removal: normal bilaterally         Assessment:      Cerumen Impaction, without otitis externa. Plan:      Cerumen removed by flushing. Care instructions given. Home treatment: none. Follow up as needed.

## 2022-06-28 LAB
ALBUMIN SERPL-MCNC: 3.7 G/DL (ref 3.2–4.6)
ALBUMIN/GLOB SERPL: 1.2 {RATIO} (ref 1.2–3.5)
ALP SERPL-CCNC: 98 U/L (ref 50–136)
ALT SERPL-CCNC: 24 U/L (ref 12–65)
ANION GAP SERPL CALC-SCNC: 6 MMOL/L (ref 7–16)
AST SERPL-CCNC: 19 U/L (ref 15–37)
BILIRUB SERPL-MCNC: 0.4 MG/DL (ref 0.2–1.1)
BUN SERPL-MCNC: 13 MG/DL (ref 8–23)
CALCIUM SERPL-MCNC: 9.4 MG/DL (ref 8.3–10.4)
CHLORIDE SERPL-SCNC: 101 MMOL/L (ref 98–107)
CO2 SERPL-SCNC: 29 MMOL/L (ref 21–32)
CREAT SERPL-MCNC: 0.7 MG/DL (ref 0.8–1.5)
EST. AVERAGE GLUCOSE BLD GHB EST-MCNC: 123 MG/DL
GLOBULIN SER CALC-MCNC: 3.2 G/DL (ref 2.3–3.5)
GLUCOSE SERPL-MCNC: 107 MG/DL (ref 65–100)
HBA1C MFR BLD: 5.9 % (ref 4.2–6.3)
POTASSIUM SERPL-SCNC: 4.4 MMOL/L (ref 3.5–5.1)
PROT SERPL-MCNC: 6.9 G/DL (ref 6.3–8.2)
SODIUM SERPL-SCNC: 136 MMOL/L (ref 138–145)

## 2022-07-11 RX ORDER — TIOTROPIUM BROMIDE 18 UG/1
CAPSULE ORAL; RESPIRATORY (INHALATION)
Qty: 30 CAPSULE | Refills: 1 | OUTPATIENT
Start: 2022-07-11

## 2022-07-18 RX ORDER — ALBUTEROL SULFATE 90 UG/1
AEROSOL, METERED RESPIRATORY (INHALATION)
Qty: 8.5 EACH | Refills: 11 | Status: SHIPPED | OUTPATIENT
Start: 2022-07-18

## 2022-08-08 DIAGNOSIS — G43.109 MIGRAINE WITH AURA, NOT INTRACTABLE, WITHOUT STATUS MIGRAINOSUS: ICD-10-CM

## 2022-08-08 RX ORDER — TIOTROPIUM BROMIDE 18 UG/1
CAPSULE ORAL; RESPIRATORY (INHALATION)
Qty: 30 CAPSULE | Refills: 1 | Status: SHIPPED | OUTPATIENT
Start: 2022-08-08 | End: 2022-10-10

## 2022-08-09 ENCOUNTER — PATIENT MESSAGE (OUTPATIENT)
Dept: PULMONOLOGY | Age: 82
End: 2022-08-09

## 2022-08-09 RX ORDER — SUMATRIPTAN 100 MG/1
TABLET, FILM COATED ORAL
Qty: 9 TABLET | Refills: 5 | Status: SHIPPED | OUTPATIENT
Start: 2022-08-09

## 2022-08-16 ENCOUNTER — OFFICE VISIT (OUTPATIENT)
Dept: NEUROLOGY | Age: 82
End: 2022-08-16
Payer: MEDICARE

## 2022-08-16 VITALS
OXYGEN SATURATION: 95 % | HEIGHT: 65 IN | WEIGHT: 126.2 LBS | HEART RATE: 94 BPM | SYSTOLIC BLOOD PRESSURE: 135 MMHG | DIASTOLIC BLOOD PRESSURE: 70 MMHG | BODY MASS INDEX: 21.02 KG/M2

## 2022-08-16 DIAGNOSIS — F02.818 LATE ONSET ALZHEIMER'S DEMENTIA WITH BEHAVIORAL DISTURBANCE (HCC): Primary | ICD-10-CM

## 2022-08-16 DIAGNOSIS — G30.1 LATE ONSET ALZHEIMER'S DEMENTIA WITH BEHAVIORAL DISTURBANCE (HCC): Primary | ICD-10-CM

## 2022-08-16 DIAGNOSIS — B02.29 POSTHERPETIC NEURALGIA: ICD-10-CM

## 2022-08-16 PROBLEM — R19.7 DIARRHEA: Status: ACTIVE | Noted: 2018-09-27

## 2022-08-16 PROBLEM — H54.52A1: Status: ACTIVE | Noted: 2019-10-10

## 2022-08-16 PROBLEM — R07.9 CHEST PAIN OF UNCERTAIN ETIOLOGY: Status: ACTIVE | Noted: 2020-10-17

## 2022-08-16 PROBLEM — H02.88A MEIBOMIAN GLAND DYSFUNCTION (MGD), BILATERAL, BOTH UPPER AND LOWER LIDS: Status: ACTIVE | Noted: 2018-07-31

## 2022-08-16 PROBLEM — K92.1 BLACK STOOL: Status: ACTIVE | Noted: 2018-09-27

## 2022-08-16 PROBLEM — H91.93 BILATERAL HEARING LOSS: Status: ACTIVE | Noted: 2018-01-10

## 2022-08-16 PROBLEM — K86.89 PANCREATIC INSUFFICIENCY: Status: ACTIVE | Noted: 2021-05-19

## 2022-08-16 PROBLEM — H35.3133 ADVANCED ATROPHIC NONEXUDATIVE AGE-RELATED MACULAR DEGENERATION OF BOTH EYES WITHOUT SUBFOVEAL INVOLVEMENT: Status: ACTIVE | Noted: 2017-09-20

## 2022-08-16 PROBLEM — K64.9 HEMORRHOIDS: Status: ACTIVE | Noted: 2017-02-09

## 2022-08-16 PROBLEM — H02.88B MEIBOMIAN GLAND DYSFUNCTION (MGD), BILATERAL, BOTH UPPER AND LOWER LIDS: Status: ACTIVE | Noted: 2018-07-31

## 2022-08-16 PROBLEM — E78.5 DYSLIPIDEMIA: Status: ACTIVE | Noted: 2020-10-28

## 2022-08-16 PROBLEM — K80.20 GALLSTONES: Status: ACTIVE | Noted: 2019-05-02

## 2022-08-16 PROCEDURE — G8420 CALC BMI NORM PARAMETERS: HCPCS | Performed by: NURSE PRACTITIONER

## 2022-08-16 PROCEDURE — 1036F TOBACCO NON-USER: CPT | Performed by: NURSE PRACTITIONER

## 2022-08-16 PROCEDURE — 99214 OFFICE O/P EST MOD 30 MIN: CPT | Performed by: NURSE PRACTITIONER

## 2022-08-16 PROCEDURE — G8427 DOCREV CUR MEDS BY ELIG CLIN: HCPCS | Performed by: NURSE PRACTITIONER

## 2022-08-16 PROCEDURE — 1123F ACP DISCUSS/DSCN MKR DOCD: CPT | Performed by: NURSE PRACTITIONER

## 2022-08-16 NOTE — PROGRESS NOTES
Laterality Date    APPENDECTOMY  age 9    CARDIAC CATHETERIZATION  12    stent x 1    CATARACT REMOVAL Bilateral     HEENT      sinus x3    HERNIA REPAIR Right 1978    inguinal    NEUROLOGICAL SURGERY      Neurostimulator implant for migraines 2012    OTHER SURGICAL HISTORY  2012    neurostimulator implant for migraines at [facility]      childhood       Family History   Problem Relation Age of Onset    Asthma Father     COPD Mother     No Known Problems Sister     No Known Problems Brother     No Known Problems Sister     Dementia Other         UNCLE       Social History     Socioeconomic History    Marital status:     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          Current Outpatient Medications:     SUMAtriptan (IMITREX) 100 MG tablet, TAKE 1/2 TO 1 TABLET BY MOUTH AS NEEDED FOR MIGRAINE MAY REPEAT IN 2HRS MAX 2/24HRS, Disp: 9 tablet, Rfl: 5    SPIRIVA HANDIHALER 18 MCG inhalation capsule, TAKE 1 CAPSULE BY MOUTH EVERY DAY, Disp: 30 capsule, Rfl: 1    albuterol sulfate HFA (PROVENTIL;VENTOLIN;PROAIR) 108 (90 Base) MCG/ACT inhaler, INHALE 2 PUFFS BY MOUTH EVERY 4 HOURS AS NEEDED, Disp: 8.5 each, Rfl: 11    pravastatin (PRAVACHOL) 10 MG tablet, TAKE 1 TABLET BY MOUTH EVERY DAY, Disp: , Rfl:     ondansetron (ZOFRAN) 4 MG tablet, TAKE 1 TABLET BY MOUTH THREE TIMES A DAY AS NEEDED FOR NAUSEA, Disp: , Rfl:     nitroGLYCERIN (NITROSTAT) 0.4 MG SL tablet, Place 0.4 mg under the tongue, Disp: , Rfl:     Multiple Vitamins-Minerals (PRESERVISION AREDS PO), Take 1 tablet by mouth 2 times daily, Disp: , Rfl:     OXYGEN, 2LPM qhs, Disp: , Rfl:     acetaminophen (TYLENOL) 325 MG tablet, Take 650 mg by mouth every 4 hours as needed, Disp: , Rfl:     albuterol (PROVENTIL) (2.5 MG/3ML) 0.083% nebulizer solution, USE 3 ML BY NEBULIZATION ROUTE 3 TIMES DAILY. BILL TO MEDICARE PART B WITH DX: J44.9 COPD., Disp: , Rfl:     vitamin D3 (CHOLECALCIFEROL) 10 MCG (400 UNIT) TABS tablet, Take by mouth daily, Disp: , Rfl:     donepezil (ARICEPT) 5 MG tablet, Take 5 mg by mouth, Disp: , Rfl:     Dupilumab (DUPIXENT) 200 MG/1. 14ML SOPN, Inject 1 each into the skin, Disp: , Rfl:     fluticasone-salmeterol (ADVAIR) 500-50 MCG/ACT AEPB diskus inhaler, TAKE 1 PUFF BY MOUTH EVERY 12 HOURS, Disp: , Rfl:     gabapentin (NEURONTIN) 300 MG capsule, TAKE 1 CAPSULE BY MOUTH THREE TIMES A DAY, Disp: , Rfl:     latanoprost (XALATAN) 0.005 % ophthalmic solution, Apply 1 drop to eye, Disp: , Rfl:     lipase-protease-amylase (CREON) 98221-36283 units delayed release capsule, Take 3 capsules by mouth 3 times daily (with meals), Disp: , Rfl:     polyethylene glycol (GLYCOLAX) 17 GM/SCOOP powder, Take 17 g by mouth daily, Disp: , Rfl:     tamsulosin (FLOMAX) 0.4 MG capsule, Take 0.8 mg by mouth daily, Disp: , Rfl:     traZODone (DESYREL) 50 MG tablet, Take 50 mg by mouth, Disp: , Rfl:     triamcinolone (NASACORT) 55 MCG/ACT nasal inhaler, 2 sprays by Nasal route daily, Disp: , Rfl:     Allergies   Allergen Reactions    Aspirin Anaphylaxis and Swelling    Codeine Other (See Comments)     headache    Ezetimibe Myalgia     Muscle cramps      Levofloxacin Nausea Only    Naproxen Sodium Other (See Comments)     Stomach cramps      Rosuvastatin Other (See Comments)     Myalgia      Sertraline Other (See Comments)     Not effective      Statins      Other reaction(s): Myalgia-Intolerance    Sulfa Antibiotics Other (See Comments)     Not allergic    Amoxicillin Nausea And Vomiting    Amoxicillin-Pot Clavulanate Nausea And Vomiting     Sever nausa, abdominal cramping and feels terrible       REVIEW OF SYSTEMS:  CONSTITUTIONAL: No weight loss, fever, chills, weakness or fatigue. HEENT: Eyes: No visual loss, blurred vision, double vision or yellow sclerae. Ears, Nose, Throat: No hearing loss, sneezing, congestion, runny nose or sore throat. SKIN: No rash or itching. CARDIOVASCULAR: No chest pain, chest pressure or chest discomfort. No palpitations or edema. RESPIRATORY: No shortness of breath, cough or sputum. GASTROINTESTINAL: No anorexia, nausea, vomiting or diarrhea. No abdominal pain or blood. GENITOURINARY: no burning with urination. NEUROLOGICAL: No headache, dizziness, syncope, paralysis, ataxia, numbness or tingling in the extremities. No change in bowel or bladder control. MUSCULOSKELETAL: No muscle, back pain, joint pain or stiffness. HEMATOLOGIC: No anemia, bleeding or bruising. LYMPHATICS: No enlarged nodes. PSYCHIATRIC: No history of depression or anxiety. ENDOCRINOLOGIC: No reports of sweating, cold or heat intolerance. No polyuria or polydipsia. ALLERGIES: No history of asthma, hives, eczema or rhinitis. Physical Examination  /70 (Site: Left Upper Arm, Position: Sitting, Cuff Size: Large Adult)   Pulse 94   Ht 5' 5\" (1.651 m)   Wt 126 lb 3.2 oz (57.2 kg)   SpO2 95%   BMI 21.00 kg/m²     General - Well developed, thin, in no apparent distress. Pleasant and conversent. HEENT - Normocephalic, atraumatic. Conjunctiva, tympanic membranes, and oropharynx are clear. Neck - Supple without masses, no bruits   Cardiovascular - Regular rate and rhythm. Lungs - Clear to auscultation. Abdomen - Soft, nontender with normal bowel sounds. Extremities - Peripheral pulses intact. No edema and no rashes. Neurological examination -Alert to person, place stated it was 2020, unable to name month. Unable to recall President's name. Able to follow 1 step instructions, difficulty with 2 step. Comprehension, attention , memory and reasoning are impaired.  Language and speech are normal. On cranial nerve examination pupils are equal round and reactive to light. Fundoscopic examination is normal. Visual acuity is adequate. Visual fields are full to finger confrontation. Extraocular motility is normal. Face is symmetric and sensation is intact to light touch. Hearing is intact to finger rustle bilaterally. Motor examination - There is normal muscle tone and bulk. Power is full throughout. Muscle stretch reflexes are 1+ throughout. Sensation is intact to light touch, pinprick, vibration and proprioception in all extremities. Cerebellar examination is normal. Gait and stance are normal.       Edi Ramirez was seen today for follow-up and memory loss. Diagnoses and all orders for this visit:    Late onset Alzheimer's dementia with behavioral disturbance ([facility] [address]  Stable. Continue donepezil. Continue trazodone. Postherpetic neuralgia  Stable. Continue sumatriptan. Not to exceed 3 doses in a week. Plan:     Interventions that may decrease conversion from mild cognitive impairment to dementia or aid in the treatment of established dementia:   Reduce damage the brain by controlling vascular risk factors (SBP < 140 mmHg, normal cholesterol, BMI < 31 kg/m²). 2. Exercise 20 minutes three times per week reaching 80% maximum heart rate   3. Diet: adhere to a Mediterranean diet     Symptomatic management   Cholinesterase inhibitors to help with memory and attention. These medications can cause nausea, vomiting, diarrhea, syncope, bradycardia, and may increase the risk of falls. If the above mentioned symptoms occur then we can try a rivastigmine patch. For moderate to severe dementia memantine can be added. Cognitive enrichment by taking part in social activities and support groups. For delirium and sundowning: cholinesterase inhibitor and melatonin can be helpful. For the prevention of delirium, re-orientation, daily routine, a normal sleep wake cycle, and socialization are most important. Benzodiazepines and anticholinergic medications should be avoided as these are frequent causes of delirium. If all measures have failed and the patient is a danger to self or others then certain atypical antipsychotics can be used as low doses (quetiapine 25 mg at night). Depression: depression is very common in dementia. SSRIs are preferred (i.e. Citalopram,escitalopram, etc.). If depression coexists with apathy then I recommend bupropion. Sleep: I counseled the patient on sleep hygiene, including keeping the lights off at night, TV off, and the benefits of herbal tea, etc. Melatonin, trazodone, and mirtazapine are helpful medications. I recommend against the use of sedative hypnotics. Fatigue: amantadine and modafinil may be mildly effective. Agitation and aggression: attempt to resolve with communication. Always introduce the patient to everyone in the room and make eye contact with the patient. Use a calm and friendly voice. Certain SSRIs may be helpful     Prognosis: pneumonia and stroke are common causes of death in dementia. The rate of cognitive decline is about the same [id number]. The duration of life after diagnosis varies significantly between patients. Follow up as needed. I spent greater than 50% of the 30 total minutes of today's visit in coordination of care and patient/family education and counseling regarding the above patient concerns, reviewing the patient's medical record, my assessment and recommendations.         Kelsea Carrion, [address] [place] [address] Neurology [facility]  11 [facility], [address] [place] [address]:[phone]

## 2022-08-24 RX ORDER — FLUTICASONE PROPIONATE AND SALMETEROL 500; 50 UG/1; UG/1
1 POWDER RESPIRATORY (INHALATION) EVERY 12 HOURS
Qty: 1 EACH | Refills: 11 | Status: SHIPPED | OUTPATIENT
Start: 2022-08-24

## 2022-09-16 DIAGNOSIS — F51.04 PSYCHOPHYSIOLOGIC INSOMNIA: ICD-10-CM

## 2022-09-18 RX ORDER — TRAZODONE HYDROCHLORIDE 50 MG/1
TABLET ORAL
Qty: 90 TABLET | Refills: 1 | Status: SHIPPED | OUTPATIENT
Start: 2022-09-18

## 2022-09-22 DIAGNOSIS — G30.1 ALZHEIMER'S DISEASE WITH LATE ONSET (CODE) (HCC): ICD-10-CM

## 2022-09-23 RX ORDER — DONEPEZIL HYDROCHLORIDE 5 MG/1
TABLET, FILM COATED ORAL
Qty: 90 TABLET | Refills: 1 | Status: SHIPPED | OUTPATIENT
Start: 2022-09-23

## 2022-10-10 RX ORDER — TIOTROPIUM BROMIDE 18 UG/1
CAPSULE ORAL; RESPIRATORY (INHALATION)
Qty: 30 CAPSULE | Refills: 1 | Status: SHIPPED | OUTPATIENT
Start: 2022-10-10

## 2022-11-29 ENCOUNTER — HOSPITAL ENCOUNTER (OUTPATIENT)
Dept: CT IMAGING | Age: 82
Discharge: HOME | End: 2022-12-02
Payer: MEDICARE

## 2022-11-29 DIAGNOSIS — R91.8 LUNG NODULES: ICD-10-CM

## 2022-11-29 PROCEDURE — 71250 CT THORAX DX C-: CPT

## 2022-12-01 ENCOUNTER — OFFICE VISIT (OUTPATIENT)
Dept: PULMONOLOGY | Age: 82
End: 2022-12-01
Payer: MEDICARE

## 2022-12-01 VITALS
HEART RATE: 91 BPM | SYSTOLIC BLOOD PRESSURE: 118 MMHG | HEIGHT: 65 IN | WEIGHT: 127 LBS | OXYGEN SATURATION: 99 % | BODY MASS INDEX: 21.16 KG/M2 | DIASTOLIC BLOOD PRESSURE: 64 MMHG | TEMPERATURE: 96.8 F

## 2022-12-01 DIAGNOSIS — R91.1 PULMONARY NODULE: ICD-10-CM

## 2022-12-01 DIAGNOSIS — J47.9 BRONCHIECTASIS WITHOUT COMPLICATION (HCC): Primary | ICD-10-CM

## 2022-12-01 DIAGNOSIS — J44.9 STAGE 3 SEVERE COPD BY GOLD CLASSIFICATION (HCC): ICD-10-CM

## 2022-12-01 PROCEDURE — G8420 CALC BMI NORM PARAMETERS: HCPCS | Performed by: INTERNAL MEDICINE

## 2022-12-01 PROCEDURE — G8427 DOCREV CUR MEDS BY ELIG CLIN: HCPCS | Performed by: INTERNAL MEDICINE

## 2022-12-01 PROCEDURE — 3023F SPIROM DOC REV: CPT | Performed by: INTERNAL MEDICINE

## 2022-12-01 PROCEDURE — G8484 FLU IMMUNIZE NO ADMIN: HCPCS | Performed by: INTERNAL MEDICINE

## 2022-12-01 PROCEDURE — 1036F TOBACCO NON-USER: CPT | Performed by: INTERNAL MEDICINE

## 2022-12-01 PROCEDURE — 3078F DIAST BP <80 MM HG: CPT | Performed by: INTERNAL MEDICINE

## 2022-12-01 PROCEDURE — 3074F SYST BP LT 130 MM HG: CPT | Performed by: INTERNAL MEDICINE

## 2022-12-01 PROCEDURE — 99214 OFFICE O/P EST MOD 30 MIN: CPT | Performed by: INTERNAL MEDICINE

## 2022-12-01 PROCEDURE — 1123F ACP DISCUSS/DSCN MKR DOCD: CPT | Performed by: INTERNAL MEDICINE

## 2022-12-01 RX ORDER — NITROGLYCERIN 0.4 MG/1
TABLET SUBLINGUAL
Qty: 75 TABLET | Refills: 2 | Status: SHIPPED | OUTPATIENT
Start: 2022-12-01

## 2022-12-01 NOTE — PROGRESS NOTES
Patient Name:  Gary Moran                               YOB: 1940  MRN: [id number]                                                Office Visit 12/1/2022    ASSESSMENT AND PLAN:  (Medical Decision Making)    1. Bronchiectasis without complication (HCC)  Continue airway clearance measures including albuterol and CPT vest.  Patient advised to call if there is any change in his sputum production. We plan to follow-up complete pulmonary function testing prior to his next visit in 6 months. Immunizations are up-to-date. 2. Stage 3 severe COPD by GOLD classification (HCC)  Continue Advair, Dupixent, Spiriva. Offered pulmonary rehab and the patient would like to think about it prior to enrolling. COVID-19 positive test (U07.1, COVID-19) with Viral Sepsis (A41.89 other specified sepsis)  (If respiratory failure present, add as separate assessment)    3. Pulmonary nodule   Follow-up CT as needed in 6 months. Ramon Brown MD    Follow-up and Dispositions    Return in about 6 months (around 6/1/2023) for CPFT First Available, CT prior to appointment. Total time for encounter on day of encounter was 31 minutes. This time includes chart prep, review of tests/procedures, review of other provider's notes, documentation and counseling patient regarding disease process and medications. ___________________________________________________________________         ______      REASON FOR VISIT:   Chief Complaint   Patient presents with    Follow-up    Pulmonary Nodule    Other     bronchiectasis   She probably needs to come in    HISTORY OF PRESENT ILLNESS:    Mr. Rob Gamino is a 80 y.o. male with a PMH of asthma/COPD (FEV1 47% June 2019) on Dupixent, bronchiectasis, lung nodules, chronic hypoxic respiratory failure on 2 L of oxygen who is seen at [facility] for routine follow-up. He was hospitalized at [facility] in June 2020 and has been followed since by Edenilson Nieto. He is not wearing oxygen today. Reports 1 to 2 tablespoons of white to yellowish mucus daily. He is using his albuterol nebulizer and CPT vest twice daily. He has not had any recent episodes of bronchitis. He is vaccinated against influenza. He had a CT yesterday done for waxing and waning pulmonary nodules with demonstrated some new nodules measuring up to 10 mm. Follow-up CT is in 6 months is recommended. Tobacco Use      Smoking status: Former        Packs/day: 1.50        Years: 20.00        Pack years: 30        Types: Cigarettes        Quit date: 1973        Years since quittin.9      Smokeless tobacco: Never      REVIEW OF SYSTEMS:   10 point review of systems is negative except as reported in HPI. PHYSICAL EXAM:   Vitals:    22 1249   BP: 118/64   Pulse: 91   Temp: 96.8 °F (36 °C)   TempSrc: Skin   SpO2: 99%   Weight: 127 lb (57.6 kg)   Height: 5' 4.5\" (1.638 m)     Body mass index is 21.46 kg/m². General:   Alert, cooperative, no distress, appears stated age. Eyes/Ears/Nose:   Conjunctivae/corneas clear. PERRL. Nasal mucosa is normal.  Normal TMs and external auditory canals. Mouth/Throat:  Lips, mucosa, and tongue normal. Teeth and gums normal.        Lungs:   Decreased bilaterally without wheezes or rhonchi     Heart:   Regular rate and rhythm, S1, S2 normal, no murmur, click, rub or gallop. Abdomen:    Soft, non-tender. Extremities:  Extremities normal, atraumatic, no cyanosis or edema. Skin:  Skin color normal. No rashes or lesions     Neurologic:  A&Ox3     DIAGNOSTIC TESTS:                                                                                                                        Pulmonary Function Testing:   Date         FVC         FEV1         FEV1/FVC         FEF 25-75%         TLC         FRC         RV         DLCO         No flowsheet data found. No results found for this or any previous visit.  No results found for this or any previous visit. LABS:   Lab Results   Component Value Date/Time    WBC 10.0 06/27/2022 02:49 PM    HGB 14.6 06/27/2022 02:49 PM    HCT 46.0 06/27/2022 02:49 PM     06/27/2022 02:49 PM    LALITA  12/11/2008 01:35 PM     NONE DET  Reference range: NONE DET  (NOTE)  TEST INFORMATION: Anti-Nuclear Ab (LALITA), IgG  LALITA specimens are screened using an PABLO assay. All  specimens that screen positive or equivocal are confirmed  using HEp-2 cells, and if positive, the titer and pattern  will be reported. [facility] [address] [facility] [phone]                 Anh Garnica. Suni Abreu MD - Lab. Director      CRP 1.6 06/02/2020 02:35 AM     Imaging: I performed an independent interpretation of the patient's images. CXR:   XR CHEST STANDARD TWO VW 07/14/2020    Narrative  Two view chest:    History: COPD. Comparison: 06/01/2020    Findings: The heart and mediastinal silhouette are normal in size and  configuration. The lungs are hyperinflated. The right hemidiaphragm is mildly  elevated, unchanged. There is mild bibasilar scarring, unchanged. There are no  significant pleural effusions. Patchy left basilar airspace opacity has  resolved. The pulmonary vascularity is within normal limits. The visualized  osseous structures are unremarkable. Curvilinear wires/electrodes overlie the  posterior soft tissues. Impression  Impression:  1. Resolution of left basilar airspace opacity. 2. Hyperinflation and mild chronic changes. CT Chest:   CT CHEST WO CONTRAST 11/29/2022    Narrative  CT of the chest without contrast.    CLINICAL INDICATION: Lung nodule, follow-up exam    PROCEDURE: Serial thin section axial images obtained from the thoracic inlet  through the upper abdomen without the administration of intravenous contrast.  Radiation dose reduction techniques were used for this study.  Our CT scanners  use one or all of the following: Automated exposure control, adjusted of the mA  and/or kV according to patient size, iterative reconstruction    COMPARISON: Chest CT dated 5/3/2022 and 11/2/2021    FINDINGS: No mediastinal or axillary adenopathy. There is no pleural or  pericardial effusion. There is severe emphysema again appreciated. There is no  dense airspace consolidation. There is no pneumothorax. There is a tiny 3 mm  pulmonary nodule in the left lung apex that has decreased slightly in size. A 3  mm pulmonary nodule is new slightly more caudal in the left upper lobe. There is  a larger 6 mm pulmonary nodule slightly more caudal in the left upper lobe on  image number 20 of the mid sequence there is a tiny 2 mm pulmonary nodule in the  right upper lobe there is a 1 cm pulmonary nodule now present at the left lung  base that is in an area of atelectasis. This may accentuate its size. There is  also a new 6 mm pulmonary nodule at the left lung base adjacent to this nodule. Limited evaluation of the upper abdomen is unremarkable. No aggressive osseous is identified. Impression  1. New 10 and 6 mm pulmonary nodules at the left lung base warrants further  follow-up evaluation in six months. 2. Multiple bilateral tiny pulmonary nodules some of which are new in both lungs  that are likely inflammatory but should also be reevaluated at follow-up. 3. Severe emphysema. No results found for this or any previous visit.        REFERENCE INFO:                                                                                                                            Past Medical History:   Diagnosis Date    Arthritis     Benign essential HTN 6/6/2017    Chronic obstructive pulmonary disease (HCC)     Cognitive deficits 8/20/2021    GERD (gastroesophageal reflux disease)     History of multiple allergies     Hx of migraines     Hypertension     Irritable bowel syndrome with diarrhea 10/2/2018    Sinus problem     Thoracic aneurysm without mention of rupture 12/2/2014    No chest or upper back pain. Echo shows a mildly dilated aortic root at 4.0 cm. There is mild LVH and normal systolic function. Allergies   Allergen Reactions    Aspirin Anaphylaxis and Swelling    Codeine Other (See Comments)     headache    Ezetimibe Myalgia     Muscle cramps      Levofloxacin Nausea Only    Naproxen Sodium Other (See Comments)     Stomach cramps      Rosuvastatin Other (See Comments)     Myalgia      Sertraline Other (See Comments)     Not effective      Statins      Other reaction(s): Myalgia-Intolerance    Sulfa Antibiotics Other (See Comments)     Not allergic    Amoxicillin Nausea And Vomiting    Amoxicillin-Pot Clavulanate Nausea And Vomiting     Sever nausa, abdominal cramping and feels terrible     Current Outpatient Medications   Medication Instructions    acetaminophen (TYLENOL) 650 mg, Oral, EVERY 4 HOURS PRN    albuterol (PROVENTIL) (2.5 MG/3ML) 0.083% nebulizer solution USE 3 ML BY NEBULIZATION ROUTE 3 TIMES DAILY. BILL TO MEDICARE PART B WITH DX: J44.9 COPD.    albuterol sulfate HFA (PROVENTIL;VENTOLIN;PROAIR) 108 (90 Base) MCG/ACT inhaler INHALE 2 PUFFS BY MOUTH EVERY 4 HOURS AS NEEDED    donepezil (ARICEPT) 5 MG tablet TAKE 1 TABLET BY MOUTH NIGHTLY    Dupilumab (DUPIXENT) 200 MG/1. 14ML SOPN 1 each, SubCUTAneous    fluticasone-salmeterol (ADVAIR DISKUS) 500-50 MCG/ACT AEPB diskus inhaler 1 puff, Inhalation, EVERY 12 HOURS    gabapentin (NEURONTIN) 300 MG capsule TAKE 1 CAPSULE BY MOUTH THREE TIMES A DAY    latanoprost (XALATAN) 0.005 % ophthalmic solution 1 drop, Ophthalmic    lipase-protease-amylase (CREON) 34143-97963 units delayed release capsule 3 capsules, Oral, 3 TIMES DAILY WITH MEALS    Multiple Vitamins-Minerals (PRESERVISION AREDS PO) 1 tablet, Oral, 2 TIMES DAILY    nitroGLYCERIN (NITROSTAT) 0.4 MG SL tablet TAKE 1 TABLET BY SUBLINGUAL ROUTE EVERY FIVE (5) MINUTES AS NEEDED FOR CHEST PAIN.     ondansetron (ZOFRAN) 4 MG tablet TAKE 1 TABLET BY MOUTH THREE TIMES A DAY AS NEEDED FOR NAUSEA    OXYGEN 2LPM qhs    polyethylene glycol (GLYCOLAX) 17 g, Oral, DAILY    pravastatin (PRAVACHOL) 10 MG tablet TAKE 1 TABLET BY MOUTH EVERY DAY    SPIRIVA HANDIHALER 18 MCG inhalation capsule TAKE 1 CAPSULE BY MOUTH EVERY DAY    SUMAtriptan (IMITREX) 100 MG tablet TAKE 1/2 TO 1 TABLET BY MOUTH AS NEEDED FOR MIGRAINE MAY REPEAT IN 2HRS MAX 2/24HRS    tamsulosin (FLOMAX) 0.8 mg, Oral, DAILY    traZODone (DESYREL) 50 MG tablet TAKE 1 TABLET BY MOUTH NIGHTLY    triamcinolone (NASACORT) 55 MCG/ACT nasal inhaler 2 sprays, Nasal, DAILY    vitamin D3 (CHOLECALCIFEROL) 10 MCG (400 UNIT) TABS tablet Oral, DAILY

## 2022-12-01 NOTE — PROGRESS NOTES
Patient Name:  Dalia Mueller                               YOB: 1940  MRN: [id number]                                                Office Visit 12/1/2022    ASSESSMENT AND PLAN:  (Medical Decision Making)    1. Bronchiectasis without complication (HCC)  Continue airway clearance measures including albuterol and CPT vest.  Patient advised to call if there is any change in his sputum production. We plan to follow-up complete pulmonary function testing prior to his next visit in 6 months. Immunizations are up-to-date. 2. Stage 3 severe COPD by GOLD classification (HCC)  Continue Advair, Dupixent, Spiriva. Offered pulmonary rehab and the patient would like to think about it prior to enrolling. COVID-19 positive test (U07.1, COVID-19) with Viral Sepsis (A41.89 other specified sepsis)  (If respiratory failure present, add as separate assessment)    3. Pulmonary nodule   Follow-up CT as needed in 6 months. Zahraa Rios MD    Follow-up and Dispositions    Return in about 6 months (around 6/1/2023) for CPFT First Available, CT prior to appointment. Total time for encounter on day of encounter was 31 minutes. This time includes chart prep, review of tests/procedures, review of other provider's notes, documentation and counseling patient regarding disease process and medications. ___________________________________________________________________         ______      REASON FOR VISIT:   Chief Complaint   Patient presents with    Follow-up    Pulmonary Nodule    Other     bronchiectasis   She probably needs to come in    HISTORY OF PRESENT ILLNESS:    Mr. Andres Shine is a 80 y.o. male with a PMH of asthma/COPD (FEV1 47% June 2019) on Dupixent, bronchiectasis, lung nodules, chronic hypoxic respiratory failure on 2 L of oxygen who is seen at [facility] for routine follow-up. He was hospitalized at [facility] in June 2020 and has been followed since by Evonne Morley. He is not wearing oxygen today. Reports 1 to 2 tablespoons of white to yellowish mucus daily. He is using his albuterol nebulizer and CPT vest twice daily. He has not had any recent episodes of bronchitis. He is vaccinated against influenza. He had a CT yesterday done for waxing and waning pulmonary nodules with demonstrated some new nodules measuring up to 10 mm. Follow-up CT is in 6 months is recommended. Tobacco Use      Smoking status: Former        Packs/day: 1.50        Years: 20.00        Pack years: 30        Types: Cigarettes        Quit date: 1973        Years since quittin.9      Smokeless tobacco: Never      REVIEW OF SYSTEMS:   10 point review of systems is negative except as reported in HPI. PHYSICAL EXAM:   Vitals:    22 1249   BP: 118/64   Pulse: 91   Temp: 96.8 °F (36 °C)   TempSrc: Skin   SpO2: 99%   Weight: 127 lb (57.6 kg)   Height: 5' 4.5\" (1.638 m)     Body mass index is 21.46 kg/m². General:   Alert, cooperative, no distress, appears stated age. Eyes/Ears/Nose:   Conjunctivae/corneas clear. PERRL. Nasal mucosa is normal.  Normal TMs and external auditory canals. Mouth/Throat:  Lips, mucosa, and tongue normal. Teeth and gums normal.        Lungs:   Decreased bilaterally without wheezes or rhonchi     Heart:   Regular rate and rhythm, S1, S2 normal, no murmur, click, rub or gallop. Abdomen:    Soft, non-tender. Extremities:  Extremities normal, atraumatic, no cyanosis or edema. Skin:  Skin color normal. No rashes or lesions     Neurologic:  A&Ox3     DIAGNOSTIC TESTS:                                                                                                                        Pulmonary Function Testing:   Date         FVC         FEV1         FEV1/FVC         FEF 25-75%         TLC         FRC         RV         DLCO         No flowsheet data found. No results found for this or any previous visit.  No results found for this or any previous visit. LABS:   Lab Results   Component Value Date/Time    WBC 10.0 06/27/2022 02:49 PM    HGB 14.6 06/27/2022 02:49 PM    HCT 46.0 06/27/2022 02:49 PM     06/27/2022 02:49 PM    LALITA  12/11/2008 01:35 PM     NONE DET  Reference range: NONE DET  (NOTE)  TEST INFORMATION: Anti-Nuclear Ab (LALITA), IgG  LALITA specimens are screened using an PABLO assay. All  specimens that screen positive or equivocal are confirmed  using HEp-2 cells, and if positive, the titer and pattern  will be reported. Performed by Dillon Ivy Jr. [facility],                              [facility] [address] [facility] [phone]                 [facility]. Kallie Lowery MD - Lab. Director      CRP 1.6 06/02/2020 02:35 AM     Imaging: I performed an independent interpretation of the patient's images. CXR:   XR CHEST STANDARD TWO VW 07/14/2020    Narrative  Two view chest:    History: COPD. Comparison: 06/01/2020    Findings: The heart and mediastinal silhouette are normal in size and  configuration. The lungs are hyperinflated. The right hemidiaphragm is mildly  elevated, unchanged. There is mild bibasilar scarring, unchanged. There are no  significant pleural effusions. Patchy left basilar airspace opacity has  resolved. The pulmonary vascularity is within normal limits. The visualized  osseous structures are unremarkable. Curvilinear wires/electrodes overlie the  posterior soft tissues. Impression  Impression:  1. Resolution of left basilar airspace opacity. 2. Hyperinflation and mild chronic changes. CT Chest:   CT CHEST WO CONTRAST 11/29/2022    Narrative  CT of the chest without contrast.    CLINICAL INDICATION: Lung nodule, follow-up exam    PROCEDURE: Serial thin section axial images obtained from the thoracic inlet  through the upper abdomen without the administration of intravenous contrast.  Radiation dose reduction techniques were used for this study.  Our CT scanners  use one or all of the following: Automated exposure control, adjusted of the mA  and/or kV according to patient size, iterative reconstruction    COMPARISON: Chest CT dated 5/3/2022 and 11/2/2021    FINDINGS: No mediastinal or axillary adenopathy. There is no pleural or  pericardial effusion. There is severe emphysema again appreciated. There is no  dense airspace consolidation. There is no pneumothorax. There is a tiny 3 mm  pulmonary nodule in the left lung apex that has decreased slightly in size. A 3  mm pulmonary nodule is new slightly more caudal in the left upper lobe. There is  a larger 6 mm pulmonary nodule slightly more caudal in the left upper lobe on  image number 20 of the mid sequence there is a tiny 2 mm pulmonary nodule in the  right upper lobe there is a 1 cm pulmonary nodule now present at the left lung  base that is in an area of atelectasis. This may accentuate its size. There is  also a new 6 mm pulmonary nodule at the left lung base adjacent to this nodule. Limited evaluation of the upper abdomen is unremarkable. No aggressive osseous is identified. Impression  1. New 10 and 6 mm pulmonary nodules at the left lung base warrants further  follow-up evaluation in six months. 2. Multiple bilateral tiny pulmonary nodules some of which are new in both lungs  that are likely inflammatory but should also be reevaluated at follow-up. 3. Severe emphysema. No results found for this or any previous visit.        REFERENCE INFO:                                                                                                                            Past Medical History:   Diagnosis Date    Arthritis     Benign essential HTN 6/6/2017    Chronic obstructive pulmonary disease (HCC)     Cognitive deficits 8/20/2021    GERD (gastroesophageal reflux disease)     History of multiple allergies     Hx of migraines     Hypertension     Irritable bowel syndrome with diarrhea 10/2/2018    Sinus problem     Thoracic aneurysm without mention of rupture 12/2/2014    No chest or upper back pain. Echo shows a mildly dilated aortic root at 4.0 cm. There is mild LVH and normal systolic function. Allergies   Allergen Reactions    Aspirin Anaphylaxis and Swelling    Codeine Other (See Comments)     headache    Ezetimibe Myalgia     Muscle cramps      Levofloxacin Nausea Only    Naproxen Sodium Other (See Comments)     Stomach cramps      Rosuvastatin Other (See Comments)     Myalgia      Sertraline Other (See Comments)     Not effective      Statins      Other reaction(s): Myalgia-Intolerance    Sulfa Antibiotics Other (See Comments)     Not allergic    Amoxicillin Nausea And Vomiting    Amoxicillin-Pot Clavulanate Nausea And Vomiting     Sever nausa, abdominal cramping and feels terrible     Current Outpatient Medications   Medication Instructions    acetaminophen (TYLENOL) 650 mg, Oral, EVERY 4 HOURS PRN    albuterol (PROVENTIL) (2.5 MG/3ML) 0.083% nebulizer solution USE 3 ML BY NEBULIZATION ROUTE 3 TIMES DAILY. BILL TO MEDICARE PART B WITH DX: J44.9 COPD.    albuterol sulfate HFA (PROVENTIL;VENTOLIN;PROAIR) 108 (90 Base) MCG/ACT inhaler INHALE 2 PUFFS BY MOUTH EVERY 4 HOURS AS NEEDED    donepezil (ARICEPT) 5 MG tablet TAKE 1 TABLET BY MOUTH NIGHTLY    Dupilumab (DUPIXENT) 200 MG/1. 14ML SOPN 1 each, SubCUTAneous    fluticasone-salmeterol (ADVAIR DISKUS) 500-50 MCG/ACT AEPB diskus inhaler 1 puff, Inhalation, EVERY 12 HOURS    gabapentin (NEURONTIN) 300 MG capsule TAKE 1 CAPSULE BY MOUTH THREE TIMES A DAY    latanoprost (XALATAN) 0.005 % ophthalmic solution 1 drop, Ophthalmic    lipase-protease-amylase (CREON) 89983-93335 units delayed release capsule 3 capsules, Oral, 3 TIMES DAILY WITH MEALS    Multiple Vitamins-Minerals (PRESERVISION AREDS PO) 1 tablet, Oral, 2 TIMES DAILY    nitroGLYCERIN (NITROSTAT) 0.4 MG SL tablet TAKE 1 TABLET BY SUBLINGUAL ROUTE EVERY FIVE (5) MINUTES AS NEEDED FOR CHEST PAIN.     ondansetron (ZOFRAN) 4 MG tablet TAKE 1 TABLET BY MOUTH THREE TIMES A DAY AS NEEDED FOR NAUSEA    OXYGEN 2LPM qhs    polyethylene glycol (GLYCOLAX) 17 g, Oral, DAILY    pravastatin (PRAVACHOL) 10 MG tablet TAKE 1 TABLET BY MOUTH EVERY DAY    SPIRIVA HANDIHALER 18 MCG inhalation capsule TAKE 1 CAPSULE BY MOUTH EVERY DAY    SUMAtriptan (IMITREX) 100 MG tablet TAKE 1/2 TO 1 TABLET BY MOUTH AS NEEDED FOR MIGRAINE MAY REPEAT IN 2HRS MAX 2/24HRS    tamsulosin (FLOMAX) 0.8 mg, Oral, DAILY    traZODone (DESYREL) 50 MG tablet TAKE 1 TABLET BY MOUTH NIGHTLY    triamcinolone (NASACORT) 55 MCG/ACT nasal inhaler 2 sprays, Nasal, DAILY    vitamin D3 (CHOLECALCIFEROL) 10 MCG (400 UNIT) TABS tablet Oral, DAILY

## 2022-12-01 NOTE — PROGRESS NOTES
Patient Name:  Jose Rice                               YOB: 1940  MRN: [id number]                                                Office Visit 2022    ASSESSMENT AND PLAN:  (Medical Decision Making)    {There are no diagnoses linked to this encounter. (Refresh or delete this SmartLink)}     Chastity Gonzales MA        Total time for encounter on day of encounter was *** minutes. This time includes chart prep, review of tests/procedures, review of other provider's notes, documentation and counseling patient regarding disease process and medications. ___________________________________________________________________         ______      REASON FOR VISIT:   Chief Complaint   Patient presents with    Follow-up    Pulmonary Nodule       HISTORY OF PRESENT ILLNESS:    Mr. Ilir Carpenter is a 80 y.o. male with a PMH of  *** who is seen at [facility] today for  ***      Tobacco Use      Smoking status: Former        Packs/day: 1.50        Years: 20.00        Pack years: 30        Types: Cigarettes        Quit date: 1973        Years since quittin.9      Smokeless tobacco: Never    Second Hand Smoke Exposure: {YES/NO:::\"No\"}  Birds: {YES/NO:::\"No\"}  Asbestos: {YES/NO:::\"No\"}  TB: {YES/NO:::\"No\"}  Hot Tubs/Humidifier: {YES/NO:::\"No\"}  Organic/Inorganic Dusts: {YES/NO:::\"No\"}  Molds: {YES/NO:::\"No\"}  Occupation/Hobbies: ***    REVIEW OF SYSTEMS:   10 point review of systems is negative except as reported in HPI. PHYSICAL EXAM:   Vitals:    22 1249   BP: 118/64   Pulse: 91   Temp: 96.8 °F (36 °C)   TempSrc: Skin   SpO2: 99%   Weight: 127 lb (57.6 kg)   Height: 5' 4.5\" (1.638 m)     Body mass index is 21.46 kg/m². General:   Alert, cooperative, no distress, appears stated age. Eyes/Ears/Nose:   Conjunctivae/corneas clear. PERRL. Nasal mucosa is normal.  Normal TMs and external auditory canals.         Mouth/Throat:  Lips, mucosa, and tongue normal. Teeth and gums normal.        Lungs:     ***     Heart:   Regular rate and rhythm, S1, S2 normal, no murmur, click, rub or gallop. Abdomen:    Soft, non-tender. Extremities:  Extremities normal, atraumatic, no cyanosis or edema. Skin:  Skin color normal. No rashes or lesions     Neurologic:  A&Ox3     DIAGNOSTIC TESTS:                                                                                                                        Pulmonary Function Testing:   Date    ***        FVC    ***        FEV1    ***        FEV1/FVC    ***        FEF 25-75%    ***        TLC    ***        FRC    ***        RV    ***        DLCO    ***        No flowsheet data found. AMBULATING OXIMETRY: ***  O2 sat HR   Room air at Rest ***% ***bpm   Room air ambulating ***% ***bpm   (recovery) Ambulating on ***Lpm ***% ***bpm   No results found for this or any previous visit. No results found for this or any previous visit. LABS:   Lab Results   Component Value Date/Time    WBC 10.0 06/27/2022 02:49 PM    HGB 14.6 06/27/2022 02:49 PM    HCT 46.0 06/27/2022 02:49 PM     06/27/2022 02:49 PM    LALITA  12/11/2008 01:35 PM     NONE DET  Reference range: NONE DET  (NOTE)  TEST INFORMATION: Anti-Nuclear Ab (LALITA), IgG  LALITA specimens are screened using an PABLO assay. All  specimens that screen positive or equivocal are confirmed  using HEp-2 cells, and if positive, the titer and pattern  will be reported. Performed by [facility]                              [address] [facility] [phone]                 Chaya Mello. Ivett Blanchard MD - Lab. Director      CRP 1.6 06/02/2020 02:35 AM     Imaging: I performed an independent interpretation of the patient's images. CXR:   XR CHEST STANDARD TWO VW 07/14/2020    Narrative  Two view chest:    History: COPD. Comparison: 06/01/2020    Findings: The heart and mediastinal silhouette are normal in size and  configuration. The lungs are hyperinflated. The right hemidiaphragm is mildly  elevated, unchanged. There is mild bibasilar scarring, unchanged. There are no  significant pleural effusions. Patchy left basilar airspace opacity has  resolved. The pulmonary vascularity is within normal limits. The visualized  osseous structures are unremarkable. Curvilinear wires/electrodes overlie the  posterior soft tissues. Impression  Impression:  1. Resolution of left basilar airspace opacity. 2. Hyperinflation and mild chronic changes. CT Chest:   CT CHEST WO CONTRAST 11/29/2022    Narrative  CT of the chest without contrast.    CLINICAL INDICATION: Lung nodule, follow-up exam    PROCEDURE: Serial thin section axial images obtained from the thoracic inlet  through the upper abdomen without the administration of intravenous contrast.  Radiation dose reduction techniques were used for this study. Our CT scanners  use one or all of the following: Automated exposure control, adjusted of the mA  and/or kV according to patient size, iterative reconstruction    COMPARISON: Chest CT dated 5/3/2022 and 11/2/2021    FINDINGS: No mediastinal or axillary adenopathy. There is no pleural or  pericardial effusion. There is severe emphysema again appreciated. There is no  dense airspace consolidation. There is no pneumothorax. There is a tiny 3 mm  pulmonary nodule in the left lung apex that has decreased slightly in size. A 3  mm pulmonary nodule is new slightly more caudal in the left upper lobe. There is  a larger 6 mm pulmonary nodule slightly more caudal in the left upper lobe on  image number 20 of the mid sequence there is a tiny 2 mm pulmonary nodule in the  right upper lobe there is a 1 cm pulmonary nodule now present at the left lung  base that is in an area of atelectasis. This may accentuate its size. There is  also a new 6 mm pulmonary nodule at the left lung base adjacent to this nodule.     Limited evaluation of the upper abdomen is unremarkable. No aggressive osseous is identified. Impression  1. New 10 and 6 mm pulmonary nodules at the left lung base warrants further  follow-up evaluation in six months. 2. Multiple bilateral tiny pulmonary nodules some of which are new in both lungs  that are likely inflammatory but should also be reevaluated at follow-up. 3. Severe emphysema. No results found for this or any previous visit. REFERENCE INFO:                                                                                                                            Past Medical History:   Diagnosis Date    Arthritis     Benign essential HTN 6/6/2017    Chronic obstructive pulmonary disease (HCC)     Cognitive deficits 8/20/2021    GERD (gastroesophageal reflux disease)     History of multiple allergies     Hx of migraines     Hypertension     Irritable bowel syndrome with diarrhea 10/2/2018    Sinus problem     Thoracic aneurysm without mention of rupture 12/2/2014    No chest or upper back pain. Echo shows a mildly dilated aortic root at 4.0 cm. There is mild LVH and normal systolic function. Allergies   Allergen Reactions    Aspirin Anaphylaxis and Swelling    Codeine Other (See Comments)     headache    Ezetimibe Myalgia     Muscle cramps      Levofloxacin Nausea Only    Naproxen Sodium Other (See Comments)     Stomach cramps      Rosuvastatin Other (See Comments)     Myalgia      Sertraline Other (See Comments)     Not effective      Statins      Other reaction(s): Myalgia-Intolerance    Sulfa Antibiotics Other (See Comments)     Not allergic    Amoxicillin Nausea And Vomiting    Amoxicillin-Pot Clavulanate Nausea And Vomiting     Sever nausa, abdominal cramping and feels terrible     Current Outpatient Medications   Medication Instructions    acetaminophen (TYLENOL) 650 mg, Oral, EVERY 4 HOURS PRN    albuterol (PROVENTIL) (2.5 MG/3ML) 0.083% nebulizer solution USE 3 ML BY NEBULIZATION ROUTE 3 TIMES DAILY.  BILL TO MEDICARE PART B WITH DX: J44.9 COPD.    albuterol sulfate HFA (PROVENTIL;VENTOLIN;PROAIR) 108 (90 Base) MCG/ACT inhaler INHALE 2 PUFFS BY MOUTH EVERY 4 HOURS AS NEEDED    donepezil (ARICEPT) 5 MG tablet TAKE 1 TABLET BY MOUTH NIGHTLY    Dupilumab (DUPIXENT) 200 MG/1. 14ML SOPN 1 each, SubCUTAneous    fluticasone-salmeterol (ADVAIR DISKUS) 500-50 MCG/ACT AEPB diskus inhaler 1 puff, Inhalation, EVERY 12 HOURS    gabapentin (NEURONTIN) 300 MG capsule TAKE 1 CAPSULE BY MOUTH THREE TIMES A DAY    latanoprost (XALATAN) 0.005 % ophthalmic solution 1 drop, Ophthalmic    lipase-protease-amylase (CREON) 66688-70217 units delayed release capsule 3 capsules, Oral, 3 TIMES DAILY WITH MEALS    Multiple Vitamins-Minerals (PRESERVISION AREDS PO) 1 tablet, Oral, 2 TIMES DAILY    nitroGLYCERIN (NITROSTAT) 0.4 MG SL tablet TAKE 1 TABLET BY SUBLINGUAL ROUTE EVERY FIVE (5) MINUTES AS NEEDED FOR CHEST PAIN.     ondansetron (ZOFRAN) 4 MG tablet TAKE 1 TABLET BY MOUTH THREE TIMES A DAY AS NEEDED FOR NAUSEA    OXYGEN 2LPM qhs    polyethylene glycol (GLYCOLAX) 17 g, Oral, DAILY    pravastatin (PRAVACHOL) 10 MG tablet TAKE 1 TABLET BY MOUTH EVERY DAY    SPIRIVA HANDIHALER 18 MCG inhalation capsule TAKE 1 CAPSULE BY MOUTH EVERY DAY    SUMAtriptan (IMITREX) 100 MG tablet TAKE 1/2 TO 1 TABLET BY MOUTH AS NEEDED FOR MIGRAINE MAY REPEAT IN 2HRS MAX 2/24HRS    tamsulosin (FLOMAX) 0.8 mg, Oral, DAILY    traZODone (DESYREL) 50 MG tablet TAKE 1 TABLET BY MOUTH NIGHTLY    triamcinolone (NASACORT) 55 MCG/ACT nasal inhaler 2 sprays, Nasal, DAILY    vitamin D3 (CHOLECALCIFEROL) 10 MCG (400 UNIT) TABS tablet Oral, DAILY

## 2022-12-12 DIAGNOSIS — N40.1 BENIGN PROSTATIC HYPERPLASIA WITH LOWER URINARY TRACT SYMPTOMS: ICD-10-CM

## 2022-12-12 DIAGNOSIS — B02.29 OTHER POSTHERPETIC NERVOUS SYSTEM INVOLVEMENT: ICD-10-CM

## 2022-12-13 RX ORDER — TAMSULOSIN HYDROCHLORIDE 0.4 MG/1
CAPSULE ORAL
Qty: 180 CAPSULE | Refills: 1 | Status: SHIPPED | OUTPATIENT
Start: 2022-12-13

## 2022-12-13 RX ORDER — GABAPENTIN 300 MG/1
CAPSULE ORAL
Qty: 270 CAPSULE | Refills: 1 | Status: SHIPPED | OUTPATIENT
Start: 2022-12-13 | End: 2023-06-11

## 2022-12-28 ENCOUNTER — OFFICE VISIT (OUTPATIENT)
Dept: INTERNAL MEDICINE CLINIC | Facility: CLINIC | Age: 82
End: 2022-12-28
Payer: MEDICARE

## 2022-12-28 VITALS
BODY MASS INDEX: 21.49 KG/M2 | HEART RATE: 84 BPM | SYSTOLIC BLOOD PRESSURE: 130 MMHG | HEIGHT: 65 IN | WEIGHT: 129 LBS | DIASTOLIC BLOOD PRESSURE: 70 MMHG | OXYGEN SATURATION: 100 %

## 2022-12-28 DIAGNOSIS — G30.1 LATE ONSET ALZHEIMER'S DEMENTIA WITH BEHAVIORAL DISTURBANCE (HCC): ICD-10-CM

## 2022-12-28 DIAGNOSIS — J44.9 COPD, MODERATE (HCC): Primary | ICD-10-CM

## 2022-12-28 DIAGNOSIS — E87.1 HYPONATREMIA: ICD-10-CM

## 2022-12-28 DIAGNOSIS — F02.818 LATE ONSET ALZHEIMER'S DEMENTIA WITH BEHAVIORAL DISTURBANCE (HCC): ICD-10-CM

## 2022-12-28 DIAGNOSIS — F41.9 ANXIETY: ICD-10-CM

## 2022-12-28 PROBLEM — R07.9 CHEST PAIN OF UNCERTAIN ETIOLOGY: Status: RESOLVED | Noted: 2020-10-17 | Resolved: 2022-12-28

## 2022-12-28 PROBLEM — Z86.79 HISTORY OF HYPERTENSION: Status: ACTIVE | Noted: 2017-06-06

## 2022-12-28 PROBLEM — K92.1 BLACK STOOL: Status: RESOLVED | Noted: 2018-09-27 | Resolved: 2022-12-28

## 2022-12-28 PROCEDURE — G8427 DOCREV CUR MEDS BY ELIG CLIN: HCPCS | Performed by: FAMILY MEDICINE

## 2022-12-28 PROCEDURE — G8420 CALC BMI NORM PARAMETERS: HCPCS | Performed by: FAMILY MEDICINE

## 2022-12-28 PROCEDURE — 99214 OFFICE O/P EST MOD 30 MIN: CPT | Performed by: FAMILY MEDICINE

## 2022-12-28 PROCEDURE — G8484 FLU IMMUNIZE NO ADMIN: HCPCS | Performed by: FAMILY MEDICINE

## 2022-12-28 PROCEDURE — 1036F TOBACCO NON-USER: CPT | Performed by: FAMILY MEDICINE

## 2022-12-28 PROCEDURE — 3023F SPIROM DOC REV: CPT | Performed by: FAMILY MEDICINE

## 2022-12-28 PROCEDURE — 1123F ACP DISCUSS/DSCN MKR DOCD: CPT | Performed by: FAMILY MEDICINE

## 2022-12-28 RX ORDER — LORAZEPAM 0.5 MG/1
0.5 TABLET ORAL NIGHTLY PRN
Qty: 30 TABLET | Refills: 2 | Status: SHIPPED | OUTPATIENT
Start: 2022-12-28 | End: 2023-03-28

## 2022-12-28 RX ORDER — FLUTICASONE PROPIONATE AND SALMETEROL 500; 50 UG/1; UG/1
1 POWDER RESPIRATORY (INHALATION) EVERY 12 HOURS
Qty: 60 EACH | Refills: 5 | Status: SHIPPED | OUTPATIENT
Start: 2022-12-28

## 2022-12-28 RX ORDER — ASPIRIN 81 MG/1
81 TABLET ORAL DAILY
COMMUNITY

## 2022-12-28 NOTE — PROGRESS NOTES
Connor Crockett DO  Diplomate of the [employer]  [facility]      Rupesh Gonzalez (: 1940) is a 80 y.o. male, here for evaluation of the following chief complaint(s):  Follow-up (6 month check lipids) and Medication Refill (Change Advair ins wont cover in )       ASSESSMENT/PLAN:  1. COPD, moderate ([address]  -     fluticasone-salmeterol (WIXELA INHUB) 500-50 MCG/ACT AEPB diskus inhaler; Inhale 1 puff into the lungs in the morning and 1 puff in the evening., Disp-60 each, R-5Normal  2. Late onset Alzheimer's dementia with behavioral disturbance ([address]  3. Hyponatremia  -     Basic Metabolic Panel; Future  4. Anxiety  -     LORazepam (ATIVAN) 0.5 MG tablet; Take 1 tablet by mouth nightly as needed for Anxiety for up to 90 days. Max Daily Amount: 0.5 mg, Disp-30 tablet, R-2Normal     Will change over to [facility] as ordered. Instructed on how to use to properly. Wife will assist.  Wife does help with his medications as well. Recheck BMP for sodium levels. We discussed different medication options as far as anxiety with his Alzheimer's. I did discuss possibly using Seroquel but at this point we are going to do a trial of lorazepam for anxiety in the evening. We will go ahead and stop the trazodone. Advised him how to take medication properly. Discussed the potential side effects--including addiction--and benefits of the medication. He is to call with any problems or concerns. I have reviewed the patients controlled substance prescription history, as maintained in the [place] prescription monitoring program, so that the prescription(s) for a controlled substance can be given. SUBJECTIVE/OBJECTIVE:  HPI:  Patient comes in today with his wife. COPD remains stable. He does need to switch inhalers though due to formulary changes. He is unsure about whether he is actually taking the Advair or not. He does use the nebulizer.   Denies any mucopurulent productive sputum or significant shortness of breath. His Alzheimer's dementia does continue to slowly worsen. Wife states that he is having a lot of anxiety in the evenings. He is having difficulty getting to sleep despite the trazodone. He has not had any significant agitative symptoms but just gets extremely anxious and she is wondering if he could try medication to help with this. ROS negative except as noted above today. Social History     Tobacco Use    Smoking status: Former     Packs/day: 1.50     Years: 20.00     Pack years: 30.00     Types: Cigarettes     Quit date: 1973     Years since quittin.0    Smokeless tobacco: Never   Vaping Use    Vaping Use: Never used   Substance Use Topics    Alcohol use: No     Alcohol/week: 0.0 standard drinks    Drug use: No     Vitals:    22 1556   BP: 130/70   Pulse: 84   SpO2: 100%   Weight: 129 lb (58.5 kg)   Height: 5' 4.5\" (1.638 m)      Body mass index is 21.8 kg/m². Physical Exam  Vitals reviewed. Cardiovascular:      Rate and Rhythm: Normal rate and regular rhythm. Pulmonary:      Effort: Pulmonary effort is normal.      Breath sounds: Normal breath sounds. Comments: Decreased BS in bases  Skin:     General: Skin is warm and dry. Neurological:      Mental Status: He is alert. An electronic signature was used to authenticate this note.   Steve Prajapati DO

## 2022-12-29 LAB
ANION GAP SERPL CALC-SCNC: 7 MMOL/L (ref 2–11)
BUN SERPL-MCNC: 16 MG/DL (ref 8–23)
CALCIUM SERPL-MCNC: 9.3 MG/DL (ref 8.3–10.4)
CHLORIDE SERPL-SCNC: 105 MMOL/L (ref 101–110)
CO2 SERPL-SCNC: 27 MMOL/L (ref 21–32)
CREAT SERPL-MCNC: 0.9 MG/DL (ref 0.8–1.5)
GLUCOSE SERPL-MCNC: 155 MG/DL (ref 65–100)
POTASSIUM SERPL-SCNC: 4.2 MMOL/L (ref 3.5–5.1)
SODIUM SERPL-SCNC: 139 MMOL/L (ref 133–143)

## 2023-01-22 ENCOUNTER — PATIENT MESSAGE (OUTPATIENT)
Dept: INTERNAL MEDICINE CLINIC | Facility: CLINIC | Age: 83
End: 2023-01-22

## 2023-01-23 NOTE — TELEPHONE ENCOUNTER
From: Tamiko Lyman  To: Dr. Liang Spears Brothers  Sent: 1/22/2023 3:54 PM EST  Subject: Lorazepam .5mg. Segundo Lopez has done very well with the change to Lorazepam.  His problem is that he takes early in the day most days when he starts feeling anxious. Then he does not have it available at bedtime or early evening when the anxiety returns. Is it possible to prescribe it for twice a day. I can tell that it has made a difference.   Shana Lyman

## 2023-01-30 ENCOUNTER — NURSE ONLY (OUTPATIENT)
Dept: PULMONOLOGY | Age: 83
End: 2023-01-30
Payer: MEDICARE

## 2023-01-30 DIAGNOSIS — J44.9 STAGE 3 SEVERE COPD BY GOLD CLASSIFICATION (HCC): Primary | ICD-10-CM

## 2023-01-30 LAB
FEV 1 , POC: 1.13 L
FEV1 % PRED, POC: 43 %
FEV1/FVC, POC: NORMAL
FVC % PRED, POC: 67 %
FVC, POC: NORMAL

## 2023-01-30 PROCEDURE — 94060 EVALUATION OF WHEEZING: CPT | Performed by: INTERNAL MEDICINE

## 2023-01-30 PROCEDURE — 94726 PLETHYSMOGRAPHY LUNG VOLUMES: CPT | Performed by: INTERNAL MEDICINE

## 2023-01-30 PROCEDURE — 94729 DIFFUSING CAPACITY: CPT | Performed by: INTERNAL MEDICINE

## 2023-01-30 ASSESSMENT — PULMONARY FUNCTION TESTS
FEV1_PERCENT_PREDICTED_POC: 43
FVC_PERCENT_PREDICTED_POC: 67

## 2023-02-01 DIAGNOSIS — G43.109 MIGRAINE WITH AURA, NOT INTRACTABLE, WITHOUT STATUS MIGRAINOSUS: ICD-10-CM

## 2023-02-01 RX ORDER — SUMATRIPTAN 100 MG/1
TABLET, FILM COATED ORAL
Qty: 9 TABLET | Refills: 5 | Status: SHIPPED | OUTPATIENT
Start: 2023-02-01

## 2023-03-16 DIAGNOSIS — G30.1 ALZHEIMER'S DISEASE WITH LATE ONSET (CODE) (HCC): ICD-10-CM

## 2023-03-16 DIAGNOSIS — F51.04 PSYCHOPHYSIOLOGIC INSOMNIA: ICD-10-CM

## 2023-03-16 RX ORDER — DONEPEZIL HYDROCHLORIDE 5 MG/1
TABLET, FILM COATED ORAL
Qty: 90 TABLET | Refills: 1 | Status: SHIPPED | OUTPATIENT
Start: 2023-03-16

## 2023-03-16 RX ORDER — TRAZODONE HYDROCHLORIDE 50 MG/1
TABLET ORAL
Qty: 90 TABLET | Refills: 1 | Status: SHIPPED | OUTPATIENT
Start: 2023-03-16

## 2023-03-20 ENCOUNTER — PATIENT MESSAGE (OUTPATIENT)
Dept: INTERNAL MEDICINE CLINIC | Facility: CLINIC | Age: 83
End: 2023-03-20

## 2023-03-20 DIAGNOSIS — F41.9 ANXIETY: ICD-10-CM

## 2023-03-21 RX ORDER — LORAZEPAM 0.5 MG/1
0.5 TABLET ORAL NIGHTLY PRN
Qty: 30 TABLET | Refills: 2 | Status: SHIPPED | OUTPATIENT
Start: 2023-03-23 | End: 2023-06-21

## 2023-03-21 NOTE — TELEPHONE ENCOUNTER
From: Fanny Lyman  To: Dr. Yamil Mc  Sent: 3/20/2023 7:02 PM EDT  Subject: Trazadone prescription    Concerned that we got Trazodone instead of Lorazepam  refill. Renee Monge has not taken Trazodone since starting the  Lorazepam and he gets relief from anxiety with the .5mg. Should he take both?     Thanks  [facility] [facility]

## 2023-03-26 DIAGNOSIS — F41.9 ANXIETY: ICD-10-CM

## 2023-03-27 RX ORDER — LORAZEPAM 0.5 MG/1
0.5 TABLET ORAL NIGHTLY PRN
Qty: 30 TABLET | Refills: 2 | OUTPATIENT
Start: 2023-03-27 | End: 2023-06-25

## 2023-04-26 RX ORDER — TIOTROPIUM BROMIDE 18 UG/1
18 CAPSULE ORAL; RESPIRATORY (INHALATION) DAILY
Qty: 30 CAPSULE | Refills: 11 | Status: SHIPPED | OUTPATIENT
Start: 2023-04-26

## 2023-04-26 NOTE — TELEPHONE ENCOUNTER
Patient Refill Request     Last Office Visit: 12/01/2022 with Marj Santos MD     When they were supposed to follow up: 6 months     Upcoming Office Visit: 08/09/2023 with Dr. Alejandro Cole Requested: [facility]     Type of refill: 30 day refill     Requested Pharmacy: [address] [place] [address]     Is prescription pended?  Yes

## 2023-05-23 ENCOUNTER — TELEPHONE (OUTPATIENT)
Dept: INTERNAL MEDICINE CLINIC | Facility: CLINIC | Age: 83
End: 2023-05-23

## 2023-05-25 ENCOUNTER — PATIENT MESSAGE (OUTPATIENT)
Dept: INTERNAL MEDICINE CLINIC | Facility: CLINIC | Age: 83
End: 2023-05-25

## 2023-06-18 DIAGNOSIS — F41.9 ANXIETY: ICD-10-CM

## 2023-06-19 RX ORDER — LORAZEPAM 0.5 MG/1
0.5 TABLET ORAL NIGHTLY PRN
Qty: 30 TABLET | Refills: 2 | Status: SHIPPED | OUTPATIENT
Start: 2023-06-24 | End: 2023-09-22

## 2023-06-30 ENCOUNTER — OFFICE VISIT (OUTPATIENT)
Dept: INTERNAL MEDICINE CLINIC | Facility: CLINIC | Age: 83
End: 2023-06-30
Payer: MEDICARE

## 2023-06-30 VITALS
SYSTOLIC BLOOD PRESSURE: 110 MMHG | HEIGHT: 65 IN | OXYGEN SATURATION: 97 % | WEIGHT: 126 LBS | RESPIRATION RATE: 16 BRPM | DIASTOLIC BLOOD PRESSURE: 70 MMHG | HEART RATE: 88 BPM | BODY MASS INDEX: 20.99 KG/M2

## 2023-06-30 DIAGNOSIS — J44.9 COPD, MODERATE (HCC): ICD-10-CM

## 2023-06-30 DIAGNOSIS — F02.818 LATE ONSET ALZHEIMER'S DEMENTIA WITH BEHAVIORAL DISTURBANCE (HCC): ICD-10-CM
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
## 2023-06-30 DIAGNOSIS — F41.9 ANXIETY: ICD-10-CM

## 2023-06-30 DIAGNOSIS — I25.119 ATHEROSCLEROSIS OF NATIVE CORONARY ARTERY OF NATIVE HEART WITH ANGINA PECTORIS (HCC): ICD-10-CM

## 2023-06-30 DIAGNOSIS — G30.1 LATE ONSET ALZHEIMER'S DEMENTIA WITH BEHAVIORAL DISTURBANCE (HCC): ICD-10-CM

## 2023-06-30 DIAGNOSIS — L03.114 CELLULITIS OF LEFT UPPER EXTREMITY: Primary | ICD-10-CM

## 2023-06-30 PROBLEM — I20.9 ANGINA PECTORIS, UNSPECIFIED (HCC): Status: ACTIVE | Noted: 2023-06-30

## 2023-06-30 PROBLEM — I20.9 ANGINA PECTORIS, UNSPECIFIED (HCC): Status: RESOLVED | Noted: 2023-06-30 | Resolved: 2023-06-30

## 2023-06-30 PROCEDURE — G8427 DOCREV CUR MEDS BY ELIG CLIN: HCPCS | Performed by: FAMILY MEDICINE

## 2023-06-30 PROCEDURE — 1123F ACP DISCUSS/DSCN MKR DOCD: CPT | Performed by: FAMILY MEDICINE

## 2023-06-30 PROCEDURE — 99214 OFFICE O/P EST MOD 30 MIN: CPT | Performed by: FAMILY MEDICINE

## 2023-06-30 PROCEDURE — 1036F TOBACCO NON-USER: CPT | Performed by: FAMILY MEDICINE

## 2023-06-30 PROCEDURE — G8420 CALC BMI NORM PARAMETERS: HCPCS | Performed by: FAMILY MEDICINE

## 2023-06-30 PROCEDURE — 90471 IMMUNIZATION ADMIN: CPT | Performed by: FAMILY MEDICINE

## 2023-06-30 PROCEDURE — 90715 TDAP VACCINE 7 YRS/> IM: CPT | Performed by: FAMILY MEDICINE

## 2023-06-30 PROCEDURE — 3023F SPIROM DOC REV: CPT | Performed by: FAMILY MEDICINE

## 2023-06-30 RX ORDER — QUETIAPINE FUMARATE 25 MG/1
25 TABLET, FILM COATED ORAL
Qty: 30 TABLET | Refills: 5 | Status: SHIPPED | OUTPATIENT
Start: 2023-06-30

## 2023-06-30 RX ORDER — LORAZEPAM 0.5 MG/1
0.5 TABLET ORAL DAILY PRN
Qty: 30 TABLET | Refills: 2 | Status: SHIPPED | OUTPATIENT
Start: 2023-06-30 | End: 2023-09-28

## 2023-06-30 RX ORDER — DOXYCYCLINE HYCLATE 100 MG
100 TABLET ORAL 2 TIMES DAILY
Qty: 14 TABLET | Refills: 0 | Status: SHIPPED | OUTPATIENT
Start: 2023-06-30 | End: 2023-07-07

## 2023-06-30 SDOH — ECONOMIC STABILITY: INCOME INSECURITY: HOW HARD IS IT FOR YOU TO PAY FOR THE VERY BASICS LIKE FOOD, HOUSING, MEDICAL CARE, AND HEATING?: NOT HARD AT ALL

## 2023-06-30 SDOH — ECONOMIC STABILITY: FOOD INSECURITY: WITHIN THE PAST 12 MONTHS, THE FOOD YOU BOUGHT JUST DIDN'T LAST AND YOU DIDN'T HAVE MONEY TO GET MORE.: NEVER TRUE

## 2023-06-30 SDOH — ECONOMIC STABILITY: HOUSING INSECURITY
IN THE LAST 12 MONTHS, WAS THERE A TIME WHEN YOU DID NOT HAVE A STEADY PLACE TO SLEEP OR SLEPT IN A SHELTER (INCLUDING NOW)?: NO

## 2023-06-30 SDOH — ECONOMIC STABILITY: FOOD INSECURITY: WITHIN THE PAST 12 MONTHS, YOU WORRIED THAT YOUR FOOD WOULD RUN OUT BEFORE YOU GOT MONEY TO BUY MORE.: NEVER TRUE

## 2023-06-30 ASSESSMENT — PATIENT HEALTH QUESTIONNAIRE - PHQ9
SUM OF ALL RESPONSES TO PHQ QUESTIONS 1-9: 0
SUM OF ALL RESPONSES TO PHQ9 QUESTIONS 1 & 2: 0
2. FEELING DOWN, DEPRESSED OR HOPELESS: 0
1. LITTLE INTEREST OR PLEASURE IN DOING THINGS: 0
SUM OF ALL RESPONSES TO PHQ QUESTIONS 1-9: 0

## 2023-07-19 ENCOUNTER — OFFICE VISIT (OUTPATIENT)
Dept: INTERNAL MEDICINE CLINIC | Facility: CLINIC | Age: 83
End: 2023-07-19
Payer: MEDICARE

## 2023-07-19 VITALS
SYSTOLIC BLOOD PRESSURE: 132 MMHG | DIASTOLIC BLOOD PRESSURE: 70 MMHG | RESPIRATION RATE: 16 BRPM | OXYGEN SATURATION: 98 % | WEIGHT: 128 LBS | HEIGHT: 65 IN | BODY MASS INDEX: 21.33 KG/M2 | HEART RATE: 86 BPM

## 2023-07-19 DIAGNOSIS — N50.89 ENLARGED TESTICLE: Primary | ICD-10-CM

## 2023-07-19 LAB
BILIRUBIN, URINE, POC: NEGATIVE
BLOOD URINE, POC: NEGATIVE
GLUCOSE URINE, POC: NEGATIVE
KETONES, URINE, POC: NEGATIVE
LEUKOCYTE ESTERASE, URINE, POC: NEGATIVE
NITRITE, URINE, POC: NEGATIVE
PH, URINE, POC: 6 (ref 4.6–8)
PROTEIN,URINE, POC: NEGATIVE
SPECIFIC GRAVITY, URINE, POC: 1.02 (ref 1–1.03)
URINALYSIS CLARITY, POC: CLEAR
URINALYSIS COLOR, POC: YELLOW
UROBILINOGEN, POC: NORMAL

## 2023-07-19 PROCEDURE — 1036F TOBACCO NON-USER: CPT | Performed by: FAMILY MEDICINE

## 2023-07-19 PROCEDURE — 81003 URINALYSIS AUTO W/O SCOPE: CPT | Performed by: FAMILY MEDICINE

## 2023-07-19 PROCEDURE — 1123F ACP DISCUSS/DSCN MKR DOCD: CPT | Performed by: FAMILY MEDICINE

## 2023-07-19 PROCEDURE — G8420 CALC BMI NORM PARAMETERS: HCPCS | Performed by: FAMILY MEDICINE

## 2023-07-19 PROCEDURE — 99213 OFFICE O/P EST LOW 20 MIN: CPT | Performed by: FAMILY MEDICINE

## 2023-07-19 PROCEDURE — G8427 DOCREV CUR MEDS BY ELIG CLIN: HCPCS | Performed by: FAMILY MEDICINE

## 2023-07-19 RX ORDER — PANTOPRAZOLE SODIUM 40 MG/1
40 TABLET, DELAYED RELEASE ORAL DAILY
COMMUNITY
Start: 2023-07-03

## 2023-07-25 NOTE — ED TRIAGE NOTES
C/o shortness of breath. Seen here earlier with similar symptoms, diagnosed with pneumonia. States instructed return if worsening of breathing. Wheezing on arrival. Attempted inhaler and nebulizer without relief. sats 90% on arrival. Hx copd.  Heart rate 127 on arrival.  
 None known

## 2023-07-27 ENCOUNTER — HOSPITAL ENCOUNTER (OUTPATIENT)
Dept: ULTRASOUND IMAGING | Age: 83
Discharge: HOME | End: 2023-07-27
Attending: FAMILY MEDICINE
Payer: MEDICARE

## 2023-07-27 DIAGNOSIS — N50.89 ENLARGED TESTICLE: ICD-10-CM

## 2023-07-27 PROCEDURE — 76870 US EXAM SCROTUM: CPT

## 2023-08-03 ENCOUNTER — HOSPITAL ENCOUNTER (OUTPATIENT)
Dept: CT IMAGING | Age: 83
Discharge: HOME | End: 2023-08-03
Attending: INTERNAL MEDICINE
Payer: MEDICARE

## 2023-08-03 DIAGNOSIS — R91.1 PULMONARY NODULE: ICD-10-CM

## 2023-08-03 PROCEDURE — 71250 CT THORAX DX C-: CPT

## 2023-08-04 DIAGNOSIS — F41.9 ANXIETY: ICD-10-CM

## 2023-08-07 RX ORDER — LORAZEPAM 0.5 MG/1
0.5 TABLET ORAL DAILY PRN
Qty: 30 TABLET | Refills: 2 | Status: SHIPPED | OUTPATIENT
Start: 2023-08-07 | End: 2023-11-05

## 2023-08-08 ENCOUNTER — TELEPHONE (OUTPATIENT)
Dept: PULMONOLOGY | Age: 83
End: 2023-08-08

## 2023-08-08 DIAGNOSIS — R91.8 PULMONARY NODULES: Primary | ICD-10-CM

## 2023-08-08 NOTE — TELEPHONE ENCOUNTER
MD Sharona Land, P  Please let patient know that there is a new nodule in the right upper lobe. Radiology recommends a PET-CT. I also think it would be reasonable to arrange a 3 month follow up CT since he has a history of large waxing/waning nodules in the past.  Please offer him these options and arrange whichever he chooses with appropriate f/u appt. Tello Walters MD             I have spoken with Mrs Anuja Alvarez, she is aware of CT chest results per Dr Guevara Bright and is aware of options presented to patient. Her preference is for PET scan and she allows me to schedule this image for patient on next available on 8/11. I have e-mailed pre-access to begin working on insurance auth for this image at this time. Mrs Anuja Alvarez is aware of instructions for this image and is aware that they will get a call prior to appointment for confirmation. Elvira Yuan

## 2023-08-09 ENCOUNTER — HOSPITAL ENCOUNTER (OUTPATIENT)
Dept: PET IMAGING | Age: 83
Discharge: HOME | End: 2023-08-12
Payer: MEDICARE

## 2023-08-09 DIAGNOSIS — R91.8 PULMONARY NODULES: ICD-10-CM

## 2023-08-09 LAB
GLUCOSE BLD STRIP.AUTO-MCNC: 122 MG/DL (ref 65–100)
SERVICE CMNT-IMP: ABNORMAL

## 2023-08-09 PROCEDURE — 3430000000 HC RX DIAGNOSTIC RADIOPHARMACEUTICAL: Performed by: INTERNAL MEDICINE

## 2023-08-09 PROCEDURE — 2580000003 HC RX 258: Performed by: INTERNAL MEDICINE

## 2023-08-09 PROCEDURE — 82962 GLUCOSE BLOOD TEST: CPT

## 2023-08-09 PROCEDURE — 6360000004 HC RX CONTRAST MEDICATION: Performed by: INTERNAL MEDICINE

## 2023-08-09 PROCEDURE — A9552 F18 FDG: HCPCS | Performed by: INTERNAL MEDICINE

## 2023-08-09 PROCEDURE — 78815 PET IMAGE W/CT SKULL-THIGH: CPT

## 2023-08-09 RX ORDER — SODIUM CHLORIDE 0.9 % (FLUSH) 0.9 %
20 SYRINGE (ML) INJECTION AS NEEDED
Status: DISCONTINUED | OUTPATIENT
Start: 2023-08-09 | End: 2023-08-13 | Stop reason: HOSPADM

## 2023-08-09 RX ORDER — FLUDEOXYGLUCOSE F 18 200 MCI/ML
13.2 INJECTION, SOLUTION INTRAVENOUS
Status: COMPLETED | OUTPATIENT
Start: 2023-08-09 | End: 2023-08-09

## 2023-08-09 RX ADMIN — FLUDEOXYGLUCOSE F 18 13.2 MILLICURIE: 200 INJECTION, SOLUTION INTRAVENOUS at 12:08

## 2023-08-09 RX ADMIN — SODIUM CHLORIDE, PRESERVATIVE FREE 20 ML: 5 INJECTION INTRAVENOUS at 12:08

## 2023-08-09 RX ADMIN — DIATRIZOATE MEGLUMINE AND DIATRIZOATE SODIUM 10 ML: 660; 100 LIQUID ORAL; RECTAL at 12:08

## 2023-08-11 ENCOUNTER — TELEPHONE (OUTPATIENT)
Dept: PULMONOLOGY | Age: 83
End: 2023-08-11

## 2023-08-11 DIAGNOSIS — R91.1 PULMONARY NODULE: Primary | ICD-10-CM

## 2023-08-11 NOTE — TELEPHONE ENCOUNTER
Per Dr Ena Soriano, ask internationalist of navigational bronchoscopy is possible ir will need TTNA. High risk of pneumothorax due to emphysema. PET 8/9/2023:  IMPRESSION:  1. Moderate metabolic activity within the 13 mm right upper lobe pulmonary  nodule, concerning for primary lung malignancy. 2.  No evidence of marco antonio or distant metastatic disease  Dr Maryam Tijerina can you please review this case for Dr Ena Soriano and advise of best biopsy modality. Recent CT is pushed to [facility] protocol.

## 2023-08-14 DIAGNOSIS — G43.109 MIGRAINE WITH AURA, NOT INTRACTABLE, WITHOUT STATUS MIGRAINOSUS: ICD-10-CM

## 2023-08-14 NOTE — PROGRESS NOTES
Patient Name:  Boo Salas                               YOB: 1940  MRN: [id number]                                                Office Visit 8/15/2023    ASSESSMENT AND PLAN:  (Medical Decision Making)      1. Pulmonary nodule  Scheduled for CT-guided biopsy on 8/28. Labs will be drawn today. If the nodule is malignant, would consider SBRT. If nondiagnostic, follow up imaging in 3-6 months would be recommended. He has had several sizable nodules in the past that have improved over time. - CBC with Auto Differential; Future  - Basic Metabolic Panel; Future  - APTT; Future  - Protime-INR; Future    2. Chronic obstructive pulmonary disease, unspecified COPD type ([address])  Start Trelegy ellipta and continue albuterol. He reports less sputum production but his wife is not sure that is true. Continues to wear 2 L/min of supplemental oxygen at night.  - fluticasone-umeclidin-vilant (TRELEGY ELLIPTA) 100-62.5-25 MCG/ACT AEPB inhaler; Inhale 1 puff into the lungs daily  Dispense: 3 each; Refill: 3    Orders Placed This Encounter   Medications    fluticasone-umeclidin-vilant (TRELEGY ELLIPTA) 100-62.5-25 MCG/ACT AEPB inhaler     Sig: Inhale 1 puff into the lungs daily     Dispense:  3 each     Refill:  3     No orders of the defined types were placed in this encounter. Follow-up and Dispositions    Return for follow up in 3-4 weeks with Steve Calabrese or Alena Galicia MD    Total time for encounter on day of encounter was 30 minutes. This time includes chart prep, review of tests/procedures, review of other provider's notes, documentation and counseling patient regarding disease process and medications.     _________________________________________________________________         ______      REASON FOR VISIT:   Chief Complaint   Patient presents with    Follow-up    COPD    Pulmonary Nodule       HISTORY OF PRESENT ILLNESS:    Mr. Adelfo Hollingsworth is a 80 y.o. male with a PMH

## 2023-08-14 NOTE — TELEPHONE ENCOUNTER
Return communication from , [facility] scheduled on 8/28.   Placed on AVS pending appointment with Dr Ermias Venegas 8/15

## 2023-08-14 NOTE — PROGRESS NOTES
Labs pending for IR biopsy. If patient is in agreement post office appointment 8/15 will need to go to lab for testing.

## 2023-08-14 NOTE — TELEPHONE ENCOUNTER
I have spoken with Mr and Mrs Leslie Araiza. They are aware of PET scan results and are agreeable to IR BX. They are are that Dr Pearl Black will discuss IR BX further at appointment tomorrow and in meantime, triage will look to get this scheduled hopefully prior to appointment tomorrow.

## 2023-08-15 ENCOUNTER — OFFICE VISIT (OUTPATIENT)
Dept: PULMONOLOGY | Age: 83
End: 2023-08-15
Payer: MEDICARE

## 2023-08-15 VITALS
BODY MASS INDEX: 20.41 KG/M2 | HEIGHT: 66 IN | WEIGHT: 127 LBS | HEART RATE: 86 BPM | RESPIRATION RATE: 15 BRPM | OXYGEN SATURATION: 96 % | DIASTOLIC BLOOD PRESSURE: 80 MMHG | SYSTOLIC BLOOD PRESSURE: 122 MMHG | TEMPERATURE: 97.2 F

## 2023-08-15 DIAGNOSIS — J44.9 CHRONIC OBSTRUCTIVE PULMONARY DISEASE, UNSPECIFIED COPD TYPE (HCC): ICD-10-CM

## 2023-08-15 DIAGNOSIS — R91.1 PULMONARY NODULE: Primary | ICD-10-CM

## 2023-08-15 DIAGNOSIS — R91.1 PULMONARY NODULE: ICD-10-CM

## 2023-08-15 LAB
APTT PPP: 32.3 SEC (ref 24.5–34.2)
BASOPHILS # BLD: 0.1 K/UL (ref 0–0.2)
BASOPHILS NFR BLD: 1 % (ref 0–2)
DIFFERENTIAL METHOD BLD: ABNORMAL
EOSINOPHIL # BLD: 0.8 K/UL (ref 0–0.8)
EOSINOPHIL NFR BLD: 9 % (ref 0.5–7.8)
ERYTHROCYTE [DISTWIDTH] IN BLOOD BY AUTOMATED COUNT: 14.3 % (ref 11.9–14.6)
HCT VFR BLD AUTO: 42.8 % (ref 41.1–50.3)
HGB BLD-MCNC: 13.2 G/DL (ref 13.6–17.2)
IMM GRANULOCYTES # BLD AUTO: 0 K/UL (ref 0–0.5)
IMM GRANULOCYTES NFR BLD AUTO: 0 % (ref 0–5)
INR PPP: 1
LYMPHOCYTES # BLD: 1.8 K/UL (ref 0.5–4.6)
LYMPHOCYTES NFR BLD: 20 % (ref 13–44)
MCH RBC QN AUTO: 25.3 PG (ref 26.1–32.9)
MCHC RBC AUTO-ENTMCNC: 30.8 G/DL (ref 31.4–35)
MCV RBC AUTO: 82.1 FL (ref 82–102)
MONOCYTES # BLD: 0.9 K/UL (ref 0.1–1.3)
MONOCYTES NFR BLD: 10 % (ref 4–12)
NEUTS SEG # BLD: 5.4 K/UL (ref 1.7–8.2)
NEUTS SEG NFR BLD: 60 % (ref 43–78)
NRBC # BLD: 0 K/UL (ref 0–0.2)
PLATELET # BLD AUTO: 282 K/UL (ref 150–450)
PMV BLD AUTO: 9.3 FL (ref 9.4–12.3)
PROTHROMBIN TIME: 14 SEC (ref 12.6–14.3)
RBC # BLD AUTO: 5.21 M/UL (ref 4.23–5.6)
WBC # BLD AUTO: 9 K/UL (ref 4.3–11.1)

## 2023-08-15 PROCEDURE — 1123F ACP DISCUSS/DSCN MKR DOCD: CPT | Performed by: INTERNAL MEDICINE

## 2023-08-15 PROCEDURE — 1036F TOBACCO NON-USER: CPT | Performed by: INTERNAL MEDICINE

## 2023-08-15 PROCEDURE — 3023F SPIROM DOC REV: CPT | Performed by: INTERNAL MEDICINE

## 2023-08-15 PROCEDURE — G8420 CALC BMI NORM PARAMETERS: HCPCS | Performed by: INTERNAL MEDICINE

## 2023-08-15 PROCEDURE — G8427 DOCREV CUR MEDS BY ELIG CLIN: HCPCS | Performed by: INTERNAL MEDICINE

## 2023-08-15 PROCEDURE — 99214 OFFICE O/P EST MOD 30 MIN: CPT | Performed by: INTERNAL MEDICINE

## 2023-08-15 RX ORDER — ALBUTEROL SULFATE 90 UG/1
AEROSOL, METERED RESPIRATORY (INHALATION)
Qty: 8.5 EACH | Refills: 11 | Status: SHIPPED | OUTPATIENT
Start: 2023-08-15

## 2023-08-15 RX ORDER — SUMATRIPTAN 100 MG/1
TABLET, FILM COATED ORAL
Qty: 9 TABLET | Refills: 5 | Status: SHIPPED | OUTPATIENT
Start: 2023-08-15

## 2023-08-15 NOTE — PATIENT INSTRUCTIONS
We understand that insurance companies have different 'preferred' medications. These preferences can change yearly and can be difficult to track. Depending upon your insurance and their preferred medicines, some medications we prescribe may be too expensive. If this happens, we recommend that you find out what inhalers for COPD or asthma are \"preferred\" on your insurance drug formulary by calling the member services phone number on the back of the insurance card. The cost of your medication can be dramatically different depending on this. Once the preferred inhalers are known, please send us a message in [facility] or call us back at [phone] with this information. We will then choose the best option available for you and send that prescription to your pharmacy on file.     ICS Inhalers:  Fluticasone inhaler (Flovent, Arnuity)  Qvar  Pulmicort  Asmanex  Alvesco    ICS/LABA Inhalers:  Fluticasone/Salmeterol inhaler (Advair, Airduo, Wixela)  Symbicort  Dulera  Breo    LABA/LAMA Inhalers:  Stiolto  Anoro  Bevespi  Duaklir    ICS/LABA/LAMA Inhalers:  Trelegy  Breztri    Short Acting Inhaler:  Albuterol (ProAir HFA/Respiclick/Digihaler, Ventolin, Proventil)  Levalbuterol (Xopenex)  Atrovent  Combivent

## 2023-08-16 LAB
ANION GAP SERPL CALC-SCNC: 5 MMOL/L (ref 2–11)
BUN SERPL-MCNC: 22 MG/DL (ref 8–23)
CALCIUM SERPL-MCNC: 9.7 MG/DL (ref 8.3–10.4)
CHLORIDE SERPL-SCNC: 104 MMOL/L (ref 101–110)
CO2 SERPL-SCNC: 28 MMOL/L (ref 21–32)
CREAT SERPL-MCNC: 0.9 MG/DL (ref 0.8–1.5)
GLUCOSE SERPL-MCNC: 116 MG/DL (ref 65–100)
POTASSIUM SERPL-SCNC: 4.8 MMOL/L (ref 3.5–5.1)
SODIUM SERPL-SCNC: 137 MMOL/L (ref 133–143)

## 2023-08-18 ENCOUNTER — TELEPHONE (OUTPATIENT)
Dept: INTERVENTIONAL RADIOLOGY/VASCULAR | Age: 83
End: 2023-08-18

## 2023-08-18 NOTE — TELEPHONE ENCOUNTER
[] Phone call to: Patient    [] Number used to reach this person: [phone]    [] Voicemail reached: Detailed Voicemail     [] Appointment date:  8/28    [] Arrival time:  0800    [] Location given: yes    [] AM Medicine with a small sip of water: Yes    [] Patient is NPO: Yes    [] Need for : Yes    [] Anesthetic Moderate Sedation    [] Blood thinners held: No    [] Education on Hold requirements prior to procedure: na     [] Allergies: OTHER:      [] Reviewed    [] Latex Allergy: No    [] Lidocaine Allergy: No    [] CPAP at night:     [] Any recent infections:     [] Diabetes: No    [] Additional information:  Awaiting a call back      [] Time taken to answer all questions    [] Call back phone number of [phone] given

## 2023-08-21 ENCOUNTER — TELEPHONE (OUTPATIENT)
Dept: PULMONOLOGY | Age: 83
End: 2023-08-21

## 2023-08-21 NOTE — TELEPHONE ENCOUNTER
Pt called to discuss procedure scheduled on 8/28/23. Please call him at [phone] or [phone]. Anytime will be alright.

## 2023-08-22 ENCOUNTER — TELEPHONE (OUTPATIENT)
Dept: PULMONOLOGY | Age: 83
End: 2023-08-22

## 2023-08-23 NOTE — TELEPHONE ENCOUNTER
Left  messages reflecting that we spoke last week and patient had appointment with Dr Benedicto Mendez. No additional need at this time but to call back if additional questions.

## 2023-08-28 ENCOUNTER — HOSPITAL ENCOUNTER (INPATIENT)
Dept: CT IMAGING | Age: 83
LOS: 11 days | Discharge: HOME | End: 2023-09-08
Attending: INTERNAL MEDICINE | Admitting: INTERNAL MEDICINE
Payer: MEDICARE

## 2023-08-28 DIAGNOSIS — R91.1 PULMONARY NODULE: ICD-10-CM

## 2023-08-28 PROBLEM — N40.1 BENIGN PROSTATIC HYPERPLASIA WITH URINARY FREQUENCY: Status: ACTIVE | Noted: 2021-06-25

## 2023-08-28 PROBLEM — R35.0 BENIGN PROSTATIC HYPERPLASIA WITH URINARY FREQUENCY: Chronic | Status: ACTIVE | Noted: 2021-06-25

## 2023-08-28 PROBLEM — R35.0 BENIGN PROSTATIC HYPERPLASIA WITH URINARY FREQUENCY: Status: ACTIVE | Noted: 2021-06-25

## 2023-08-28 PROBLEM — F02.80 ALZHEIMER'S DEMENTIA (HCC): Chronic | Status: ACTIVE | Noted: 2021-08-20

## 2023-08-28 PROBLEM — R91.8 PULMONARY NODULES: Status: ACTIVE | Noted: 2017-06-09

## 2023-08-28 PROBLEM — G30.9 ALZHEIMER'S DEMENTIA (HCC): Chronic | Status: ACTIVE | Noted: 2021-08-20

## 2023-08-28 PROBLEM — J95.811 PNEUMOTHORAX AFTER BIOPSY: Status: ACTIVE | Noted: 2023-08-28

## 2023-08-28 PROBLEM — N40.1 BENIGN PROSTATIC HYPERPLASIA WITH URINARY FREQUENCY: Chronic | Status: ACTIVE | Noted: 2021-06-25

## 2023-08-28 PROCEDURE — G0378 HOSPITAL OBSERVATION PER HR: HCPCS

## 2023-08-28 PROCEDURE — 6360000002 HC RX W HCPCS: Performed by: INTERNAL MEDICINE

## 2023-08-28 PROCEDURE — 0BBC3ZX EXCISION OF RIGHT UPPER LUNG LOBE, PERCUTANEOUS APPROACH, DIAGNOSTIC: ICD-10-PCS | Performed by: INTERNAL MEDICINE

## 2023-08-28 PROCEDURE — 2500000003 HC RX 250 WO HCPCS: Performed by: RADIOLOGY

## 2023-08-28 PROCEDURE — 88305 TISSUE EXAM BY PATHOLOGIST: CPT

## 2023-08-28 PROCEDURE — 94640 AIRWAY INHALATION TREATMENT: CPT

## 2023-08-28 PROCEDURE — C1729 CATH, DRAINAGE: HCPCS

## 2023-08-28 PROCEDURE — 94760 N-INVAS EAR/PLS OXIMETRY 1: CPT

## 2023-08-28 PROCEDURE — 6360000002 HC RX W HCPCS: Performed by: RADIOLOGY

## 2023-08-28 PROCEDURE — 6370000000 HC RX 637 (ALT 250 FOR IP): Performed by: INTERNAL MEDICINE

## 2023-08-28 PROCEDURE — 2580000003 HC RX 258: Performed by: INTERNAL MEDICINE

## 2023-08-28 PROCEDURE — G0379 DIRECT REFER HOSPITAL OBSERV: HCPCS

## 2023-08-28 PROCEDURE — 1100000000 HC RM PRIVATE

## 2023-08-28 PROCEDURE — 99153 MOD SED SAME PHYS/QHP EA: CPT

## 2023-08-28 RX ORDER — ALBUTEROL SULFATE 2.5 MG/3ML
2.5 SOLUTION RESPIRATORY (INHALATION) 3 TIMES DAILY
Status: DISCONTINUED | OUTPATIENT
Start: 2023-08-28 | End: 2023-09-08 | Stop reason: HOSPADM

## 2023-08-28 RX ORDER — POTASSIUM CHLORIDE 7.45 MG/ML
10 INJECTION INTRAVENOUS PRN
Status: DISCONTINUED | OUTPATIENT
Start: 2023-08-28 | End: 2023-09-08 | Stop reason: HOSPADM

## 2023-08-28 RX ORDER — TAMSULOSIN HYDROCHLORIDE 0.4 MG/1
0.8 CAPSULE ORAL DAILY
Status: DISCONTINUED | OUTPATIENT
Start: 2023-08-29 | End: 2023-09-08 | Stop reason: HOSPADM

## 2023-08-28 RX ORDER — ONDANSETRON 2 MG/ML
4 INJECTION INTRAMUSCULAR; INTRAVENOUS EVERY 6 HOURS PRN
Status: DISCONTINUED | OUTPATIENT
Start: 2023-08-28 | End: 2023-09-08 | Stop reason: HOSPADM

## 2023-08-28 RX ORDER — POLYETHYLENE GLYCOL 3350 17 G/17G
17 POWDER, FOR SOLUTION ORAL DAILY PRN
Status: DISCONTINUED | OUTPATIENT
Start: 2023-08-28 | End: 2023-09-08 | Stop reason: HOSPADM

## 2023-08-28 RX ORDER — PRAVASTATIN SODIUM 20 MG
10 TABLET ORAL
Status: DISCONTINUED | OUTPATIENT
Start: 2023-08-28 | End: 2023-09-08 | Stop reason: HOSPADM

## 2023-08-28 RX ORDER — FENTANYL CITRATE 50 UG/ML
INJECTION, SOLUTION INTRAMUSCULAR; INTRAVENOUS PRN
Status: COMPLETED | OUTPATIENT
Start: 2023-08-28 | End: 2023-08-28

## 2023-08-28 RX ORDER — SODIUM CHLORIDE 0.9 % (FLUSH) 0.9 %
5-40 SYRINGE (ML) INJECTION EVERY 12 HOURS SCHEDULED
Status: DISCONTINUED | OUTPATIENT
Start: 2023-08-28 | End: 2023-09-08 | Stop reason: HOSPADM

## 2023-08-28 RX ORDER — ONDANSETRON 4 MG/1
4 TABLET, ORALLY DISINTEGRATING ORAL EVERY 8 HOURS PRN
Status: DISCONTINUED | OUTPATIENT
Start: 2023-08-28 | End: 2023-09-08 | Stop reason: HOSPADM

## 2023-08-28 RX ORDER — ASPIRIN 81 MG/1
81 TABLET ORAL DAILY
Status: DISCONTINUED | OUTPATIENT
Start: 2023-08-28 | End: 2023-08-28

## 2023-08-28 RX ORDER — ACETAMINOPHEN 650 MG/1
650 SUPPOSITORY RECTAL EVERY 6 HOURS PRN
Status: DISCONTINUED | OUTPATIENT
Start: 2023-08-28 | End: 2023-09-08 | Stop reason: HOSPADM

## 2023-08-28 RX ORDER — ENEMA 19; 7 G/133ML; G/133ML
1 ENEMA RECTAL DAILY PRN
Status: DISCONTINUED | OUTPATIENT
Start: 2023-08-28 | End: 2023-09-08 | Stop reason: HOSPADM

## 2023-08-28 RX ORDER — MAGNESIUM HYDROXIDE/ALUMINUM HYDROXICE/SIMETHICONE 120; 1200; 1200 MG/30ML; MG/30ML; MG/30ML
30 SUSPENSION ORAL EVERY 6 HOURS PRN
Status: DISCONTINUED | OUTPATIENT
Start: 2023-08-28 | End: 2023-09-08 | Stop reason: HOSPADM

## 2023-08-28 RX ORDER — POTASSIUM CHLORIDE 20 MEQ/1
40 TABLET, EXTENDED RELEASE ORAL PRN
Status: DISCONTINUED | OUTPATIENT
Start: 2023-08-28 | End: 2023-09-08 | Stop reason: HOSPADM

## 2023-08-28 RX ORDER — SODIUM CHLORIDE 0.9 % (FLUSH) 0.9 %
5-40 SYRINGE (ML) INJECTION PRN
Status: DISCONTINUED | OUTPATIENT
Start: 2023-08-28 | End: 2023-09-08 | Stop reason: HOSPADM

## 2023-08-28 RX ORDER — LATANOPROST 50 UG/ML
1 SOLUTION/ DROPS OPHTHALMIC
Status: DISCONTINUED | OUTPATIENT
Start: 2023-08-28 | End: 2023-09-08 | Stop reason: HOSPADM

## 2023-08-28 RX ORDER — QUETIAPINE FUMARATE 25 MG/1
25 TABLET, FILM COATED ORAL 2 TIMES DAILY PRN
Status: DISCONTINUED | OUTPATIENT
Start: 2023-08-28 | End: 2023-09-08 | Stop reason: HOSPADM

## 2023-08-28 RX ORDER — MIDAZOLAM HYDROCHLORIDE 2 MG/2ML
INJECTION, SOLUTION INTRAMUSCULAR; INTRAVENOUS PRN
Status: COMPLETED | OUTPATIENT
Start: 2023-08-28 | End: 2023-08-28

## 2023-08-28 RX ORDER — LANOLIN ALCOHOL/MO/W.PET/CERES
3 CREAM (GRAM) TOPICAL NIGHTLY PRN
Status: DISCONTINUED | OUTPATIENT
Start: 2023-08-28 | End: 2023-09-08 | Stop reason: HOSPADM

## 2023-08-28 RX ORDER — GUAIFENESIN/DEXTROMETHORPHAN 100-10MG/5
10 SYRUP ORAL EVERY 4 HOURS PRN
Status: DISCONTINUED | OUTPATIENT
Start: 2023-08-28 | End: 2023-09-08 | Stop reason: HOSPADM

## 2023-08-28 RX ORDER — OXYCODONE HYDROCHLORIDE 5 MG/1
5 TABLET ORAL EVERY 4 HOURS PRN
Status: DISCONTINUED | OUTPATIENT
Start: 2023-08-28 | End: 2023-09-08 | Stop reason: HOSPADM

## 2023-08-28 RX ORDER — ACETAMINOPHEN 325 MG/1
650 TABLET ORAL EVERY 6 HOURS PRN
Status: DISCONTINUED | OUTPATIENT
Start: 2023-08-28 | End: 2023-09-08 | Stop reason: HOSPADM

## 2023-08-28 RX ORDER — DONEPEZIL HYDROCHLORIDE 5 MG/1
5 TABLET, FILM COATED ORAL NIGHTLY
Status: DISCONTINUED | OUTPATIENT
Start: 2023-08-28 | End: 2023-09-08 | Stop reason: HOSPADM

## 2023-08-28 RX ORDER — LIDOCAINE HYDROCHLORIDE 20 MG/ML
INJECTION, SOLUTION INFILTRATION; PERINEURAL PRN
Status: COMPLETED | OUTPATIENT
Start: 2023-08-28 | End: 2023-08-28

## 2023-08-28 RX ORDER — POLYETHYLENE GLYCOL 3350 17 G/17G
17 POWDER, FOR SOLUTION ORAL DAILY
Status: DISCONTINUED | OUTPATIENT
Start: 2023-08-29 | End: 2023-09-08 | Stop reason: HOSPADM

## 2023-08-28 RX ORDER — PANTOPRAZOLE SODIUM 40 MG/1
40 TABLET, DELAYED RELEASE ORAL DAILY
Status: DISCONTINUED | OUTPATIENT
Start: 2023-08-29 | End: 2023-09-08 | Stop reason: HOSPADM

## 2023-08-28 RX ORDER — MAGNESIUM SULFATE IN WATER 40 MG/ML
2000 INJECTION, SOLUTION INTRAVENOUS PRN
Status: DISCONTINUED | OUTPATIENT
Start: 2023-08-28 | End: 2023-09-08 | Stop reason: HOSPADM

## 2023-08-28 RX ORDER — OLANZAPINE 5 MG/1
5 TABLET, ORALLY DISINTEGRATING ORAL 2 TIMES DAILY PRN
Status: DISCONTINUED | OUTPATIENT
Start: 2023-08-28 | End: 2023-09-08 | Stop reason: HOSPADM

## 2023-08-28 RX ORDER — QUETIAPINE FUMARATE 25 MG/1
25 TABLET, FILM COATED ORAL
Status: DISCONTINUED | OUTPATIENT
Start: 2023-08-28 | End: 2023-09-08 | Stop reason: HOSPADM

## 2023-08-28 RX ORDER — ENOXAPARIN SODIUM 100 MG/ML
40 INJECTION SUBCUTANEOUS EVERY 24 HOURS
Status: DISCONTINUED | OUTPATIENT
Start: 2023-08-28 | End: 2023-09-03

## 2023-08-28 RX ORDER — LORAZEPAM 0.5 MG/1
0.5 TABLET ORAL DAILY PRN
Status: DISCONTINUED | OUTPATIENT
Start: 2023-08-28 | End: 2023-09-08 | Stop reason: HOSPADM

## 2023-08-28 RX ORDER — SODIUM CHLORIDE 9 MG/ML
INJECTION, SOLUTION INTRAVENOUS PRN
Status: DISCONTINUED | OUTPATIENT
Start: 2023-08-28 | End: 2023-09-08 | Stop reason: HOSPADM

## 2023-08-28 RX ORDER — FAMOTIDINE 20 MG/1
10 TABLET, FILM COATED ORAL DAILY PRN
Status: DISCONTINUED | OUTPATIENT
Start: 2023-08-28 | End: 2023-09-08 | Stop reason: HOSPADM

## 2023-08-28 RX ORDER — GABAPENTIN 300 MG/1
300 CAPSULE ORAL 3 TIMES DAILY
Status: DISCONTINUED | OUTPATIENT
Start: 2023-08-28 | End: 2023-09-08 | Stop reason: HOSPADM

## 2023-08-28 RX ORDER — BISACODYL 10 MG
10 SUPPOSITORY, RECTAL RECTAL DAILY PRN
Status: DISCONTINUED | OUTPATIENT
Start: 2023-08-28 | End: 2023-09-08 | Stop reason: HOSPADM

## 2023-08-28 RX ORDER — HYDRALAZINE HYDROCHLORIDE 20 MG/ML
20 INJECTION INTRAMUSCULAR; INTRAVENOUS EVERY 4 HOURS PRN
Status: DISCONTINUED | OUTPATIENT
Start: 2023-08-28 | End: 2023-09-08 | Stop reason: HOSPADM

## 2023-08-28 RX ORDER — CLONIDINE HYDROCHLORIDE 0.2 MG/1
0.1 TABLET ORAL EVERY 4 HOURS PRN
Status: DISCONTINUED | OUTPATIENT
Start: 2023-08-28 | End: 2023-09-08 | Stop reason: HOSPADM

## 2023-08-28 RX ADMIN — LORAZEPAM 0.5 MG: 0.5 TABLET ORAL at 15:04

## 2023-08-28 RX ADMIN — MIDAZOLAM HYDROCHLORIDE 0.5 MG: 1 INJECTION, SOLUTION INTRAMUSCULAR; INTRAVENOUS at 09:44

## 2023-08-28 RX ADMIN — FENTANYL CITRATE 25 MCG: 50 INJECTION, SOLUTION INTRAMUSCULAR; INTRAVENOUS at 09:44

## 2023-08-28 RX ADMIN — Medication 3 MG: at 23:29

## 2023-08-28 RX ADMIN — MIDAZOLAM HYDROCHLORIDE 1 MG: 1 INJECTION, SOLUTION INTRAMUSCULAR; INTRAVENOUS at 09:39

## 2023-08-28 RX ADMIN — MIDAZOLAM HYDROCHLORIDE 0.5 MG: 1 INJECTION, SOLUTION INTRAMUSCULAR; INTRAVENOUS at 09:49

## 2023-08-28 RX ADMIN — FENTANYL CITRATE 50 MCG: 50 INJECTION, SOLUTION INTRAMUSCULAR; INTRAVENOUS at 09:39

## 2023-08-28 RX ADMIN — ENOXAPARIN SODIUM 40 MG: 100 INJECTION SUBCUTANEOUS at 14:53

## 2023-08-28 RX ADMIN — LORAZEPAM 0.5 MG: 0.5 TABLET ORAL at 23:29

## 2023-08-28 RX ADMIN — MIDAZOLAM HYDROCHLORIDE 0.5 MG: 1 INJECTION, SOLUTION INTRAMUSCULAR; INTRAVENOUS at 10:04

## 2023-08-28 RX ADMIN — QUETIAPINE FUMARATE 25 MG: 25 TABLET ORAL at 20:43

## 2023-08-28 RX ADMIN — SODIUM CHLORIDE, PRESERVATIVE FREE 10 ML: 5 INJECTION INTRAVENOUS at 20:44

## 2023-08-28 RX ADMIN — FENTANYL CITRATE 25 MCG: 50 INJECTION, SOLUTION INTRAMUSCULAR; INTRAVENOUS at 09:54

## 2023-08-28 RX ADMIN — ACETAMINOPHEN 650 MG: 325 TABLET ORAL at 15:04

## 2023-08-28 RX ADMIN — LIDOCAINE HYDROCHLORIDE 10 ML: 20 INJECTION, SOLUTION INFILTRATION; PERINEURAL at 09:52

## 2023-08-28 RX ADMIN — QUETIAPINE FUMARATE 25 MG: 25 TABLET ORAL at 16:12

## 2023-08-28 RX ADMIN — LATANOPROST 1 DROP: 50 SOLUTION OPHTHALMIC at 20:43

## 2023-08-28 RX ADMIN — FENTANYL CITRATE 25 MCG: 50 INJECTION, SOLUTION INTRAMUSCULAR; INTRAVENOUS at 09:49

## 2023-08-28 RX ADMIN — MIDAZOLAM HYDROCHLORIDE 0.5 MG: 1 INJECTION, SOLUTION INTRAMUSCULAR; INTRAVENOUS at 09:54

## 2023-08-28 RX ADMIN — FENTANYL CITRATE 25 MCG: 50 INJECTION, SOLUTION INTRAMUSCULAR; INTRAVENOUS at 10:04

## 2023-08-28 RX ADMIN — GABAPENTIN 300 MG: 300 CAPSULE ORAL at 20:44

## 2023-08-28 RX ADMIN — ALBUTEROL SULFATE 2.5 MG: 2.5 SOLUTION RESPIRATORY (INHALATION) at 16:26

## 2023-08-28 RX ADMIN — HYDRALAZINE HYDROCHLORIDE 20 MG: 20 INJECTION, SOLUTION INTRAMUSCULAR; INTRAVENOUS at 14:48

## 2023-08-28 RX ADMIN — DONEPEZIL HYDROCHLORIDE 5 MG: 5 TABLET, FILM COATED ORAL at 20:43

## 2023-08-28 RX ADMIN — MOMETASONE FUROATE AND FORMOTEROL FUMARATE DIHYDRATE 2 PUFF: 200; 5 AEROSOL RESPIRATORY (INHALATION) at 19:44

## 2023-08-28 RX ADMIN — ALBUTEROL SULFATE 2.5 MG: 2.5 SOLUTION RESPIRATORY (INHALATION) at 19:44

## 2023-08-28 RX ADMIN — PRAVASTATIN SODIUM 10 MG: 20 TABLET ORAL at 20:43

## 2023-08-28 ASSESSMENT — PAIN SCALES - GENERAL
PAINLEVEL_OUTOF10: 2
PAINLEVEL_OUTOF10: 0
PAINLEVEL_OUTOF10: 3
PAINLEVEL_OUTOF10: 0
PAINLEVEL_OUTOF10: 0
PAINLEVEL_OUTOF10: 2
PAINLEVEL_OUTOF10: 0

## 2023-08-28 ASSESSMENT — PAIN DESCRIPTION - LOCATION: LOCATION: CHEST

## 2023-08-28 NOTE — PROGRESS NOTES
No orders for chest tube settings, pulmonary notified and told writer they were not following and to notify hospitalist. Hospitalist notified. Awaiting orders.

## 2023-08-28 NOTE — PROGRESS NOTES
S/P CT guided biopsy complicated bt PTX, s/p placement of chest tube by IR with resolution of PTX. Chest tube removal to the discretion of IR. Recommend chest tube to suction and transition to water seal with repeat CXR in AM if no PTX on repeat CXR in 4-6h - if no PTX - remove chest tube. This will be left to the discretion of IR. Our role here would be limited at this point, if persistent PTX or need for large bore CT  develops, please consult us then. Otherwise will be on stand by.     Alyson Sanders MD

## 2023-08-28 NOTE — PROGRESS NOTES
If you need to see a   -  just ask the nurse to call us. We look forward to serving your family!!         Jose Crawford

## 2023-08-28 NOTE — OR NURSING
Given ok from Dr. Jose Cano for patient to have a regular diet. Patient requested for coffee. Tolerated well. Complete 8 oz of fluid. Patient to be admitted to inpatient. Chest tube in place and to LIWS. Denies pain or sob. Family gone home for the moment, to update once patient receives room number.

## 2023-08-28 NOTE — BRIEF OP NOTE
Department of Interventional Radiology  [phone]        Interventional Radiology Brief Procedure Note    Patient: Spurgeon Holstein MRN: [id number]  SSN: [id number]    YOB: 1940  Age: 80 y.o. Sex: male      Date of Procedure: 8/28/2023    Pre-Procedure Diagnosis: RUL lung nodule    Post-Procedure Diagnosis: SAME    Procedure(s): Image Guided Biopsy, right chest tube         Performed By: Rupa Hernandez MD     Assistants: None    Anesthesia:Moderate Sedation    Estimated Blood Loss: Less than 10ml    Specimens:  Pathology    Implants:   All Purpose Drain    Findings: COPD 13 mm nodule    Complications: None    Recommendations: check path results prior to chest tube removal         Signed By: Rupa Hernandez MD     August 28, 2023

## 2023-08-28 NOTE — CARE COORDINATION
Met with Mr. Ramon Pritchard at bedside for initial CM assessment. Patient is alert and oriented x4 and accompanied by his wife, Jadyn Richey. Demographics and PCP verified. Mr Ramon Pritchard lives with his wife in a one story home with no steps to enter. He was independent with mobility and ADLs at most recent baseline and used no DME or services. He is retired and no longer a  in the community. Patient is pending therapy evals at this time to determine safest discharge plan. CM will continue to follow. 08/28/23 2916   Service Assessment   Patient Orientation Alert and Oriented;Person;Place;Situation   Cognition Alert   History Provided By Patient   Primary Caregiver Self   Accompanied By/Relationship Spouse-Shana   Support Systems Spouse/Significant Other;Family Members   Patient's Healthcare Decision Maker is: Legal Next of Kin   PCP Verified by CM Yes   Last Visit to PCP Within last 3 months   Prior Functional Level Independent in ADLs/IADLs   Current Functional Level Other (see comment)  (pending therapy evals)   Can patient return to prior living arrangement Yes   Ability to make needs known: Good   Family able to assist with home care needs: Yes   Would you like for me to discuss the discharge plan with any other family members/significant others, and if so, who?  Yes   Financial Resources Medicare   Community Resources None   Social/Functional History   Lives With Spouse   Type of [address] [facility]  One level   Home Access Level entry   [address] [facility] unit   [address] [facility] None   ADL Assistance Independent   Homemaking Assistance Independent   Ambulation Assistance Independent   Transfer Assistance Independent   Active  No   Patient's  Info Wife transports   Mode of Transportation Car   Occupation Retired   Discharge Planning   Type of [address] Spouse/Significant Other

## 2023-08-28 NOTE — H&P
Department of Interventional Radiology  [phone]    History and Physical    Patient:  Rosy Capps MRN:  [id number]  SSN:  [id number]    YOB: 1940  Age:  80 y.o. Sex:  male      Primary Care Provider:  Lupillo Wang DO  Referring Physician:  Zeynep Murphy MD    Subjective:     Chief Complaint: Pulmonary nodule    History of the Present Illness: The patient is a 80 y.o. male who presents for CT-guided right upper lobe pulmonary nodule biopsy under moderate sedation. Patient has a history of COPD and continues to wear 2 L of supplemental oxygen at nighttime. Patient had a chest CT on 8-3-2023 which showed a new 1.4 cm spiculated pulmonary nodule in the anterior aspect of the right upper lobe concerning for malignancy. He has severe emphysema. Follow-up PET scan on 8-9-2023 showed moderate metabolic activity within the right upper lobe pulmonary nodule. No evidence of marco antonio or distant metastatic disease. Patient is n.p.o. today and denies taking any blood thinners      Past Medical History:   Diagnosis Date    Arthritis     Benign essential HTN 6/6/2017    Chronic obstructive pulmonary disease (HCC)     Cognitive deficits 8/20/2021    GERD (gastroesophageal reflux disease)     History of multiple allergies     Hx of migraines     Hypertension     Irritable bowel syndrome with diarrhea 10/2/2018    Sinus problem     Thoracic aneurysm without mention of rupture 12/2/2014    No chest or upper back pain. Echo shows a mildly dilated aortic root at 4.0 cm. There is mild LVH and normal systolic function.        Past Surgical History:   Procedure Laterality Date    APPENDECTOMY  age 9    CARDIAC CATHETERIZATION  2/27/12    stent x 1    CATARACT REMOVAL Bilateral     HEENT  1999    sinus x3    HERNIA REPAIR Right 1978    inguinal    NEUROLOGICAL SURGERY      Neurostimulator implant for migraines 2/2012    OTHER SURGICAL HISTORY  2/2012    neurostimulator implant for migraines 08/15/2023 01:51 PM    HCT 46.0 06/27/2022 02:49 PM     08/15/2023 01:51 PM     06/27/2022 02:49 PM     Lab Results   Component Value Date/Time    APTT 32.3 08/15/2023 01:51 PM    INR 1.0 08/15/2023 01:51 PM       Assessment:     26-year-old male with severe COPD and recent CT scan findings of a 1.3 cm spiculated right upper lobe pulmonary nodule. Follow-up PET scan showed moderate metabolic activity within the nodule        Plan:     Planned Procedure: CT-guided lung biopsy under moderate sedation    Risks, benefits, and alternatives reviewed with patient and he agrees to proceed with the procedure.       Signed By: BOBY Almodovar     August 28, 2023

## 2023-08-29 ENCOUNTER — APPOINTMENT (OUTPATIENT)
Dept: GENERAL RADIOLOGY | Age: 83
End: 2023-08-29
Attending: INTERNAL MEDICINE
Payer: MEDICARE

## 2023-08-29 ENCOUNTER — APPOINTMENT (OUTPATIENT)
Dept: CT IMAGING | Age: 83
End: 2023-08-29
Attending: RADIOLOGY
Payer: MEDICARE

## 2023-08-29 PROBLEM — D64.9 NORMOCYTIC ANEMIA: Status: ACTIVE | Noted: 2023-08-29

## 2023-08-29 PROBLEM — D72.829 LEUKOCYTOSIS: Status: ACTIVE | Noted: 2017-10-09

## 2023-08-29 PROBLEM — J47.9 BRONCHIECTASIS (HCC): Status: ACTIVE | Noted: 2019-01-24

## 2023-08-29 LAB
ANION GAP SERPL CALC-SCNC: 7 MMOL/L (ref 2–11)
BASOPHILS # BLD: 0.1 K/UL (ref 0–0.2)
BASOPHILS NFR BLD: 0 % (ref 0–2)
BUN SERPL-MCNC: 17 MG/DL (ref 8–23)
CALCIUM SERPL-MCNC: 9.3 MG/DL (ref 8.3–10.4)
CHLORIDE SERPL-SCNC: 107 MMOL/L (ref 101–110)
CO2 SERPL-SCNC: 23 MMOL/L (ref 21–32)
CREAT SERPL-MCNC: 0.8 MG/DL (ref 0.8–1.5)
DIFFERENTIAL METHOD BLD: ABNORMAL
EOSINOPHIL # BLD: 0.7 K/UL (ref 0–0.8)
EOSINOPHIL NFR BLD: 4 % (ref 0.5–7.8)
ERYTHROCYTE [DISTWIDTH] IN BLOOD BY AUTOMATED COUNT: 14.5 % (ref 11.9–14.6)
GLUCOSE SERPL-MCNC: 110 MG/DL (ref 65–100)
HCT VFR BLD AUTO: 39.3 % (ref 41.1–50.3)
HGB BLD-MCNC: 12.6 G/DL (ref 13.6–17.2)
IMM GRANULOCYTES # BLD AUTO: 0.1 K/UL (ref 0–0.5)
IMM GRANULOCYTES NFR BLD AUTO: 1 % (ref 0–5)
LYMPHOCYTES # BLD: 1.2 K/UL (ref 0.5–4.6)
LYMPHOCYTES NFR BLD: 8 % (ref 13–44)
MCH RBC QN AUTO: 25 PG (ref 26.1–32.9)
MCHC RBC AUTO-ENTMCNC: 32.1 G/DL (ref 31.4–35)
MCV RBC AUTO: 78 FL (ref 82–102)
MONOCYTES # BLD: 1.5 K/UL (ref 0.1–1.3)
MONOCYTES NFR BLD: 10 % (ref 4–12)
NEUTS SEG # BLD: 11.7 K/UL (ref 1.7–8.2)
NEUTS SEG NFR BLD: 77 % (ref 43–78)
NRBC # BLD: 0 K/UL (ref 0–0.2)
PLATELET # BLD AUTO: 242 K/UL (ref 150–450)
PMV BLD AUTO: 9 FL (ref 9.4–12.3)
POTASSIUM SERPL-SCNC: 3.7 MMOL/L (ref 3.5–5.1)
RBC # BLD AUTO: 5.04 M/UL (ref 4.23–5.6)
SODIUM SERPL-SCNC: 137 MMOL/L (ref 133–143)
WBC # BLD AUTO: 15.2 K/UL (ref 4.3–11.1)

## 2023-08-29 PROCEDURE — 97535 SELF CARE MNGMENT TRAINING: CPT

## 2023-08-29 PROCEDURE — 97166 OT EVAL MOD COMPLEX 45 MIN: CPT

## 2023-08-29 PROCEDURE — 6360000002 HC RX W HCPCS: Performed by: INTERNAL MEDICINE

## 2023-08-29 PROCEDURE — 1100000000 HC RM PRIVATE

## 2023-08-29 PROCEDURE — 99223 1ST HOSP IP/OBS HIGH 75: CPT | Performed by: INTERNAL MEDICINE

## 2023-08-29 PROCEDURE — 97530 THERAPEUTIC ACTIVITIES: CPT

## 2023-08-29 PROCEDURE — 97162 PT EVAL MOD COMPLEX 30 MIN: CPT

## 2023-08-29 PROCEDURE — 94640 AIRWAY INHALATION TREATMENT: CPT

## 2023-08-29 PROCEDURE — 36415 COLL VENOUS BLD VENIPUNCTURE: CPT

## 2023-08-29 PROCEDURE — 71045 X-RAY EXAM CHEST 1 VIEW: CPT

## 2023-08-29 PROCEDURE — 94761 N-INVAS EAR/PLS OXIMETRY MLT: CPT

## 2023-08-29 PROCEDURE — 6370000000 HC RX 637 (ALT 250 FOR IP): Performed by: INTERNAL MEDICINE

## 2023-08-29 PROCEDURE — 85025 COMPLETE CBC W/AUTO DIFF WBC: CPT

## 2023-08-29 PROCEDURE — 2500000003 HC RX 250 WO HCPCS: Performed by: INTERNAL MEDICINE

## 2023-08-29 PROCEDURE — 2580000003 HC RX 258: Performed by: INTERNAL MEDICINE

## 2023-08-29 PROCEDURE — 80048 BASIC METABOLIC PNL TOTAL CA: CPT

## 2023-08-29 RX ADMIN — PANTOPRAZOLE SODIUM 40 MG: 40 TABLET, DELAYED RELEASE ORAL at 05:20

## 2023-08-29 RX ADMIN — ACETAMINOPHEN 650 MG: 325 TABLET ORAL at 04:19

## 2023-08-29 RX ADMIN — SODIUM CHLORIDE, PRESERVATIVE FREE 10 ML: 5 INJECTION INTRAVENOUS at 21:35

## 2023-08-29 RX ADMIN — ALBUTEROL SULFATE 2.5 MG: 2.5 SOLUTION RESPIRATORY (INHALATION) at 14:01

## 2023-08-29 RX ADMIN — LATANOPROST 1 DROP: 50 SOLUTION OPHTHALMIC at 21:37

## 2023-08-29 RX ADMIN — ALBUTEROL SULFATE 2.5 MG: 2.5 SOLUTION RESPIRATORY (INHALATION) at 20:12

## 2023-08-29 RX ADMIN — TUBERCULIN PURIFIED PROTEIN DERIVATIVE 5 UNITS: 5 INJECTION, SOLUTION INTRADERMAL at 18:10

## 2023-08-29 RX ADMIN — LORAZEPAM 0.5 MG: 0.5 TABLET ORAL at 13:21

## 2023-08-29 RX ADMIN — GABAPENTIN 300 MG: 300 CAPSULE ORAL at 13:21

## 2023-08-29 RX ADMIN — MOMETASONE FUROATE AND FORMOTEROL FUMARATE DIHYDRATE 2 PUFF: 200; 5 AEROSOL RESPIRATORY (INHALATION) at 08:00

## 2023-08-29 RX ADMIN — ALBUTEROL SULFATE 2.5 MG: 2.5 SOLUTION RESPIRATORY (INHALATION) at 08:00

## 2023-08-29 RX ADMIN — OLANZAPINE 5 MG: 5 TABLET, ORALLY DISINTEGRATING ORAL at 14:42

## 2023-08-29 RX ADMIN — MOMETASONE FUROATE AND FORMOTEROL FUMARATE DIHYDRATE 2 PUFF: 200; 5 AEROSOL RESPIRATORY (INHALATION) at 20:10

## 2023-08-29 RX ADMIN — QUETIAPINE FUMARATE 25 MG: 25 TABLET ORAL at 07:46

## 2023-08-29 RX ADMIN — GABAPENTIN 300 MG: 300 CAPSULE ORAL at 07:45

## 2023-08-29 RX ADMIN — SODIUM CHLORIDE, PRESERVATIVE FREE 10 ML: 5 INJECTION INTRAVENOUS at 07:47

## 2023-08-29 RX ADMIN — TAMSULOSIN HYDROCHLORIDE 0.8 MG: 0.4 CAPSULE ORAL at 07:46

## 2023-08-29 RX ADMIN — ACETAMINOPHEN 650 MG: 325 TABLET ORAL at 13:20

## 2023-08-29 RX ADMIN — POLYETHYLENE GLYCOL 3350 17 G: 17 POWDER, FOR SOLUTION ORAL at 07:46

## 2023-08-29 RX ADMIN — WATER 5 MG: 1 INJECTION INTRAMUSCULAR; INTRAVENOUS; SUBCUTANEOUS at 18:02

## 2023-08-29 RX ADMIN — TIOTROPIUM BROMIDE INHALATION SPRAY 2 PUFF: 3.12 SPRAY, METERED RESPIRATORY (INHALATION) at 08:00

## 2023-08-29 ASSESSMENT — PAIN DESCRIPTION - LOCATION: LOCATION: CHEST

## 2023-08-29 ASSESSMENT — PAIN SCALES - GENERAL: PAINLEVEL_OUTOF10: 3

## 2023-08-29 NOTE — PLAN OF CARE
Problem: Respiratory - Adult  Goal: Achieves optimal ventilation and oxygenation  Outcome: Progressing  Flowsheets (Taken 8/29/2023 1006)  Achieves optimal ventilation and oxygenation:   Assess for changes in respiratory status   Respiratory therapy support as indicated   Assess for changes in mentation and behavior   Oxygen supplementation based on oxygen saturation or arterial blood gases   Encourage broncho-pulmonary hygiene including cough, deep breathe, incentive spirometry   Assess and instruct to report shortness of breath or any respiratory difficulty

## 2023-08-29 NOTE — CONSULTS
History and Physical Initial Visit NOTE           8/29/2023    Kamilla Lyman                        Date of Admission:  8/28/2023    The patient's chart is reviewed and the patient is discussed with the staff. Subjective:     Patient is a 80 y.o.  male seen and evaluated at the request of Dr. Andrew Obregon for PTX with chest tube placed in IR post CT guided needle biopsy of RUL nodule. Patient is followed by Dr. Michael Rutledge in our clinic. He has a PMH of HTN, COPD, nocturnal hypoxia on 2 lpm NC at night, GERD, IBS, thoracic aneurysm, dementia, and pulmonary nodule. He presented to IR on 8/28 for CT guided biopsy of RUL pulmonary nodule after a chest CT on 8/3 showed a new 1.4 cm spiculated nodule in RUL concerning for malignancy. Follow up PT on 8/9 showed moderate metabolic activity within the area but no evidence of marco antonio or distant metastatic disease. During biopsy in IR, patient was noted to have a pneumothorax so a small chest tube was inserted and hospitalist was asked to admit to floor. PTX still present with chest tube, tube on suction and no air leak noted. He is stable on room air with adequate saturations and has pain with coughing. Of note, he has had several sizable pulmonary nodules in the past that have improved over time. Review of Systems: Comprehensive ROS negative except in HPI    Current Outpatient Medications   Medication Instructions    acetaminophen (TYLENOL) 650 mg, Oral, EVERY 4 HOURS PRN    albuterol (PROVENTIL) (2.5 MG/3ML) 0.083% nebulizer solution USE 3 ML BY NEBULIZATION ROUTE 3 TIMES DAILY. BILL TO MEDICARE PART B WITH DX: J44.9 COPD.    albuterol sulfate HFA (PROVENTIL;VENTOLIN;PROAIR) 108 (90 Base) MCG/ACT inhaler INHALE 2 PUFFS BY MOUTH EVERY 4 HOURS AS NEEDED    aspirin 81 mg, Oral, DAILY    donepezil (ARICEPT) 5 MG tablet TAKE 1 TABLET BY MOUTH NIGHTLY    Dupilumab (DUPIXENT) 200 MG/1. 14ML SOPN 1 each, SubCUTAneous    fl[facility]asone[facility]salmeterol [facility] interpretation of the patient's images. CXR:    8/29  Small right PTX with chest tube       CT Chest:   8/3/23 compared to 11/29/22      Post biopsy right PTX      Recent Labs     08/29/23  0612   WBC 15.2*   HGB 12.6*   HCT 39.3*         K 3.7      CO2 23   BUN 17   CREATININE 0.80       Lab Results   Component Value Date/Time     08/29/2023 06:12 AM    K 3.7 08/29/2023 06:12 AM     08/29/2023 06:12 AM    CO2 23 08/29/2023 06:12 AM    BUN 17 08/29/2023 06:12 AM    CREATININE 0.80 08/29/2023 06:12 AM    GLUCOSE 110 08/29/2023 06:12 AM    CALCIUM 9.3 08/29/2023 06:12 AM      No results found for: BNP    ECHO: No results found for this or any previous visit. MICRO: No results for input(s): CULTURE in the last 72 hours. Assessment and Plan:  (Medical Decision Making)   Impression: 80 y.o male s/p CT guided needle biopsy in IR of RUL spiculated pulmonary nodule who developed a right PTX and small chest tube placed by IR. We were consulted to manage chest tube. Principal Problem:    Pneumothorax after biopsy  Plan: small PTX still evident on CXR this morning. Chest tube to suction. No air leak noted. Has pain with coughing. Keep to suction and repeat CXR in the morning. Active Problems:    Pulmonary nodules  Plan: has a history of several sizeable pulmonary nodules that have improved over time. This new nodule seen on CT early this month is measuring 1.4 x 1.1 cm, is spiculated in RUL. PET-CT demonstrated moderate metabolic activity. Agreed to CT needle biopsy; follow pathology   There was mention to wife that there may be a need for additional biopsy but at this time she is not interested in further biopsies/procedures     COPD, severe ([address])  Plan: started Trelegy 100 on last office visit   Normally on 2 lpm O2 at night for nocturnal hypoxia but not requiring any O2 during day. Stable on RA here. Not exacerbated.  Continue substituted inhaled therapy for trelegy

## 2023-08-29 NOTE — PROGRESS NOTES
aluminum & magnesium hydroxide-simethicone (MAALOX) 200-200-20 MG/5ML suspension 30 mL  30 mL Oral Q6H PRN    acetaminophen (TYLENOL) tablet 650 mg  650 mg Oral Q6H PRN    Or    acetaminophen (TYLENOL) suppository 650 mg  650 mg Rectal Q6H PRN    enoxaparin (LOVENOX) injection 40 mg  40 mg SubCUTAneous Q24H    albuterol (PROVENTIL) (2.5 MG/3ML) 0.083% nebulizer solution 2.5 mg  2.5 mg Nebulization TID    donepezil (ARICEPT) tablet 5 mg  5 mg Oral Nightly    gabapentin (NEURONTIN) capsule 300 mg  300 mg Oral TID    latanoprost (XALATAN) 0.005 % ophthalmic solution 1 drop  1 drop Both Eyes QHS    LORazepam (ATIVAN) tablet 0.5 mg  0.5 mg Oral Daily PRN    pantoprazole (PROTONIX) tablet 40 mg  40 mg Oral Daily    pravastatin (PRAVACHOL) tablet 10 mg  10 mg Oral QHS    polyethylene glycol (GLYCOLAX) packet 17 g  17 g Oral Daily    QUEtiapine (SEROQUEL) tablet 25 mg  25 mg Oral QHS    tamsulosin (FLOMAX) capsule 0.8 mg  0.8 mg Oral Daily    tiotropium (SPIRIVA RESPIMAT) 2.5 MCG/ACT inhaler 2 puff  2 puff Inhalation Daily RT    hydrALAZINE (APRESOLINE) injection 20 mg  20 mg IntraVENous Q4H PRN    sodium phosphate (FLEET) rectal enema 1 enema  1 enema Rectal Daily PRN    oxyCODONE (ROXICODONE) immediate release tablet 5 mg  5 mg Oral Q4H PRN    melatonin tablet 3 mg  3 mg Oral Nightly PRN    guaiFENesin-dextromethorphan (ROBITUSSIN DM) 100-10 MG/5ML syrup 10 mL  10 mL Oral Q4H PRN    cloNIDine (CATAPRES) tablet 0.1 mg  0.1 mg Oral Q4H PRN    QUEtiapine (SEROQUEL) tablet 25 mg  25 mg Oral BID PRN    OLANZapine (ZYPREXA) 5 mg in sterile water 1 mL injection  5 mg IntraMUSCular Q12H PRN    OLANZapine zydis (ZYPREXA) disintegrating tablet 5 mg  5 mg Oral BID PRN    mometasone-formoterol (DULERA) 200-5 MCG/ACT inhaler 2 puff  2 puff Inhalation BID RT       Signed:  Pedro Luis Galo DO  8/29/2023    Part of this note may have been written by using a voice dictation software.   The note has been proof read but may still contain some grammatical/other typographical errors.

## 2023-08-29 NOTE — CARE COORDINATION
Spoke with wife, Avery Roque, via phone regarding therapy recommendations. Therapy is currently recommending STR at discharge. Referrals have been sent to [facility], [address] [place] [address], [place] [facility]. Awaiting bed offers at this time. CM will continue to follow.

## 2023-08-29 NOTE — THERAPY EVALUATION
ACUTE OCCUPATIONAL THERAPY GOALS:   (Developed with and agreed upon by patient and/or caregiver.)  1. Patient will complete lower body bathing and dressing with SUPERVISION and adaptive equipment as needed. 2. Patient will complete toileting with SUPERVISION. 3. Patient will complete grooming ADL standing at sink with SUPERVISION. 4. Patient will tolerate 25 minutes of OT treatment with 1-2 rest breaks to increase activity tolerance for ADLs. 5. Patient will complete functional transfers with SUPERVISION and adaptive equipment as needed. 6. Patient will tolerate 10 minutes BUE exercises to increase strength for safe, functional transfers. Timeframe: 7 visits       OCCUPATIONAL THERAPY Initial Assessment, Daily Note, and AM       OT Visit Days: 1  Acknowledge Orders  Time  OT Charge Capture  Rehab Caseload Tracker      Yo Lugo is a 80 y.o. male   PRIMARY DIAGNOSIS: Pneumothorax after biopsy  Pneumothorax [J93.9]  Pneumothorax after biopsy [J95.811]    1 Day Post-Op  Reason for Referral: Pain in Right Shoulder (M25.511)  Generalized Muscle Weakness (M62.81)  Difficulty in walking, Not elsewhere classified (R26.2)  History of falling (Z91.81)  Inpatient: Payor: MEDICARE / Plan: MEDICARE PART A AND B / Product Type: *No Product type* /     ASSESSMENT:     REHAB RECOMMENDATIONS:   Recommendation to date pending progress:  Setting:  Short-term Rehab    Equipment:    To Be Determined     ASSESSMENT:  Mr. Niarv Steven is a 81 y/o male presents with pneumothorax, pmh dementia. Wife was present today and assisted with baseline information. At baseline, wife reports pt can complete ADLs without any assistance and functional mobility. Today, pt demonstrates impairments in activity tolerance, balance, strength, cognition, as well as increased pain in RUE/ limited AROM all of which are impacting ADL performance and functional mobility.  Pt overall Min A for functional transfers and mobility of household

## 2023-08-29 NOTE — PROGRESS NOTES
ACUTE PHYSICAL THERAPY GOALS:   (Developed with and agreed upon by patient and/or caregiver.)    (1.) Boo Salas  will move from supine to sit and sit to supine , scoot up and down, and roll side to side with SUPERVISION within 7 treatment day(s). (2.) Boo Salas will transfer from bed to chair and chair to bed with SUPERVISION using the least restrictive device within 7 treatment day(s). (3.) Boo Salas will ambulate with SUPERVISION for 200 feet with the least restrictive device within 7 treatment day(s). (4.) Boo Salas will perform standing static and dynamic balance activities x 8 minutes with SUPERVISION to improve safety within 7 treatment day(s). PHYSICAL THERAPY Initial Assessment, Daily Note, and AM  (Link to Caseload Tracking: PT Visit Days : 1  Acknowledge Orders  Time In/Out  PT Charge Capture  Rehab Caseload Tracker    Boo Salas is a 80 y.o. male   PRIMARY DIAGNOSIS: Pneumothorax after biopsy  Pneumothorax [J93.9]  Pneumothorax after biopsy [J95.811]    1 Day Post-Op  Reason for Referral: Generalized Muscle Weakness (M62.81)  Difficulty in walking, Not elsewhere classified (R26.2)  Inpatient: Payor: Kandi Bloomots / Plan: MEDICARE PART A AND B / Product Type: *No Product type* /     ASSESSMENT:     REHAB RECOMMENDATIONS:   Recommendation to date pending progress:  Setting:  Short-term Rehab    Equipment:    To Be Determined     ASSESSMENT:  Mr. Agustin Townsend is an 80year old male admitted with pneumothorax following lung biopsy. He has history of dementia. At baseline he lives with his wife and performs mobility MI using no AD. Today he is seated up in chair upon arrival and agreeable to mobility. He performs sit to stand, standing tasks, and short ambulation using 2WW with CGA/Harry. He requires extra cueing for safety and sequencing throughout due to cognition.  Pt presents as functioning below his baseline, with deficits in mobility including AROM [x]     PROM [x]    Strength []  Grossly 4/5 in BLE    Balance [] Sitting - Static: Fair, +  Sitting - Dynamic: Fair, +  Standing - Static: Fair  Standing - Dynamic: Fair, -   Posture [] Forward Head  Rounded Shoulders   Sensation []     Coordination []      Tone []     Edema []    Activity Tolerance []  Limited by SOB    []      COGNITION/  PERCEPTION: Intact Impaired (Comments):   Orientation []     Vision []  Glasses    Hearing []  Ramona; needs increased volume with hearing aids    Cognition  []  Confused/disoriented     MOBILITY: I Mod I S SBA CGA Min Mod Max Total  NT x2 Comments:   Bed Mobility    Rolling [] [] [] [] [] [] [] [] [] [x] []    Supine to Sit [] [] [] [] [] [] [] [] [] [x] []    Scooting [] [] [] [] [x] [] [] [] [] [] []    Sit to Supine [] [] [] [] [] [] [] [] [] [x] []    Transfers    Sit to Stand [] [] [] [] [x] [] [] [] [] [] []    Bed to Chair [] [] [] [] [x] [x] [] [] [] [] []    Stand to Sit [] [] [] [] [x] [] [] [] [] [] []     [] [] [] [] [] [] [] [] [] [] []    I=Independent, Mod I=Modified Independent, S=Supervision, SBA=Standby Assistance, CGA=Contact Guard Assistance,   Min=Minimal Assistance, Mod=Moderate Assistance, Max=Maximal Assistance, Total=Total Assistance, NT=Not Tested    GAIT: I Mod I S SBA CGA Min Mod Max Total  NT x2 Comments:   Level of Assistance [] [] [] [] [x] [x] [] [] [] [] []    Distance   40 feet    DME Rolling Walker    Gait Quality Decreased step clearance, Decreased step length, and Decreased stance    Weightbearing Status Restrictions/Precautions  Restrictions/Precautions: Fall Risk    Stairs      I=Independent, Mod I=Modified Independent, S=Supervision, SBA=Standby Assistance, CGA=Contact Guard Assistance,   Min=Minimal Assistance, Mod=Moderate Assistance, Max=Maximal Assistance, Total=Total Assistance, NT=Not Tested    PLAN:   FREQUENCY AND DURATION: 3 times/week for duration of hospital stay or until stated goals are met, whichever comes

## 2023-08-30 ENCOUNTER — APPOINTMENT (OUTPATIENT)
Dept: GENERAL RADIOLOGY | Age: 83
End: 2023-08-30
Attending: INTERNAL MEDICINE
Payer: MEDICARE

## 2023-08-30 PROBLEM — R91.1 PULMONARY NODULE: Status: ACTIVE | Noted: 2017-06-09

## 2023-08-30 LAB
ANION GAP SERPL CALC-SCNC: 6 MMOL/L (ref 2–11)
BASOPHILS # BLD: 0 K/UL (ref 0–0.2)
BASOPHILS NFR BLD: 0 % (ref 0–2)
BUN SERPL-MCNC: 12 MG/DL (ref 8–23)
CALCIUM SERPL-MCNC: 9.3 MG/DL (ref 8.3–10.4)
CHLORIDE SERPL-SCNC: 109 MMOL/L (ref 101–110)
CO2 SERPL-SCNC: 25 MMOL/L (ref 21–32)
CREAT SERPL-MCNC: 0.8 MG/DL (ref 0.8–1.5)
DIFFERENTIAL METHOD BLD: ABNORMAL
EOSINOPHIL # BLD: 0.2 K/UL (ref 0–0.8)
EOSINOPHIL NFR BLD: 2 % (ref 0.5–7.8)
ERYTHROCYTE [DISTWIDTH] IN BLOOD BY AUTOMATED COUNT: 14.6 % (ref 11.9–14.6)
GLUCOSE SERPL-MCNC: 121 MG/DL (ref 65–100)
HCT VFR BLD AUTO: 40.1 % (ref 41.1–50.3)
HGB BLD-MCNC: 12.9 G/DL (ref 13.6–17.2)
IMM GRANULOCYTES # BLD AUTO: 0.1 K/UL (ref 0–0.5)
IMM GRANULOCYTES NFR BLD AUTO: 1 % (ref 0–5)
LYMPHOCYTES # BLD: 1.2 K/UL (ref 0.5–4.6)
LYMPHOCYTES NFR BLD: 8 % (ref 13–44)
MCH RBC QN AUTO: 25.1 PG (ref 26.1–32.9)
MCHC RBC AUTO-ENTMCNC: 32.2 G/DL (ref 31.4–35)
MCV RBC AUTO: 78 FL (ref 82–102)
MM INDURATION, POC: 0 MM (ref 0–5)
MONOCYTES # BLD: 1.5 K/UL (ref 0.1–1.3)
MONOCYTES NFR BLD: 11 % (ref 4–12)
NEUTS SEG # BLD: 11.6 K/UL (ref 1.7–8.2)
NEUTS SEG NFR BLD: 78 % (ref 43–78)
NRBC # BLD: 0 K/UL (ref 0–0.2)
PLATELET # BLD AUTO: 239 K/UL (ref 150–450)
PMV BLD AUTO: 9.1 FL (ref 9.4–12.3)
POTASSIUM SERPL-SCNC: 3.9 MMOL/L (ref 3.5–5.1)
PPD, POC: NEGATIVE
RBC # BLD AUTO: 5.14 M/UL (ref 4.23–5.6)
SODIUM SERPL-SCNC: 140 MMOL/L (ref 133–143)
WBC # BLD AUTO: 14.7 K/UL (ref 4.3–11.1)

## 2023-08-30 PROCEDURE — 2500000003 HC RX 250 WO HCPCS: Performed by: INTERNAL MEDICINE

## 2023-08-30 PROCEDURE — 1100000000 HC RM PRIVATE

## 2023-08-30 PROCEDURE — 97530 THERAPEUTIC ACTIVITIES: CPT

## 2023-08-30 PROCEDURE — 94760 N-INVAS EAR/PLS OXIMETRY 1: CPT

## 2023-08-30 PROCEDURE — 6360000002 HC RX W HCPCS: Performed by: INTERNAL MEDICINE

## 2023-08-30 PROCEDURE — 71045 X-RAY EXAM CHEST 1 VIEW: CPT

## 2023-08-30 PROCEDURE — 6370000000 HC RX 637 (ALT 250 FOR IP): Performed by: INTERNAL MEDICINE

## 2023-08-30 PROCEDURE — 80048 BASIC METABOLIC PNL TOTAL CA: CPT

## 2023-08-30 PROCEDURE — 97112 NEUROMUSCULAR REEDUCATION: CPT

## 2023-08-30 PROCEDURE — 85025 COMPLETE CBC W/AUTO DIFF WBC: CPT

## 2023-08-30 PROCEDURE — 2580000003 HC RX 258: Performed by: INTERNAL MEDICINE

## 2023-08-30 PROCEDURE — 36415 COLL VENOUS BLD VENIPUNCTURE: CPT

## 2023-08-30 PROCEDURE — 2700000000 HC OXYGEN THERAPY PER DAY

## 2023-08-30 PROCEDURE — 99232 SBSQ HOSP IP/OBS MODERATE 35: CPT | Performed by: INTERNAL MEDICINE

## 2023-08-30 PROCEDURE — 97535 SELF CARE MNGMENT TRAINING: CPT

## 2023-08-30 PROCEDURE — 94640 AIRWAY INHALATION TREATMENT: CPT

## 2023-08-30 RX ADMIN — ALBUTEROL SULFATE 2.5 MG: 2.5 SOLUTION RESPIRATORY (INHALATION) at 08:41

## 2023-08-30 RX ADMIN — QUETIAPINE FUMARATE 25 MG: 25 TABLET ORAL at 12:36

## 2023-08-30 RX ADMIN — GABAPENTIN 300 MG: 300 CAPSULE ORAL at 12:35

## 2023-08-30 RX ADMIN — MOMETASONE FUROATE AND FORMOTEROL FUMARATE DIHYDRATE 2 PUFF: 200; 5 AEROSOL RESPIRATORY (INHALATION) at 08:40

## 2023-08-30 RX ADMIN — DONEPEZIL HYDROCHLORIDE 5 MG: 5 TABLET, FILM COATED ORAL at 20:49

## 2023-08-30 RX ADMIN — ALBUTEROL SULFATE 2.5 MG: 2.5 SOLUTION RESPIRATORY (INHALATION) at 15:43

## 2023-08-30 RX ADMIN — SODIUM CHLORIDE, PRESERVATIVE FREE 10 ML: 5 INJECTION INTRAVENOUS at 12:38

## 2023-08-30 RX ADMIN — ENOXAPARIN SODIUM 40 MG: 100 INJECTION SUBCUTANEOUS at 17:52

## 2023-08-30 RX ADMIN — QUETIAPINE FUMARATE 25 MG: 25 TABLET ORAL at 20:50

## 2023-08-30 RX ADMIN — SODIUM CHLORIDE, PRESERVATIVE FREE 10 ML: 5 INJECTION INTRAVENOUS at 20:55

## 2023-08-30 RX ADMIN — TAMSULOSIN HYDROCHLORIDE 0.8 MG: 0.4 CAPSULE ORAL at 12:35

## 2023-08-30 RX ADMIN — GABAPENTIN 300 MG: 300 CAPSULE ORAL at 20:49

## 2023-08-30 RX ADMIN — WATER 5 MG: 1 INJECTION INTRAMUSCULAR; INTRAVENOUS; SUBCUTANEOUS at 22:37

## 2023-08-30 RX ADMIN — TICAGRELOR 90 MG: 90 TABLET ORAL at 20:50

## 2023-08-30 RX ADMIN — ACETAMINOPHEN 650 MG: 325 TABLET ORAL at 12:35

## 2023-08-30 RX ADMIN — TIOTROPIUM BROMIDE INHALATION SPRAY 2 PUFF: 3.12 SPRAY, METERED RESPIRATORY (INHALATION) at 08:40

## 2023-08-30 RX ADMIN — LATANOPROST 1 DROP: 50 SOLUTION OPHTHALMIC at 20:56

## 2023-08-30 RX ADMIN — PRAVASTATIN SODIUM 10 MG: 20 TABLET ORAL at 20:49

## 2023-08-30 RX ADMIN — TICAGRELOR 90 MG: 90 TABLET ORAL at 12:37

## 2023-08-30 RX ADMIN — POLYETHYLENE GLYCOL 3350 17 G: 17 POWDER, FOR SOLUTION ORAL at 12:37

## 2023-08-30 NOTE — PROGRESS NOTES
The pt is limited by cognitive deficits, weakness, decreased activity tolerance which are impacting ADLs and functional mobility, pt would benefit from continued acute OT and STR at discharge. Pt is progressing toward goals but limited by increased confusion today. Continue OT POC. SUBJECTIVE:     Mr. Leslie Araiza states, \"I have a dog named Roxanne Busch, she's pretty old\"     Social/Functional Lives With: Spouse  Type of Home: House  Home Layout: One level  Home Access: Level entry  Bathroom Shower/Tub: Tub/Shower unit  Bathroom Toilet: Standard  Bathroom Equipment: None  Bathroom Accessibility: Accessible  Home Equipment: None  ADL Assistance: Independent  Homemaking Assistance: Independent  Ambulation Assistance: Independent  Transfer Assistance: Independent  Active : No  Patient's  Info:  Wife transports  Mode of Transportation: Car  Occupation: Retired    OBJECTIVE:     HILLARY / María Wallis / Janey Lasso: Chest Tube    RESTRICTIONS/PRECAUTIONS:  Restrictions/Precautions  Restrictions/Precautions: Fall Risk        PAIN: Oly Emil / O2:   Pre Treatment: No c/o           Post Treatment: same Vitals          Oxygen        MOBILITY: I Mod I S SBA CGA Min Mod Max Total  NT x2 Comments:   Bed Mobility    Rolling [] [] [] [x] [x] [] [] [] [] [] []    Supine to Sit [] [] [] [x] [x] [] [] [] [] [] []    Scooting [] [] [] [x] [x] [] [] [] [] [] []    Sit to Supine [] [] [] [] [] [] [] [] [] [x] []    Transfers    Sit to Stand [] [] [] [] [] [x] [] [] [] [] []    Bed to Chair [] [] [] [] [] [x] [] [] [] [] []    Stand to Sit [] [] [] [] [] [x] [] [] [] [] []    Tub/Shower [] [] [] [] [] [] [] [] [] [x] []     Toilet [] [] [] [] [] [] [] [] [] [x] []      [] [] [] [] [] [] [] [] [] [] []    I=Independent, Mod I=Modified Independent, S=Supervision/Setup, SBA=Standby Assistance, CGA=Contact Guard Assistance, Min=Minimal Assistance, Mod=Moderate Assistance, Max=Maximal Assistance, Total=Total Assistance, NT=Not Tested    ACTIVITIES OF participated in grooming ADLs in supported sitting with moderate visual, verbal, manual, and tactile cueing to increase independence and decrease assistance required. Patient also participated in functional mobility and functional transfer training to increase independence, decrease assistance required, increase activity tolerance, and increase safety awareness.      TREATMENT GRID:  N/A    AFTER TREATMENT PRECAUTIONS: Alarm Activated, Bed/Chair Locked, Call light within reach, Chair, Heels floated, Needs within reach, and RN notified    INTERDISCIPLINARY COLLABORATION:  RN/ PCT, PT/ PTA, and OT/ GARDUNO    EDUCATION:       TOTAL TREATMENT DURATION AND TIME:  Time In: 1430  Time Out: [facility]  Minutes: [address] [place], [place]

## 2023-08-30 NOTE — PROGRESS NOTES
Comprehensive Nutrition Assessment    Type and Reason for Visit: Initial, Positive Nutrition Screen  Best Practice Alert for BMI <18.5    Nutrition Recommendations/Plan:   Meals and Snacks:  Diet: Continue current order  Nutrition Supplement Therapy:  Medical food supplement therapy:  Initiate Ensure Enlive twice per day (this provides 350 kcal and 20 grams protein per bottle) and Magic Cup once per day (this provides 290 kcal and 9 grams protein per serving)     Malnutrition Assessment:  Malnutrition Status: Moderate malnutrition  Context: Chronic Illness  Findings of clinical characteristics of malnutrition:   Energy Intake:  Unable to assess (pt unable to recall quantifiable nutrition hx)  Weight Loss:  Greater than 10% over 6 months (17% loss since Dec per EMR records with majority occurign over the past 2 months)     Body Fat Loss:  Mild body fat loss Triceps, Buccal region, Fat Overlying Ribs, Orbital (moderate loss)   Muscle Mass Loss:  Mild muscle mass loss Temples (temporalis), Hand (interosseous), Calf (gastrocnemius), Clavicles (pectoralis & deltoids) (moderate loss)  Fluid Accumulation:  No significant fluid accumulation     Strength:  Not Performed     Nutrition Assessment:  Nutrition History: Pt tells me he lives with his wife in [place] and that he eats mainly vegetarian and drinks a protein drink from [address] [employer]. Unable to provide any quantifiable nutrition hx. Pt does not know UBW, wt hx per EMR review as below. No family present. Do You Have Any Cultural, Lutheran, or Ethnic Food Preferences?: No   Nutrition Background:       PMH remarkable for severe COPD, BPH, dementia, presented outpt IR for biopsy of RUL nodule. Admitted with pneumothorax after biopsy, +R sided chest tube, pulmonary nodules 8/28 biopsy nondiagnostic, COPD, leukocytosis, bronchiectasis, Alzheimer's dementia, normocytic anemia. 8/30: CT clamped for 1 hour and PTX returned.     Nutrition Interval:  Pt up to

## 2023-08-30 NOTE — CARE COORDINATION
Spoke with wife via phone to inform her of bed offer from the [facility]. Wife accepted bed offer and in agreement with STR at discharge. CM will continue to follow and assist with transfer to the [facility] when patient is medically ready.

## 2023-08-30 NOTE — PROGRESS NOTES
Hospitalist Progress Note   Admit Date:  2023  1:38 PM   Name:  Brigette Lyman   Age:  80 y.o. Sex:  male  :  1940   MRN:  [id number]   Room:  [facility]/    Presenting/Chief Complaint: No chief complaint on file. Reason(s) for Admission: Pneumothorax [J93.9]  Pneumothorax after biopsy [J95.811]     Hospital Course:   80 y.o. male with medical history of severe COPD on 2L baseline, BPH, dementia, who presented to outpatient IR for biopsy of RUL lung nodule. Patient had a chest CT on 8-3-2023 which showed a new 1.4 cm spiculated pulmonary nodule in the anterior aspect of the right upper lobe concerning for malignancy. Follow-up PET scan on 2023 showed moderate metabolic activity within the right upper lobe pulmonary nodule. No evidence of marco antonio or distant metastatic disease. After biopsy pt was noted to have pneumothorax so chest tube was placed. Subjective & 24hr Events (23): Patient pleasantly confused from Alzheimer's dementia. He is confused prior to going down to IR for CT clamping and eval. States his right lung hurts, but left lung feels good. Denies chest pain. Denies N/V/D.     Assessment & Plan:     Principal Problem:    Pneumothorax after biopsy  Right-sided chest tube remains in place   chest x-ray with right small apical pneumothorax   with small apical pneumothorax  Continue chest tube to suction   To go to IR for CT clamping and reassessment  Chest tube management per interventional radiology    Active Problems:    Pulmonary nodules   biopsy nondiagnostic  Interventional radiology wanted to do another biopsy, but wife declined      COPD, severe ([address])  Currently satting 95% on 2 L nasal cannula  Continue albuterol nebulizers  Continue Dulera 200-5 twice daily  Continue Spiriva  Appreciate pulmonology's input and assistance      Leukocytosis   WBC 15.2   WBC 14.7  I suspect this is due to stress from biopsy and chest tube  No signs or

## 2023-08-30 NOTE — PROGRESS NOTES
Kamilla Lyman  Admission Date: 8/28/2023         Daily Progress Note: 8/30/2023    The patient's chart is reviewed and the patient is discussed with the staff. Background: Patient is a 80 y.o.  male seen and evaluated at the request of Dr. Andrew Obregon for PTX with chest tube placed in IR post CT guided needle biopsy of RUL nodule. Patient is followed by Dr. Michael Rutledge in our clinic. He has a PMH of HTN, COPD, nocturnal hypoxia on 2 lpm NC at night, GERD, IBS, thoracic aneurysm, dementia, and pulmonary nodule. He presented to IR on 8/28 for CT guided biopsy of RUL pulmonary nodule after a chest CT on 8/3 showed a new 1.4 cm spiculated nodule in RUL concerning for malignancy. Follow up PT on 8/9 showed moderate metabolic activity within the area but no evidence of marco antonio or distant metastatic disease. During biopsy in IR, patient was noted to have a pneumothorax so a small chest tube was inserted and hospitalist was asked to admit to floor. PTX still present with chest tube, tube on suction and no air leak noted. He is stable on room air with adequate saturations and has pain with coughing. Of note, he has had several sizable pulmonary nodules in the past that have improved over time. Subjective:     IR saw yesterday, wife declined repeat biopsy yesterday. Went down this morning for CXR and possible CT removal by IR. Tube was clamped for 1 hour and PTX returned with worsening subQ emphysema- CT was returned to suction. Still confused, wife at bedside. States he wants to go home.       Current Facility-Administered Medications   Medication Dose Route Frequency    ticagrelor (BRILINTA) tablet 90 mg  90 mg Oral BID    tuberculin injection 5 Units  5 Units IntraDERmal Once    sodium chloride flush 0.9 % injection 5-40 mL  5-40 mL IntraVENous 2 times per day    sodium chloride flush 0.9 % injection 5-40 mL  5-40 mL IntraVENous PRN    0.9 % sodium chloride infusion   IntraVENous PRN

## 2023-08-30 NOTE — PROGRESS NOTES
ACUTE PHYSICAL THERAPY GOALS:   (Developed with and agreed upon by patient and/or caregiver.)  (1.) Lazara Dias  will move from supine to sit and sit to supine , scoot up and down, and roll side to side with SUPERVISION within 7 treatment day(s). (2.) Lazara Dias will transfer from bed to chair and chair to bed with SUPERVISION using the least restrictive device within 7 treatment day(s). (3.) Lazara Dias will ambulate with SUPERVISION for 200 feet with the least restrictive device within 7 treatment day(s). (4.) Lazara Dias will perform standing static and dynamic balance activities x 8 minutes with SUPERVISION to improve safety within 7 treatment day(s). PHYSICAL THERAPY: Daily Note PM   (Link to Caseload Tracking: PT Visit Days : 2  Time In/Out PT Charge Capture  Rehab Caseload Tracker  Orders    Lazara Dias is a 80 y.o. male   PRIMARY DIAGNOSIS: Pneumothorax after biopsy  Pneumothorax [J93.9]  Pneumothorax after biopsy [J95.811]    2 Days Post-Op  Inpatient: Payor: MEDICARE / Plan: MEDICARE PART A AND B / Product Type: *No Product type* /     ASSESSMENT:     REHAB RECOMMENDATIONS:   Recommendation to date pending progress:  Setting:  Short-term Rehab    Equipment:    To Be Determined     ASSESSMENT:  Mr. Blanca Hu presents supine and agrees to therapy. He has a chest tube to suction, RN okayed to remove from suction long enough for ambulation. He is pleasantly confused and is distracted easily. He requires cues for redirection, safety awareness, walker management, and staying on task. He ambulated about 48' in [place] using rolling walker and min assist.  He was left sitting up in chair with needs in reach. He continues to make slow progress towards goals. Will continue with POC. SUBJECTIVE:   Mr. Blanca Hu states, Ana Small dog is getting old. She's about 32years old. \"     Social/Functional Lives With: Spouse  Type of Home: House  Home Layout: One level  Home I=Modified Independent, S=Supervision, SBA=Standby Assistance, CGA=Contact Guard Assistance,   Min=Minimal Assistance, Mod=Moderate Assistance, Max=Maximal Assistance, Total=Total Assistance, NT=Not Tested    PLAN:   FREQUENCY AND DURATION: 3 times/week for duration of hospital stay or until stated goals are met, whichever comes first.    TREATMENT:   TREATMENT:   Co-Treatment PT/OT necessary due to patient's decreased overall endurance/tolerance levels, as well as need for high level skilled assistance to complete functional transfers/mobility and functional tasks  Therapeutic Activity (23 Minutes): Therapeutic activity included Supine to Sit, Scooting, Transfer Training, Ambulation on level ground, Sitting balance , and Standing balance to improve functional Activity tolerance, Balance, Coordination, Mobility, and Strength.     TREATMENT GRID:  N/A    AFTER TREATMENT PRECAUTIONS: Alarm Activated, Bed/Chair Locked, Call light within reach, Chair, Needs within reach, and RN notified    INTERDISCIPLINARY COLLABORATION:  RN/ PCT, PT/ PTA, and OT/ GARDUNO    EDUCATION:      TIME IN/OUT:  Time In: 1430  Time Out: 1453  Minutes: 23    LASHANDA ABBASI PTA

## 2023-08-30 NOTE — PROGRESS NOTES
Chest tube unclamped and placed on -20cm of H2O per order from Dr. Cydney Elena. Report called to Harry Machuca RN.

## 2023-08-31 ENCOUNTER — APPOINTMENT (OUTPATIENT)
Dept: GENERAL RADIOLOGY | Age: 83
End: 2023-08-31
Attending: INTERNAL MEDICINE
Payer: MEDICARE

## 2023-08-31 LAB
ANION GAP SERPL CALC-SCNC: 5 MMOL/L (ref 2–11)
BASOPHILS # BLD: 0 K/UL (ref 0–0.2)
BASOPHILS NFR BLD: 0 % (ref 0–2)
BUN SERPL-MCNC: 17 MG/DL (ref 8–23)
CALCIUM SERPL-MCNC: 9.6 MG/DL (ref 8.3–10.4)
CHLORIDE SERPL-SCNC: 111 MMOL/L (ref 101–110)
CO2 SERPL-SCNC: 23 MMOL/L (ref 21–32)
CREAT SERPL-MCNC: 0.7 MG/DL (ref 0.8–1.5)
DIFFERENTIAL METHOD BLD: ABNORMAL
EOSINOPHIL # BLD: 0.7 K/UL (ref 0–0.8)
EOSINOPHIL NFR BLD: 5 % (ref 0.5–7.8)
ERYTHROCYTE [DISTWIDTH] IN BLOOD BY AUTOMATED COUNT: 14.4 % (ref 11.9–14.6)
GLUCOSE SERPL-MCNC: 129 MG/DL (ref 65–100)
HCT VFR BLD AUTO: 39.8 % (ref 41.1–50.3)
HGB BLD-MCNC: 12.8 G/DL (ref 13.6–17.2)
IMM GRANULOCYTES # BLD AUTO: 0.1 K/UL (ref 0–0.5)
IMM GRANULOCYTES NFR BLD AUTO: 1 % (ref 0–5)
LYMPHOCYTES # BLD: 1.1 K/UL (ref 0.5–4.6)
LYMPHOCYTES NFR BLD: 8 % (ref 13–44)
MCH RBC QN AUTO: 24.7 PG (ref 26.1–32.9)
MCHC RBC AUTO-ENTMCNC: 32.2 G/DL (ref 31.4–35)
MCV RBC AUTO: 76.7 FL (ref 82–102)
MM INDURATION, POC: 0 MM (ref 0–5)
MONOCYTES # BLD: 0.8 K/UL (ref 0.1–1.3)
MONOCYTES NFR BLD: 6 % (ref 4–12)
NEUTS SEG # BLD: 11.4 K/UL (ref 1.7–8.2)
NEUTS SEG NFR BLD: 80 % (ref 43–78)
NRBC # BLD: 0 K/UL (ref 0–0.2)
PLATELET # BLD AUTO: 261 K/UL (ref 150–450)
PMV BLD AUTO: 9.5 FL (ref 9.4–12.3)
POTASSIUM SERPL-SCNC: 3.8 MMOL/L (ref 3.5–5.1)
PPD, POC: NEGATIVE
RBC # BLD AUTO: 5.19 M/UL (ref 4.23–5.6)
SARS-COV-2 RDRP RESP QL NAA+PROBE: NOT DETECTED
SODIUM SERPL-SCNC: 139 MMOL/L (ref 133–143)
SOURCE: NORMAL
WBC # BLD AUTO: 14.1 K/UL (ref 4.3–11.1)

## 2023-08-31 PROCEDURE — 94760 N-INVAS EAR/PLS OXIMETRY 1: CPT

## 2023-08-31 PROCEDURE — 2500000003 HC RX 250 WO HCPCS: Performed by: INTERNAL MEDICINE

## 2023-08-31 PROCEDURE — 6370000000 HC RX 637 (ALT 250 FOR IP): Performed by: INTERNAL MEDICINE

## 2023-08-31 PROCEDURE — 94761 N-INVAS EAR/PLS OXIMETRY MLT: CPT

## 2023-08-31 PROCEDURE — 87635 SARS-COV-2 COVID-19 AMP PRB: CPT

## 2023-08-31 PROCEDURE — 85025 COMPLETE CBC W/AUTO DIFF WBC: CPT

## 2023-08-31 PROCEDURE — 80048 BASIC METABOLIC PNL TOTAL CA: CPT

## 2023-08-31 PROCEDURE — 2500000003 HC RX 250 WO HCPCS: Performed by: FAMILY MEDICINE

## 2023-08-31 PROCEDURE — 94640 AIRWAY INHALATION TREATMENT: CPT

## 2023-08-31 PROCEDURE — 6360000002 HC RX W HCPCS: Performed by: INTERNAL MEDICINE

## 2023-08-31 PROCEDURE — 36415 COLL VENOUS BLD VENIPUNCTURE: CPT

## 2023-08-31 PROCEDURE — 2580000003 HC RX 258: Performed by: INTERNAL MEDICINE

## 2023-08-31 PROCEDURE — 71045 X-RAY EXAM CHEST 1 VIEW: CPT

## 2023-08-31 PROCEDURE — 1100000000 HC RM PRIVATE

## 2023-08-31 PROCEDURE — 2580000003 HC RX 258: Performed by: FAMILY MEDICINE

## 2023-08-31 RX ADMIN — ENOXAPARIN SODIUM 40 MG: 100 INJECTION SUBCUTANEOUS at 14:47

## 2023-08-31 RX ADMIN — OLANZAPINE 5 MG: 10 INJECTION, POWDER, FOR SOLUTION INTRAMUSCULAR at 23:46

## 2023-08-31 RX ADMIN — PRAVASTATIN SODIUM 10 MG: 20 TABLET ORAL at 21:04

## 2023-08-31 RX ADMIN — ALBUTEROL SULFATE 2.5 MG: 2.5 SOLUTION RESPIRATORY (INHALATION) at 14:24

## 2023-08-31 RX ADMIN — SODIUM CHLORIDE, PRESERVATIVE FREE 10 ML: 5 INJECTION INTRAVENOUS at 21:11

## 2023-08-31 RX ADMIN — GABAPENTIN 300 MG: 300 CAPSULE ORAL at 21:03

## 2023-08-31 RX ADMIN — ALBUTEROL SULFATE 2.5 MG: 2.5 SOLUTION RESPIRATORY (INHALATION) at 20:42

## 2023-08-31 RX ADMIN — TICAGRELOR 90 MG: 90 TABLET ORAL at 08:52

## 2023-08-31 RX ADMIN — WATER 5 MG: 1 INJECTION INTRAMUSCULAR; INTRAVENOUS; SUBCUTANEOUS at 22:02

## 2023-08-31 RX ADMIN — DONEPEZIL HYDROCHLORIDE 5 MG: 5 TABLET, FILM COATED ORAL at 21:03

## 2023-08-31 RX ADMIN — GABAPENTIN 300 MG: 300 CAPSULE ORAL at 08:52

## 2023-08-31 RX ADMIN — MOMETASONE FUROATE AND FORMOTEROL FUMARATE DIHYDRATE 2 PUFF: 200; 5 AEROSOL RESPIRATORY (INHALATION) at 20:42

## 2023-08-31 RX ADMIN — POLYETHYLENE GLYCOL 3350 17 G: 17 POWDER, FOR SOLUTION ORAL at 08:51

## 2023-08-31 RX ADMIN — ALBUTEROL SULFATE 2.5 MG: 2.5 SOLUTION RESPIRATORY (INHALATION) at 08:25

## 2023-08-31 RX ADMIN — SODIUM CHLORIDE, PRESERVATIVE FREE 10 ML: 5 INJECTION INTRAVENOUS at 09:33

## 2023-08-31 RX ADMIN — MOMETASONE FUROATE AND FORMOTEROL FUMARATE DIHYDRATE 2 PUFF: 200; 5 AEROSOL RESPIRATORY (INHALATION) at 08:24

## 2023-08-31 RX ADMIN — LATANOPROST 1 DROP: 50 SOLUTION OPHTHALMIC at 21:09

## 2023-08-31 RX ADMIN — TICAGRELOR 90 MG: 90 TABLET ORAL at 21:04

## 2023-08-31 RX ADMIN — TAMSULOSIN HYDROCHLORIDE 0.8 MG: 0.4 CAPSULE ORAL at 08:52

## 2023-08-31 RX ADMIN — OLANZAPINE 5 MG: 5 TABLET, ORALLY DISINTEGRATING ORAL at 18:23

## 2023-08-31 RX ADMIN — PANTOPRAZOLE SODIUM 40 MG: 40 TABLET, DELAYED RELEASE ORAL at 05:54

## 2023-08-31 RX ADMIN — QUETIAPINE FUMARATE 25 MG: 25 TABLET ORAL at 21:03

## 2023-08-31 RX ADMIN — GABAPENTIN 300 MG: 300 CAPSULE ORAL at 14:47

## 2023-08-31 RX ADMIN — TIOTROPIUM BROMIDE INHALATION SPRAY 2 PUFF: 3.12 SPRAY, METERED RESPIRATORY (INHALATION) at 08:24

## 2023-08-31 NOTE — PROGRESS NOTES
Physical Therapy Note:    Attempted to see patient this PM for physical therapy treatment  session. Discussed with IR RN who was clamping his chest tube for an hour and requested to hold off for right now. Will follow and re-attempt as schedule permits/patient available.  Thank you,    LASHANDA ABBASI, hospitals     Rehab Caseload Tracker

## 2023-08-31 NOTE — PROGRESS NOTES
Hourly rounds performed this shift. Bed lowered and locked. Call light within reach. All needs met at this time. Bedside report will be given to oncoming nurse.

## 2023-08-31 NOTE — PROGRESS NOTES
Hospitalist Progress Note   Admit Date:  2023  1:38 PM   Name:  Jimi Lyman   Age:  80 y.o. Sex:  male  :  1940   MRN:  [id number]   Room:  [facility]/    Presenting/Chief Complaint: No chief complaint on file. Reason(s) for Admission: Pneumothorax [J93.9]  Pneumothorax after biopsy [J95.811]     Hospital Course:   80 y.o. male with medical history of severe COPD on 2L baseline, BPH, dementia, who presented to outpatient IR for biopsy of RUL lung nodule. Patient had a chest CT on 8-3-2023 which showed a new 1.4 cm spiculated pulmonary nodule in the anterior aspect of the right upper lobe concerning for malignancy. Follow-up PET scan on 2023 showed moderate metabolic activity within the right upper lobe pulmonary nodule. No evidence of marco antonio or distant metastatic disease. After biopsy pt was noted to have pneumothorax so chest tube was placed. Subjective & 24hr Events:   Patient was seen and examined at the bedside. Slightly confused this morning. No complaints. Denies cardiac chest pain, shortness of breath, Harman pain, fever/chills.       Assessment & Plan:   Pneumothorax after biopsy  Right-sided chest tube remains in place   chest x-ray with right small apical pneumothorax   with small apical pneumothorax  Continue chest tube to suction   To go to IR for CT clamping and reassessment  Chest tube management per interventional radiology  - chest tube to be clamped, follow-up IR recommendations      Pulmonary nodules   biopsy nondiagnostic  Interventional radiology wanted to do another biopsy, but wife declined       COPD, severe ([address])  Currently satting 95% on 2 L nasal cannula  Continue albuterol nebulizers  Continue Dulera 200-5 twice daily  Continue Spiriva  Appreciate pulmonology's input and assistance       Leukocytosis   WBC 15.2   WBC 14.7  I suspect this is due to stress from biopsy and chest tube  No signs or symptoms of acute

## 2023-08-31 NOTE — PROGRESS NOTES
PT oriented to self only. Had a more lucid period of the day when his wife was at the bedside, but grew increasingly confused and restless in the afternoon. Was found out of bed and standing in the corner with his chest tube pulled very taut and the occlusive dressing lifted. Order was placed for a virtual  and pulmonology MD was notified, responded to bedside and replaced dressing. VS stable. PRN medication given for restlessness per MAR. Hourly rounds performed during shift. Bed locked and lowered. Call light in reach. All needs met at this time. Bedside shift report given to oncoming nurse.

## 2023-09-01 ENCOUNTER — APPOINTMENT (OUTPATIENT)
Dept: INTERVENTIONAL RADIOLOGY/VASCULAR | Age: 83
End: 2023-09-01
Attending: RADIOLOGY
Payer: MEDICARE

## 2023-09-01 LAB
ANION GAP SERPL CALC-SCNC: 4 MMOL/L (ref 2–11)
BASOPHILS # BLD: 0 K/UL (ref 0–0.2)
BASOPHILS NFR BLD: 0 % (ref 0–2)
BUN SERPL-MCNC: 13 MG/DL (ref 8–23)
CALCIUM SERPL-MCNC: 9 MG/DL (ref 8.3–10.4)
CHLORIDE SERPL-SCNC: 112 MMOL/L (ref 101–110)
CO2 SERPL-SCNC: 27 MMOL/L (ref 21–32)
CREAT SERPL-MCNC: 0.7 MG/DL (ref 0.8–1.5)
DIFFERENTIAL METHOD BLD: ABNORMAL
EOSINOPHIL # BLD: 0.7 K/UL (ref 0–0.8)
EOSINOPHIL NFR BLD: 8 % (ref 0.5–7.8)
ERYTHROCYTE [DISTWIDTH] IN BLOOD BY AUTOMATED COUNT: 14.6 % (ref 11.9–14.6)
GLUCOSE SERPL-MCNC: 105 MG/DL (ref 65–100)
HCT VFR BLD AUTO: 40.4 % (ref 41.1–50.3)
HGB BLD-MCNC: 12.9 G/DL (ref 13.6–17.2)
IMM GRANULOCYTES # BLD AUTO: 0 K/UL (ref 0–0.5)
IMM GRANULOCYTES NFR BLD AUTO: 1 % (ref 0–5)
LYMPHOCYTES # BLD: 1.6 K/UL (ref 0.5–4.6)
LYMPHOCYTES NFR BLD: 19 % (ref 13–44)
MCH RBC QN AUTO: 25.1 PG (ref 26.1–32.9)
MCHC RBC AUTO-ENTMCNC: 31.9 G/DL (ref 31.4–35)
MCV RBC AUTO: 78.6 FL (ref 82–102)
MM INDURATION, POC: 0 MM (ref 0–5)
MONOCYTES # BLD: 0.6 K/UL (ref 0.1–1.3)
MONOCYTES NFR BLD: 7 % (ref 4–12)
NEUTS SEG # BLD: 5.7 K/UL (ref 1.7–8.2)
NEUTS SEG NFR BLD: 65 % (ref 43–78)
NRBC # BLD: 0 K/UL (ref 0–0.2)
PLATELET # BLD AUTO: 304 K/UL (ref 150–450)
PMV BLD AUTO: 9.9 FL (ref 9.4–12.3)
POTASSIUM SERPL-SCNC: 3.8 MMOL/L (ref 3.5–5.1)
PPD, POC: NEGATIVE
RBC # BLD AUTO: 5.14 M/UL (ref 4.23–5.6)
SODIUM SERPL-SCNC: 143 MMOL/L (ref 133–143)
WBC # BLD AUTO: 8.7 K/UL (ref 4.3–11.1)

## 2023-09-01 PROCEDURE — 1100000000 HC RM PRIVATE

## 2023-09-01 PROCEDURE — 85025 COMPLETE CBC W/AUTO DIFF WBC: CPT

## 2023-09-01 PROCEDURE — 6370000000 HC RX 637 (ALT 250 FOR IP): Performed by: INTERNAL MEDICINE

## 2023-09-01 PROCEDURE — 6360000002 HC RX W HCPCS: Performed by: RADIOLOGY

## 2023-09-01 PROCEDURE — 2500000003 HC RX 250 WO HCPCS: Performed by: RADIOLOGY

## 2023-09-01 PROCEDURE — 0W9930Z DRAINAGE OF RIGHT PLEURAL CAVITY WITH DRAINAGE DEVICE, PERCUTANEOUS APPROACH: ICD-10-PCS | Performed by: RADIOLOGY

## 2023-09-01 PROCEDURE — 94760 N-INVAS EAR/PLS OXIMETRY 1: CPT

## 2023-09-01 PROCEDURE — 94640 AIRWAY INHALATION TREATMENT: CPT

## 2023-09-01 PROCEDURE — 2700000000 HC OXYGEN THERAPY PER DAY

## 2023-09-01 PROCEDURE — 6360000002 HC RX W HCPCS: Performed by: INTERNAL MEDICINE

## 2023-09-01 PROCEDURE — 36415 COLL VENOUS BLD VENIPUNCTURE: CPT

## 2023-09-01 PROCEDURE — 2580000003 HC RX 258: Performed by: RADIOLOGY

## 2023-09-01 PROCEDURE — 80048 BASIC METABOLIC PNL TOTAL CA: CPT

## 2023-09-01 PROCEDURE — 75984 XRAY CONTROL CATHETER CHANGE: CPT

## 2023-09-01 PROCEDURE — 97530 THERAPEUTIC ACTIVITIES: CPT

## 2023-09-01 RX ORDER — LIDOCAINE HYDROCHLORIDE 10 MG/ML
INJECTION, SOLUTION EPIDURAL; INFILTRATION; INTRACAUDAL; PERINEURAL PRN
Status: COMPLETED | OUTPATIENT
Start: 2023-09-01 | End: 2023-09-01

## 2023-09-01 RX ORDER — SODIUM CHLORIDE 9 MG/ML
INJECTION, SOLUTION INTRAVENOUS CONTINUOUS PRN
Status: COMPLETED | OUTPATIENT
Start: 2023-09-01 | End: 2023-09-01

## 2023-09-01 RX ORDER — FENTANYL CITRATE 50 UG/ML
INJECTION, SOLUTION INTRAMUSCULAR; INTRAVENOUS PRN
Status: COMPLETED | OUTPATIENT
Start: 2023-09-01 | End: 2023-09-01

## 2023-09-01 RX ORDER — MIDAZOLAM HYDROCHLORIDE 1 MG/ML
INJECTION INTRAMUSCULAR; INTRAVENOUS PRN
Status: COMPLETED | OUTPATIENT
Start: 2023-09-01 | End: 2023-09-01

## 2023-09-01 RX ADMIN — TIOTROPIUM BROMIDE INHALATION SPRAY 2 PUFF: 3.12 SPRAY, METERED RESPIRATORY (INHALATION) at 08:18

## 2023-09-01 RX ADMIN — GABAPENTIN 300 MG: 300 CAPSULE ORAL at 13:14

## 2023-09-01 RX ADMIN — QUETIAPINE FUMARATE 25 MG: 25 TABLET ORAL at 14:41

## 2023-09-01 RX ADMIN — ENOXAPARIN SODIUM 40 MG: 100 INJECTION SUBCUTANEOUS at 17:35

## 2023-09-01 RX ADMIN — ALBUTEROL SULFATE 2.5 MG: 2.5 SOLUTION RESPIRATORY (INHALATION) at 08:17

## 2023-09-01 RX ADMIN — TAMSULOSIN HYDROCHLORIDE 0.8 MG: 0.4 CAPSULE ORAL at 11:12

## 2023-09-01 RX ADMIN — POLYETHYLENE GLYCOL 3350 17 G: 17 POWDER, FOR SOLUTION ORAL at 11:12

## 2023-09-01 RX ADMIN — ALBUTEROL SULFATE 2.5 MG: 2.5 SOLUTION RESPIRATORY (INHALATION) at 16:03

## 2023-09-01 RX ADMIN — OXYCODONE HYDROCHLORIDE 5 MG: 5 TABLET ORAL at 12:55

## 2023-09-01 RX ADMIN — TICAGRELOR 90 MG: 90 TABLET ORAL at 11:11

## 2023-09-01 RX ADMIN — MIDAZOLAM 1 MG: 1 INJECTION INTRAMUSCULAR; INTRAVENOUS at 09:39

## 2023-09-01 RX ADMIN — FENTANYL CITRATE 50 MCG: 50 INJECTION, SOLUTION INTRAMUSCULAR; INTRAVENOUS at 09:39

## 2023-09-01 RX ADMIN — MOMETASONE FUROATE AND FORMOTEROL FUMARATE DIHYDRATE 2 PUFF: 200; 5 AEROSOL RESPIRATORY (INHALATION) at 08:18

## 2023-09-01 RX ADMIN — LIDOCAINE HYDROCHLORIDE 10 ML: 10 INJECTION, SOLUTION EPIDURAL; INFILTRATION; INTRACAUDAL; PERINEURAL at 09:42

## 2023-09-01 RX ADMIN — SODIUM CHLORIDE 500 ML: 900 INJECTION, SOLUTION INTRAVENOUS at 09:39

## 2023-09-01 RX ADMIN — LORAZEPAM 0.5 MG: 0.5 TABLET ORAL at 13:34

## 2023-09-01 ASSESSMENT — PAIN SCALES - GENERAL
PAINLEVEL_OUTOF10: 6
PAINLEVEL_OUTOF10: 0
PAINLEVEL_OUTOF10: 0

## 2023-09-01 ASSESSMENT — PAIN DESCRIPTION - DESCRIPTORS: DESCRIPTORS: ACHING;THROBBING

## 2023-09-01 ASSESSMENT — PAIN DESCRIPTION - ORIENTATION: ORIENTATION: RIGHT;MID

## 2023-09-01 ASSESSMENT — PAIN DESCRIPTION - LOCATION: LOCATION: FLANK;BACK

## 2023-09-01 NOTE — PROGRESS NOTES
Hourly rounds performed this shift. Bed lowered and locked. Call light within reach. Bed alarm on. Virtual sitter present. All needs met at this time. Bedside report will be given to oncoming nurse.

## 2023-09-01 NOTE — PROGRESS NOTES
ACUTE PHYSICAL THERAPY GOALS:   (Developed with and agreed upon by patient and/or caregiver.)  (1.) Spurgeon Holstein  will move from supine to sit and sit to supine , scoot up and down, and roll side to side with SUPERVISION within 7 treatment day(s). (2.) Spurgeon Holstein will transfer from bed to chair and chair to bed with SUPERVISION using the least restrictive device within 7 treatment day(s). (3.) Spurgeon Holstein will ambulate with SUPERVISION for 200 feet with the least restrictive device within 7 treatment day(s). (4.) Spurgeon Holstein will perform standing static and dynamic balance activities x 8 minutes with SUPERVISION to improve safety within 7 treatment day(s). PHYSICAL THERAPY: Daily Note PM   (Link to Caseload Tracking: PT Visit Days : 3  Time In/Out PT Charge Capture  Rehab Caseload Tracker  Orders    Spurgeon Holstein is a 80 y.o. male   PRIMARY DIAGNOSIS: Pneumothorax after biopsy  Pneumothorax [J93.9]  Pneumothorax after biopsy [J95.811]    4 Days Post-Op  Inpatient: Payor: MEDICARE / Plan: MEDICARE PART A AND B / Product Type: *No Product type* /     ASSESSMENT:     REHAB RECOMMENDATIONS:   Recommendation to date pending progress:  Setting:  Short-term Rehab    Equipment:    To Be Determined     ASSESSMENT:  Mr. Pao Figueroa presents supine and he is trying to get out of bed on contact. RN and virtual  in room. He is more confused this afternoon. He sat to side of bed with min assist x 2. He is limited to day by his confusion and stood a couple times but was unsafe for further mobility. He returned supine with mod assist x 2 and was left with RN attending. No progress this date. Will continue with POC. SUBJECTIVE:   Mr. Pao Figueroa states, \"I need to get in my car. \"     Social/Functional Lives With: Spouse  Type of Home: House  Home Layout: One level  Home Access: Level entry  Bathroom Shower/Tub: Tub/Shower unit  Bathroom Toilet: Standard  Bathroom

## 2023-09-01 NOTE — PROGRESS NOTES
MCV 76.7 08/31/2023 05:56 AM    RDW 14.4 08/31/2023 05:56 AM     08/31/2023 05:56 AM     BMP:    Lab Results   Component Value Date/Time     08/31/2023 05:56 AM    K 3.8 08/31/2023 05:56 AM     08/31/2023 05:56 AM    CO2 23 08/31/2023 05:56 AM    BUN 17 08/31/2023 05:56 AM     Assessment:   80-year-old male with history of Alzheimer's dementia who came to the interventional radiology department on 8/28 for outpatient lung biopsy. He has a known history of COPD and wears 2 L of oxygen at nighttime. Patient developed a pneumothorax postprocedure and right-sided chest tube was placed, and pt admitted. Today no air leak seen in [facility]. Chest tube was clamped for 1 hour and repeat chest x-ray was obtained which unfortunately shows persistent right apical pneumothorax and worsening subcutaneous emphysema--now extending across midline. Patient's chest tube was unclamped and returned to -20 suction. Plan:   Patient will be brought down to the IR department on 9/01 to upsize his chest tube. He is to be NPO after midnight.       Signed By: BOBY Varela     August 31, 2023

## 2023-09-01 NOTE — CARE COORDINATION
Patient chart reviewed for continued stay. Per MD, patient to go to IR for an upsize in chest tube today. Patient not medically ready at this time. Plan is for discharge to [facility] for STR when medically stable. Will continue to follow patient's plan of care and assist further with supportive care needs as appropriate.

## 2023-09-01 NOTE — PROGRESS NOTES
Hourly rounding completed during shift. All needs met at this time. Pt continues to be agitated,confused and attempted to pull at chest tube. Bed L/L with call bell in reach, bed alarm on, EVS in room monitoring pt. Report to be given to oncoming RN.

## 2023-09-01 NOTE — PRE SEDATION
bisacodyl, famotidine, aluminum & magnesium hydroxide-simethicone, acetaminophen **OR** acetaminophen, LORazepam, hydrALAZINE, sodium phosphate, oxyCODONE, melatonin, guaiFENesin-dextromethorphan, cloNIDine, QUEtiapine, OLANZapine (ZyPREXA) in sterile water IntraMUSCular, OLANZapine zydis  Home Meds:   Prior to Admission medications    Medication Sig Start Date End Date Taking? Authorizing Provider   SUMAtriptan (IMITREX) 100 MG tablet TAKE 1/2 TO 1 TABLET BY MOUTH AS NEEDED FOR MIGRAINE MAY REPEAT IN 2HRS MAX 2/24HRS 8/15/23   Eddie Santiago DO   fluticasone-umeclidin-vilant (TRELEGY ELLIPTA) 340-12.0-55 MCG/ACT AEPB inhaler Inhale 1 puff into the lungs daily 8/15/23   Levar Pizarro MD   albuterol sulfate HFA (PROVENTIL;VENTOLIN;PROAIR) 108 (90 Base) MCG/ACT inhaler INHALE 2 PUFFS BY MOUTH EVERY 4 HOURS AS NEEDED 8/15/23   Levar Pizarro MD   LORazepam (ATIVAN) 0.5 MG tablet Take 1 tablet by mouth daily as needed for Anxiety for up to 90 days. Max Daily Amount: 0.5 mg 8/7/23 11/5/23  Eddie Santiago DO   pantoprazole (PROTONIX) 40 MG tablet Take 1 tablet by mouth daily 7/3/23   Historical Provider, MD   QUEtiapine (SEROQUEL) 25 MG tablet Take 1 tablet by mouth nightly 6/30/23   Eddie Santiago DO   tamsulosin (FLOMAX) 0.4 MG capsule TAKE 2 CAPSULES BY MOUTH EVERY DAY 6/15/23   Eddie Santiago DO   tiotropium (SPIRIVA HANDIHALER) 18 MCG inhalation capsule Inhale 1 capsule into the lungs daily 4/26/23   Levar Pizarro MD   donepezil (ARICEPT) 5 MG tablet TAKE 1 TABLET BY MOUTH NIGHTLY 3/16/23   Eddie Santiago DO   aspirin 81 MG EC tablet Take 1 tablet by mouth daily    Historical Provider, MD   fluticasone-salmeterol (WIXELA INHUB) 500-50 MCG/ACT AEPB diskus inhaler Inhale 1 puff into the lungs in the morning and 1 puff in the evening.   Patient not taking: Reported on 8/15/2023 12/28/22   Susie BOWLING Brothers, DO   gabapentin (NEURONTIN) 300 MG capsule TAKE 1 CAPSULE BY MOUTH THREE TIMES A DAY 12/13/22 visible)    Prior History of Anesthesia Complications:   none    ASA Classification:  Class 4 - A patient with an incapacitating systemic disease that is a constant threat to life    Sedation/ Anesthesia Plan:   intravenous sedation    Medications Planned:   midazolam (Versed) intravenously and fentanyl intravenously    Patient is an appropriate candidate for plan of sedation: yes    Electronically signed by Piyush Rosas MD on 9/1/2023 at 9:26 AM

## 2023-09-01 NOTE — PROGRESS NOTES
TRANSFER - IN REPORT:    Verbal report received from IR RN on [facility]  being received from IR for ordered procedure      Report consisted of patient's Situation, Background, Assessment and   Recommendations(SBAR). Information from the following report(s) Surgery Report and MAR was reviewed with the receiving nurse. Opportunity for questions and clarification was provided. Assessment to be completed upon patient's arrival to unit and care assumed.

## 2023-09-01 NOTE — OR NURSING
Patient transported to CT via stretcher. Patient moved to the procedure table with assistance. Patient secured to the procedure table. Pt connected to EKG, SPO2, BP, and ETCO2     A Pre-assessment was conducted and a set of vital signs were completed. The provider was notified of any changes from the assessment and/or vital signs immediately prior to beginning moderate sedation.

## 2023-09-01 NOTE — PROGRESS NOTES
Hospitalist Progress Note   Admit Date:  2023  1:38 PM   Name:  Ardyth Goltz Renaker   Age:  80 y.o. Sex:  male  :  1940   MRN:  [id number]   Room:  [facility]/    Presenting/Chief Complaint: No chief complaint on file. Reason(s) for Admission: Pneumothorax [J93.9]  Pneumothorax after biopsy [J95.811]     Hospital Course:   80 y.o. male with medical history of severe COPD on 2L baseline, BPH, dementia, who presented to outpatient IR for biopsy of RUL lung nodule. Patient had a chest CT on 8-3-2023 which showed a new 1.4 cm spiculated pulmonary nodule in the anterior aspect of the right upper lobe concerning for malignancy. Follow-up PET scan on 2023 showed moderate metabolic activity within the right upper lobe pulmonary nodule. No evidence of marco antonio or distant metastatic disease. After biopsy pt was noted to have pneumothorax so chest tube was placed. Subjective & 24hr Events:   Patient was seen and examined at the bedside. Still confused. Patient to go to IR for upsizing chest tube.     Assessment & Plan:   Pneumothorax after biopsy  Right-sided chest tube remains in place   chest x-ray with right small apical pneumothorax   with small apical pneumothorax  Continue chest tube to suction   To go to IR for CT clamping and reassessment  Chest tube management per interventional radiology  - chest tube to be clamped, follow-up IR recommendations  - patient to go to IR for upsizing chest tube  - Repeat chest x-ray yesterday with increased subcu emphysema tiny right apical pneumothorax after chest tube was clamped      Pulmonary nodules   biopsy nondiagnostic  Interventional radiology wanted to do another biopsy, but wife declined       COPD, severe ([address])  Currently satting 95% on 2 L nasal cannula  Continue albuterol nebulizers  Continue Dulera 200-5 twice daily  Continue Spiriva  Appreciate pulmonology's input and assistance       Leukocytosis   WBC 15.2

## 2023-09-01 NOTE — OR NURSING
Pt transported to room 631 via stretcher with RN. Pt assisted to bed. Chest tube placed to suction. Pt remains on 3L O2 via NC. Primary RN at bedside.

## 2023-09-01 NOTE — OP NOTE
Department of Interventional Radiology  [phone]        Interventional Radiology Brief Procedure Note    Patient: Lazara Dias MRN: [id number]  SSN: [id number]    YOB: 1940  Age: 80 y.o. Sex: male      Date of Procedure: 9/1/2023    Pre-Procedure Diagnosis: pneumothorax    Post-Procedure Diagnosis: SAME    Procedure(s): upsize chest tube    Brief Description of Procedure: as above    Performed By: Faustino Batista MD     Assistants: None    Anesthesia:Moderate Sedation    Estimated Blood Loss: None    Specimens:  None    Implants:   All Purpose Drain    Findings: 14 f drain int o right apex    Complications: None    Recommendations: external suction     Follow Up: will follow    Signed By: Faustino Batista MD     September 1, 2023

## 2023-09-02 ENCOUNTER — APPOINTMENT (OUTPATIENT)
Dept: GENERAL RADIOLOGY | Age: 83
End: 2023-09-02
Attending: INTERNAL MEDICINE
Payer: MEDICARE

## 2023-09-02 LAB
ANION GAP SERPL CALC-SCNC: 7 MMOL/L (ref 2–11)
BASOPHILS # BLD: 0.1 K/UL (ref 0–0.2)
BASOPHILS NFR BLD: 1 % (ref 0–2)
BUN SERPL-MCNC: 13 MG/DL (ref 8–23)
CALCIUM SERPL-MCNC: 8.8 MG/DL (ref 8.3–10.4)
CHLORIDE SERPL-SCNC: 110 MMOL/L (ref 101–110)
CO2 SERPL-SCNC: 27 MMOL/L (ref 21–32)
CREAT SERPL-MCNC: 0.6 MG/DL (ref 0.8–1.5)
DIFFERENTIAL METHOD BLD: ABNORMAL
EOSINOPHIL # BLD: 0.8 K/UL (ref 0–0.8)
EOSINOPHIL NFR BLD: 10 % (ref 0.5–7.8)
ERYTHROCYTE [DISTWIDTH] IN BLOOD BY AUTOMATED COUNT: 14.6 % (ref 11.9–14.6)
GLUCOSE SERPL-MCNC: 92 MG/DL (ref 65–100)
HCT VFR BLD AUTO: 40.5 % (ref 41.1–50.3)
HGB BLD-MCNC: 12.5 G/DL (ref 13.6–17.2)
IMM GRANULOCYTES # BLD AUTO: 0.1 K/UL (ref 0–0.5)
IMM GRANULOCYTES NFR BLD AUTO: 1 % (ref 0–5)
LYMPHOCYTES # BLD: 1.5 K/UL (ref 0.5–4.6)
LYMPHOCYTES NFR BLD: 21 % (ref 13–44)
MCH RBC QN AUTO: 24.9 PG (ref 26.1–32.9)
MCHC RBC AUTO-ENTMCNC: 30.9 G/DL (ref 31.4–35)
MCV RBC AUTO: 80.5 FL (ref 82–102)
MONOCYTES # BLD: 0.7 K/UL (ref 0.1–1.3)
MONOCYTES NFR BLD: 9 % (ref 4–12)
NEUTS SEG # BLD: 4.3 K/UL (ref 1.7–8.2)
NEUTS SEG NFR BLD: 58 % (ref 43–78)
NRBC # BLD: 0 K/UL (ref 0–0.2)
PLATELET # BLD AUTO: 264 K/UL (ref 150–450)
PMV BLD AUTO: 9 FL (ref 9.4–12.3)
POTASSIUM SERPL-SCNC: 4 MMOL/L (ref 3.5–5.1)
RBC # BLD AUTO: 5.03 M/UL (ref 4.23–5.6)
SODIUM SERPL-SCNC: 144 MMOL/L (ref 133–143)
WBC # BLD AUTO: 7.4 K/UL (ref 4.3–11.1)

## 2023-09-02 PROCEDURE — 2700000000 HC OXYGEN THERAPY PER DAY

## 2023-09-02 PROCEDURE — 36415 COLL VENOUS BLD VENIPUNCTURE: CPT

## 2023-09-02 PROCEDURE — 6370000000 HC RX 637 (ALT 250 FOR IP): Performed by: INTERNAL MEDICINE

## 2023-09-02 PROCEDURE — 94760 N-INVAS EAR/PLS OXIMETRY 1: CPT

## 2023-09-02 PROCEDURE — 6360000002 HC RX W HCPCS: Performed by: INTERNAL MEDICINE

## 2023-09-02 PROCEDURE — 2580000003 HC RX 258: Performed by: INTERNAL MEDICINE

## 2023-09-02 PROCEDURE — 71045 X-RAY EXAM CHEST 1 VIEW: CPT

## 2023-09-02 PROCEDURE — 80048 BASIC METABOLIC PNL TOTAL CA: CPT

## 2023-09-02 PROCEDURE — 1100000000 HC RM PRIVATE

## 2023-09-02 PROCEDURE — 94640 AIRWAY INHALATION TREATMENT: CPT

## 2023-09-02 PROCEDURE — 85025 COMPLETE CBC W/AUTO DIFF WBC: CPT

## 2023-09-02 RX ADMIN — GABAPENTIN 300 MG: 300 CAPSULE ORAL at 21:29

## 2023-09-02 RX ADMIN — MOMETASONE FUROATE AND FORMOTEROL FUMARATE DIHYDRATE 2 PUFF: 200; 5 AEROSOL RESPIRATORY (INHALATION) at 20:44

## 2023-09-02 RX ADMIN — SODIUM CHLORIDE, PRESERVATIVE FREE 10 ML: 5 INJECTION INTRAVENOUS at 21:33

## 2023-09-02 RX ADMIN — GABAPENTIN 300 MG: 300 CAPSULE ORAL at 09:52

## 2023-09-02 RX ADMIN — ALBUTEROL SULFATE 2.5 MG: 2.5 SOLUTION RESPIRATORY (INHALATION) at 20:44

## 2023-09-02 RX ADMIN — TIOTROPIUM BROMIDE INHALATION SPRAY 2 PUFF: 3.12 SPRAY, METERED RESPIRATORY (INHALATION) at 07:38

## 2023-09-02 RX ADMIN — LATANOPROST 1 DROP: 50 SOLUTION OPHTHALMIC at 21:34

## 2023-09-02 RX ADMIN — ALBUTEROL SULFATE 2.5 MG: 2.5 SOLUTION RESPIRATORY (INHALATION) at 14:47

## 2023-09-02 RX ADMIN — SODIUM CHLORIDE, PRESERVATIVE FREE 10 ML: 5 INJECTION INTRAVENOUS at 09:52

## 2023-09-02 RX ADMIN — ALBUTEROL SULFATE 2.5 MG: 2.5 SOLUTION RESPIRATORY (INHALATION) at 07:38

## 2023-09-02 RX ADMIN — DONEPEZIL HYDROCHLORIDE 5 MG: 5 TABLET, FILM COATED ORAL at 21:32

## 2023-09-02 RX ADMIN — POLYETHYLENE GLYCOL 3350 17 G: 17 POWDER, FOR SOLUTION ORAL at 09:51

## 2023-09-02 RX ADMIN — TICAGRELOR 90 MG: 90 TABLET ORAL at 21:30

## 2023-09-02 RX ADMIN — MOMETASONE FUROATE AND FORMOTEROL FUMARATE DIHYDRATE 2 PUFF: 200; 5 AEROSOL RESPIRATORY (INHALATION) at 07:38

## 2023-09-02 RX ADMIN — Medication 3 MG: at 23:42

## 2023-09-02 RX ADMIN — ENOXAPARIN SODIUM 40 MG: 100 INJECTION SUBCUTANEOUS at 17:27

## 2023-09-02 RX ADMIN — QUETIAPINE FUMARATE 25 MG: 25 TABLET ORAL at 21:29

## 2023-09-02 RX ADMIN — PRAVASTATIN SODIUM 10 MG: 20 TABLET ORAL at 21:30

## 2023-09-02 RX ADMIN — TICAGRELOR 90 MG: 90 TABLET ORAL at 09:51

## 2023-09-02 RX ADMIN — OLANZAPINE 5 MG: 5 TABLET, ORALLY DISINTEGRATING ORAL at 10:15

## 2023-09-02 RX ADMIN — ACETAMINOPHEN 650 MG: 325 TABLET ORAL at 21:36

## 2023-09-02 RX ADMIN — TAMSULOSIN HYDROCHLORIDE 0.8 MG: 0.4 CAPSULE ORAL at 09:51

## 2023-09-02 ASSESSMENT — PAIN - FUNCTIONAL ASSESSMENT: PAIN_FUNCTIONAL_ASSESSMENT: PREVENTS OR INTERFERES SOME ACTIVE ACTIVITIES AND ADLS

## 2023-09-02 ASSESSMENT — PAIN DESCRIPTION - LOCATION: LOCATION: HEAD

## 2023-09-02 ASSESSMENT — PAIN SCALES - WONG BAKER: WONGBAKER_NUMERICALRESPONSE: 2

## 2023-09-02 ASSESSMENT — PAIN DESCRIPTION - DESCRIPTORS: DESCRIPTORS: ACHING

## 2023-09-02 ASSESSMENT — PAIN SCALES - GENERAL
PAINLEVEL_OUTOF10: 4
PAINLEVEL_OUTOF10: 2

## 2023-09-02 NOTE — PROGRESS NOTES
Hourly rounds performed this shift. Bed lowered and locked. Bed alarm on and virtual sitter present. Call light within reach. All needs met at this time. Bedside report will be given to oncoming nurse.

## 2023-09-02 NOTE — PROGRESS NOTES
Hospitalist Progress Note   Admit Date:  2023  1:38 PM   Name:  Judd Lyman   Age:  80 y.o. Sex:  male  :  1940   MRN:  [id number]   Room:  [facility]/    Presenting/Chief Complaint: No chief complaint on file. Reason(s) for Admission: Pneumothorax [J93.9]  Pneumothorax after biopsy [J95.811]     Hospital Course:   80 y.o. male with medical history of severe COPD on 2L baseline, BPH, dementia, who presented to outpatient IR for biopsy of RUL lung nodule. Patient had a chest CT on 8-3-2023 which showed a new 1.4 cm spiculated pulmonary nodule in the anterior aspect of the right upper lobe concerning for malignancy. Follow-up PET scan on 2023 showed moderate metabolic activity within the right upper lobe pulmonary nodule. No evidence of marco antonio or distant metastatic disease. After biopsy pt was noted to have pneumothorax so chest tube was placed. Subjective & 24hr Events:   Patient was seen and examined at the bedside. Wife at bedside this morning. Patient still pleasantly confused. No new complaints this morning. Assessment & Plan:   Pneumothorax after biopsy  Right-sided chest tube remains in place   chest x-ray with right small apical pneumothorax   with small apical pneumothorax  Continue chest tube to suction   To go to IR for CT clamping and reassessment  Chest tube management per interventional radiology  - chest tube to be clamped, follow-up IR recommendations  - patient to go to IR for upsizing chest tube  - Repeat chest x-ray yesterday with increased subcu emphysema tiny right apical pneumothorax after chest tube was clamped  -Chest tube to suction. Not much output.   Guidance and recommendations per interventional radiology  - chest x-ray with no significant interval change without pneumo thorax visualized      Pulmonary nodules   biopsy nondiagnostic  Interventional radiology wanted to do another biopsy, but wife declined       COPD, Hemoglobin 12.9 (L) 13.6 - 17.2 g/dL    Hematocrit 40.4 (L) 41.1 - 50.3 %    MCV 78.6 (L) 82 - 102 FL    MCH 25.1 (L) 26.1 - 32.9 PG    MCHC 31.9 31.4 - 35.0 g/dL    RDW 14.6 11.9 - 14.6 %    Platelets 580 788 - 853 K/uL    MPV 9.9 9.4 - 12.3 FL    nRBC 0.00 0.0 - 0.2 K/uL    Differential Type AUTOMATED      Neutrophils % 65 43 - 78 %    Lymphocytes % 19 13 - 44 %    Monocytes % 7 4.0 - 12.0 %    Eosinophils % 8 (H) 0.5 - 7.8 %    Basophils % 0 0.0 - 2.0 %    Immature Granulocytes 1 0.0 - 5.0 %    Neutrophils Absolute 5.7 1.7 - 8.2 K/UL    Lymphocytes Absolute 1.6 0.5 - 4.6 K/UL    Monocytes Absolute 0.6 0.1 - 1.3 K/UL    Eosinophils Absolute 0.7 0.0 - 0.8 K/UL    Basophils Absolute 0.0 0.0 - 0.2 K/UL    Absolute Immature Granulocyte 0.0 0.0 - 0.5 K/UL   Basic Metabolic Panel w/ Reflex to MG    Collection Time: 09/02/23  6:34 AM   Result Value Ref Range    Sodium 144 (H) 133 - 143 mmol/L    Potassium 4.0 3.5 - 5.1 mmol/L    Chloride 110 101 - 110 mmol/L    CO2 27 21 - 32 mmol/L    Anion Gap 7 2 - 11 mmol/L    Glucose 92 65 - 100 mg/dL    BUN 13 8 - 23 MG/DL    Creatinine 0.60 (L) 0.8 - 1.5 MG/DL    Est, Glom Filt Rate >60 >60 ml/min/1.73m2    Calcium 8.8 8.3 - 10.4 MG/DL   CBC with Auto Differential    Collection Time: 09/02/23  6:34 AM   Result Value Ref Range    WBC 7.4 4.3 - 11.1 K/uL    RBC 5.03 4.23 - 5.6 M/uL    Hemoglobin 12.5 (L) 13.6 - 17.2 g/dL    Hematocrit 40.5 (L) 41.1 - 50.3 %    MCV 80.5 (L) 82 - 102 FL    MCH 24.9 (L) 26.1 - 32.9 PG    MCHC 30.9 (L) 31.4 - 35.0 g/dL    RDW 14.6 11.9 - 14.6 %    Platelets 551 998 - 612 K/uL    MPV 9.0 (L) 9.4 - 12.3 FL    nRBC 0.00 0.0 - 0.2 K/uL    Differential Type AUTOMATED      Neutrophils % 58 43 - 78 %    Lymphocytes % 21 13 - 44 %    Monocytes % 9 4.0 - 12.0 %    Eosinophils % 10 (H) 0.5 - 7.8 %    Basophils % 1 0.0 - 2.0 %    Immature Granulocytes 1 0.0 - 5.0 %    Neutrophils Absolute 4.3 1.7 - 8.2 K/UL    Lymphocytes Absolute 1.5 0.5 - 4.6 K/UL

## 2023-09-03 ENCOUNTER — APPOINTMENT (OUTPATIENT)
Dept: GENERAL RADIOLOGY | Age: 83
End: 2023-09-03
Attending: INTERNAL MEDICINE
Payer: MEDICARE

## 2023-09-03 PROCEDURE — 94640 AIRWAY INHALATION TREATMENT: CPT

## 2023-09-03 PROCEDURE — 94761 N-INVAS EAR/PLS OXIMETRY MLT: CPT

## 2023-09-03 PROCEDURE — 6360000002 HC RX W HCPCS: Performed by: INTERNAL MEDICINE

## 2023-09-03 PROCEDURE — 6370000000 HC RX 637 (ALT 250 FOR IP): Performed by: INTERNAL MEDICINE

## 2023-09-03 PROCEDURE — 2580000003 HC RX 258: Performed by: INTERNAL MEDICINE

## 2023-09-03 PROCEDURE — 2700000000 HC OXYGEN THERAPY PER DAY

## 2023-09-03 PROCEDURE — 71045 X-RAY EXAM CHEST 1 VIEW: CPT

## 2023-09-03 PROCEDURE — 94664 DEMO&/EVAL PT USE INHALER: CPT

## 2023-09-03 PROCEDURE — 1100000000 HC RM PRIVATE

## 2023-09-03 RX ORDER — ENOXAPARIN SODIUM 100 MG/ML
30 INJECTION SUBCUTANEOUS EVERY 24 HOURS
Status: DISCONTINUED | OUTPATIENT
Start: 2023-09-04 | End: 2023-09-08 | Stop reason: HOSPADM

## 2023-09-03 RX ADMIN — ACETAMINOPHEN 650 MG: 325 TABLET ORAL at 11:42

## 2023-09-03 RX ADMIN — PANTOPRAZOLE SODIUM 40 MG: 40 TABLET, DELAYED RELEASE ORAL at 06:01

## 2023-09-03 RX ADMIN — QUETIAPINE FUMARATE 25 MG: 25 TABLET ORAL at 23:09

## 2023-09-03 RX ADMIN — OLANZAPINE 5 MG: 5 TABLET, ORALLY DISINTEGRATING ORAL at 11:46

## 2023-09-03 RX ADMIN — ALBUTEROL SULFATE 2.5 MG: 2.5 SOLUTION RESPIRATORY (INHALATION) at 21:43

## 2023-09-03 RX ADMIN — ENOXAPARIN SODIUM 40 MG: 100 INJECTION SUBCUTANEOUS at 16:19

## 2023-09-03 RX ADMIN — TICAGRELOR 90 MG: 90 TABLET ORAL at 23:09

## 2023-09-03 RX ADMIN — LATANOPROST 1 DROP: 50 SOLUTION OPHTHALMIC at 23:11

## 2023-09-03 RX ADMIN — ALBUTEROL SULFATE 2.5 MG: 2.5 SOLUTION RESPIRATORY (INHALATION) at 15:24

## 2023-09-03 RX ADMIN — SODIUM CHLORIDE, PRESERVATIVE FREE 10 ML: 5 INJECTION INTRAVENOUS at 10:04

## 2023-09-03 RX ADMIN — ALBUTEROL SULFATE 2.5 MG: 2.5 SOLUTION RESPIRATORY (INHALATION) at 08:32

## 2023-09-03 RX ADMIN — MOMETASONE FUROATE AND FORMOTEROL FUMARATE DIHYDRATE 2 PUFF: 200; 5 AEROSOL RESPIRATORY (INHALATION) at 21:44

## 2023-09-03 RX ADMIN — GABAPENTIN 300 MG: 300 CAPSULE ORAL at 10:03

## 2023-09-03 RX ADMIN — MOMETASONE FUROATE AND FORMOTEROL FUMARATE DIHYDRATE 2 PUFF: 200; 5 AEROSOL RESPIRATORY (INHALATION) at 08:32

## 2023-09-03 RX ADMIN — DONEPEZIL HYDROCHLORIDE 5 MG: 5 TABLET, FILM COATED ORAL at 23:06

## 2023-09-03 RX ADMIN — SODIUM CHLORIDE, PRESERVATIVE FREE 10 ML: 5 INJECTION INTRAVENOUS at 23:12

## 2023-09-03 RX ADMIN — TICAGRELOR 90 MG: 90 TABLET ORAL at 10:04

## 2023-09-03 RX ADMIN — GABAPENTIN 300 MG: 300 CAPSULE ORAL at 23:10

## 2023-09-03 RX ADMIN — TIOTROPIUM BROMIDE INHALATION SPRAY 2 PUFF: 3.12 SPRAY, METERED RESPIRATORY (INHALATION) at 08:32

## 2023-09-03 RX ADMIN — PRAVASTATIN SODIUM 10 MG: 20 TABLET ORAL at 23:06

## 2023-09-03 RX ADMIN — POLYETHYLENE GLYCOL 3350 17 G: 17 POWDER, FOR SOLUTION ORAL at 10:03

## 2023-09-03 RX ADMIN — TAMSULOSIN HYDROCHLORIDE 0.8 MG: 0.4 CAPSULE ORAL at 10:04

## 2023-09-03 ASSESSMENT — PAIN SCALES - GENERAL
PAINLEVEL_OUTOF10: 3
PAINLEVEL_OUTOF10: 0

## 2023-09-03 ASSESSMENT — PAIN DESCRIPTION - LOCATION: LOCATION: HEAD

## 2023-09-03 ASSESSMENT — PAIN DESCRIPTION - DESCRIPTORS: DESCRIPTORS: ACHING;THROBBING

## 2023-09-03 NOTE — PROGRESS NOTES
Hospitalist Progress Note   Admit Date:  2023  1:38 PM   Name:  Luciana Lyman   Age:  80 y.o. Sex:  male  :  1940   MRN:  [id number]   Room:  1/01    Presenting/Chief Complaint: No chief complaint on file. Reason(s) for Admission: Pneumothorax [J93.9]  Pneumothorax after biopsy [J95.811]     Hospital Course:   80 y.o. male with medical history of severe COPD on 2L baseline, BPH, dementia, who presented to outpatient IR for biopsy of RUL lung nodule. Patient had a chest CT on 8-3-2023 which showed a new 1.4 cm spiculated pulmonary nodule in the anterior aspect of the right upper lobe concerning for malignancy. Follow-up PET scan on 2023 showed moderate metabolic activity within the right upper lobe pulmonary nodule. No evidence of marco antonio or distant metastatic disease. After biopsy pt was noted to have pneumothorax so chest tube was placed. Subjective & 24hr Events:   Patient was seen and examined at the bedside. Spoke with wife at bedside. No new changes this morning. Assessment & Plan:   Pneumothorax after biopsy  Right-sided chest tube remains in place   chest x-ray with right small apical pneumothorax   with small apical pneumothorax  Continue chest tube to suction   To go to IR for CT clamping and reassessment  Chest tube management per interventional radiology  - chest tube to be clamped, follow-up IR recommendations  - patient to go to IR for upsizing chest tube  - Repeat chest x-ray yesterday with increased subcu emphysema tiny right apical pneumothorax after chest tube was clamped  -Chest tube to suction. Not much output. Guidance and recommendations per interventional radiology  - chest x-ray with no significant interval change without pneumo thorax visualized  -9/3 no pneumothorax noted.       Pulmonary nodules   biopsy nondiagnostic  Interventional radiology wanted to do another biopsy, but wife declined       COPD, severe Result Value Ref Range    WBC 7.4 4.3 - 11.1 K/uL    RBC 5.03 4.23 - 5.6 M/uL    Hemoglobin 12.5 (L) 13.6 - 17.2 g/dL    Hematocrit 40.5 (L) 41.1 - 50.3 %    MCV 80.5 (L) 82 - 102 FL    MCH 24.9 (L) 26.1 - 32.9 PG    MCHC 30.9 (L) 31.4 - 35.0 g/dL    RDW 14.6 11.9 - 14.6 %    Platelets 599 457 - 105 K/uL    MPV 9.0 (L) 9.4 - 12.3 FL    nRBC 0.00 0.0 - 0.2 K/uL    Differential Type AUTOMATED      Neutrophils % 58 43 - 78 %    Lymphocytes % 21 13 - 44 %    Monocytes % 9 4.0 - 12.0 %    Eosinophils % 10 (H) 0.5 - 7.8 %    Basophils % 1 0.0 - 2.0 %    Immature Granulocytes 1 0.0 - 5.0 %    Neutrophils Absolute 4.3 1.7 - 8.2 K/UL    Lymphocytes Absolute 1.5 0.5 - 4.6 K/UL    Monocytes Absolute 0.7 0.1 - 1.3 K/UL    Eosinophils Absolute 0.8 0.0 - 0.8 K/UL    Basophils Absolute 0.1 0.0 - 0.2 K/UL    Absolute Immature Granulocyte 0.1 0.0 - 0.5 K/UL       Current Meds:  Current Facility-Administered Medications   Medication Dose Route Frequency    ticagrelor (BRILINTA) tablet 90 mg  90 mg Oral BID    sodium chloride flush 0.9 % injection 5-40 mL  5-40 mL IntraVENous 2 times per day    sodium chloride flush 0.9 % injection 5-40 mL  5-40 mL IntraVENous PRN    0.9 % sodium chloride infusion   IntraVENous PRN    potassium chloride (KLOR-CON M) extended release tablet 40 mEq  40 mEq Oral PRN    Or    potassium bicarb-citric acid (EFFER-K) effervescent tablet 40 mEq  40 mEq Oral PRN    Or    potassium chloride 10 mEq/100 mL IVPB (Peripheral Line)  10 mEq IntraVENous PRN    potassium chloride 10 mEq/100 mL IVPB (Peripheral Line)  10 mEq IntraVENous PRN    magnesium sulfate 2000 mg in 50 mL IVPB premix  2,000 mg IntraVENous PRN    ondansetron (ZOFRAN-ODT) disintegrating tablet 4 mg  4 mg Oral Q8H PRN    Or    ondansetron (ZOFRAN) injection 4 mg  4 mg IntraVENous Q6H PRN    polyethylene glycol (GLYCOLAX) packet 17 g  17 g Oral Daily PRN    bisacodyl (DULCOLAX) suppository 10 mg  10 mg Rectal Daily PRN    famotidine (PEPCID) tablet

## 2023-09-03 NOTE — PLAN OF CARE
Problem: Pain  Goal: Verbalizes/displays adequate comfort level or baseline comfort level  Outcome: Progressing     Problem: Safety - Adult  Goal: Free from fall injury  Outcome: Progressing     Problem: ABCDS Injury Assessment  Goal: Absence of physical injury  Outcome: Progressing     Problem: Confusion  Goal: Confusion, delirium, dementia, or psychosis is improved or at baseline  Description: INTERVENTIONS:  1. Assess for possible contributors to thought disturbance, including medications, impaired vision or hearing, underlying metabolic abnormalities, dehydration, psychiatric diagnoses, and notify attending LIP  2. [place] high risk fall precautions, as indicated  3. Provide frequent short contacts to provide reality reorientation, refocusing and direction  4. Decrease environmental stimuli, including noise as appropriate  5. Monitor and intervene to maintain adequate nutrition, hydration, elimination, sleep and activity  6. If unable to ensure safety without constant attention obtain sitter and review sitter guidelines with assigned personnel  7. Initiate Psychosocial CNS and Spiritual Care consult, as indicated  Outcome: Progressing     Problem: Respiratory - Adult  Goal: Achieves optimal ventilation and oxygenation  Outcome: Progressing     Problem: Skin/Tissue Integrity  Goal: Absence of new skin breakdown  Description: 1. Monitor for areas of redness and/or skin breakdown  2. Assess vascular access sites hourly  3. Every 4-6 hours minimum:  Change oxygen saturation probe site  4. Every 4-6 hours:  If on nasal continuous positive airway pressure, respiratory therapy assess nares and determine need for appliance change or resting period. Outcome: Progressing     Problem: Safety - Medical Restraint  Goal: Remains free of injury from restraints (Restraint for Interference with Medical Device)  Description: INTERVENTIONS:  1.  Determine that other, less restrictive measures have been tried or would not be effective before applying the restraint  2. Evaluate the patient's condition at the time of restraint application  3. Inform patient/family regarding the reason for restraint  4.  Q2H: Monitor safety, psychosocial status, comfort, nutrition and hydration  Outcome: Progressing

## 2023-09-03 NOTE — PROGRESS NOTES
Hourly rounding completed during shift. All needs met at this time. Medicated per Mar for agitation and was effective x 1 during the shift. Bed L/L with call bell in reach. Report given to oncoming RN.

## 2023-09-04 PROCEDURE — 6360000002 HC RX W HCPCS: Performed by: INTERNAL MEDICINE

## 2023-09-04 PROCEDURE — 94760 N-INVAS EAR/PLS OXIMETRY 1: CPT

## 2023-09-04 PROCEDURE — 6370000000 HC RX 637 (ALT 250 FOR IP): Performed by: INTERNAL MEDICINE

## 2023-09-04 PROCEDURE — 97112 NEUROMUSCULAR REEDUCATION: CPT

## 2023-09-04 PROCEDURE — 2700000000 HC OXYGEN THERAPY PER DAY

## 2023-09-04 PROCEDURE — 97530 THERAPEUTIC ACTIVITIES: CPT

## 2023-09-04 PROCEDURE — 2580000003 HC RX 258: Performed by: INTERNAL MEDICINE

## 2023-09-04 PROCEDURE — 94640 AIRWAY INHALATION TREATMENT: CPT

## 2023-09-04 PROCEDURE — 6360000002 HC RX W HCPCS: Performed by: STUDENT IN AN ORGANIZED HEALTH CARE EDUCATION/TRAINING PROGRAM

## 2023-09-04 PROCEDURE — 1100000000 HC RM PRIVATE

## 2023-09-04 PROCEDURE — 94761 N-INVAS EAR/PLS OXIMETRY MLT: CPT

## 2023-09-04 RX ADMIN — ALBUTEROL SULFATE 2.5 MG: 2.5 SOLUTION RESPIRATORY (INHALATION) at 15:38

## 2023-09-04 RX ADMIN — PRAVASTATIN SODIUM 10 MG: 20 TABLET ORAL at 20:17

## 2023-09-04 RX ADMIN — TICAGRELOR 90 MG: 90 TABLET ORAL at 20:18

## 2023-09-04 RX ADMIN — TIOTROPIUM BROMIDE INHALATION SPRAY 2 PUFF: 3.12 SPRAY, METERED RESPIRATORY (INHALATION) at 08:31

## 2023-09-04 RX ADMIN — ENOXAPARIN SODIUM 30 MG: 100 INJECTION SUBCUTANEOUS at 15:17

## 2023-09-04 RX ADMIN — TICAGRELOR 90 MG: 90 TABLET ORAL at 09:20

## 2023-09-04 RX ADMIN — ALBUTEROL SULFATE 2.5 MG: 2.5 SOLUTION RESPIRATORY (INHALATION) at 08:31

## 2023-09-04 RX ADMIN — PANTOPRAZOLE SODIUM 40 MG: 40 TABLET, DELAYED RELEASE ORAL at 05:28

## 2023-09-04 RX ADMIN — POLYETHYLENE GLYCOL 3350 17 G: 17 POWDER, FOR SOLUTION ORAL at 09:20

## 2023-09-04 RX ADMIN — SODIUM CHLORIDE, PRESERVATIVE FREE 10 ML: 5 INJECTION INTRAVENOUS at 09:21

## 2023-09-04 RX ADMIN — DONEPEZIL HYDROCHLORIDE 5 MG: 5 TABLET, FILM COATED ORAL at 20:17

## 2023-09-04 RX ADMIN — OXYCODONE HYDROCHLORIDE 5 MG: 5 TABLET ORAL at 20:24

## 2023-09-04 RX ADMIN — OLANZAPINE 5 MG: 5 TABLET, ORALLY DISINTEGRATING ORAL at 15:17

## 2023-09-04 RX ADMIN — QUETIAPINE FUMARATE 25 MG: 25 TABLET ORAL at 20:17

## 2023-09-04 RX ADMIN — LORAZEPAM 0.5 MG: 0.5 TABLET ORAL at 21:47

## 2023-09-04 RX ADMIN — Medication 3 MG: at 20:17

## 2023-09-04 RX ADMIN — TAMSULOSIN HYDROCHLORIDE 0.8 MG: 0.4 CAPSULE ORAL at 09:20

## 2023-09-04 RX ADMIN — GABAPENTIN 300 MG: 300 CAPSULE ORAL at 13:59

## 2023-09-04 RX ADMIN — LATANOPROST 1 DROP: 50 SOLUTION OPHTHALMIC at 20:27

## 2023-09-04 RX ADMIN — ACETAMINOPHEN 650 MG: 325 TABLET ORAL at 11:15

## 2023-09-04 RX ADMIN — MOMETASONE FUROATE AND FORMOTEROL FUMARATE DIHYDRATE 2 PUFF: 200; 5 AEROSOL RESPIRATORY (INHALATION) at 20:23

## 2023-09-04 RX ADMIN — SODIUM CHLORIDE, PRESERVATIVE FREE 10 ML: 5 INJECTION INTRAVENOUS at 20:27

## 2023-09-04 RX ADMIN — GABAPENTIN 300 MG: 300 CAPSULE ORAL at 20:33

## 2023-09-04 RX ADMIN — GABAPENTIN 300 MG: 300 CAPSULE ORAL at 09:20

## 2023-09-04 RX ADMIN — MOMETASONE FUROATE AND FORMOTEROL FUMARATE DIHYDRATE 2 PUFF: 200; 5 AEROSOL RESPIRATORY (INHALATION) at 08:31

## 2023-09-04 ASSESSMENT — PAIN DESCRIPTION - ORIENTATION
ORIENTATION: RIGHT

## 2023-09-04 ASSESSMENT — PAIN SCALES - WONG BAKER: WONGBAKER_NUMERICALRESPONSE: 0

## 2023-09-04 ASSESSMENT — PAIN SCALES - GENERAL
PAINLEVEL_OUTOF10: 0
PAINLEVEL_OUTOF10: 2
PAINLEVEL_OUTOF10: 6
PAINLEVEL_OUTOF10: 1
PAINLEVEL_OUTOF10: 6
PAINLEVEL_OUTOF10: 0

## 2023-09-04 ASSESSMENT — PAIN - FUNCTIONAL ASSESSMENT
PAIN_FUNCTIONAL_ASSESSMENT: ACTIVITIES ARE NOT PREVENTED
PAIN_FUNCTIONAL_ASSESSMENT: PREVENTS OR INTERFERES SOME ACTIVE ACTIVITIES AND ADLS
PAIN_FUNCTIONAL_ASSESSMENT: PREVENTS OR INTERFERES SOME ACTIVE ACTIVITIES AND ADLS

## 2023-09-04 ASSESSMENT — PAIN DESCRIPTION - ONSET
ONSET: PROGRESSIVE
ONSET: PROGRESSIVE

## 2023-09-04 ASSESSMENT — PAIN DESCRIPTION - DESCRIPTORS
DESCRIPTORS: ACHING
DESCRIPTORS: ACHING;GNAWING;SORE
DESCRIPTORS: ACHING

## 2023-09-04 ASSESSMENT — PAIN DESCRIPTION - FREQUENCY
FREQUENCY: INTERMITTENT
FREQUENCY: INTERMITTENT

## 2023-09-04 ASSESSMENT — PAIN DESCRIPTION - PAIN TYPE
TYPE: ACUTE PAIN
TYPE: ACUTE PAIN

## 2023-09-04 ASSESSMENT — PAIN DESCRIPTION - LOCATION
LOCATION: CHEST
LOCATION: CHEST
LOCATION: RIB CAGE

## 2023-09-04 NOTE — PROGRESS NOTES
ACUTE OCCUPATIONAL THERAPY GOALS:   (Developed with and agreed upon by patient and/or caregiver.)  1. Patient will complete lower body bathing and dressing with SUPERVISION and adaptive equipment as needed. 2. Patient will complete toileting with SUPERVISION. 3. Patient will complete grooming ADL standing at sink with SUPERVISION. 4. Patient will tolerate 25 minutes of OT treatment with 1-2 rest breaks to increase activity tolerance for ADLs. 5. Patient will complete functional transfers with SUPERVISION and adaptive equipment as needed. 6. Patient will tolerate 10 minutes BUE exercises to increase strength for safe, functional transfers. Timeframe: 7 visits     OCCUPATIONAL THERAPY: Daily Note PM   OT Visit Days: 3   Time In/Out  OT Charge Capture  Rehab Caseload Tracker  OT Orders    Suzy Evans is a 80 y.o. male   PRIMARY DIAGNOSIS: Pneumothorax after biopsy  Pneumothorax [J93.9]  Pneumothorax after biopsy [J95.811]    7 Days Post-Op  Inpatient: Payor: MEDICARE / Plan: MEDICARE PART A AND B / Product Type: *No Product type* /     ASSESSMENT:     REHAB RECOMMENDATIONS: CURRENT LEVEL OF FUNCTION:  (Most Recently Demonstrated)   Recommendation to date pending progress:  Setting:  Short-term Rehab    Equipment:    To Be Determined Bathing:  Not Tested  Dressing:  Maximal Assist  Feeding/Grooming:  Not Tested  Toileting:  Not Tested  Functional Mobility:  Minimal Assist x 2     ASSESSMENT:  Mr. Kassy Triplett is weaker today and required cueing throughout session for initiation, sequencing, safety awareness, and attention. Pt does better with simple, direct commands. Pt completed bed mobility, STS, steps at EOB with Min A HHA x 2. Pt demo'ed notable posterior lean in standing. Pt refused ADLs today, still requiring Max A for LB dressing. Pt complained of increased pain in R shoulder/side surrounding chest tube and required cueing not to pull the tube.  RN alerted that chest tube appeared not to be PRECAUTIONS: Alarm Activated, Bed/Chair Locked, Call light within reach, Chair, Heels floated, Needs within reach, and RN notified    INTERDISCIPLINARY COLLABORATION:  RN/ PCT, PT/ PTA, and OT/ GARDUNO    EDUCATION:       TOTAL TREATMENT DURATION AND TIME:  Time In: 1121  Time Out: 1434 [facility]  Minutes: 2415 Jl Oliveros, OT

## 2023-09-04 NOTE — PROGRESS NOTES
ACUTE PHYSICAL THERAPY GOALS:   (Developed with and agreed upon by patient and/or caregiver.)  (1.) Berenice Amezcua  will move from supine to sit and sit to supine , scoot up and down, and roll side to side with SUPERVISION within 7 treatment day(s). (2.) Berenice Amezcua will transfer from bed to chair and chair to bed with SUPERVISION using the least restrictive device within 7 treatment day(s). (3.) Berenice Amezcua will ambulate with SUPERVISION for 200 feet with the least restrictive device within 7 treatment day(s). (4.) Berenice Amezcua will perform standing static and dynamic balance activities x 8 minutes with SUPERVISION to improve safety within 7 treatment day(s). PHYSICAL THERAPY: Daily Note PM   (Link to Caseload Tracking: PT Visit Days : 4  Time In/Out PT Charge Capture  Rehab Caseload Tracker  Orders    Berenice Amezcua is a 80 y.o. male   PRIMARY DIAGNOSIS: Pneumothorax after biopsy  Pneumothorax [J93.9]  Pneumothorax after biopsy [J95.811]    7 Days Post-Op  Inpatient: Payor: MEDICARE / Plan: MEDICARE PART A AND B / Product Type: *No Product type* /     ASSESSMENT:     REHAB RECOMMENDATIONS:   Recommendation to date pending progress:  Setting:  Short-term Rehab    Equipment:    To Be Determined     ASSESSMENT:  Mr. Lisset Masters presents supine with wife present in room and with R UE restraint donned. Per RN, pt has been trying to pull at his chest tube and had to be restrained. Pt required Min A x2 for supine>sit and demonstrated fair/fair- seated balance EOB. Min A x2 for STS 2x and standing marching in place and side steps to [facility]. Pt limited by fatigue and minor agitation this AM. Pt returned to bed and left with R restraint donned per RN request and bed in chair position to assist in improving breathing. No desaturation in oxygen throughout treatment session today. RN present with patient when therapy left and made aware of chest tube not presenting current drainage.  Will

## 2023-09-04 NOTE — FLOWSHEET NOTE
09/04/23 0452   Chest Tube Right Midaxillary 1   Placement Date/Time: 09/01/23 0947   Inserted by: Zara Wright  Present on Admission/Arrival: No  Tube Size (fr): 14 fr  Chest Tube Orientation: Right  Chest Tube Location: Midaxillary  Tube Number: 1  Post Procedure X-Ray: Yes  Chest Tube Drainage System:. ..    Chest Tube Airleak No   Status Continuous Suction   Suction -40 cm H2O   Y Connector Used No   Drainage Description Serous   Dressing Status Clean, dry & intact   Chest Tube Dressing Dry   Site Assessment Clean, dry & intact   Surrounding Skin Crepitus     Crepitus felt in bilateral shoulders and bilateral chest

## 2023-09-04 NOTE — PROGRESS NOTES
Hospitalist Progress Note   Admit Date:  2023  1:38 PM   Name:  Guero Lyman   Age:  80 y.o. Sex:  male  :  1940   MRN:  [id number]   Room:  [facility]/    Presenting/Chief Complaint: No chief complaint on file. Reason(s) for Admission: Pneumothorax [J93.9]  Pneumothorax after biopsy [J95.811]     Hospital Course:   80 y.o. male with medical history of severe COPD on 2L baseline, BPH, dementia, who presented to outpatient IR for biopsy of RUL lung nodule. Patient had a chest CT on 8-3-2023 which showed a new 1.4 cm spiculated pulmonary nodule in the anterior aspect of the right upper lobe concerning for malignancy. Follow-up PET scan on 2023 showed moderate metabolic activity within the right upper lobe pulmonary nodule. No evidence of marco antonio or distant metastatic disease. After biopsy pt was noted to have pneumothorax so chest tube was placed. Subjective & 24hr Events:   Patient was seen and examined at the bedside. Patient doing well this morning. Room air. No new complaints. Assessment & Plan:   Pneumothorax after biopsy  Right-sided chest tube remains in place   chest x-ray with right small apical pneumothorax   with small apical pneumothorax  Continue chest tube to suction   To go to IR for CT clamping and reassessment  Chest tube management per interventional radiology  - chest tube to be clamped, follow-up IR recommendations  - patient to go to IR for upsizing chest tube  - Repeat chest x-ray yesterday with increased subcu emphysema tiny right apical pneumothorax after chest tube was clamped  -Chest tube to suction. Not much output. Guidance and recommendations per interventional radiology  - chest x-ray with no significant interval change without pneumo thorax visualized  -9/3 no pneumothorax noted. -Patient on room air without any respiratory distress.   Not much output from chest to      Pulmonary nodules   biopsy

## 2023-09-05 ENCOUNTER — APPOINTMENT (OUTPATIENT)
Dept: GENERAL RADIOLOGY | Age: 83
End: 2023-09-05
Attending: INTERNAL MEDICINE
Payer: MEDICARE

## 2023-09-05 ENCOUNTER — APPOINTMENT (OUTPATIENT)
Dept: INTERVENTIONAL RADIOLOGY/VASCULAR | Age: 83
End: 2023-09-05
Attending: INTERNAL MEDICINE
Payer: MEDICARE

## 2023-09-05 LAB
ALBUMIN SERPL-MCNC: 2.8 G/DL (ref 3.2–4.6)
ALBUMIN/GLOB SERPL: 0.7 (ref 0.4–1.6)
ALP SERPL-CCNC: 75 U/L (ref 50–136)
ALT SERPL-CCNC: 23 U/L (ref 12–65)
ANION GAP SERPL CALC-SCNC: 5 MMOL/L (ref 2–11)
AST SERPL-CCNC: 22 U/L (ref 15–37)
BASOPHILS # BLD: 0 K/UL (ref 0–0.2)
BASOPHILS NFR BLD: 0 % (ref 0–2)
BILIRUB SERPL-MCNC: 0.3 MG/DL (ref 0.2–1.1)
BUN SERPL-MCNC: 18 MG/DL (ref 8–23)
CALCIUM SERPL-MCNC: 8.7 MG/DL (ref 8.3–10.4)
CHLORIDE SERPL-SCNC: 109 MMOL/L (ref 101–110)
CO2 SERPL-SCNC: 26 MMOL/L (ref 21–32)
CREAT SERPL-MCNC: 0.9 MG/DL (ref 0.8–1.5)
DIFFERENTIAL METHOD BLD: ABNORMAL
EOSINOPHIL # BLD: 0.1 K/UL (ref 0–0.8)
EOSINOPHIL NFR BLD: 1 % (ref 0.5–7.8)
ERYTHROCYTE [DISTWIDTH] IN BLOOD BY AUTOMATED COUNT: 14.8 % (ref 11.9–14.6)
GLOBULIN SER CALC-MCNC: 3.8 G/DL (ref 2.8–4.5)
GLUCOSE SERPL-MCNC: 165 MG/DL (ref 65–100)
HCT VFR BLD AUTO: 41.9 % (ref 41.1–50.3)
HGB BLD-MCNC: 13.2 G/DL (ref 13.6–17.2)
IMM GRANULOCYTES # BLD AUTO: 0.1 K/UL (ref 0–0.5)
IMM GRANULOCYTES NFR BLD AUTO: 1 % (ref 0–5)
LYMPHOCYTES # BLD: 0.6 K/UL (ref 0.5–4.6)
LYMPHOCYTES NFR BLD: 6 % (ref 13–44)
MCH RBC QN AUTO: 24.9 PG (ref 26.1–32.9)
MCHC RBC AUTO-ENTMCNC: 31.5 G/DL (ref 31.4–35)
MCV RBC AUTO: 78.9 FL (ref 82–102)
MONOCYTES # BLD: 1.1 K/UL (ref 0.1–1.3)
MONOCYTES NFR BLD: 11 % (ref 4–12)
NEUTS SEG # BLD: 7.8 K/UL (ref 1.7–8.2)
NEUTS SEG NFR BLD: 81 % (ref 43–78)
NRBC # BLD: 0 K/UL (ref 0–0.2)
PLATELET # BLD AUTO: 271 K/UL (ref 150–450)
PLATELET COMMENT: ADEQUATE
PMV BLD AUTO: 9 FL (ref 9.4–12.3)
POTASSIUM SERPL-SCNC: 4.4 MMOL/L (ref 3.5–5.1)
PROT SERPL-MCNC: 6.6 G/DL (ref 6.3–8.2)
RBC # BLD AUTO: 5.31 M/UL (ref 4.23–5.6)
RBC MORPH BLD: ABNORMAL
SODIUM SERPL-SCNC: 140 MMOL/L (ref 133–143)
WBC # BLD AUTO: 9.7 K/UL (ref 4.3–11.1)

## 2023-09-05 PROCEDURE — 1100000000 HC RM PRIVATE

## 2023-09-05 PROCEDURE — 6370000000 HC RX 637 (ALT 250 FOR IP): Performed by: INTERNAL MEDICINE

## 2023-09-05 PROCEDURE — 80053 COMPREHEN METABOLIC PANEL: CPT

## 2023-09-05 PROCEDURE — 2700000000 HC OXYGEN THERAPY PER DAY

## 2023-09-05 PROCEDURE — 2580000003 HC RX 258: Performed by: INTERNAL MEDICINE

## 2023-09-05 PROCEDURE — 94761 N-INVAS EAR/PLS OXIMETRY MLT: CPT

## 2023-09-05 PROCEDURE — 94760 N-INVAS EAR/PLS OXIMETRY 1: CPT

## 2023-09-05 PROCEDURE — 36415 COLL VENOUS BLD VENIPUNCTURE: CPT

## 2023-09-05 PROCEDURE — 6360000002 HC RX W HCPCS: Performed by: STUDENT IN AN ORGANIZED HEALTH CARE EDUCATION/TRAINING PROGRAM

## 2023-09-05 PROCEDURE — 97530 THERAPEUTIC ACTIVITIES: CPT

## 2023-09-05 PROCEDURE — 85025 COMPLETE CBC W/AUTO DIFF WBC: CPT

## 2023-09-05 PROCEDURE — 71045 X-RAY EXAM CHEST 1 VIEW: CPT

## 2023-09-05 PROCEDURE — 6360000002 HC RX W HCPCS: Performed by: INTERNAL MEDICINE

## 2023-09-05 PROCEDURE — 94640 AIRWAY INHALATION TREATMENT: CPT

## 2023-09-05 RX ADMIN — LORAZEPAM 0.5 MG: 0.5 TABLET ORAL at 20:41

## 2023-09-05 RX ADMIN — MOMETASONE FUROATE AND FORMOTEROL FUMARATE DIHYDRATE 2 PUFF: 200; 5 AEROSOL RESPIRATORY (INHALATION) at 19:35

## 2023-09-05 RX ADMIN — TAMSULOSIN HYDROCHLORIDE 0.8 MG: 0.4 CAPSULE ORAL at 08:39

## 2023-09-05 RX ADMIN — QUETIAPINE FUMARATE 25 MG: 25 TABLET ORAL at 20:41

## 2023-09-05 RX ADMIN — ENOXAPARIN SODIUM 30 MG: 100 INJECTION SUBCUTANEOUS at 17:26

## 2023-09-05 RX ADMIN — SODIUM CHLORIDE, PRESERVATIVE FREE 10 ML: 5 INJECTION INTRAVENOUS at 08:39

## 2023-09-05 RX ADMIN — GABAPENTIN 300 MG: 300 CAPSULE ORAL at 08:39

## 2023-09-05 RX ADMIN — GABAPENTIN 300 MG: 300 CAPSULE ORAL at 20:44

## 2023-09-05 RX ADMIN — GABAPENTIN 300 MG: 300 CAPSULE ORAL at 13:49

## 2023-09-05 RX ADMIN — LATANOPROST 1 DROP: 50 SOLUTION OPHTHALMIC at 20:49

## 2023-09-05 RX ADMIN — SODIUM CHLORIDE, PRESERVATIVE FREE 10 ML: 5 INJECTION INTRAVENOUS at 20:49

## 2023-09-05 RX ADMIN — TICAGRELOR 90 MG: 90 TABLET ORAL at 08:39

## 2023-09-05 RX ADMIN — DONEPEZIL HYDROCHLORIDE 5 MG: 5 TABLET, FILM COATED ORAL at 20:44

## 2023-09-05 RX ADMIN — ALBUTEROL SULFATE 2.5 MG: 2.5 SOLUTION RESPIRATORY (INHALATION) at 19:35

## 2023-09-05 RX ADMIN — TICAGRELOR 90 MG: 90 TABLET ORAL at 20:44

## 2023-09-05 RX ADMIN — POLYETHYLENE GLYCOL 3350 17 G: 17 POWDER, FOR SOLUTION ORAL at 08:39

## 2023-09-05 RX ADMIN — MOMETASONE FUROATE AND FORMOTEROL FUMARATE DIHYDRATE 2 PUFF: 200; 5 AEROSOL RESPIRATORY (INHALATION) at 08:33

## 2023-09-05 RX ADMIN — PRAVASTATIN SODIUM 10 MG: 20 TABLET ORAL at 20:44

## 2023-09-05 RX ADMIN — PANTOPRAZOLE SODIUM 40 MG: 40 TABLET, DELAYED RELEASE ORAL at 04:45

## 2023-09-05 RX ADMIN — ALBUTEROL SULFATE 2.5 MG: 2.5 SOLUTION RESPIRATORY (INHALATION) at 08:33

## 2023-09-05 RX ADMIN — ALBUTEROL SULFATE 2.5 MG: 2.5 SOLUTION RESPIRATORY (INHALATION) at 14:25

## 2023-09-05 ASSESSMENT — PAIN SCALES - WONG BAKER: WONGBAKER_NUMERICALRESPONSE: 2

## 2023-09-05 NOTE — PROGRESS NOTES
Physical Therapy Note:    Attempted to see patient this AM for physical therapy treatment  session. Patient TANGELA currently. Will follow and re-attempt as schedule permits/patient available.  Thank you,    Eunice England, PT     [facility]

## 2023-09-05 NOTE — PROGRESS NOTES
Hourly rounds in progress. Temp 100.6 and 99.7. Alert, confused and restless. Settled and slept some after a po ativan. Chest tube to sux. Denies needs or pain at this time. Bed in low locked position. Call light within reach. Bed alarm on. Continues to have a sitter and restraints. Will continue to monitor and give report to oncoming day shift nurse.

## 2023-09-05 NOTE — BRIEF OP NOTE
Department of Interventional Radiology  [phone]        Interventional Radiology Brief Procedure Note    Patient: [place] Holstein MRN: [id number]  SSN: [id number]    YOB: 1940  Age: 80 y.o.   Sex: male      Date of Procedure: 9/5/2023    Pre-Procedure Diagnosis: post biopsy pneumothorax    Post-Procedure Diagnosis: SAME    Procedure(s): chest tube removal    Performed By: Rupa Hernandez MD     Findings: chest tube clamped for 2 hrs, no PTX, chest tube removed    Complications: None    Recommendations: CXR in the AM          Signed By: Rupa Hernandez MD     September 5, 2023

## 2023-09-05 NOTE — PROGRESS NOTES
ACUTE PHYSICAL THERAPY GOALS:   (Developed with and agreed upon by patient and/or caregiver.)  (1.) Carolynne Felty  will move from supine to sit and sit to supine , scoot up and down, and roll side to side with SUPERVISION within 7 treatment day(s). (2.) Carolynne Felty will transfer from bed to chair and chair to bed with SUPERVISION using the least restrictive device within 7 treatment day(s). (3.) Carolynne Felty will ambulate with SUPERVISION for 200 feet with the least restrictive device within 7 treatment day(s). (4.) Carolynne Felty will perform standing static and dynamic balance activities x 8 minutes with SUPERVISION to improve safety within 7 treatment day(s). PHYSICAL THERAPY: Daily Note PM   (Link to Caseload Tracking: PT Visit Days : 5  Time In/Out PT Charge Capture  Rehab Caseload Tracker  Orders    Carolynne Felty is a 80 y.o. male   PRIMARY DIAGNOSIS: Pneumothorax after biopsy  Pneumothorax [J93.9]  Pneumothorax after biopsy [J95.811]    8 Days Post-Op  Inpatient: Payor: MEDICARE / Plan: MEDICARE PART A AND B / Product Type: *No Product type* /     ASSESSMENT:     REHAB RECOMMENDATIONS:   Recommendation to date pending progress:  Setting:  Short-term Rehab    Equipment:    To Be Determined     ASSESSMENT:  Mr. Anuja Alvarez presents supine with sitter present in room today and disoriented and hard to understand with questioning. Attempted to assist patient with max A for supine>sit transfer however he became agitated and did not want to attempt. Pt was instructed in scooting upwards in bed and rolling side to side and was able to do so with CGA. Improved bed mobility this session noted and he was able to unweight his bottom for scooting. Pt left supine in bed with PCT and sitter present in room. RN made aware. PT to continue to follow. SUBJECTIVE:   Mr. Anuja Alvarez states, \"Where's Dearing Dash? .\"     Social/Functional Lives With: Spouse  Type of Home: House  Home Layout: One

## 2023-09-05 NOTE — PROGRESS NOTES
Comprehensive Nutrition Assessment    Type and Reason for Visit: [address] [place] [address] for BMI <18.5    Nutrition Recommendations/Plan:   Meals and Snacks:  Diet: Continue current order  Nutrition Supplement Therapy:  Medical food supplement therapy:  Continue Ensure Enlive twice per day (this provides 350 kcal and 20 grams protein per bottle) and Magic Cup once per day (this provides 290 kcal and 9 grams protein per serving)     Malnutrition Assessment:  Malnutrition Status: Moderate malnutrition  Context: Chronic Illness  Findings of clinical characteristics of malnutrition:   Energy Intake:  Unable to assess (pt unable to recall quantifiable nutrition hx)  Weight Loss:  Greater than 10% over 6 months (17% loss since Dec per EMR records with majority occurign over the past 2 months)     Body Fat Loss:  Mild body fat loss Triceps, Buccal region, Fat Overlying Ribs, Orbital (moderate loss)   Muscle Mass Loss:  Mild muscle mass loss Temples (temporalis), Hand (interosseous), Calf (gastrocnemius), Clavicles (pectoralis & deltoids) (moderate loss)  Fluid Accumulation:  No significant fluid accumulation     Strength:  Not Performed     Nutrition Assessment:  Nutrition History: Pt tells me he lives with his wife in [place] and that he eats mainly vegetarian and drinks a protein drink from [employer]. Unable to provide any quantifiable nutrition hx. Pt does not know UBW, wt hx per EMR review as below. No family present. Do You Have Any Cultural, Adventism, or Ethnic Food Preferences?: No   Nutrition Background:       PMH remarkable for severe COPD, BPH, dementia, presented outpt IR for biopsy of RUL nodule. Admitted with pneumothorax after biopsy, +R sided chest tube, pulmonary nodules 8/28 biopsy nondiagnostic, COPD, leukocytosis, bronchiectasis, Alzheimer's dementia, normocytic anemia. 8/30: CT clamped for 1 hour and PTX returned. 9/5: CT removed.     Nutrition Interval:  Pt back from IR

## 2023-09-05 NOTE — PROGRESS NOTES
Hospitalist Progress Note   Admit Date:  2023  1:38 PM   Name:  Enma Lyman   Age:  80 y.o. Sex:  male  :  1940   MRN:  [id number]   Room:  [facility]/    Presenting/Chief Complaint: No chief complaint on file. Reason(s) for Admission: Pneumothorax [J93.9]  Pneumothorax after biopsy [J95.811]     Hospital Course:   80 y.o. male with medical history of severe COPD on 2L baseline, BPH, dementia, who presented to outpatient IR for biopsy of RUL lung nodule. Patient had a chest CT on 8-3-2023 which showed a new 1.4 cm spiculated pulmonary nodule in the anterior aspect of the right upper lobe concerning for malignancy. Follow-up PET scan on 2023 showed moderate metabolic activity within the right upper lobe pulmonary nodule. No evidence of marco antonio or distant metastatic disease. After biopsy pt was noted to have pneumothorax so chest tube was placed. Subjective & 24hr Events:   Patient to go for removal of chest tube with IR today. No new changes. Assessment & Plan:   Pneumothorax after biopsy  Right-sided chest tube remains in place   chest x-ray with right small apical pneumothorax   with small apical pneumothorax  Continue chest tube to suction   To go to IR for CT clamping and reassessment  Chest tube management per interventional radiology  - chest tube to be clamped, follow-up IR recommendations  - patient to go to IR for upsizing chest tube  - Repeat chest x-ray yesterday with increased subcu emphysema tiny right apical pneumothorax after chest tube was clamped  -Chest tube to suction. Not much output. Guidance and recommendations per interventional radiology  - chest x-ray with no significant interval change without pneumo thorax visualized  -9/3 no pneumothorax noted. -Patient on room air without any respiratory distress. Not much output from chest to  - chest tube clamped for 2+ hours. Chest x-ray with no pneumothorax.   Subcu air improved  - [facility]) injection 4 mg  4 mg IntraVENous Q6H PRN    polyethylene glycol (GLYCOLAX) packet 17 g  17 g Oral Daily PRN    bisacodyl (DULCOLAX) suppository 10 mg  10 mg Rectal Daily PRN    famotidine (PEPCID) tablet 10 mg  10 mg Oral Daily PRN    aluminum & magnesium hydroxide-simethicone (MAALOX) 200-200-20 MG/5ML suspension 30 mL  30 mL Oral Q6H PRN    acetaminophen (TYLENOL) tablet 650 mg  650 mg Oral Q6H PRN    Or    acetaminophen (TYLENOL) suppository 650 mg  650 mg Rectal Q6H PRN    albuterol (PROVENTIL) (2.5 MG/3ML) 0.083% nebulizer solution 2.5 mg  2.5 mg Nebulization TID    donepezil (ARICEPT) tablet 5 mg  5 mg Oral Nightly    gabapentin (NEURONTIN) capsule 300 mg  300 mg Oral TID    latanoprost (XALATAN) 0.005 % ophthalmic solution 1 drop  1 drop Both Eyes QHS    LORazepam (ATIVAN) tablet 0.5 mg  0.5 mg Oral Daily PRN    pantoprazole (PROTONIX) tablet 40 mg  40 mg Oral Daily    pravastatin (PRAVACHOL) tablet 10 mg  10 mg Oral QHS    polyethylene glycol (GLYCOLAX) packet 17 g  17 g Oral Daily    QUEtiapine (SEROQUEL) tablet 25 mg  25 mg Oral QHS    tamsulosin (FLOMAX) capsule 0.8 mg  0.8 mg Oral Daily    tiotropium (SPIRIVA RESPIMAT) 2.5 MCG/ACT inhaler 2 puff  2 puff Inhalation Daily RT    hydrALAZINE (APRESOLINE) injection 20 mg  20 mg IntraVENous Q4H PRN    sodium phosphate (FLEET) rectal enema 1 enema  1 enema Rectal Daily PRN    oxyCODONE (ROXICODONE) immediate release tablet 5 mg  5 mg Oral Q4H PRN    melatonin tablet 3 mg  3 mg Oral Nightly PRN    guaiFENesin-dextromethorphan (ROBITUSSIN DM) 100-10 MG/5ML syrup 10 mL  10 mL Oral Q4H PRN    cloNIDine (CATAPRES) tablet 0.1 mg  0.1 mg Oral Q4H PRN    QUEtiapine (SEROQUEL) tablet 25 mg  25 mg Oral BID PRN    OLANZapine (ZYPREXA) 5 mg in sterile water 1 mL injection  5 mg IntraMUSCular Q12H PRN    OLANZapine zydis (ZYPREXA) disintegrating tablet 5 mg  5 mg Oral BID PRN    mometasone-formoterol (DULERA) 200-5 MCG/ACT inhaler 2 puff  2 puff Inhalation BID RT

## 2023-09-05 NOTE — PLAN OF CARE
Problem: Respiratory - Adult  Goal: Achieves optimal ventilation and oxygenation  9/5/2023 0842 by Dena Syed RCP  Outcome: Progressing  9/5/2023 0841 by Dena Syed RCP  Outcome: Progressing  9/4/2023 2032 by Majr Rosales RN  Outcome: Progressing

## 2023-09-06 ENCOUNTER — APPOINTMENT (OUTPATIENT)
Dept: GENERAL RADIOLOGY | Age: 83
End: 2023-09-06
Attending: INTERNAL MEDICINE
Payer: MEDICARE

## 2023-09-06 PROBLEM — U07.1 COVID-19: Status: ACTIVE | Noted: 2023-09-06

## 2023-09-06 LAB
CRP SERPL-MCNC: 8.9 MG/DL (ref 0–0.9)
SARS-COV-2 RDRP RESP QL NAA+PROBE: DETECTED
SARS-COV-2 RDRP RESP QL NAA+PROBE: DETECTED
SOURCE: ABNORMAL
SOURCE: ABNORMAL

## 2023-09-06 PROCEDURE — 6360000002 HC RX W HCPCS: Performed by: INTERNAL MEDICINE

## 2023-09-06 PROCEDURE — 87635 SARS-COV-2 COVID-19 AMP PRB: CPT

## 2023-09-06 PROCEDURE — 2700000000 HC OXYGEN THERAPY PER DAY

## 2023-09-06 PROCEDURE — 2580000003 HC RX 258: Performed by: INTERNAL MEDICINE

## 2023-09-06 PROCEDURE — 94640 AIRWAY INHALATION TREATMENT: CPT

## 2023-09-06 PROCEDURE — 36415 COLL VENOUS BLD VENIPUNCTURE: CPT

## 2023-09-06 PROCEDURE — 86140 C-REACTIVE PROTEIN: CPT

## 2023-09-06 PROCEDURE — 71045 X-RAY EXAM CHEST 1 VIEW: CPT

## 2023-09-06 PROCEDURE — 6360000002 HC RX W HCPCS: Performed by: STUDENT IN AN ORGANIZED HEALTH CARE EDUCATION/TRAINING PROGRAM

## 2023-09-06 PROCEDURE — 1100000000 HC RM PRIVATE

## 2023-09-06 PROCEDURE — 6370000000 HC RX 637 (ALT 250 FOR IP): Performed by: INTERNAL MEDICINE

## 2023-09-06 PROCEDURE — 97530 THERAPEUTIC ACTIVITIES: CPT

## 2023-09-06 PROCEDURE — 94761 N-INVAS EAR/PLS OXIMETRY MLT: CPT

## 2023-09-06 PROCEDURE — 97112 NEUROMUSCULAR REEDUCATION: CPT

## 2023-09-06 RX ADMIN — SODIUM CHLORIDE, PRESERVATIVE FREE 10 ML: 5 INJECTION INTRAVENOUS at 20:48

## 2023-09-06 RX ADMIN — MOMETASONE FUROATE AND FORMOTEROL FUMARATE DIHYDRATE 2 PUFF: 200; 5 AEROSOL RESPIRATORY (INHALATION) at 21:08

## 2023-09-06 RX ADMIN — TICAGRELOR 90 MG: 90 TABLET ORAL at 09:37

## 2023-09-06 RX ADMIN — ALBUTEROL SULFATE 2.5 MG: 2.5 SOLUTION RESPIRATORY (INHALATION) at 13:28

## 2023-09-06 RX ADMIN — QUETIAPINE FUMARATE 25 MG: 25 TABLET ORAL at 20:39

## 2023-09-06 RX ADMIN — GABAPENTIN 300 MG: 300 CAPSULE ORAL at 14:46

## 2023-09-06 RX ADMIN — TIOTROPIUM BROMIDE INHALATION SPRAY 2 PUFF: 3.12 SPRAY, METERED RESPIRATORY (INHALATION) at 08:07

## 2023-09-06 RX ADMIN — PRAVASTATIN SODIUM 10 MG: 20 TABLET ORAL at 20:40

## 2023-09-06 RX ADMIN — PANTOPRAZOLE SODIUM 40 MG: 40 TABLET, DELAYED RELEASE ORAL at 06:04

## 2023-09-06 RX ADMIN — SODIUM CHLORIDE, PRESERVATIVE FREE 10 ML: 5 INJECTION INTRAVENOUS at 09:38

## 2023-09-06 RX ADMIN — MOMETASONE FUROATE AND FORMOTEROL FUMARATE DIHYDRATE 2 PUFF: 200; 5 AEROSOL RESPIRATORY (INHALATION) at 08:02

## 2023-09-06 RX ADMIN — GABAPENTIN 300 MG: 300 CAPSULE ORAL at 09:37

## 2023-09-06 RX ADMIN — ENOXAPARIN SODIUM 30 MG: 100 INJECTION SUBCUTANEOUS at 14:46

## 2023-09-06 RX ADMIN — LATANOPROST 1 DROP: 50 SOLUTION OPHTHALMIC at 20:48

## 2023-09-06 RX ADMIN — POLYETHYLENE GLYCOL 3350 17 G: 17 POWDER, FOR SOLUTION ORAL at 09:37

## 2023-09-06 RX ADMIN — Medication 3 MG: at 20:39

## 2023-09-06 RX ADMIN — GABAPENTIN 300 MG: 300 CAPSULE ORAL at 20:40

## 2023-09-06 RX ADMIN — DONEPEZIL HYDROCHLORIDE 5 MG: 5 TABLET, FILM COATED ORAL at 20:40

## 2023-09-06 RX ADMIN — ALBUTEROL SULFATE 2.5 MG: 2.5 SOLUTION RESPIRATORY (INHALATION) at 21:08

## 2023-09-06 RX ADMIN — LORAZEPAM 0.5 MG: 0.5 TABLET ORAL at 10:02

## 2023-09-06 RX ADMIN — ALBUTEROL SULFATE 2.5 MG: 2.5 SOLUTION RESPIRATORY (INHALATION) at 08:02

## 2023-09-06 RX ADMIN — TAMSULOSIN HYDROCHLORIDE 0.8 MG: 0.4 CAPSULE ORAL at 09:37

## 2023-09-06 RX ADMIN — TICAGRELOR 90 MG: 90 TABLET ORAL at 20:39

## 2023-09-06 NOTE — PROGRESS NOTES
ACUTE OCCUPATIONAL THERAPY GOALS:   (Developed with and agreed upon by patient and/or caregiver.)  1. Patient will complete lower body bathing and dressing with SUPERVISION and adaptive equipment as needed. 2. Patient will complete toileting with SUPERVISION. 3. Patient will complete grooming ADL standing at sink with SUPERVISION. 4. Patient will tolerate 25 minutes of OT treatment with 1-2 rest breaks to increase activity tolerance for ADLs. 5. Patient will complete functional transfers with SUPERVISION and adaptive equipment as needed. 6. Patient will tolerate 10 minutes BUE exercises to increase strength for safe, functional transfers. Timeframe: 7 visits     OCCUPATIONAL THERAPY: Daily Note PM   OT Visit Days: 5   Time In/Out  OT Charge Capture  Rehab Caseload Tracker  OT Orders    Anup Fraser is a 80 y.o. male   PRIMARY DIAGNOSIS: Pneumothorax after biopsy  Pneumothorax [J93.9]  Pneumothorax after biopsy [J95.811]    9 Days Post-Op  Inpatient: Payor: MEDICARE / Plan: MEDICARE PART A AND B / Product Type: *No Product type* /     ASSESSMENT:     REHAB RECOMMENDATIONS: CURRENT LEVEL OF FUNCTION:  (Most Recently Demonstrated)   Recommendation to date pending progress:  Setting:  Short-term Rehab    Equipment:    To Be Determined Bathing:  Not Tested  Dressing:  Not Tested  Feeding/Grooming:  Not Tested  Toileting:  Not Tested  Functional Mobility:  Moderate Assist-Max x 2     ASSESSMENT:  Mr. Hermann Bourgeois is more confused today and required increased assist for functional transfers/ mobility. Initially, he required moderate assist x 2 for in-room mobility/ transfer to chair but progressed to requiring maximal assist x 2 using RW. Attempted transfer with HHA, but pt did better with RW. Notable posterior lean demonstrated in standing. Pt fatigued quickly, experiencing multiple desats to high 80s on 2L.      The pt is limited by cognitive deficits, decreased balance, weakness, decreased activity

## 2023-09-06 NOTE — PROGRESS NOTES
Hospitalist Progress Note   Admit Date:  2023  1:38 PM   Name:  Lul Lyman   Age:  80 y.o. Sex:  male  :  1940   MRN:  [id number]   Room:  1/    Presenting/Chief Complaint: No chief complaint on file. Reason(s) for Admission: Pneumothorax [J93.9]  Pneumothorax after biopsy [J95.811]     Hospital Course:   80 y.o. male with medical history of severe COPD on 2L baseline, BPH, dementia, who presented to outpatient IR for biopsy of RUL lung nodule. Patient had a chest CT on 8-3-2023 which showed a new 1.4 cm spiculated pulmonary nodule in the anterior aspect of the right upper lobe concerning for malignancy. Follow-up PET scan on 2023 showed moderate metabolic activity within the right upper lobe pulmonary nodule. No evidence of marco antonio or distant metastatic disease. After biopsy pt was noted to have pneumothorax so chest tube was placed. Subjective & 24hr Events:   Patient was seen and examined at the bedside. No overnight events. Family at bedside. No respiratory complaints this morning. Assessment & Plan:   Pneumothorax after biopsy  Right-sided chest tube remains in place   chest x-ray with right small apical pneumothorax   with small apical pneumothorax  Continue chest tube to suction   To go to IR for CT clamping and reassessment  Chest tube management per interventional radiology  - chest tube to be clamped, follow-up IR recommendations  - patient to go to IR for upsizing chest tube  - Repeat chest x-ray yesterday with increased subcu emphysema tiny right apical pneumothorax after chest tube was clamped  -Chest tube to suction. Not much output. Guidance and recommendations per interventional radiology  - chest x-ray with no significant interval change without pneumo thorax visualized  -9/3 no pneumothorax noted. -Patient on room air without any respiratory distress. Not much output from chest to  - chest tube clamped for 2+ hours. Medication Dose Route Frequency    enoxaparin Sodium (LOVENOX) injection 30 mg  30 mg SubCUTAneous Q24H    ticagrelor (BRILINTA) tablet 90 mg  90 mg Oral BID    sodium chloride flush 0.9 % injection 5-40 mL  5-40 mL IntraVENous 2 times per day    sodium chloride flush 0.9 % injection 5-40 mL  5-40 mL IntraVENous PRN    0.9 % sodium chloride infusion   IntraVENous PRN    potassium chloride (KLOR-CON M) extended release tablet 40 mEq  40 mEq Oral PRN    Or    potassium bicarb-citric acid (EFFER-K) effervescent tablet 40 mEq  40 mEq Oral PRN    Or    potassium chloride 10 mEq/100 mL IVPB (Peripheral Line)  10 mEq IntraVENous PRN    potassium chloride 10 mEq/100 mL IVPB (Peripheral Line)  10 mEq IntraVENous PRN    magnesium sulfate 2000 mg in 50 mL IVPB premix  2,000 mg IntraVENous PRN    ondansetron (ZOFRAN-ODT) disintegrating tablet 4 mg  4 mg Oral Q8H PRN    Or    ondansetron (ZOFRAN) injection 4 mg  4 mg IntraVENous Q6H PRN    polyethylene glycol (GLYCOLAX) packet 17 g  17 g Oral Daily PRN    bisacodyl (DULCOLAX) suppository 10 mg  10 mg Rectal Daily PRN    famotidine (PEPCID) tablet 10 mg  10 mg Oral Daily PRN    aluminum & magnesium hydroxide-simethicone (MAALOX) 200-200-20 MG/5ML suspension 30 mL  30 mL Oral Q6H PRN    acetaminophen (TYLENOL) tablet 650 mg  650 mg Oral Q6H PRN    Or    acetaminophen (TYLENOL) suppository 650 mg  650 mg Rectal Q6H PRN    albuterol (PROVENTIL) (2.5 MG/3ML) 0.083% nebulizer solution 2.5 mg  2.5 mg Nebulization TID    donepezil (ARICEPT) tablet 5 mg  5 mg Oral Nightly    gabapentin (NEURONTIN) capsule 300 mg  300 mg Oral TID    latanoprost (XALATAN) 0.005 % ophthalmic solution 1 drop  1 drop Both Eyes QHS    LORazepam (ATIVAN) tablet 0.5 mg  0.5 mg Oral Daily PRN    pantoprazole (PROTONIX) tablet 40 mg  40 mg Oral Daily    pravastatin (PRAVACHOL) tablet 10 mg  10 mg Oral QHS    polyethylene glycol (GLYCOLAX) packet 17 g  17 g Oral Daily    QUEtiapine (SEROQUEL) tablet 25 mg  25 mg

## 2023-09-06 NOTE — CARE COORDINATION
Patient chart reviewed for continued stay. Per MD, patient is medically ready to go. Spoke to Yi Issa at the [facility] and they do not have a bed available today. He did confirm that he would have one tomorrow. Patient will discharge to the [facility] tomorrow. Will continue to follow patient's plan of care and assist further with supportive care needs as appropriate.

## 2023-09-06 NOTE — PROGRESS NOTES
Standard  Bathroom Equipment: None  Bathroom Accessibility: Accessible  Home Equipment: None  ADL Assistance: Independent  Homemaking Assistance: Independent  Ambulation Assistance: Independent  Transfer Assistance: Independent  Active : No  Patient's  Info:  Wife transports  Mode of Transportation: Car  Occupation: Retired  OBJECTIVE:     PAIN: VITALS / O2: PRECAUTION / Rosmery Ervin / Yakelin Hawthorne:   Pre Treatment: 0         Post Treatment: 0 Vitals        Oxygen 2L/02  upper Spo2 upper 80s     External Catheter    RESTRICTIONS/PRECAUTIONS:  Restrictions/Precautions  Restrictions/Precautions: Fall Risk  Restrictions/Precautions: Fall Risk     MOBILITY: I Mod I S SBA CGA Min Mod Max Total  NT x2 Comments:   Bed Mobility    Rolling [] [] [] [] [] [] [] [] [] [x] []    Supine to Sit [] [] [] [] [] [] [x] [] [] [] [x]    Scooting [] [] [] [] [] [] [] [] [] [x] []    Sit to Supine [] [] [] [] [] [] [] [] [] [x] []    Transfers    Sit to Stand [] [] [] [] [] [] [x] [] [] [] [x]    Bed to Chair [] [] [] [] [] [] [] [] [] [x] []    Stand to Sit [] [] [] [] [] [] [] [x] [] [] [x]     [] [] [] [] [] [] [] [] [] [] []    I=Independent, Mod I=Modified Independent, S=Supervision, SBA=Standby Assistance, CGA=Contact Guard Assistance,   Min=Minimal Assistance, Mod=Moderate Assistance, Max=Maximal Assistance, Total=Total Assistance, NT=Not Tested    BALANCE: Good Fair+ Fair Fair- Poor NT Comments   Sitting Static [] [x] [] [] [] []    Sitting Dynamic [] [x] [x] [] [] []              Standing Static [] [] [] [] [x] []    Standing Dynamic [] [] [] [] [x] []      GAIT: I Mod I S SBA CGA Min Mod Max Total  NT x2 Comments:   Level of Assistance [] [] [] [] []  [x] [x] [] [] [x]    Distance 15  feet    DME Gait Belt and Rolling Walker    Gait Quality Decreased rosales , Decreased step clearance, Decreased step length, and Trunk sway increased    Weightbearing Status      Stairs      I=Independent, Mod I=Modified Independent, S=Supervision, SBA=Standby Assistance, CGA=Contact Guard Assistance,   Min=Minimal Assistance, Mod=Moderate Assistance, Max=Maximal Assistance, Total=Total Assistance, NT=Not Tested    PLAN:   [address]: 3 times/week for duration of hospital stay or until stated goals are met, whichever comes first.    TREATMENT:   TREATMENT:   Therapeutic Activity (25 Minutes): Therapeutic activity included Supine to Sit, Scooting, Ambulation on level ground, Sitting balance , Standing balance, and LE ex to improve functional Activity tolerance, Balance, Mobility, and Strength.     TREATMENT GRID:  N/A    AFTER TREATMENT PRECAUTIONS: Alarm Activated, Bed/Chair Locked, Call light within reach, Chair, Needs within reach, and RN notified    INTERDISCIPLINARY COLLABORATION:  RN/ PCT, PT/ PTA, and OT/ GARDUNO    EDUCATION:      TIME IN/OUT:  Time In: 1410  Time Out: 1435  Minutes: [facility], [facility]

## 2023-09-07 LAB
ALBUMIN SERPL-MCNC: 2.4 G/DL (ref 3.2–4.6)
ALBUMIN/GLOB SERPL: 0.6 (ref 0.4–1.6)
ALP SERPL-CCNC: 63 U/L (ref 50–136)
ALT SERPL-CCNC: 26 U/L (ref 12–65)
ANION GAP SERPL CALC-SCNC: 5 MMOL/L (ref 2–11)
AST SERPL-CCNC: 22 U/L (ref 15–37)
BASOPHILS # BLD: 0 K/UL (ref 0–0.2)
BASOPHILS NFR BLD: 0 % (ref 0–2)
BILIRUB SERPL-MCNC: 0.3 MG/DL (ref 0.2–1.1)
BUN SERPL-MCNC: 13 MG/DL (ref 8–23)
CALCIUM SERPL-MCNC: 8.4 MG/DL (ref 8.3–10.4)
CHLORIDE SERPL-SCNC: 105 MMOL/L (ref 101–110)
CO2 SERPL-SCNC: 27 MMOL/L (ref 21–32)
CREAT SERPL-MCNC: 0.8 MG/DL (ref 0.8–1.5)
DIFFERENTIAL METHOD BLD: ABNORMAL
EOSINOPHIL # BLD: 0.8 K/UL (ref 0–0.8)
EOSINOPHIL NFR BLD: 7 % (ref 0.5–7.8)
ERYTHROCYTE [DISTWIDTH] IN BLOOD BY AUTOMATED COUNT: 14.5 % (ref 11.9–14.6)
GLOBULIN SER CALC-MCNC: 3.8 G/DL (ref 2.8–4.5)
GLUCOSE SERPL-MCNC: 142 MG/DL (ref 65–100)
HCT VFR BLD AUTO: 37.3 % (ref 41.1–50.3)
HGB BLD-MCNC: 11.9 G/DL (ref 13.6–17.2)
IMM GRANULOCYTES # BLD AUTO: 0.1 K/UL (ref 0–0.5)
IMM GRANULOCYTES NFR BLD AUTO: 1 % (ref 0–5)
LYMPHOCYTES # BLD: 0.9 K/UL (ref 0.5–4.6)
LYMPHOCYTES NFR BLD: 8 % (ref 13–44)
MCH RBC QN AUTO: 25.1 PG (ref 26.1–32.9)
MCHC RBC AUTO-ENTMCNC: 31.9 G/DL (ref 31.4–35)
MCV RBC AUTO: 78.7 FL (ref 82–102)
MONOCYTES # BLD: 0.6 K/UL (ref 0.1–1.3)
MONOCYTES NFR BLD: 6 % (ref 4–12)
NEUTS SEG # BLD: 8.5 K/UL (ref 1.7–8.2)
NEUTS SEG NFR BLD: 78 % (ref 43–78)
NRBC # BLD: 0 K/UL (ref 0–0.2)
PLATELET # BLD AUTO: 239 K/UL (ref 150–450)
PMV BLD AUTO: 9.2 FL (ref 9.4–12.3)
POTASSIUM SERPL-SCNC: 3.7 MMOL/L (ref 3.5–5.1)
PROT SERPL-MCNC: 6.2 G/DL (ref 6.3–8.2)
RBC # BLD AUTO: 4.74 M/UL (ref 4.23–5.6)
SODIUM SERPL-SCNC: 137 MMOL/L (ref 133–143)
WBC # BLD AUTO: 11 K/UL (ref 4.3–11.1)

## 2023-09-07 PROCEDURE — 6360000002 HC RX W HCPCS: Performed by: INTERNAL MEDICINE

## 2023-09-07 PROCEDURE — 94640 AIRWAY INHALATION TREATMENT: CPT

## 2023-09-07 PROCEDURE — 85025 COMPLETE CBC W/AUTO DIFF WBC: CPT

## 2023-09-07 PROCEDURE — 80053 COMPREHEN METABOLIC PANEL: CPT

## 2023-09-07 PROCEDURE — 6360000002 HC RX W HCPCS: Performed by: STUDENT IN AN ORGANIZED HEALTH CARE EDUCATION/TRAINING PROGRAM

## 2023-09-07 PROCEDURE — 1100000000 HC RM PRIVATE

## 2023-09-07 PROCEDURE — 2580000003 HC RX 258: Performed by: INTERNAL MEDICINE

## 2023-09-07 PROCEDURE — 36415 COLL VENOUS BLD VENIPUNCTURE: CPT

## 2023-09-07 PROCEDURE — 2700000000 HC OXYGEN THERAPY PER DAY

## 2023-09-07 PROCEDURE — 6370000000 HC RX 637 (ALT 250 FOR IP): Performed by: FAMILY MEDICINE

## 2023-09-07 PROCEDURE — 94644 CONT INHLJ TX 1ST HOUR: CPT

## 2023-09-07 PROCEDURE — 6370000000 HC RX 637 (ALT 250 FOR IP): Performed by: INTERNAL MEDICINE

## 2023-09-07 PROCEDURE — 6370000000 HC RX 637 (ALT 250 FOR IP): Performed by: STUDENT IN AN ORGANIZED HEALTH CARE EDUCATION/TRAINING PROGRAM

## 2023-09-07 PROCEDURE — 94761 N-INVAS EAR/PLS OXIMETRY MLT: CPT

## 2023-09-07 RX ORDER — PANCRELIPASE 36000; 180000; 114000 [USP'U]/1; [USP'U]/1; [USP'U]/1
36000 CAPSULE, DELAYED RELEASE PELLETS ORAL
COMMUNITY
Start: 2023-08-05

## 2023-09-07 RX ORDER — DEXAMETHASONE 4 MG/1
6 TABLET ORAL DAILY
Status: DISCONTINUED | OUTPATIENT
Start: 2023-09-07 | End: 2023-09-07

## 2023-09-07 RX ADMIN — PANCRELIPASE LIPASE, PANCRELIPASE PROTEASE, PANCRELIPASE AMYLASE 20000 UNITS: 20000; 63000; 84000 CAPSULE, DELAYED RELEASE ORAL at 17:18

## 2023-09-07 RX ADMIN — ALBUTEROL SULFATE 2.5 MG: 2.5 SOLUTION RESPIRATORY (INHALATION) at 08:05

## 2023-09-07 RX ADMIN — TICAGRELOR 90 MG: 90 TABLET ORAL at 20:44

## 2023-09-07 RX ADMIN — PANCRELIPASE LIPASE, PANCRELIPASE PROTEASE, PANCRELIPASE AMYLASE 15000 UNITS: 5000; 17000; 24000 CAPSULE, DELAYED RELEASE ORAL at 17:18

## 2023-09-07 RX ADMIN — PANTOPRAZOLE SODIUM 40 MG: 40 TABLET, DELAYED RELEASE ORAL at 06:08

## 2023-09-07 RX ADMIN — MOMETASONE FUROATE AND FORMOTEROL FUMARATE DIHYDRATE 2 PUFF: 200; 5 AEROSOL RESPIRATORY (INHALATION) at 21:49

## 2023-09-07 RX ADMIN — ALBUTEROL SULFATE 2.5 MG: 2.5 SOLUTION RESPIRATORY (INHALATION) at 14:20

## 2023-09-07 RX ADMIN — LORAZEPAM 0.5 MG: 0.5 TABLET ORAL at 20:44

## 2023-09-07 RX ADMIN — PRAVASTATIN SODIUM 10 MG: 20 TABLET ORAL at 20:43

## 2023-09-07 RX ADMIN — LATANOPROST 1 DROP: 50 SOLUTION OPHTHALMIC at 21:03

## 2023-09-07 RX ADMIN — PANCRELIPASE LIPASE, PANCRELIPASE PROTEASE, PANCRELIPASE AMYLASE 5000 UNITS: 5000; 17000; 24000 CAPSULE, DELAYED RELEASE ORAL at 08:36

## 2023-09-07 RX ADMIN — TICAGRELOR 90 MG: 90 TABLET ORAL at 08:36

## 2023-09-07 RX ADMIN — TAMSULOSIN HYDROCHLORIDE 0.8 MG: 0.4 CAPSULE ORAL at 08:36

## 2023-09-07 RX ADMIN — ALBUTEROL SULFATE 2.5 MG: 2.5 SOLUTION RESPIRATORY (INHALATION) at 21:47

## 2023-09-07 RX ADMIN — OLANZAPINE 5 MG: 5 TABLET, ORALLY DISINTEGRATING ORAL at 17:18

## 2023-09-07 RX ADMIN — ACETAMINOPHEN 650 MG: 325 TABLET ORAL at 12:05

## 2023-09-07 RX ADMIN — DONEPEZIL HYDROCHLORIDE 5 MG: 5 TABLET, FILM COATED ORAL at 20:44

## 2023-09-07 RX ADMIN — GABAPENTIN 300 MG: 300 CAPSULE ORAL at 08:36

## 2023-09-07 RX ADMIN — GABAPENTIN 300 MG: 300 CAPSULE ORAL at 13:53

## 2023-09-07 RX ADMIN — ENOXAPARIN SODIUM 30 MG: 100 INJECTION SUBCUTANEOUS at 17:18

## 2023-09-07 RX ADMIN — QUETIAPINE FUMARATE 25 MG: 25 TABLET ORAL at 20:44

## 2023-09-07 RX ADMIN — PANCRELIPASE LIPASE, PANCRELIPASE PROTEASE, PANCRELIPASE AMYLASE 5000 UNITS: 5000; 17000; 24000 CAPSULE, DELAYED RELEASE ORAL at 12:05

## 2023-09-07 RX ADMIN — SODIUM CHLORIDE, PRESERVATIVE FREE 10 ML: 5 INJECTION INTRAVENOUS at 20:45

## 2023-09-07 RX ADMIN — GABAPENTIN 300 MG: 300 CAPSULE ORAL at 20:44

## 2023-09-07 RX ADMIN — SODIUM CHLORIDE, PRESERVATIVE FREE 10 ML: 5 INJECTION INTRAVENOUS at 08:37

## 2023-09-07 ASSESSMENT — PAIN DESCRIPTION - LOCATION: LOCATION: GENERALIZED

## 2023-09-07 ASSESSMENT — PAIN SCALES - GENERAL: PAINLEVEL_OUTOF10: 2

## 2023-09-07 ASSESSMENT — PAIN DESCRIPTION - DESCRIPTORS: DESCRIPTORS: ACHING

## 2023-09-07 NOTE — PLAN OF CARE
Problem: Pain  Goal: Verbalizes/displays adequate comfort level or baseline comfort level  Outcome: Progressing     Problem: Discharge Planning  Goal: Discharge to home or other facility with appropriate resources  Outcome: Progressing     Problem: Safety - Adult  Goal: Free from fall injury  Outcome: Progressing     Problem: ABCDS Injury Assessment  Goal: Absence of physical injury  Outcome: Progressing     Problem: Confusion  Goal: Confusion, delirium, dementia, or psychosis is improved or at baseline  Description: INTERVENTIONS:  1. Assess for possible contributors to thought disturbance, including medications, impaired vision or hearing, underlying metabolic abnormalities, dehydration, psychiatric diagnoses, and notify attending LIP  2. [place] high risk fall precautions, as indicated  3. Provide frequent short contacts to provide reality reorientation, refocusing and direction  4. Decrease environmental stimuli, including noise as appropriate  5. Monitor and intervene to maintain adequate nutrition, hydration, elimination, sleep and activity  6. If unable to ensure safety without constant attention obtain sitter and review sitter guidelines with assigned personnel  7.  Initiate Psychosocial CNS and Spiritual Care consult, as indicated  Outcome: Progressing     Problem: Respiratory - Adult  Goal: Achieves optimal ventilation and oxygenation  Outcome: Progressing

## 2023-09-07 NOTE — PROGRESS NOTES
Hospitalist Progress Note   Admit Date:  2023  1:38 PM   Name:  Lul Lyman   Age:  80 y.o. Sex:  male  :  1940   MRN:  [id number]   Room:  /    Presenting/Chief Complaint: No chief complaint on file. Reason(s) for Admission: Pneumothorax [J93.9]  Pneumothorax after biopsy [J95.811]     Hospital Course:   80 y.o. male with medical history of severe COPD on 2L baseline, BPH, dementia, who presented to outpatient IR for biopsy of RUL lung nodule. Patient had a chest CT on 8-3-2023 which showed a new 1.4 cm spiculated pulmonary nodule in the anterior aspect of the right upper lobe concerning for malignancy. Follow-up PET scan on 2023 showed moderate metabolic activity within the right upper lobe pulmonary nodule. No evidence of marco antonio or distant metastatic disease. After biopsy pt was noted to have pneumothorax so chest tube was placed. Subjective & 24hr Events:   Patient was seen and examined at the bedside. No overnight events. Patient diagnosed with COVID. Patient will no longer be going to short-term rehab as wife does not approve of the other rehab that would take COVID patients. Patient instead will go home with home health tomorrow . Patient without any complaints this morning. Assessment & Plan:   Pneumothorax after biopsy  Right-sided chest tube remains in place   chest x-ray with right small apical pneumothorax   with small apical pneumothorax  Continue chest tube to suction   To go to IR for CT clamping and reassessment  Chest tube management per interventional radiology  - chest tube to be clamped, follow-up IR recommendations  - patient to go to IR for upsizing chest tube  - Repeat chest x-ray yesterday with increased subcu emphysema tiny right apical pneumothorax after chest tube was clamped  -Chest tube to suction. Not much output.   Guidance and recommendations per interventional radiology  - chest x-ray with no Oral Q6H PRN    acetaminophen (TYLENOL) tablet 650 mg  650 mg Oral Q6H PRN    Or    acetaminophen (TYLENOL) suppository 650 mg  650 mg Rectal Q6H PRN    albuterol (PROVENTIL) (2.5 MG/3ML) 0.083% nebulizer solution 2.5 mg  2.5 mg Nebulization TID    donepezil (ARICEPT) tablet 5 mg  5 mg Oral Nightly    gabapentin (NEURONTIN) capsule 300 mg  300 mg Oral TID    latanoprost (XALATAN) 0.005 % ophthalmic solution 1 drop  1 drop Both Eyes QHS    LORazepam (ATIVAN) tablet 0.5 mg  0.5 mg Oral Daily PRN    pantoprazole (PROTONIX) tablet 40 mg  40 mg Oral Daily    pravastatin (PRAVACHOL) tablet 10 mg  10 mg Oral QHS    [Held by provider] polyethylene glycol (GLYCOLAX) packet 17 g  17 g Oral Daily    QUEtiapine (SEROQUEL) tablet 25 mg  25 mg Oral QHS    tamsulosin (FLOMAX) capsule 0.8 mg  0.8 mg Oral Daily    tiotropium (SPIRIVA RESPIMAT) 2.5 MCG/ACT inhaler 2 puff  2 puff Inhalation Daily RT    hydrALAZINE (APRESOLINE) injection 20 mg  20 mg IntraVENous Q4H PRN    sodium phosphate (FLEET) rectal enema 1 enema  1 enema Rectal Daily PRN    oxyCODONE (ROXICODONE) immediate release tablet 5 mg  5 mg Oral Q4H PRN    melatonin tablet 3 mg  3 mg Oral Nightly PRN    guaiFENesin-dextromethorphan (ROBITUSSIN DM) 100-10 MG/5ML syrup 10 mL  10 mL Oral Q4H PRN    cloNIDine (CATAPRES) tablet 0.1 mg  0.1 mg Oral Q4H PRN    QUEtiapine (SEROQUEL) tablet 25 mg  25 mg Oral BID PRN    OLANZapine (ZYPREXA) 5 mg in sterile water 1 mL injection  5 mg IntraMUSCular Q12H PRN    OLANZapine zydis (ZYPREXA) disintegrating tablet 5 mg  5 mg Oral BID PRN    mometasone-formoterol (DULERA) 200-5 MCG/ACT inhaler 2 puff  2 puff Inhalation BID RT       Signed:  Gretchen Jaime MD    Part of this note may have been written by using a voice dictation software. The note has been proof read but may still contain some grammatical/other typographical errors.

## 2023-09-07 NOTE — CARE COORDINATION
0915: Spoke with wife over the phone regarding discharge planning. Patient was planned to go to [facility] [place] for [place], however, patient tested positive for COVID-19 yesterday. [facility] does not take COVID positive patients at this time. We discussed options that do take COVID positive patients and wife requested referral be sent to [facility]. 11am: Received bed offer from [facility] for today. Frank SY for discharge orders. 12pm: Received phone call from wife, Jasmyn Pina, that she had looked at review of [facility] and she was not comfortable sending him there. I told her we had very limited options due to him being COVID positive. She inquired if she could get help at home and I informed her I would be happy to set up [address] [place] [address],[address]. Referral sent to [facility] and confirmed with Emery Cheadle that they can accommodate patient for PT/OT/RN services at discharge.

## 2023-09-07 NOTE — PROGRESS NOTES
Hourly rounds performed this shift. Bed lowered and locked. Call light within reach. Bed alarm on. All needs met at this time. Bedside report will be given to oncoming nurse.

## 2023-09-07 NOTE — PROGRESS NOTES
Comprehensive Nutrition Assessment    Type and Reason for Visit: [address] [place] for BMI <18.5    Nutrition Recommendations/Plan:   Meals and Snacks:  Diet: Continue current order Fruit and ice cream w all meals as pt will consume these  Nutrition Supplement Therapy:  Medical food supplement therapy:  Continue Ensure Enlive twice per day (this provides 350 kcal and 20 grams protein per bottle) and Magic Cup once per day (this provides 290 kcal and 9 grams protein per serving)     Malnutrition Assessment:  Malnutrition Status: Moderate malnutrition  Context: Chronic Illness  Findings of clinical characteristics of malnutrition:   Energy Intake:  Unable to assess (pt unable to recall quantifiable nutrition hx)  Weight Loss:  Greater than 10% over 6 months (17% loss since Dec per EMR records with majority occurign over the past 2 months)     Body Fat Loss:  Mild body fat loss Triceps, Buccal region, Fat Overlying Ribs, Orbital (moderate loss)   Muscle Mass Loss:  Mild muscle mass loss Temples (temporalis), Hand (interosseous), Calf (gastrocnemius), Clavicles (pectoralis & deltoids) (moderate loss)  Fluid Accumulation:  No significant fluid accumulation     Strength:  Not Performed     Nutrition Assessment:  Nutrition History: Pt tells me he lives with his wife in [place] and that he eats mainly vegetarian and drinks a protein drink from [employer]. Unable to provide any quantifiable nutrition hx. Pt does not know UBW, wt hx per EMR review as below. No family present. Do You Have Any Cultural, Caodaism, or Ethnic Food Preferences?: No   Nutrition Background:       PMH remarkable for severe COPD, BPH, dementia, presented outpt IR for biopsy of RUL nodule. Admitted with pneumothorax after biopsy, +R sided chest tube, pulmonary nodules 8/28 biopsy nondiagnostic, COPD, leukocytosis, bronchiectasis, Alzheimer's dementia, normocytic anemia. 8/30: CT clamped for 1 hour and PTX returned. inpatient      Goals:   Previous Goal Met: No Progress toward Goal(s)  Active Goal:  (Meet > 50% estimated needs by RD follow-up)       Nutrition Monitoring and Evaluation:      Food/Nutrient Intake Outcomes: Food and Nutrient Intake, Supplement Intake  Physical Signs/Symptoms Outcomes: Biochemical Data, GI Status, Meal Time Behavior, Weight    Discharge Planning:     Too soon to determine    YULISSA LOPEZ, RD

## 2023-09-07 NOTE — PROGRESS NOTES
Physician Progress Note      PATIENT:               Dave Aguilar  CSN #:                  [id number]  :                       1940  ADMIT DATE:       2023 1:38 PM  [address] DATE:  RESPONDING  PROVIDER #:        Ahmet Blankenship MD          QUERY TEXT:    Patient admitted with pneumothorax and has moderate malnutrition documented in   the  RD Note. If possible, please document in progress notes and discharge   summary if you are evaluating and /or treating any of the following: The medical record reflects the following:  Risk Factors: 80 yr, chronic illness, severe COPD, dementia    Clinical Indicators: Per RD note on \"Weight Loss Greater than 10% over 6   months, Body Fat Loss Mild body fat loss Triceps, Buccal region, Fat Overlying   Ribs, Orbital moderate loss, Muscle Mass Loss Mild muscle mass loss Temples   temporalis, Hand interosseous, Calf gastrocnemius, Clavicles pectoralis &   deltoids moderate loss    Treatment: RD alert for BMI less than 18.5, Ensure Enlive twice per day, Magic   Cup daily    ASPEN Criteria:    https://aspenjournals. onlinelibrary. camejo. com/doi/full/10.1177/339426728240780  5  Options provided:  -- Protein calorie malnutrition moderate  -- Other - I will add my own diagnosis  -- Disagree - Not applicable / Not valid  -- Disagree - Clinically unable to determine / Unknown  -- Refer to Clinical Documentation Reviewer    PROVIDER RESPONSE TEXT:    This patient has moderate protein calorie malnutrition.     Query created by: Noah Meraz on 2023 12:24 PM      Electronically signed by:  Ahmet Blankenship MD 2023 3:51 PM

## 2023-09-07 NOTE — PROGRESS NOTES
Pt pulled IV this shift new IV placed. Pt started to become agitated & redirected multiple times. .Pt is resting in bed Hourly rounds completed and all needs met. Bed is low, locked, bed alarm on, call light is in reach and pt is encouraged to call for assistance.

## 2023-09-08 VITALS
SYSTOLIC BLOOD PRESSURE: 139 MMHG | DIASTOLIC BLOOD PRESSURE: 74 MMHG | HEIGHT: 66 IN | BODY MASS INDEX: 17.18 KG/M2 | HEART RATE: 90 BPM | TEMPERATURE: 99.1 F | OXYGEN SATURATION: 92 % | RESPIRATION RATE: 18 BRPM | WEIGHT: 106.92 LBS

## 2023-09-08 LAB
ALBUMIN SERPL-MCNC: 2.5 G/DL (ref 3.2–4.6)
ALBUMIN/GLOB SERPL: 0.7 (ref 0.4–1.6)
ALP SERPL-CCNC: 70 U/L (ref 50–136)
ALT SERPL-CCNC: 24 U/L (ref 12–65)
ANION GAP SERPL CALC-SCNC: 4 MMOL/L (ref 2–11)
AST SERPL-CCNC: 24 U/L (ref 15–37)
BASOPHILS # BLD: 0 K/UL (ref 0–0.2)
BASOPHILS NFR BLD: 0 % (ref 0–2)
BILIRUB SERPL-MCNC: 0.3 MG/DL (ref 0.2–1.1)
BUN SERPL-MCNC: 12 MG/DL (ref 8–23)
CALCIUM SERPL-MCNC: 8.7 MG/DL (ref 8.3–10.4)
CHLORIDE SERPL-SCNC: 107 MMOL/L (ref 101–110)
CO2 SERPL-SCNC: 29 MMOL/L (ref 21–32)
CREAT SERPL-MCNC: 0.6 MG/DL (ref 0.8–1.5)
DIFFERENTIAL METHOD BLD: ABNORMAL
EOSINOPHIL # BLD: 0.8 K/UL (ref 0–0.8)
EOSINOPHIL NFR BLD: 10 % (ref 0.5–7.8)
ERYTHROCYTE [DISTWIDTH] IN BLOOD BY AUTOMATED COUNT: 14.5 % (ref 11.9–14.6)
GLOBULIN SER CALC-MCNC: 3.8 G/DL (ref 2.8–4.5)
GLUCOSE SERPL-MCNC: 109 MG/DL (ref 65–100)
HCT VFR BLD AUTO: 38.8 % (ref 41.1–50.3)
HGB BLD-MCNC: 12 G/DL (ref 13.6–17.2)
IMM GRANULOCYTES # BLD AUTO: 0.1 K/UL (ref 0–0.5)
IMM GRANULOCYTES NFR BLD AUTO: 1 % (ref 0–5)
LYMPHOCYTES # BLD: 1.4 K/UL (ref 0.5–4.6)
LYMPHOCYTES NFR BLD: 17 % (ref 13–44)
MAGNESIUM SERPL-MCNC: 2.2 MG/DL (ref 1.8–2.4)
MCH RBC QN AUTO: 24.4 PG (ref 26.1–32.9)
MCHC RBC AUTO-ENTMCNC: 30.9 G/DL (ref 31.4–35)
MCV RBC AUTO: 78.9 FL (ref 82–102)
MONOCYTES # BLD: 0.8 K/UL (ref 0.1–1.3)
MONOCYTES NFR BLD: 10 % (ref 4–12)
NEUTS SEG # BLD: 5 K/UL (ref 1.7–8.2)
NEUTS SEG NFR BLD: 62 % (ref 43–78)
NRBC # BLD: 0 K/UL (ref 0–0.2)
PLATELET # BLD AUTO: 219 K/UL (ref 150–450)
PMV BLD AUTO: 9.4 FL (ref 9.4–12.3)
POTASSIUM SERPL-SCNC: 3.3 MMOL/L (ref 3.5–5.1)
PROT SERPL-MCNC: 6.3 G/DL (ref 6.3–8.2)
RBC # BLD AUTO: 4.92 M/UL (ref 4.23–5.6)
SODIUM SERPL-SCNC: 140 MMOL/L (ref 133–143)
WBC # BLD AUTO: 8 K/UL (ref 4.3–11.1)

## 2023-09-08 PROCEDURE — 85025 COMPLETE CBC W/AUTO DIFF WBC: CPT

## 2023-09-08 PROCEDURE — 6370000000 HC RX 637 (ALT 250 FOR IP): Performed by: INTERNAL MEDICINE

## 2023-09-08 PROCEDURE — 2500000003 HC RX 250 WO HCPCS: Performed by: INTERNAL MEDICINE

## 2023-09-08 PROCEDURE — 6360000002 HC RX W HCPCS: Performed by: INTERNAL MEDICINE

## 2023-09-08 PROCEDURE — 80053 COMPREHEN METABOLIC PANEL: CPT

## 2023-09-08 PROCEDURE — 94760 N-INVAS EAR/PLS OXIMETRY 1: CPT

## 2023-09-08 PROCEDURE — 2580000003 HC RX 258: Performed by: INTERNAL MEDICINE

## 2023-09-08 PROCEDURE — 83735 ASSAY OF MAGNESIUM: CPT

## 2023-09-08 PROCEDURE — 36415 COLL VENOUS BLD VENIPUNCTURE: CPT

## 2023-09-08 PROCEDURE — 6370000000 HC RX 637 (ALT 250 FOR IP): Performed by: STUDENT IN AN ORGANIZED HEALTH CARE EDUCATION/TRAINING PROGRAM

## 2023-09-08 PROCEDURE — 94640 AIRWAY INHALATION TREATMENT: CPT

## 2023-09-08 RX ADMIN — TAMSULOSIN HYDROCHLORIDE 0.8 MG: 0.4 CAPSULE ORAL at 10:01

## 2023-09-08 RX ADMIN — TICAGRELOR 90 MG: 90 TABLET ORAL at 10:00

## 2023-09-08 RX ADMIN — POTASSIUM CHLORIDE 40 MEQ: 1500 TABLET, EXTENDED RELEASE ORAL at 10:00

## 2023-09-08 RX ADMIN — MOMETASONE FUROATE AND FORMOTEROL FUMARATE DIHYDRATE 2 PUFF: 200; 5 AEROSOL RESPIRATORY (INHALATION) at 08:05

## 2023-09-08 RX ADMIN — PANCRELIPASE LIPASE, PANCRELIPASE PROTEASE, PANCRELIPASE AMYLASE 20000 UNITS: 20000; 63000; 84000 CAPSULE, DELAYED RELEASE ORAL at 10:00

## 2023-09-08 RX ADMIN — GABAPENTIN 300 MG: 300 CAPSULE ORAL at 10:00

## 2023-09-08 RX ADMIN — ALBUTEROL SULFATE 2.5 MG: 2.5 SOLUTION RESPIRATORY (INHALATION) at 08:04

## 2023-09-08 RX ADMIN — TIOTROPIUM BROMIDE INHALATION SPRAY 2 PUFF: 3.12 SPRAY, METERED RESPIRATORY (INHALATION) at 08:04

## 2023-09-08 RX ADMIN — SODIUM CHLORIDE, PRESERVATIVE FREE 10 ML: 5 INJECTION INTRAVENOUS at 10:01

## 2023-09-08 RX ADMIN — PANTOPRAZOLE SODIUM 40 MG: 40 TABLET, DELAYED RELEASE ORAL at 05:46

## 2023-09-08 RX ADMIN — WATER 5 MG: 1 INJECTION INTRAMUSCULAR; INTRAVENOUS; SUBCUTANEOUS at 00:28

## 2023-09-08 RX ADMIN — PANCRELIPASE LIPASE, PANCRELIPASE PROTEASE, PANCRELIPASE AMYLASE 15000 UNITS: 5000; 17000; 24000 CAPSULE, DELAYED RELEASE ORAL at 09:59

## 2023-09-08 NOTE — DISCHARGE SUMMARY
Hospitalist Discharge Summary   Admit Date:  2023  1:38 PM   DC Note date: 2023  Name:  Yuan Lyman   Age:  80 y.o. Sex:  male  :  1940   MRN:  [id number]   Room:  [facility]  PCP:  Betzy Adamson DO    Presenting Complaint: No chief complaint on file. Initial Admission Diagnosis: Pneumothorax [J93.9]  Pneumothorax after biopsy [J95.811]     Problem List for this Hospitalization (present on admission):    Principal Problem:    Pneumothorax after biopsy  Active Problems:    Bilateral hearing loss    Pulmonary nodule    COPD, severe ([address])    DNR (do not resuscitate)    Thoracic aortic aneurysm (TAA) ([address])    Benign prostatic hyperplasia with urinary frequency    Bronchiectasis (HCC)    Leukocytosis    Alzheimer's dementia ([address])    Normocytic anemia    COVID-19  Resolved Problems:    * No resolved hospital problems. *      Hospital Course:  80 y.o. male with medical history of severe COPD on 2L baseline, BPH, dementia, who presented to outpatient IR for biopsy of RUL lung nodule. Patient had a chest CT on 8-3-2023 which showed a new 1.4 cm spiculated pulmonary nodule in the anterior aspect of the right upper lobe concerning for malignancy. Follow-up PET scan on 2023 showed moderate metabolic activity within the right upper lobe pulmonary nodule. No evidence of marco antonio or distant metastatic disease. Patient underwent IR guided biopsy and after biopsy pt was noted to have pneumothorax so chest tube was placed and hospitalist consulted for admission. Chest tube was clamped  and noted with increase in right apical pneumothorax. IR upsized chest tube on . Chest tube was clamped on , repeat chest x-ray with no pneumothorax. IR removed chest tube, patient remained stable.  biopsy was nondiagnostic. Interventional radiology wanted to do another biopsy but wife declined. PT/OT recommended rehab on discharge.   Patient came positive for COVID prior to going to short-term

## 2023-09-08 NOTE — CARE COORDINATION
09/08/23 1123   [address] Discharge   Transition of Care Consult (CM Consult) [facility] No   Reason Outside Agency Chosen Script used patient chose alternate agency   [address] [employer] Time of Discharge 1200   Condition of Participation: Discharge Planning   The Plan for Transition of Care is related to the following treatment goals: home with home health   The Patient and/or Patient Representative was provided with a Choice of Provider? Patient   The Patient and/Or Patient Representative agree with the Discharge Plan? Yes   Freedom of Choice list was provided with basic dialogue that supports the patient's individualized plan of care/goals, treatment preferences, and shares the quality data associated with the providers? Yes     Patient discharging home with spouse and [facility]. Transportation arranged for a  time of 1200. No further CM needs.     [place] Karla SARAVIA, ACM  [facility]

## 2023-09-09 DIAGNOSIS — F51.04 PSYCHOPHYSIOLOGIC INSOMNIA: ICD-10-CM

## 2023-09-09 DIAGNOSIS — G30.1 ALZHEIMER'S DISEASE WITH LATE ONSET (CODE) (HCC): ICD-10-CM

## 2023-09-11 ENCOUNTER — CARE COORDINATION (OUTPATIENT)
Dept: CARE COORDINATION | Facility: CLINIC | Age: 83
End: 2023-09-11

## 2023-09-11 RX ORDER — DONEPEZIL HYDROCHLORIDE 5 MG/1
TABLET, FILM COATED ORAL
Qty: 90 TABLET | Refills: 1 | Status: SHIPPED | OUTPATIENT
Start: 2023-09-11

## 2023-09-11 RX ORDER — ALBUTEROL SULFATE 2.5 MG/3ML
SOLUTION RESPIRATORY (INHALATION)
Qty: 300 ML | Refills: 5 | OUTPATIENT
Start: 2023-09-11

## 2023-09-11 RX ORDER — TRAZODONE HYDROCHLORIDE 50 MG/1
TABLET ORAL
Qty: 90 TABLET | Refills: 1 | OUTPATIENT
Start: 2023-09-11

## 2023-09-11 NOTE — CARE COORDINATION
[facility] Care Transitions Initial Follow Up Call    Call within 2 business days of discharge: Yes    Patient Current Location:  Home: [address]    Care Transition Nurse contacted the patient and spouse  by telephone to perform post hospital discharge assessment. Verified name and  with  spouse  as identifiers. Provided introduction to self, and explanation of the Care Transition Nurse role. Patient: Miya Lyman Patient : 1940   MRN: [id number]  Reason for Admission: Pulmonary nodule; COVID  Discharge Date: 23 RARS: Readmission Risk Score: 12.7      Last Discharge [address] [facility]       Date Complaint Diagnosis Description Type Department Provider    23  Pulmonary nodule Admission (Discharged) from [facility] Caryle Hector, MD            Was this an external facility discharge? No Discharge Facility:     Challenges to be reviewed by the provider   Additional needs identified to be addressed with provider: No  none               Method of communication with provider: none. Spoke with patient's wife, Meeta Ding, noted in Media tab with signature from patient on an ROSA form. Per notes in Epic, patient has dementia. Spouse reports that patient is doing well. He is dependent on ADL's and spouse assists patient with medications. Reports that patient has some episodes of incontinence. He is doing better today. Discussed patient's ability to attend an appointment with providers. Spouse states that she will be able to take him if he continues to feel better and able to get into the car, etc.  Continues to use Albuterol, COVID positive. Unable to discharge to [facility] because of COVID positive test.  Home health services start of care 9/10/2023. Care Transition Nurse reviewed discharge instructions with  spouse  who verbalized understanding. The  spouse  was given an opportunity to ask questions and does not have any further questions or concerns at this time.  Were

## 2023-09-12 ENCOUNTER — TELEPHONE (OUTPATIENT)
Dept: INTERNAL MEDICINE CLINIC | Facility: CLINIC | Age: 83
End: 2023-09-12

## 2023-09-12 ENCOUNTER — HOSPITAL ENCOUNTER (INPATIENT)
Age: 83
LOS: 2 days | Discharge: HOME | DRG: 871 | End: 2023-09-14
Attending: STUDENT IN AN ORGANIZED HEALTH CARE EDUCATION/TRAINING PROGRAM | Admitting: FAMILY MEDICINE
Payer: MEDICARE

## 2023-09-12 ENCOUNTER — APPOINTMENT (OUTPATIENT)
Dept: GENERAL RADIOLOGY | Age: 83
DRG: 871 | End: 2023-09-12
Payer: MEDICARE

## 2023-09-12 ENCOUNTER — APPOINTMENT (OUTPATIENT)
Dept: CT IMAGING | Age: 83
DRG: 871 | End: 2023-09-12
Payer: MEDICARE

## 2023-09-12 DIAGNOSIS — U07.1 COVID-19: Primary | ICD-10-CM

## 2023-09-12 DIAGNOSIS — J18.9 PNEUMONIA DUE TO INFECTIOUS ORGANISM, UNSPECIFIED LATERALITY, UNSPECIFIED PART OF LUNG: ICD-10-CM

## 2023-09-12 LAB
ALBUMIN SERPL-MCNC: 2.5 G/DL (ref 3.2–4.6)
ALBUMIN/GLOB SERPL: 0.6 (ref 0.4–1.6)
ALP SERPL-CCNC: 75 U/L (ref 50–136)
ALT SERPL-CCNC: 37 U/L (ref 12–65)
ANION GAP SERPL CALC-SCNC: 9 MMOL/L (ref 2–11)
AST SERPL-CCNC: 35 U/L (ref 15–37)
BASOPHILS # BLD: 0 K/UL (ref 0–0.2)
BASOPHILS NFR BLD: 0 % (ref 0–2)
BILIRUB SERPL-MCNC: 0.3 MG/DL (ref 0.2–1.1)
BUN SERPL-MCNC: 17 MG/DL (ref 8–23)
CALCIUM SERPL-MCNC: 9.1 MG/DL (ref 8.3–10.4)
CHLORIDE SERPL-SCNC: 108 MMOL/L (ref 101–110)
CO2 SERPL-SCNC: 22 MMOL/L (ref 21–32)
CREAT SERPL-MCNC: 0.6 MG/DL (ref 0.8–1.5)
DIFFERENTIAL METHOD BLD: ABNORMAL
EKG ATRIAL RATE: 121 BPM
EKG DIAGNOSIS: NORMAL
EKG P AXIS: 79 DEGREES
EKG P-R INTERVAL: 138 MS
EKG Q-T INTERVAL: 310 MS
EKG QRS DURATION: 92 MS
EKG QTC CALCULATION (BAZETT): 440 MS
EKG R AXIS: 33 DEGREES
EKG T AXIS: 61 DEGREES
EKG VENTRICULAR RATE: 121 BPM
EOSINOPHIL # BLD: 0.1 K/UL (ref 0–0.8)
EOSINOPHIL NFR BLD: 1 % (ref 0.5–7.8)
ERYTHROCYTE [DISTWIDTH] IN BLOOD BY AUTOMATED COUNT: 14.6 % (ref 11.9–14.6)
GLOBULIN SER CALC-MCNC: 4 G/DL (ref 2.8–4.5)
GLUCOSE SERPL-MCNC: 147 MG/DL (ref 65–100)
HCT VFR BLD AUTO: 32.7 % (ref 41.1–50.3)
HGB BLD-MCNC: 10.6 G/DL (ref 13.6–17.2)
IMM GRANULOCYTES # BLD AUTO: 0.1 K/UL (ref 0–0.5)
IMM GRANULOCYTES NFR BLD AUTO: 1 % (ref 0–5)
LACTATE SERPL-SCNC: 1.8 MMOL/L (ref 0.4–2)
LYMPHOCYTES # BLD: 0.7 K/UL (ref 0.5–4.6)
LYMPHOCYTES NFR BLD: 6 % (ref 13–44)
MCH RBC QN AUTO: 24.7 PG (ref 26.1–32.9)
MCHC RBC AUTO-ENTMCNC: 32.4 G/DL (ref 31.4–35)
MCV RBC AUTO: 76 FL (ref 82–102)
MONOCYTES # BLD: 0.9 K/UL (ref 0.1–1.3)
MONOCYTES NFR BLD: 8 % (ref 4–12)
NEUTS SEG # BLD: 9.6 K/UL (ref 1.7–8.2)
NEUTS SEG NFR BLD: 84 % (ref 43–78)
NRBC # BLD: 0 K/UL (ref 0–0.2)
PLATELET # BLD AUTO: 372 K/UL (ref 150–450)
PMV BLD AUTO: 9.2 FL (ref 9.4–12.3)
POTASSIUM SERPL-SCNC: 3.6 MMOL/L (ref 3.5–5.1)
PROT SERPL-MCNC: 6.5 G/DL (ref 6.3–8.2)
RBC # BLD AUTO: 4.3 M/UL (ref 4.23–5.6)
SODIUM SERPL-SCNC: 139 MMOL/L (ref 133–143)
TROPONIN I SERPL HS-MCNC: 8 PG/ML (ref 0–14)
WBC # BLD AUTO: 11.5 K/UL (ref 4.3–11.1)

## 2023-09-12 PROCEDURE — 80053 COMPREHEN METABOLIC PANEL: CPT

## 2023-09-12 PROCEDURE — 99285 EMERGENCY DEPT VISIT HI MDM: CPT

## 2023-09-12 PROCEDURE — 6360000002 HC RX W HCPCS: Performed by: FAMILY MEDICINE

## 2023-09-12 PROCEDURE — 2700000000 HC OXYGEN THERAPY PER DAY

## 2023-09-12 PROCEDURE — 2580000003 HC RX 258: Performed by: STUDENT IN AN ORGANIZED HEALTH CARE EDUCATION/TRAINING PROGRAM

## 2023-09-12 PROCEDURE — 6360000004 HC RX CONTRAST MEDICATION: Performed by: STUDENT IN AN ORGANIZED HEALTH CARE EDUCATION/TRAINING PROGRAM

## 2023-09-12 PROCEDURE — 93005 ELECTROCARDIOGRAM TRACING: CPT | Performed by: STUDENT IN AN ORGANIZED HEALTH CARE EDUCATION/TRAINING PROGRAM

## 2023-09-12 PROCEDURE — 6370000000 HC RX 637 (ALT 250 FOR IP): Performed by: FAMILY MEDICINE

## 2023-09-12 PROCEDURE — 85025 COMPLETE CBC W/AUTO DIFF WBC: CPT

## 2023-09-12 PROCEDURE — 83605 ASSAY OF LACTIC ACID: CPT

## 2023-09-12 PROCEDURE — 87040 BLOOD CULTURE FOR BACTERIA: CPT

## 2023-09-12 PROCEDURE — 71260 CT THORAX DX C+: CPT

## 2023-09-12 PROCEDURE — 93010 ELECTROCARDIOGRAM REPORT: CPT | Performed by: INTERNAL MEDICINE

## 2023-09-12 PROCEDURE — 94760 N-INVAS EAR/PLS OXIMETRY 1: CPT

## 2023-09-12 PROCEDURE — 71045 X-RAY EXAM CHEST 1 VIEW: CPT

## 2023-09-12 PROCEDURE — 1100000000 HC RM PRIVATE

## 2023-09-12 PROCEDURE — 84484 ASSAY OF TROPONIN QUANT: CPT

## 2023-09-12 RX ORDER — LORAZEPAM 0.5 MG/1
0.5 TABLET ORAL DAILY PRN
Status: DISCONTINUED | OUTPATIENT
Start: 2023-09-12 | End: 2023-09-14 | Stop reason: HOSPADM

## 2023-09-12 RX ORDER — ACETAMINOPHEN 325 MG/1
650 TABLET ORAL EVERY 6 HOURS PRN
Status: DISCONTINUED | OUTPATIENT
Start: 2023-09-12 | End: 2023-09-14 | Stop reason: HOSPADM

## 2023-09-12 RX ORDER — QUETIAPINE FUMARATE 25 MG/1
25 TABLET, FILM COATED ORAL
Status: DISCONTINUED | OUTPATIENT
Start: 2023-09-12 | End: 2023-09-14 | Stop reason: HOSPADM

## 2023-09-12 RX ORDER — PANTOPRAZOLE SODIUM 40 MG/1
40 TABLET, DELAYED RELEASE ORAL DAILY
Status: DISCONTINUED | OUTPATIENT
Start: 2023-09-13 | End: 2023-09-14 | Stop reason: HOSPADM

## 2023-09-12 RX ORDER — ALBUTEROL SULFATE 2.5 MG/3ML
2.5 SOLUTION RESPIRATORY (INHALATION) EVERY 6 HOURS PRN
Qty: 120 EACH | Refills: 11 | Status: SHIPPED | OUTPATIENT
Start: 2023-09-12

## 2023-09-12 RX ORDER — ENOXAPARIN SODIUM 100 MG/ML
40 INJECTION SUBCUTANEOUS DAILY
Status: DISCONTINUED | OUTPATIENT
Start: 2023-09-13 | End: 2023-09-14 | Stop reason: HOSPADM

## 2023-09-12 RX ORDER — 0.9 % SODIUM CHLORIDE 0.9 %
1000 INTRAVENOUS SOLUTION INTRAVENOUS
Status: COMPLETED | OUTPATIENT
Start: 2023-09-12 | End: 2023-09-12

## 2023-09-12 RX ORDER — ALBUTEROL SULFATE 2.5 MG/3ML
2.5 SOLUTION RESPIRATORY (INHALATION) EVERY 6 HOURS PRN
Status: DISCONTINUED | OUTPATIENT
Start: 2023-09-12 | End: 2023-09-14 | Stop reason: HOSPADM

## 2023-09-12 RX ORDER — NICOTINE 21 MG/24HR
1 PATCH, TRANSDERMAL 24 HOURS TRANSDERMAL DAILY PRN
Status: DISCONTINUED | OUTPATIENT
Start: 2023-09-12 | End: 2023-09-14 | Stop reason: HOSPADM

## 2023-09-12 RX ORDER — GUAIFENESIN/DEXTROMETHORPHAN 100-10MG/5
10 SYRUP ORAL EVERY 4 HOURS PRN
Status: DISCONTINUED | OUTPATIENT
Start: 2023-09-12 | End: 2023-09-14 | Stop reason: HOSPADM

## 2023-09-12 RX ORDER — DONEPEZIL HYDROCHLORIDE 5 MG/1
5 TABLET, FILM COATED ORAL NIGHTLY
Status: DISCONTINUED | OUTPATIENT
Start: 2023-09-12 | End: 2023-09-14 | Stop reason: HOSPADM

## 2023-09-12 RX ORDER — 0.9 % SODIUM CHLORIDE 0.9 %
100 INTRAVENOUS SOLUTION INTRAVENOUS
Status: DISCONTINUED | OUTPATIENT
Start: 2023-09-12 | End: 2023-09-14 | Stop reason: HOSPADM

## 2023-09-12 RX ORDER — SODIUM CHLORIDE 0.9 % (FLUSH) 0.9 %
5-40 SYRINGE (ML) INJECTION EVERY 12 HOURS SCHEDULED
Status: DISCONTINUED | OUTPATIENT
Start: 2023-09-12 | End: 2023-09-14 | Stop reason: HOSPADM

## 2023-09-12 RX ORDER — POLYETHYLENE GLYCOL 3350 17 G/17G
17 POWDER, FOR SOLUTION ORAL DAILY
Status: DISCONTINUED | OUTPATIENT
Start: 2023-09-13 | End: 2023-09-14 | Stop reason: HOSPADM

## 2023-09-12 RX ORDER — ONDANSETRON 4 MG/1
4 TABLET, ORALLY DISINTEGRATING ORAL EVERY 8 HOURS PRN
Status: DISCONTINUED | OUTPATIENT
Start: 2023-09-12 | End: 2023-09-14 | Stop reason: HOSPADM

## 2023-09-12 RX ORDER — ONDANSETRON 2 MG/ML
4 INJECTION INTRAMUSCULAR; INTRAVENOUS EVERY 6 HOURS PRN
Status: DISCONTINUED | OUTPATIENT
Start: 2023-09-12 | End: 2023-09-14 | Stop reason: HOSPADM

## 2023-09-12 RX ORDER — TAMSULOSIN HYDROCHLORIDE 0.4 MG/1
0.8 CAPSULE ORAL DAILY
Status: DISCONTINUED | OUTPATIENT
Start: 2023-09-13 | End: 2023-09-14 | Stop reason: HOSPADM

## 2023-09-12 RX ORDER — POLYETHYLENE GLYCOL 3350 17 G/17G
17 POWDER, FOR SOLUTION ORAL DAILY PRN
Status: DISCONTINUED | OUTPATIENT
Start: 2023-09-12 | End: 2023-09-14 | Stop reason: HOSPADM

## 2023-09-12 RX ORDER — DEXAMETHASONE 4 MG/1
6 TABLET ORAL DAILY
Status: DISCONTINUED | OUTPATIENT
Start: 2023-09-12 | End: 2023-09-14 | Stop reason: HOSPADM

## 2023-09-12 RX ORDER — BISACODYL 10 MG
10 SUPPOSITORY, RECTAL RECTAL DAILY PRN
Status: DISCONTINUED | OUTPATIENT
Start: 2023-09-12 | End: 2023-09-14 | Stop reason: HOSPADM

## 2023-09-12 RX ORDER — PRAVASTATIN SODIUM 20 MG
10 TABLET ORAL NIGHTLY
Status: DISCONTINUED | OUTPATIENT
Start: 2023-09-12 | End: 2023-09-14 | Stop reason: HOSPADM

## 2023-09-12 RX ORDER — SODIUM CHLORIDE 9 MG/ML
INJECTION, SOLUTION INTRAVENOUS PRN
Status: DISCONTINUED | OUTPATIENT
Start: 2023-09-12 | End: 2023-09-14 | Stop reason: HOSPADM

## 2023-09-12 RX ORDER — SODIUM CHLORIDE 0.9 % (FLUSH) 0.9 %
10 SYRINGE (ML) INJECTION
Status: DISCONTINUED | OUTPATIENT
Start: 2023-09-12 | End: 2023-09-14 | Stop reason: HOSPADM

## 2023-09-12 RX ORDER — GABAPENTIN 300 MG/1
300 CAPSULE ORAL 3 TIMES DAILY
Status: DISCONTINUED | OUTPATIENT
Start: 2023-09-12 | End: 2023-09-14 | Stop reason: HOSPADM

## 2023-09-12 RX ORDER — MAGNESIUM HYDROXIDE/ALUMINUM HYDROXICE/SIMETHICONE 120; 1200; 1200 MG/30ML; MG/30ML; MG/30ML
30 SUSPENSION ORAL EVERY 6 HOURS PRN
Status: DISCONTINUED | OUTPATIENT
Start: 2023-09-12 | End: 2023-09-14 | Stop reason: HOSPADM

## 2023-09-12 RX ORDER — ACETAMINOPHEN 650 MG/1
650 SUPPOSITORY RECTAL EVERY 6 HOURS PRN
Status: DISCONTINUED | OUTPATIENT
Start: 2023-09-12 | End: 2023-09-14 | Stop reason: HOSPADM

## 2023-09-12 RX ORDER — SODIUM CHLORIDE 0.9 % (FLUSH) 0.9 %
5-40 SYRINGE (ML) INJECTION PRN
Status: DISCONTINUED | OUTPATIENT
Start: 2023-09-12 | End: 2023-09-14 | Stop reason: HOSPADM

## 2023-09-12 RX ORDER — ASPIRIN 81 MG/1
81 TABLET ORAL DAILY
Status: DISCONTINUED | OUTPATIENT
Start: 2023-09-13 | End: 2023-09-14 | Stop reason: HOSPADM

## 2023-09-12 RX ORDER — FAMOTIDINE 20 MG/1
10 TABLET, FILM COATED ORAL 2 TIMES DAILY PRN
Status: DISCONTINUED | OUTPATIENT
Start: 2023-09-12 | End: 2023-09-14 | Stop reason: HOSPADM

## 2023-09-12 RX ORDER — LATANOPROST 50 UG/ML
1 SOLUTION/ DROPS OPHTHALMIC NIGHTLY
Status: DISCONTINUED | OUTPATIENT
Start: 2023-09-12 | End: 2023-09-14 | Stop reason: HOSPADM

## 2023-09-12 RX ADMIN — DONEPEZIL HYDROCHLORIDE 5 MG: 5 TABLET, FILM COATED ORAL at 23:19

## 2023-09-12 RX ADMIN — GABAPENTIN 300 MG: 300 CAPSULE ORAL at 23:14

## 2023-09-12 RX ADMIN — QUETIAPINE FUMARATE 25 MG: 25 TABLET ORAL at 23:19

## 2023-09-12 RX ADMIN — IOPAMIDOL 100 ML: 755 INJECTION, SOLUTION INTRAVENOUS at 20:01

## 2023-09-12 RX ADMIN — SODIUM CHLORIDE 1000 ML: 9 INJECTION, SOLUTION INTRAVENOUS at 22:16

## 2023-09-12 RX ADMIN — DEXAMETHASONE 6 MG: 4 TABLET ORAL at 23:16

## 2023-09-12 RX ADMIN — PRAVASTATIN SODIUM 10 MG: 20 TABLET ORAL at 23:15

## 2023-09-12 ASSESSMENT — LIFESTYLE VARIABLES
HOW OFTEN DO YOU HAVE A DRINK CONTAINING ALCOHOL: NEVER
HOW MANY STANDARD DRINKS CONTAINING ALCOHOL DO YOU HAVE ON A TYPICAL DAY: PATIENT DOES NOT DRINK

## 2023-09-12 NOTE — TELEPHONE ENCOUNTER
Patient was discharged on Friday. He was COVID positive when he left hospital.  He is having to wear his 02 on the time.   Using his nebulizer and wife is concerned about him

## 2023-09-12 NOTE — TELEPHONE ENCOUNTER
Patients wife called and states that the patient was in the hospital for covid and has some questions about the patients oxygen. Please Advise.

## 2023-09-12 NOTE — TELEPHONE ENCOUNTER
----- Message from Yanira Walsh. Esmer sent at 9/11/2023  9:50 AM EDT -----  Regarding: albuterol 2.5mg/3ml  Contact: [phone] [address] is totally out of this medication and needs a refill  He is using his rescue inhaler multiple times a day since his return home on Friday. Would appreciate a refill sent to [facility] in [address] [place] [address] today [phone].   (for Nebulizer)  Thank you,  Shana Lyman

## 2023-09-12 NOTE — ED TRIAGE NOTES
Pt. Presents by ems from home called out for SOB and Covid+ (tested + 2 days ago). PmHx of COPD, dementia, and diabetes. Was satting at 80 on RA when ems arrived and so they placed him on 4 L. A&Ox3.  121/71  121bpm    97% on 4 L.

## 2023-09-12 NOTE — ED PROVIDER NOTES
Emergency Department Provider Note       PCP: Cheryle Hill DO   Age: 80 y.o. Sex: male     Tim Islas Admitted 09/12/2023 10:00:42 PM       ICD-10-CM    1. COVID-19  U07.1       2. Pneumonia due to infectious organism, unspecified laterality, unspecified part of lung  J18.9           Medical Decision Making     Complexity of Problems Addressed:  1 or more acute illnesses that pose a threat to life or bodily function. Data Reviewed and Analyzed:   I independently ordered and reviewed each unique test.  I reviewed external records: provider visit note from outside specialist.       I independently ordered and interpreted the ED EKG in the absence of a Cardiologist.    Rate: 121  EKG Interpretation: EKG Interpretation: sinus rhythm, no evidence of arrhythmia  ST Segments: Nonspecific ST segments - NO STEMI  I interpreted the X-rays no pneumothorax. I interpreted the CT Scan no pneumothorax. Discussion of management or test interpretation. 77-year-old male presents to the emergency department with worsening shortness of breath. Denies chest or back pain but does again endorse shortness of breath. States he was diagnosed with COVID-19 6 days ago. Has history of COPD as well as dementia. Reports compliance with medication but overall getting worse at home. EMS reports patient was hypoxic on arrival as he was placed on supplemental oxygen. A broad-based work-up was ordered. Patient is tachycardic. Will investigate. CBC with normal white count, stable H&H, Chest x-ray with improving subcutaneous emphysema. Patient did have a lung biopsy recently developed a pneumothorax and had to have a chest tube at that time. X-ray today without any evidence of pneumothorax. Troponin and lactic acid are normal. CMP without any emergent findings. CT chest shows patchy consolidative disease in the left lower lobe suspicious for pneumonia. Will treat as such. Hospitalist consulted for admission.   Patient wall.   5. Emphysema. 6. Atherosclerosis, including the coronary arteries. 7. Fusiform 4.2 cm ascending aortic aneurysm. Coni Henson M.D.    9/12/2023 9:02:00 PM      XR CHEST PORTABLE   Final Result      Mild atelectasis. No visible consolidation. Improving chest wall air. Kaci Vasquez M.D.    9/12/2023 6:02:00 PM                        Voice dictation software was used during the making of this note. This software is not perfect and grammatical and other typographical errors may be present. This note has not been completely proofread for errors.      Conception DO Constantin  09/12/23 3340

## 2023-09-13 ENCOUNTER — CARE COORDINATION (OUTPATIENT)
Dept: CARE COORDINATION | Facility: CLINIC | Age: 83
End: 2023-09-13

## 2023-09-13 LAB
ALBUMIN SERPL-MCNC: 2.4 G/DL (ref 3.2–4.6)
ALBUMIN/GLOB SERPL: 0.6 (ref 0.4–1.6)
ALP SERPL-CCNC: 74 U/L (ref 50–136)
ALT SERPL-CCNC: 32 U/L (ref 12–65)
ANION GAP SERPL CALC-SCNC: 6 MMOL/L (ref 2–11)
AST SERPL-CCNC: 29 U/L (ref 15–37)
BASOPHILS # BLD: 0 K/UL (ref 0–0.2)
BASOPHILS NFR BLD: 0 % (ref 0–2)
BILIRUB SERPL-MCNC: 0.3 MG/DL (ref 0.2–1.1)
BUN SERPL-MCNC: 8 MG/DL (ref 8–23)
CALCIUM SERPL-MCNC: 8.8 MG/DL (ref 8.3–10.4)
CHLORIDE SERPL-SCNC: 113 MMOL/L (ref 101–110)
CO2 SERPL-SCNC: 24 MMOL/L (ref 21–32)
CREAT SERPL-MCNC: 0.5 MG/DL (ref 0.8–1.5)
DIFFERENTIAL METHOD BLD: ABNORMAL
EOSINOPHIL # BLD: 0 K/UL (ref 0–0.8)
EOSINOPHIL NFR BLD: 0 % (ref 0.5–7.8)
ERYTHROCYTE [DISTWIDTH] IN BLOOD BY AUTOMATED COUNT: 14.7 % (ref 11.9–14.6)
GLOBULIN SER CALC-MCNC: 4 G/DL (ref 2.8–4.5)
GLUCOSE SERPL-MCNC: 153 MG/DL (ref 65–100)
HCT VFR BLD AUTO: 34.7 % (ref 41.1–50.3)
HGB BLD-MCNC: 11 G/DL (ref 13.6–17.2)
IMM GRANULOCYTES # BLD AUTO: 0.1 K/UL (ref 0–0.5)
IMM GRANULOCYTES NFR BLD AUTO: 1 % (ref 0–5)
LACTATE SERPL-SCNC: 0.8 MMOL/L (ref 0.4–2)
LACTATE SERPL-SCNC: 2 MMOL/L (ref 0.4–2)
LYMPHOCYTES # BLD: 0.4 K/UL (ref 0.5–4.6)
LYMPHOCYTES NFR BLD: 5 % (ref 13–44)
MCH RBC QN AUTO: 24.4 PG (ref 26.1–32.9)
MCHC RBC AUTO-ENTMCNC: 31.7 G/DL (ref 31.4–35)
MCV RBC AUTO: 77.1 FL (ref 82–102)
MM INDURATION, POC: 0 MM (ref 0–5)
MONOCYTES # BLD: 0.1 K/UL (ref 0.1–1.3)
MONOCYTES NFR BLD: 1 % (ref 4–12)
MRSA DNA SPEC QL NAA+PROBE: NOT DETECTED
NEUTS SEG # BLD: 7.6 K/UL (ref 1.7–8.2)
NEUTS SEG NFR BLD: 93 % (ref 43–78)
NRBC # BLD: 0 K/UL (ref 0–0.2)
PLATELET # BLD AUTO: 375 K/UL (ref 150–450)
PMV BLD AUTO: 8.8 FL (ref 9.4–12.3)
POTASSIUM SERPL-SCNC: 4 MMOL/L (ref 3.5–5.1)
PPD, POC: NEGATIVE
PROT SERPL-MCNC: 6.4 G/DL (ref 6.3–8.2)
RBC # BLD AUTO: 4.5 M/UL (ref 4.23–5.6)
S AUREUS CPE NOSE QL NAA+PROBE: NOT DETECTED
SODIUM SERPL-SCNC: 143 MMOL/L (ref 133–143)
WBC # BLD AUTO: 8.2 K/UL (ref 4.3–11.1)

## 2023-09-13 PROCEDURE — 6370000000 HC RX 637 (ALT 250 FOR IP): Performed by: FAMILY MEDICINE

## 2023-09-13 PROCEDURE — 1100000000 HC RM PRIVATE

## 2023-09-13 PROCEDURE — 94760 N-INVAS EAR/PLS OXIMETRY 1: CPT

## 2023-09-13 PROCEDURE — 87641 MR-STAPH DNA AMP PROBE: CPT

## 2023-09-13 PROCEDURE — 36415 COLL VENOUS BLD VENIPUNCTURE: CPT

## 2023-09-13 PROCEDURE — 83605 ASSAY OF LACTIC ACID: CPT

## 2023-09-13 PROCEDURE — 94640 AIRWAY INHALATION TREATMENT: CPT

## 2023-09-13 PROCEDURE — 2700000000 HC OXYGEN THERAPY PER DAY

## 2023-09-13 PROCEDURE — 80053 COMPREHEN METABOLIC PANEL: CPT

## 2023-09-13 PROCEDURE — 97535 SELF CARE MNGMENT TRAINING: CPT

## 2023-09-13 PROCEDURE — 97166 OT EVAL MOD COMPLEX 45 MIN: CPT

## 2023-09-13 PROCEDURE — 97161 PT EVAL LOW COMPLEX 20 MIN: CPT

## 2023-09-13 PROCEDURE — 85025 COMPLETE CBC W/AUTO DIFF WBC: CPT

## 2023-09-13 PROCEDURE — 6360000002 HC RX W HCPCS: Performed by: FAMILY MEDICINE

## 2023-09-13 PROCEDURE — 2500000003 HC RX 250 WO HCPCS: Performed by: FAMILY MEDICINE

## 2023-09-13 PROCEDURE — 97530 THERAPEUTIC ACTIVITIES: CPT

## 2023-09-13 PROCEDURE — 2580000003 HC RX 258: Performed by: FAMILY MEDICINE

## 2023-09-13 PROCEDURE — 6370000000 HC RX 637 (ALT 250 FOR IP): Performed by: HOSPITALIST

## 2023-09-13 RX ORDER — AZITHROMYCIN 250 MG/1
500 TABLET, FILM COATED ORAL DAILY
Status: DISCONTINUED | OUTPATIENT
Start: 2023-09-13 | End: 2023-09-14 | Stop reason: HOSPADM

## 2023-09-13 RX ADMIN — SODIUM CHLORIDE, PRESERVATIVE FREE 10 ML: 5 INJECTION INTRAVENOUS at 09:09

## 2023-09-13 RX ADMIN — SODIUM CHLORIDE, PRESERVATIVE FREE 10 ML: 5 INJECTION INTRAVENOUS at 00:04

## 2023-09-13 RX ADMIN — PANCRELIPASE LIPASE, PANCRELIPASE PROTEASE, PANCRELIPASE AMYLASE 5000 UNITS: 5000; 17000; 24000 CAPSULE, DELAYED RELEASE ORAL at 12:39

## 2023-09-13 RX ADMIN — TIOTROPIUM BROMIDE INHALATION SPRAY 2 PUFF: 3.12 SPRAY, METERED RESPIRATORY (INHALATION) at 08:56

## 2023-09-13 RX ADMIN — MOMETASONE FUROATE AND FORMOTEROL FUMARATE DIHYDRATE 2 PUFF: 200; 5 AEROSOL RESPIRATORY (INHALATION) at 08:56

## 2023-09-13 RX ADMIN — SODIUM CHLORIDE, PRESERVATIVE FREE 10 ML: 5 INJECTION INTRAVENOUS at 21:41

## 2023-09-13 RX ADMIN — GABAPENTIN 300 MG: 300 CAPSULE ORAL at 21:40

## 2023-09-13 RX ADMIN — PANCRELIPASE LIPASE, PANCRELIPASE PROTEASE, PANCRELIPASE AMYLASE 5000 UNITS: 5000; 17000; 24000 CAPSULE, DELAYED RELEASE ORAL at 09:08

## 2023-09-13 RX ADMIN — PANTOPRAZOLE SODIUM 40 MG: 40 TABLET, DELAYED RELEASE ORAL at 09:08

## 2023-09-13 RX ADMIN — MOMETASONE FUROATE AND FORMOTEROL FUMARATE DIHYDRATE 2 PUFF: 200; 5 AEROSOL RESPIRATORY (INHALATION) at 20:19

## 2023-09-13 RX ADMIN — LATANOPROST 1 DROP: 50 SOLUTION OPHTHALMIC at 00:04

## 2023-09-13 RX ADMIN — PANCRELIPASE LIPASE, PANCRELIPASE PROTEASE, PANCRELIPASE AMYLASE 30000 UNITS: 5000; 17000; 24000 CAPSULE, DELAYED RELEASE ORAL at 17:17

## 2023-09-13 RX ADMIN — DONEPEZIL HYDROCHLORIDE 5 MG: 5 TABLET, FILM COATED ORAL at 21:41

## 2023-09-13 RX ADMIN — VANCOMYCIN HYDROCHLORIDE 1250 MG: 10 INJECTION, POWDER, LYOPHILIZED, FOR SOLUTION INTRAVENOUS at 00:19

## 2023-09-13 RX ADMIN — TUBERCULIN PURIFIED PROTEIN DERIVATIVE 5 UNITS: 5 INJECTION, SOLUTION INTRADERMAL at 00:04

## 2023-09-13 RX ADMIN — TAMSULOSIN HYDROCHLORIDE 0.8 MG: 0.4 CAPSULE ORAL at 09:09

## 2023-09-13 RX ADMIN — GUAIFENESIN AND DEXTROMETHORPHAN 10 ML: 100; 10 SYRUP ORAL at 22:35

## 2023-09-13 RX ADMIN — PANCRELIPASE LIPASE, PANCRELIPASE PROTEASE, PANCRELIPASE AMYLASE 5000 UNITS: 5000; 17000; 24000 CAPSULE, DELAYED RELEASE ORAL at 17:17

## 2023-09-13 RX ADMIN — DEXAMETHASONE 6 MG: 4 TABLET ORAL at 09:09

## 2023-09-13 RX ADMIN — QUETIAPINE FUMARATE 25 MG: 25 TABLET ORAL at 21:41

## 2023-09-13 RX ADMIN — CEFTRIAXONE 1000 MG: 1 INJECTION, POWDER, FOR SOLUTION INTRAMUSCULAR; INTRAVENOUS at 09:30

## 2023-09-13 RX ADMIN — GABAPENTIN 300 MG: 300 CAPSULE ORAL at 09:08

## 2023-09-13 RX ADMIN — ASPIRIN 81 MG: 81 TABLET ORAL at 09:08

## 2023-09-13 RX ADMIN — GABAPENTIN 300 MG: 300 CAPSULE ORAL at 13:30

## 2023-09-13 RX ADMIN — PRAVASTATIN SODIUM 10 MG: 20 TABLET ORAL at 21:41

## 2023-09-13 RX ADMIN — LATANOPROST 1 DROP: 50 SOLUTION OPHTHALMIC at 21:43

## 2023-09-13 RX ADMIN — ENOXAPARIN SODIUM 40 MG: 100 INJECTION SUBCUTANEOUS at 09:08

## 2023-09-13 RX ADMIN — PANCRELIPASE LIPASE, PANCRELIPASE PROTEASE, PANCRELIPASE AMYLASE 30000 UNITS: 5000; 17000; 24000 CAPSULE, DELAYED RELEASE ORAL at 13:21

## 2023-09-13 RX ADMIN — AZITHROMYCIN DIHYDRATE 500 MG: 250 TABLET ORAL at 13:30

## 2023-09-13 RX ADMIN — POLYETHYLENE GLYCOL 3350 17 G: 17 POWDER, FOR SOLUTION ORAL at 09:08

## 2023-09-13 RX ADMIN — PANCRELIPASE LIPASE, PANCRELIPASE PROTEASE, PANCRELIPASE AMYLASE 30000 UNITS: 5000; 17000; 24000 CAPSULE, DELAYED RELEASE ORAL at 09:44

## 2023-09-13 ASSESSMENT — PAIN SCALES - GENERAL
PAINLEVEL_OUTOF10: 0

## 2023-09-13 NOTE — PROGRESS NOTES
Patient awake in chair. A&O x 2. Weaned patient down from 4 L to 2 L Nasal cannula sating at 97% . Denies pain,  no signs of distress present. No wants or needs stated. Bed at the lowest position, call light and bedside table within reach. Will begin preparing for bedside shift report.

## 2023-09-13 NOTE — PROGRESS NOTES
Patient is awake, Alert and oriented to person, year. Disoriented to place, time. Denies any pain. No signs of distress present, no wants or needs stated. Bed in lowest position, call light and bedside table within reach. Will continue to monitor.

## 2023-09-13 NOTE — PROGRESS NOTES
ACUTE OCCUPATIONAL THERAPY GOALS:   (Developed with and agreed upon by patient and/or caregiver.)  1. Patient will complete lower body bathing and dressing with SBA and adaptive equipment as   needed. 2. Patient will completed upper body bathing and dressing with Mod I and adaptive equipment as needed. 3. Patient will complete grooming standing at sink with SBA and adaptive equipment as needed. 4. Patient will complete toileting with SBA and adaptive equipment as needed. 5. Patient will tolerate 30 minutes of OT treatment with no rest breaks to increase activity tolerance for ADLs. 6. Patient will complete functional transfers with Sup-V and adaptive equipment as needed. Timeframe: 7 visits       OCCUPATIONAL THERAPY Initial Assessment, Daily Note, and AM       OT Visit Days: 1  Acknowledge Orders  Time  OT Charge Capture  Rehab Caseload Tracker      Daquan Polk is a 80 y.o. male   PRIMARY DIAGNOSIS: COVID  Pneumonia due to infectious organism, unspecified laterality, unspecified part of lung [J18.9]  COVID [U07.1]  COVID-19 [U07.1]       Reason for Referral: Generalized Muscle Weakness (M62.81)  Other abnormalities of gait and mobility (R26.89)  Inpatient: Payor: MEDICARE / Plan: MEDICARE PART A AND B / Product Type: *No Product type* /     ASSESSMENT:     REHAB RECOMMENDATIONS:   Recommendation to date pending progress:  Setting:  Home Health Therapy  Vs STR pending spouse health given spouse Covid dx    Equipment:    To Be Determined     ASSESSMENT:  Mr. Nathan Harmon is a 79 y/o M presenting with Covid. Pt seated in recliner on entry on 4L o2 via NC, A/O x 1. Applicable PMHx: dementia, COPD, 2L o2 via NC at home, BPH, AAA, RUL malignancy. No family present to assist in or clarify on pt response to PLOF and hx. Session minimally limited by cognitive deficits. Pt denied light headedness, dizziness or SOB. Pt reports no fall(s) in past 6 months. PLOF Mod I living with spouse who is pt's PCG.  Spouse

## 2023-09-13 NOTE — CARE COORDINATION
MSN, CM:  spoke with patient this afternoon about discharge planning. Patient lives with his wife in own home with no steps for entrance. Patient is independent with all ADL's and requires no equipment for ambulation. Patient denies any rehab currently. Patient is current with home oxygen and [facility] and would like for that to resume upon discharge. Patient is retired and his wife drives him to all appointments. Patient was admitted for SOB r/t COVID. Patient currently on 4L NC with a baseline of 2L NC.  PT/OT consulted for evaluation and recommendations. Case Management will continue to follow for any discharge needs. 09/13/23 9415   Service Assessment   Patient Orientation Alert and Oriented   Cognition Alert   History Provided By Patient   Primary Caregiver Self   Support Systems Spouse/Significant Other   Patient's Healthcare Decision Maker is: Named in [address]    PCP Verified by CM Yes   Last Visit to PCP Within last 3 months   Prior Functional Level Independent in ADLs/IADLs   Current Functional Level Other (see comment)  (PT/OT consutled)   Can patient return to prior living arrangement Yes   Ability to make needs known: Good   Family able to assist with home care needs: Yes   Would you like for me to discuss the discharge plan with any other family members/significant others, and if so, who?  Yes   Financial Resources Medicare   Community Resources ECF/Home Care  ([facility])   Social/Functional History   Lives With Spouse   Type of [facility]  One level   Home Access Level entry   [address] None   Receives Help From Family   ADL Assistance Independent   Homemaking Assistance Independent   Homemaking Responsibilities Yes   Ambulation Assistance Independent   Transfer Assistance Independent   Active  No   Patient's  Info Wife   Mode of Transportation Car Occupation Retired   Discharge Planning   Type of [address] [place] [address] Prior To Admission [facility]   DME Ordered?  No   Potential Assistance Purchasing Medications No   Type of Home Care Services None   Patient expects to be discharged to: House   One/Two Story Residence One story   History of falls? 0

## 2023-09-13 NOTE — H&P
Hospitalist History and Physical   Admit Date:  2023  5:08 PM   Name:  Judd Lyman   Age:  80 y.o. Sex:  male  :  1940   MRN:  [id number]   Room:  [facility]/    Presenting/Chief Complaint: Shortness of Breath     Reason(s) for Admission: COVID [U07.1]     History of Present Illness:   Celina Reid is a 80 y.o. male who presented to the ED for cc worsening SOB over the past two days. Has known COVID diagnosed on . Wife now with COVID as well and unable to take care of him    Hx of severe COPD on 2L baseline, BPH, dementia, AAA, new 1.4 cm spiculated pulmonary nodule in the anterior aspect of the right upper lobe concerning for malignancy with moderate metabolic activity within the right upper lobe pulmonary nodule. He recently was discharged from our facility after an IR guided biopsy had subsequent pneumothorax. Also diagnosed with COVID on  while here. Rehab was recommended but wife declined. RR 31, . WBC 11.5. CT PE protocol - No evidence of pulmonary embolism. 2. Patchy consolidative airspace disease in the left lower lobe, suspicious for   pneumonia. 3. Spiculated nodular opacity in the right upper lobe, concerning for   malignancy. 4. Subcutaneous emphysema of the chest wall. 5. Emphysema. 6. Atherosclerosis, including the coronary arteries. 7. Fusiform 4.2 cm ascending aortic aneurysm. Assessment & Plan: Active Problems:    COVID - decadron. Albuterol. Trelegy. LLL HCAP - Vanc, Ceftriaxone (has penicillin allergy). Sputum culture. Known right upper lobe nodule - Per wife, they no longer want aggressive work up or treatment for this. Dementia - aricept, seroquel    HLD - statin     Flomax    Anxiety - PRN ativan daily. PPI    Weak of exam and wife now has COVID. Likely will need rehab. PT/OT evals and PPD needed/ordered?   Yes  Diet: ADULT DIET; Easy to Chew; Low Fat/Low Chol/High Fiber/2 gm Na  VTE prophylaxis: Lovenox  Code EKG 12 Lead    Collection Time: 09/12/23  5:15 PM   Result Value Ref Range    Ventricular Rate 121 BPM    Atrial Rate 121 BPM    P-R Interval 138 ms    QRS Duration 92 ms    Q-T Interval 310 ms    QTc Calculation (Bazett) 440 ms    P Axis 79 degrees    R Axis 33 degrees    T Axis 61 degrees    Diagnosis       Sinus tachycardia  Low voltage, extremity leads  Consider anterior infarct    Confirmed by DARRYL SY ()LUBA ([id number]) on 9/12/2023 9:45:25 PM     CBC with Auto Differential    Collection Time: 09/12/23  5:27 PM   Result Value Ref Range    WBC 11.5 (H) 4.3 - 11.1 K/uL    RBC 4.30 4.23 - 5.6 M/uL    Hemoglobin 10.6 (L) 13.6 - 17.2 g/dL    Hematocrit 32.7 (L) 41.1 - 50.3 %    MCV 76.0 (L) 82 - 102 FL    MCH 24.7 (L) 26.1 - 32.9 PG    MCHC 32.4 31.4 - 35.0 g/dL    RDW 14.6 11.9 - 14.6 %    Platelets 786 112 - 215 K/uL    MPV 9.2 (L) 9.4 - 12.3 FL    nRBC 0.00 0.0 - 0.2 K/uL    Differential Type AUTOMATED      Neutrophils % 84 (H) 43 - 78 %    Lymphocytes % 6 (L) 13 - 44 %    Monocytes % 8 4.0 - 12.0 %    Eosinophils % 1 0.5 - 7.8 %    Basophils % 0 0.0 - 2.0 %    Immature Granulocytes 1 0.0 - 5.0 %    Neutrophils Absolute 9.6 (H) 1.7 - 8.2 K/UL    Lymphocytes Absolute 0.7 0.5 - 4.6 K/UL    Monocytes Absolute 0.9 0.1 - 1.3 K/UL    Eosinophils Absolute 0.1 0.0 - 0.8 K/UL    Basophils Absolute 0.0 0.0 - 0.2 K/UL    Absolute Immature Granulocyte 0.1 0.0 - 0.5 K/UL   CMP    Collection Time: 09/12/23  5:27 PM   Result Value Ref Range    Sodium 139 133 - 143 mmol/L    Potassium 3.6 3.5 - 5.1 mmol/L    Chloride 108 101 - 110 mmol/L    CO2 22 21 - 32 mmol/L    Anion Gap 9 2 - 11 mmol/L    Glucose 147 (H) 65 - 100 mg/dL    BUN 17 8 - 23 MG/DL    Creatinine 0.60 (L) 0.8 - 1.5 MG/DL    Est, Glom Filt Rate >60 >60 ml/min/1.73m2    Calcium 9.1 8.3 - 10.4 MG/DL    Total Bilirubin 0.3 0.2 - 1.1 MG/DL    ALT 37 12 - 65 U/L    AST 35 15 - 37 U/L    Alk Phosphatase 75 50 - 136 U/L    Total Protein 6.5 6.3 - 8.2 g/dL

## 2023-09-13 NOTE — PROGRESS NOTES
Patient arrived to room 818 via stretcher. A&O x2. Respirations even and unlabored. On 4L NC. Lung sounds with bilateral rhonchi. Radial and pedal pulses palpable bilaterally. Dual skin assessment completed with Stormy Nieto RN. No evidence of pressure injury at this time. No signs of distress. No needs expressed. Bed alarm on. Bed low and locked. Call light within reach. Care plan ongoing.

## 2023-09-13 NOTE — ACP (ADVANCE CARE PLANNING)
[facility] Hospitalist Service  At the heart of better care     Advance Care Planning   Admit Date:  2023  5:08 PM   Name:  Dale Lyman   Age:  80 y.o. Sex:  male  :  1940   MRN:  [id number]   Room:  [place] [address]    Sima Wiley has capacity to make his own decisions:   No    If pt unable to make decisions, POA/surrogate decision maker:  Spouse    Other people present:   Spouse via phone     Patient / surrogate decision-maker directed code status:  DNR/DNI    Patient or surrogate consented to discussion of the current conditions, workup, management plans, prognosis, and the risk for further deterioration. Time spent: 17 minutes in direct discussion.       Signed:  Wally Ybarra DO

## 2023-09-13 NOTE — PROGRESS NOTES
TRANSFER - IN REPORT:    Verbal report received from [facility], RN on Lazara Dias  being received from Emergency Department for routine progression of patient care. Report consisted of patient's Situation, Background, Assessment and   Recommendations(SBAR). Information from the following report(s) Nurse Handoff Report, MAR, and Recent Results was reviewed with the receiving nurse. Opportunity for questions and clarification was provided. Assessment will be completed upon patient's arrival to unit and care assumed.

## 2023-09-13 NOTE — PROGRESS NOTES
ACUTE PHYSICAL THERAPY GOALS:   (Developed with and agreed upon by patient and/or caregiver. )    LTG:  (1.)Mr. Lyman will move from supine to sit and sit to supine , scoot up and down, and roll side to side in bed with INDEPENDENT within 7 treatment day(s). (2.)Mr. Lyman will transfer from bed to chair and chair to bed with MODIFIED INDEPENDENCE using the least restrictive device within 7 treatment day(s). (3.)Mr. Lyman will ambulate with MODIFIED INDEPENDENCE for 150 feet with the least restrictive device within 7 treatment day(s). (4.)Mr. Lyman will tolerate at least 23 min of dynamic standing activity to assist standing ADLs with the least restrictive device within 7 treatment days. ________________________________________________________________________________________________      PHYSICAL THERAPY Initial Assessment, Daily Note, and AM  (Link to Caseload Tracking: PT Visit Days : 1  Acknowledge Orders  Time In/Out  PT Charge Capture  Rehab Caseload Tracker    Dena Gonzalez is a 80 y.o. male   PRIMARY DIAGNOSIS: COVID  Pneumonia due to infectious organism, unspecified laterality, unspecified part of lung [J18.9]  COVID [U07.1]  COVID-19 [U07.1]       Reason for Referral: Generalized Muscle Weakness (M62.81)  Other lack of cordination (R27.8)  Difficulty in walking, Not elsewhere classified (R26.2)  Inpatient: Payor: MEDICARE / Plan: MEDICARE PART A AND B / Product Type: *No Product type* /     ASSESSMENT:     REHAB RECOMMENDATIONS:   Recommendation to date pending progress:  Setting:  Home Health Therapy    Equipment:    To Be Determined     ASSESSMENT:  Mr. Chancey Sandifer presents in supine, C19+, A&Ox2, agreeable to session. He consistently follows two step commands. Upon entering, Pnt is agreeable to PT treatment. he reports no pain at rest. Pnt performed supine > sit with CGA, sitting EOB with good sitting balance control. Sit > stand with CGA is performed three times using RW. Awareness  Decreased Strength  Decreased Transfer Abilities INTERVENTIONS PLANNED:   (Benefits and precautions of physical therapy have been discussed with the patient.)  Self Care Training  Therapeutic Activity  Therapeutic Exercise/HEP  Neuromuscular Re-education  Gait Training  Manual Therapy  Modalities  Education       TREATMENT:   EVALUATION: LOW COMPLEXITY: (Untimed Charge)    TREATMENT:   Therapeutic Activity (23 Minutes): Therapeutic activity included Rolling, Supine to Sit, Scooting, Transfer Training, Ambulation on level ground, Sitting balance , and Standing balance to improve functional Activity tolerance, Balance, Coordination, Mobility, Strength, and ROM.     TREATMENT GRID:  N/A    AFTER TREATMENT PRECAUTIONS: Alarm Activated, Call light within reach, Chair, Needs within reach, and RN notified    INTERDISCIPLINARY COLLABORATION:  RN/ PCT, PT/ PTA, and Rehab Attendant    EDUCATION: Education Given To: Patient  Education Provided: Role of Therapy;Plan of Care    TIME IN/OUT:  Time In: 8701  Time Out: [address]  Minutes: KATI Gonzalez

## 2023-09-13 NOTE — PROGRESS NOTES
Hospitalist Progress Note   Admit Date:  2023  5:08 PM   Name:  Nallely Lyman   Age:  80 y.o. Sex:  male  :  1940   MRN:  [id number]   Room:  [facility]/    Presenting/Chief Complaint: Shortness of Breath     Reason(s) for Admission: Pneumonia due to infectious organism, unspecified laterality, unspecified part of lung [J18.9]  COVID [U07.1]  COVID-19 [U07.1]     Hospital Course:   From HPI: Praveen Egan is a 80 y.o. male who presented to the ED for cc worsening SOB over the past two days. Has known COVID diagnosed on . Wife now with COVID as well and unable to take care of him. Hx of severe COPD on 2L baseline, BPH, dementia, AAA, new 1.4 cm spiculated pulmonary nodule in the anterior aspect of the right upper lobe concerning for malignancy with moderate metabolic activity within the right upper lobe pulmonary nodule. He recently was discharged from our facility after an IR guided biopsy had subsequent pneumothorax. Also diagnosed with COVID on  while here. Rehab was recommended but wife declined. CTA showed no pulmonary embolism. Spiculated nodular opacity in the right upper lobe concerning for malignancy was present. Redemonstrated is a fusiform 4.2 cm ascending aortic aneurysm. Subjective & 24hr Events:   Feels that he is doing a bit better. Very friendly and interactive. However, he asked me: \"Am I in a hospital?\"       Assessment & Plan:     Principal Problem:    COVID-19 pneumonia    Left lower lobe HCAP  Plan: Continue Decadron, albuterol, Trelegy, Rocephin, vancomycin, sputum culture awaited. MRSA not detected and will de-escalate antibiotics and stop vancomycin. Add Zithromax for atypicals. Active Problems:    COPD, severe ([address])  Plan: Continue usual inhaler medications. On Decadron also.     Mixed hyperlipidemia  Plan: Continue usual medications    Benign prostatic hyperplasia with urinary frequency  Plan: Bladder scans to assure emptying continue Flomax

## 2023-09-13 NOTE — TELEPHONE ENCOUNTER
Direct chart review with Dr Ena Soriano who states that pt needs to have hospital f/up appt. Unable to connect with spouse. LVM explaining that hospital f/up is needed per Dr Ena Soriano. When she calls back, please note that pt is COVID+ and will need to have 10 days out from diagnosis before he can be seen in office. This date being 9/16, so he can be seen 9/17 or after.

## 2023-09-13 NOTE — ED NOTES
TRANSFER - OUT REPORT:    Verbal report given to Chiquita Raphael RN on [facility]  being transferred to [address] [place] [address] for routine progression of patient care       Report consisted of patient's Situation, Background, Assessment and   Recommendations(SBAR). Information from the following report(s) ED Encounter Summary was reviewed with the receiving nurse. Mary Alice Fall Assessment:    Presents to emergency department  because of falls (Syncope, seizure, or loss of consciousness): No  Age > 70: Yes  Altered Mental Status, Intoxication with alcohol or substance confusion (Disorientation, impaired judgment, poor safety awaremess, or inability to follow instructions): Yes  Impaired Mobility: Ambulates or transfers with assistive devices or assistance; Unable to ambulate or transer.: Yes  Nursing Judgement: Yes          Lines:   Peripheral IV 09/12/23 Right Antecubital (Active)   Site Assessment Clean, dry & intact 09/12/23 1943   Line Status Blood return noted; Flushed 09/12/23 1943        Opportunity for questions and clarification was provided.       Patient transported with:  O2 @ 3lpm          Ria Cuellar RN  09/12/23 6920

## 2023-09-14 VITALS
DIASTOLIC BLOOD PRESSURE: 80 MMHG | OXYGEN SATURATION: 93 % | HEART RATE: 95 BPM | WEIGHT: 136.9 LBS | TEMPERATURE: 97.5 F | HEIGHT: 66 IN | RESPIRATION RATE: 16 BRPM | SYSTOLIC BLOOD PRESSURE: 122 MMHG | BODY MASS INDEX: 22 KG/M2

## 2023-09-14 PROBLEM — J18.9 PNEUMONIA: Status: ACTIVE | Noted: 2023-09-14

## 2023-09-14 LAB
ALBUMIN SERPL-MCNC: 2.4 G/DL (ref 3.2–4.6)
ALBUMIN/GLOB SERPL: 0.6 (ref 0.4–1.6)
ALP SERPL-CCNC: 67 U/L (ref 50–136)
ALT SERPL-CCNC: 33 U/L (ref 12–65)
ANION GAP SERPL CALC-SCNC: 4 MMOL/L (ref 2–11)
AST SERPL-CCNC: 29 U/L (ref 15–37)
BASOPHILS # BLD: 0 K/UL (ref 0–0.2)
BASOPHILS NFR BLD: 0 % (ref 0–2)
BILIRUB SERPL-MCNC: 0.3 MG/DL (ref 0.2–1.1)
BUN SERPL-MCNC: 16 MG/DL (ref 8–23)
CALCIUM SERPL-MCNC: 9.3 MG/DL (ref 8.3–10.4)
CHLORIDE SERPL-SCNC: 109 MMOL/L (ref 101–110)
CO2 SERPL-SCNC: 25 MMOL/L (ref 21–32)
CREAT SERPL-MCNC: 0.6 MG/DL (ref 0.8–1.5)
DIFFERENTIAL METHOD BLD: ABNORMAL
EOSINOPHIL # BLD: 0 K/UL (ref 0–0.8)
EOSINOPHIL NFR BLD: 0 % (ref 0.5–7.8)
ERYTHROCYTE [DISTWIDTH] IN BLOOD BY AUTOMATED COUNT: 14.6 % (ref 11.9–14.6)
GLOBULIN SER CALC-MCNC: 4 G/DL (ref 2.8–4.5)
GLUCOSE SERPL-MCNC: 121 MG/DL (ref 65–100)
HCT VFR BLD AUTO: 34 % (ref 41.1–50.3)
HGB BLD-MCNC: 10.8 G/DL (ref 13.6–17.2)
IMM GRANULOCYTES # BLD AUTO: 0.1 K/UL (ref 0–0.5)
IMM GRANULOCYTES NFR BLD AUTO: 1 % (ref 0–5)
LYMPHOCYTES # BLD: 1.2 K/UL (ref 0.5–4.6)
LYMPHOCYTES NFR BLD: 12 % (ref 13–44)
MCH RBC QN AUTO: 24.6 PG (ref 26.1–32.9)
MCHC RBC AUTO-ENTMCNC: 31.8 G/DL (ref 31.4–35)
MCV RBC AUTO: 77.4 FL (ref 82–102)
MONOCYTES # BLD: 0.8 K/UL (ref 0.1–1.3)
MONOCYTES NFR BLD: 8 % (ref 4–12)
NEUTS SEG # BLD: 7.6 K/UL (ref 1.7–8.2)
NEUTS SEG NFR BLD: 79 % (ref 43–78)
NRBC # BLD: 0 K/UL (ref 0–0.2)
PLATELET # BLD AUTO: 425 K/UL (ref 150–450)
PMV BLD AUTO: 9.4 FL (ref 9.4–12.3)
POTASSIUM SERPL-SCNC: 4.3 MMOL/L (ref 3.5–5.1)
PROT SERPL-MCNC: 6.4 G/DL (ref 6.3–8.2)
RBC # BLD AUTO: 4.39 M/UL (ref 4.23–5.6)
SODIUM SERPL-SCNC: 138 MMOL/L (ref 133–143)
WBC # BLD AUTO: 9.7 K/UL (ref 4.3–11.1)

## 2023-09-14 PROCEDURE — 6360000002 HC RX W HCPCS: Performed by: FAMILY MEDICINE

## 2023-09-14 PROCEDURE — 36415 COLL VENOUS BLD VENIPUNCTURE: CPT

## 2023-09-14 PROCEDURE — 6370000000 HC RX 637 (ALT 250 FOR IP): Performed by: HOSPITALIST

## 2023-09-14 PROCEDURE — 94761 N-INVAS EAR/PLS OXIMETRY MLT: CPT

## 2023-09-14 PROCEDURE — 85025 COMPLETE CBC W/AUTO DIFF WBC: CPT

## 2023-09-14 PROCEDURE — 2580000003 HC RX 258: Performed by: FAMILY MEDICINE

## 2023-09-14 PROCEDURE — 6370000000 HC RX 637 (ALT 250 FOR IP): Performed by: FAMILY MEDICINE

## 2023-09-14 PROCEDURE — 94640 AIRWAY INHALATION TREATMENT: CPT

## 2023-09-14 PROCEDURE — 80053 COMPREHEN METABOLIC PANEL: CPT

## 2023-09-14 RX ORDER — DEXAMETHASONE 6 MG/1
6 TABLET ORAL
Qty: 5 TABLET | Refills: 0 | Status: SHIPPED | OUTPATIENT
Start: 2023-09-14 | End: 2023-09-19

## 2023-09-14 RX ORDER — SACCHAROMYCES BOULARDII 250 MG
250 CAPSULE ORAL 2 TIMES DAILY
Qty: 14 CAPSULE | Refills: 0 | Status: SHIPPED | OUTPATIENT
Start: 2023-09-14 | End: 2023-09-21

## 2023-09-14 RX ORDER — AZITHROMYCIN 500 MG/1
500 TABLET, FILM COATED ORAL DAILY
Qty: 3 TABLET | Refills: 0 | Status: SHIPPED | OUTPATIENT
Start: 2023-09-15 | End: 2023-09-18

## 2023-09-14 RX ORDER — CEFPODOXIME PROXETIL 200 MG/1
200 TABLET, FILM COATED ORAL 2 TIMES DAILY
Qty: 12 TABLET | Refills: 0 | Status: SHIPPED | OUTPATIENT
Start: 2023-09-14 | End: 2023-09-20

## 2023-09-14 RX ADMIN — TAMSULOSIN HYDROCHLORIDE 0.8 MG: 0.4 CAPSULE ORAL at 09:34

## 2023-09-14 RX ADMIN — POLYETHYLENE GLYCOL 3350 17 G: 17 POWDER, FOR SOLUTION ORAL at 09:36

## 2023-09-14 RX ADMIN — TIOTROPIUM BROMIDE INHALATION SPRAY 2 PUFF: 3.12 SPRAY, METERED RESPIRATORY (INHALATION) at 09:31

## 2023-09-14 RX ADMIN — PANCRELIPASE LIPASE, PANCRELIPASE PROTEASE, PANCRELIPASE AMYLASE 5000 UNITS: 5000; 17000; 24000 CAPSULE, DELAYED RELEASE ORAL at 09:33

## 2023-09-14 RX ADMIN — PANCRELIPASE LIPASE, PANCRELIPASE PROTEASE, PANCRELIPASE AMYLASE 5000 UNITS: 5000; 17000; 24000 CAPSULE, DELAYED RELEASE ORAL at 12:39

## 2023-09-14 RX ADMIN — CEFTRIAXONE 1000 MG: 1 INJECTION, POWDER, FOR SOLUTION INTRAMUSCULAR; INTRAVENOUS at 09:35

## 2023-09-14 RX ADMIN — PANTOPRAZOLE SODIUM 40 MG: 40 TABLET, DELAYED RELEASE ORAL at 09:34

## 2023-09-14 RX ADMIN — LORAZEPAM 0.5 MG: 0.5 TABLET ORAL at 01:17

## 2023-09-14 RX ADMIN — SODIUM CHLORIDE, PRESERVATIVE FREE 10 ML: 5 INJECTION INTRAVENOUS at 09:34

## 2023-09-14 RX ADMIN — MOMETASONE FUROATE AND FORMOTEROL FUMARATE DIHYDRATE 2 PUFF: 200; 5 AEROSOL RESPIRATORY (INHALATION) at 09:30

## 2023-09-14 RX ADMIN — AZITHROMYCIN DIHYDRATE 500 MG: 250 TABLET ORAL at 09:32

## 2023-09-14 RX ADMIN — GABAPENTIN 300 MG: 300 CAPSULE ORAL at 09:33

## 2023-09-14 RX ADMIN — DEXAMETHASONE 6 MG: 4 TABLET ORAL at 09:34

## 2023-09-14 RX ADMIN — ASPIRIN 81 MG: 81 TABLET ORAL at 09:34

## 2023-09-14 RX ADMIN — PANCRELIPASE LIPASE, PANCRELIPASE PROTEASE, PANCRELIPASE AMYLASE 30000 UNITS: 5000; 17000; 24000 CAPSULE, DELAYED RELEASE ORAL at 12:40

## 2023-09-14 RX ADMIN — ENOXAPARIN SODIUM 40 MG: 100 INJECTION SUBCUTANEOUS at 09:33

## 2023-09-14 RX ADMIN — PANCRELIPASE LIPASE, PANCRELIPASE PROTEASE, PANCRELIPASE AMYLASE 30000 UNITS: 5000; 17000; 24000 CAPSULE, DELAYED RELEASE ORAL at 09:33

## 2023-09-14 ASSESSMENT — PAIN SCALES - GENERAL
PAINLEVEL_OUTOF10: 0
PAINLEVEL_OUTOF10: 0

## 2023-09-14 NOTE — PROGRESS NOTES
Physician Progress Note      PATIENTDajulián Madrid  CSN #:                  [id number]  :                       1940  ADMIT DATE:       2023 5:08 PM  [address] [facility] DATE:  RESPONDING  PROVIDER #:        Keyshawn Myles MD          QUERY TEXT:    Patient admitted with Covid PNA. Noted documentation of sepsis in ED note. In   order to support the diagnosis of sepsis, please include additional clinical   indicators in your documentation. Or please document if the diagnosis of   sepsis has been ruled out after further study    The medical record reflects the following:  Risk Factors: Per ED provider note \"Is this patient to be included in the   SEP-1 core measure due to severe sepsis or septic shock? Yes SEP-1 CORE   MEASURE DATA, sepsis Identified. Clinical Indicators: Covid PNA, on admit -124, RR 22, 31, 29, 19, 25, 31  Treatment: IVF's, ABX    Thank you,  Ivon Wilde RN, BSN, [facility]. [email or web address]  . Options provided:  -- Sepsis present as evidenced by, Please document evidence. -- Sepsis was ruled out after study  -- Other - I will add my own diagnosis  -- Disagree - Not applicable / Not valid  -- Disagree - Clinically unable to determine / Unknown  -- Refer to Clinical Documentation Reviewer    PROVIDER RESPONSE TEXT:    Sepsis is present as evidenced by pneumonia, elevated heart rate and   respiratory rate.     Query created by: Jose Gutierrez on 2023 12:20 PM      Electronically signed by:  Keyshawn Myles MD 2023 12:23 PM

## 2023-09-14 NOTE — CARE COORDINATION
MSN, CM:  patient to be discharged home today with [facility] and [address] Patient and family agree with this discharge plan. Patient has met all milestones for this discharge. Family to transport patient home. 09/14/23 1207   Service Assessment   Patient Orientation Alert and [address] [address] At/After Discharge   Transition of Care Consult (CM Consult) Home Health; Other  ([facility] and [address])   Internal Home Health No   Reason Outside Agency Chosen Patient already serviced by other home care/hospice agency   Services At/After Discharge PT;Nursing services   Mode of Transport at Discharge Other (see comment)  (family to transport)   Confirm Follow Up Transport Family   Condition of Participation: Discharge Planning   The Plan for Transition of Care is related to the following treatment goals: Home Health and Palliative Care   The Patient and/or Patient Representative was provided with a Choice of Provider? Patient;Patient Representative   Name of the Patient Representative who was provided with the Choice of Provider and agrees with the Discharge Plan? Wife   The Patient and/Or Patient Representative agree with the Discharge Plan? Yes   Freedom of Choice list was provided with basic dialogue that supports the patient's individualized plan of care/goals, treatment preferences, and shares the quality data associated with the providers?   Yes

## 2023-09-14 NOTE — PROGRESS NOTES
09/14/23 1123   Resting (Room Air)   SpO2 94   HR 86   During Walk (Room Air)   SpO2 93   HR 95   Walk/Assistance Device Ambulation   Rate of Dyspnea 0   After Walk   SpO2 93   HR 96   Rate of Dyspnea 0   Does the Patient Qualify for Home O2 No   Does the Patient Need Portable Oxygen Tanks No

## 2023-09-14 NOTE — DISCHARGE INSTRUCTIONS
Come back to ER if any increasing shortness of breath or hypoxia or fever on antibiotics. Learning About COVID-19 and Flu Symptoms  How can you tell COVID-19 from the flu? COVID-19 and the flu have similar symptoms. The two can be hard to tell apart. The only way to know for sure which illness you have is to be tested. If you have questions about COVID-19 testing, ask your doctor or check the CDC website at cdc.gov for information. Since the symptoms are so alike, it makes sense to act as if you have COVID-19 until your test results come back. This means staying home and limiting contact with people in your home. You'll need to wash your hands often and disinfect surfaces that you touch. And be sure to wear a mask when you're around other people. This is also good advice if you think you have the flu. COVID-19 and the flu have these symptoms in common:  Fever or chills  Cough  Shortness of breath  Fatigue (tiredness)  Sore throat  Runny or stuffy nose  Muscle and body aches  Headache  Vomiting and diarrhea (more common in children than adults)  COVID-19 has another symptom that also may occur:  New loss of taste or smell  COVID-19 symptoms may appear from 2 to 14 days after infection. Flu symptoms usually appear 1 to 4 days after infection. Why should you get the flu and COVID-19 vaccines? It's important to get your yearly flu vaccine and stay up to date on your COVID-19 vaccines. The flu and COVID-19 can be active at the same time. You can get sick with both infections at once. And having both may make you more sick than getting just one. Getting both vaccines can prevent you and others from getting very sick. If you do get the flu or COVID-19 after being vaccinated, you're much less likely to get seriously ill. Where can you learn more? Go to http://www.woods.com/ and enter C123 to learn more about \"Learning About COVID-19 and Flu Symptoms. \"  Current as of: April 28, 2023

## 2023-09-14 NOTE — PROGRESS NOTES
Discharge instruction reviewed with wife, Phoenix Lawson via phone. Expressed the importance of completing the antibiotic course. Notified Mrs. Lyman that patient does not require oxygen to go home. Mrs. Lyman voiced that she understood the discharge instructions and did not voice any comments, questions, or concerns. Patient's IV CATH removed, tip intact. Will continue to monitor.

## 2023-09-14 NOTE — DISCHARGE SUMMARY
Hospitalist Discharge Summary   Admit Date:  2023  5:08 PM   DC Note date: 2023  Name:  Anel Lyman   Age:  80 y.o. Sex:  male  :  1940   MRN:  [id number]   Room:    PCP:  Roni Alcaraz DO    Presenting Complaint: Shortness of Breath     Initial Admission Diagnosis: Pneumonia due to infectious organism, unspecified laterality, unspecified part of lung [J18.9]  COVID [U07.1]  COVID-19 [U07.1]     Problem List for this Hospitalization (present on admission):    Principal Problem:    COVID  Active Problems:    Bilateral hearing loss    COPD, severe ([address])    Mixed hyperlipidemia    DNR (do not resuscitate)    Benign prostatic hyperplasia with urinary frequency    Migraine    Alzheimer's dementia ([address])    Pneumonia  Resolved Problems:    * No resolved hospital problems. *    H&P  Suzy Evans is a 80 y.o. male who presented to the ED for cc worsening SOB over the past two days. Has known COVID diagnosed on . Wife now with COVID as well and unable to take care of him     Hx of severe COPD on 2L baseline, BPH, dementia, AAA, new 1.4 cm spiculated pulmonary nodule in the anterior aspect of the right upper lobe concerning for malignancy with moderate metabolic activity within the right upper lobe pulmonary nodule. He recently was discharged from our facility after an IR guided biopsy had subsequent pneumothorax. Also diagnosed with COVID on  while here. Rehab was recommended but wife declined. RR 31, . WBC 11.5. CT PE protocol - No evidence of pulmonary embolism. 2. Patchy consolidative airspace disease in the left lower lobe, suspicious for   pneumonia. 3. Spiculated nodular opacity in the right upper lobe, concerning for   malignancy. 4. Subcutaneous emphysema of the chest wall. 5. Emphysema. 6. Atherosclerosis, including the coronary arteries. 7. Fusiform 4.2 cm ascending aortic aneurysm. Hospital Course:  Admitted for COVID and HCAP.   Was started the FDA under an Emergency Use Authorization (EUA) for use by authorized laboratories. Fact sheet for Healthcare Providers: WorldMate.co.nz  Fact sheet for Patients: WorldMate.co.nz     Methodology: Isothermal Nucleic Acid Amplification         COVID-19, Rapid [[id number]]  (Abnormal) Collected: 09/06/23 1459    Order Status: Completed Specimen: Nasopharyngeal Updated: 09/06/23 1520     Source NASAL        SARS-CoV-2, Rapid Detected        Comment:    The specimen is POSITIVE for SARS-CoV-2, the novel coronavirus associated with COVID-19. This test has been authorized by the FDA under an Emergency Use Authorization (EUA) for use by authorized laboratories. Fact sheet for Healthcare Providers: Grovodate.co.nz  Fact sheet for Patients: WorldMate.co.nz     Methodology: Isothermal Nucleic Acid Amplification         COVID-19, Rapid [[id number]] Collected: 08/31/23 1209    Order Status: Completed Specimen: Nasopharyngeal Updated: 08/31/23 1415     Source NASAL        SARS-CoV-2, Rapid Not detected        Comment:    The specimen is NEGATIVE for SARS-CoV-2, the novel coronavirus associated with COVID-19. A negative result does not rule out COVID-19. This test has been authorized by the FDA under an Emergency Use Authorization (EUA) for use by authorized laboratories.       Fact sheet for Healthcare Providers: WorldMate.co.nz  Fact sheet for Patients: WorldMate.co.     Methodology: Isothermal Nucleic Acid Amplification                 All Labs from Last 24 Hrs:  Recent Results (from the past 24 hour(s))   CBC with Auto Differential    Collection Time: 09/14/23  4:51 AM   Result Value Ref Range    WBC 9.7 4.3 - 11.1 K/uL    RBC 4.39 4.23 - 5.6 M/uL    Hemoglobin 10.8 (L) 13.6 - 17.2 g/dL    Hematocrit 34.0 (L) 41.1 - 50.3 %    MCV 77.4 (L) 82 - 102 FL

## 2023-09-14 NOTE — PROGRESS NOTES
Patient is awake, A&O to person and year on 2 L nasal cannula. Denies pain, no signs of distress present. No wants or needs  stated. Bed in lowest position. Call light and bedside table within reach. Will continue to monitor.

## 2023-09-15 ENCOUNTER — CARE COORDINATION (OUTPATIENT)
Dept: CARE COORDINATION | Facility: CLINIC | Age: 83
End: 2023-09-15

## 2023-09-15 NOTE — CARE COORDINATION
entered: yes  Spouse states they have not obtained florastor at this time and she will call the pharmacy. Spouse declines CTN assist at this time. Was patient discharged with a pulse oximeter? no    Non-face-to-face services provided:  Scheduled appointment with PCP-Spouse declines CTN assists to scheduled follow up for patient; she prefers to call for follow up. Obtained and reviewed discharge summary and/or continuity of care documents  Education of patient/family/caregiver/guardian to support self-management-symptoms management, medications management and the importance of completing all antibiotics as ordered, adequate fluid intake, self monitoring and when to seek care, and follow up with PCP. Spouse states she also has Covid. Souse reports patient had a good night and is eating breakfast at the time of outreach. Spouse states patient has home supplemental oxygen and he only uses at night. Patient was discharged with [facility] home health and [facility] Palliative care. CTN spoke with staff, Carli Zavala at [place] and [facility] services to be resumed. Spouse prefers to call PCP for follow up. Offered patient enrollment in the Remote Patient Monitoring (RPM) program for in-home monitoring:  no .    Care Transitions 24 Hour Call    Schedule Follow Up Appointment with PCP: Completed  Do you have a copy of your discharge instructions?: Yes  Do you have all of your prescriptions and are they filled?: No  Have you been contacted by a [place] Pharmacist?: No  How are you going to get to your appointment?: Car - family or friend to transport  Do you have support at home?: Partner/Spouse/SO  Do you feel like you have everything you need to keep you well at home?: Yes  Are you an active caregiver in your home?: No  Care Transitions Interventions                                 Discussed follow-up appointments. If no appointment was previously scheduled, appointment scheduling offered: Yes.    Is follow up appointment scheduled

## 2023-09-21 ENCOUNTER — OFFICE VISIT (OUTPATIENT)
Dept: INTERNAL MEDICINE CLINIC | Facility: CLINIC | Age: 83
End: 2023-09-21

## 2023-09-21 ENCOUNTER — CARE COORDINATION (OUTPATIENT)
Dept: CARE COORDINATION | Facility: CLINIC | Age: 83
End: 2023-09-21

## 2023-09-21 VITALS
HEIGHT: 66 IN | OXYGEN SATURATION: 100 % | WEIGHT: 120 LBS | TEMPERATURE: 97.6 F | DIASTOLIC BLOOD PRESSURE: 70 MMHG | HEART RATE: 82 BPM | BODY MASS INDEX: 19.29 KG/M2 | SYSTOLIC BLOOD PRESSURE: 120 MMHG

## 2023-09-21 DIAGNOSIS — J44.9 COPD, MODERATE (HCC): ICD-10-CM

## 2023-09-21 DIAGNOSIS — R91.1 LUNG NODULE: ICD-10-CM

## 2023-09-21 DIAGNOSIS — J12.82 PNEUMONIA DUE TO COVID-19 VIRUS: ICD-10-CM

## 2023-09-21 DIAGNOSIS — Z09 HOSPITAL DISCHARGE FOLLOW-UP: Primary | ICD-10-CM

## 2023-09-21 DIAGNOSIS — D64.9 ANEMIA, UNSPECIFIED TYPE: ICD-10-CM

## 2023-09-21 DIAGNOSIS — G30.1 ALZHEIMER'S DISEASE WITH LATE ONSET (CODE) (HCC): ICD-10-CM

## 2023-09-21 DIAGNOSIS — U07.1 PNEUMONIA DUE TO COVID-19 VIRUS: ICD-10-CM

## 2023-09-21 LAB
ALBUMIN SERPL-MCNC: 3.1 G/DL (ref 3.2–4.6)
ALBUMIN/GLOB SERPL: 1.1 (ref 0.4–1.6)
ALP SERPL-CCNC: 75 U/L (ref 50–136)
ALT SERPL-CCNC: 73 U/L (ref 12–65)
ANION GAP SERPL CALC-SCNC: 5 MMOL/L (ref 2–11)
AST SERPL-CCNC: 32 U/L (ref 15–37)
BASOPHILS # BLD: 0 K/UL (ref 0–0.2)
BASOPHILS NFR BLD: 0 % (ref 0–2)
BILIRUB SERPL-MCNC: 0.5 MG/DL (ref 0.2–1.1)
BUN SERPL-MCNC: 18 MG/DL (ref 8–23)
CALCIUM SERPL-MCNC: 9.3 MG/DL (ref 8.3–10.4)
CHLORIDE SERPL-SCNC: 104 MMOL/L (ref 101–110)
CO2 SERPL-SCNC: 30 MMOL/L (ref 21–32)
CREAT SERPL-MCNC: 0.7 MG/DL (ref 0.8–1.5)
DIFFERENTIAL METHOD BLD: ABNORMAL
EOSINOPHIL # BLD: 0 K/UL (ref 0–0.8)
EOSINOPHIL NFR BLD: 1 % (ref 0.5–7.8)
ERYTHROCYTE [DISTWIDTH] IN BLOOD BY AUTOMATED COUNT: 15.9 % (ref 11.9–14.6)
FERRITIN SERPL-MCNC: 50 NG/ML (ref 8–388)
GLOBULIN SER CALC-MCNC: 2.9 G/DL (ref 2.8–4.5)
GLUCOSE SERPL-MCNC: 99 MG/DL (ref 65–100)
HCT VFR BLD AUTO: 39.7 % (ref 41.1–50.3)
HGB BLD-MCNC: 12 G/DL (ref 13.6–17.2)
IMM GRANULOCYTES # BLD AUTO: 0.2 K/UL (ref 0–0.5)
IMM GRANULOCYTES NFR BLD AUTO: 3 % (ref 0–5)
IRON SERPL-MCNC: 59 UG/DL (ref 35–150)
LYMPHOCYTES # BLD: 1.7 K/UL (ref 0.5–4.6)
LYMPHOCYTES NFR BLD: 20 % (ref 13–44)
MCH RBC QN AUTO: 24.2 PG (ref 26.1–32.9)
MCHC RBC AUTO-ENTMCNC: 30.2 G/DL (ref 31.4–35)
MCV RBC AUTO: 80.2 FL (ref 82–102)
MONOCYTES # BLD: 1.1 K/UL (ref 0.1–1.3)
MONOCYTES NFR BLD: 14 % (ref 4–12)
NEUTS SEG # BLD: 5.3 K/UL (ref 1.7–8.2)
NEUTS SEG NFR BLD: 63 % (ref 43–78)
NRBC # BLD: 0 K/UL (ref 0–0.2)
PLATELET # BLD AUTO: 392 K/UL (ref 150–450)
PMV BLD AUTO: 8.8 FL (ref 9.4–12.3)
POTASSIUM SERPL-SCNC: 4.3 MMOL/L (ref 3.5–5.1)
PROT SERPL-MCNC: 6 G/DL (ref 6.3–8.2)
RBC # BLD AUTO: 4.95 M/UL (ref 4.23–5.6)
SODIUM SERPL-SCNC: 139 MMOL/L (ref 133–143)
TIBC SERPL-MCNC: 298 UG/DL (ref 250–450)
WBC # BLD AUTO: 8.4 K/UL (ref 4.3–11.1)

## 2023-09-21 NOTE — PROGRESS NOTES
Post-Discharge Transitional Care Follow Up      Hannah Lyman   YOB: 1940    Date of Office Visit:  9/21/2023  Date of Hospital Admission: 9/12/23  Date of Hospital Discharge: 9/14/23  Readmission Risk Score (high >=14%. Medium >=10%):Readmission Risk Score: 21.6      Care management risk score Rising risk (score 2-5) and Complex Care (Scores >=6): No Risk Score On File     Non face to face  following discharge, date last encounter closed (first attempt may have been earlier): 09/15/2023     Call initiated 2 business days of discharge: Yes     Hospital discharge follow-up  -     WA DISCHARGE MEDS RECONCILED W/ CURRENT OUTPATIENT MED LIST    Medical Decision Making: moderate complexity  No follow-ups on file. Subjective:   Patient is here for hospital follow up. He was admitted in august for lung biopsy with subsequent pneumothorax. He was admitted 11 days and developed Covid. He was discharged home with covid, then became more sick and his wife called EMS TO take to ER for dyspnea. He was admitted 2 days for pneumonia and discharged 9/14. He finished antibiotics. He feels ok. He is a little hoarse and weak. He is better now and breathing now. His oxygen has been good at home  He uses oxygen at home only at night. He is on trelegy and spiriva. He didn't get good biopsy in hospital and not a good tissue sample. He had worsening mental health in hospital when laying on his back with the pneumothorax  He sleeps better with seroquel   He is scheduled with pulmonary - Dr Augusto Castillo, calling today          Inpatient course: Discharge summary reviewed- see chart.     Interval history/Current status: breathing much better today, feels better, sleeping better    Patient Active Problem List   Diagnosis    Pulmonary nodule    Long term (current) use of antithrombotics/antiplatelets    Irritable bowel syndrome with diarrhea    ETD (eustachian tube dysfunction)    Fungal sinusitis

## 2023-09-21 NOTE — CARE COORDINATION
[facility] Care Transitions Follow Up Call    Patient Current Location:  Home: [address]    Care Transition Nurse contacted the patient by telephone to follow up after admission on 2023. Verified name and  with family as identifiers. Patient: Logan Lyman  Patient : 1940   MRN: [id number]  Reason for Admission: COVID-19  Discharge Date: 23 RARS: Readmission Risk Score: 21.6    Needs to be reviewed by the provider   Additional needs identified to be addressed with provider: No  none             Method of communication with provider: none. Addressed changes since last contact:   Patient completed follow up with PCP as scheduled. Spouse reports patient completed antibiotics and endorses compliance with medications. Spouse assists with FSBS monitoring at home. Spouse reports symptoms are improving. Patient continues to use supplemental oxygen at hs per spouse. Spouse states she is waiting on a call back from Pulmonary for appointment. Spouse wound like [facility]rystal on Monday. CTN notified agency. Discussed follow-up appointments. If no appointment was previously scheduled, appointment scheduling offered: Yes. Is follow up appointment scheduled within 7 days of discharge? Yes. Follow Up  Future Appointments   Date Time Provider [address]   10/16/2023  2:15 PM Anastasiia Nearing T Brothers, DO [facility]     Non-[facility] follow up appointment(s): no    Care Transition Nurse reviewed medical action plan and red flags with patient and discussed any barriers to care and/or understanding of plan of care after discharge. Discussed appropriate site of care based on symptoms and resources available to patient including: PCP  Specialist  Home health  When to call Ciara Snider. The patient agrees to contact the PCP office for questions related to their healthcare. Advance Care Planning:   not on file.      Patients top risk factors for readmission:

## 2023-09-22 ENCOUNTER — TELEPHONE (OUTPATIENT)
Dept: INTERNAL MEDICINE CLINIC | Facility: CLINIC | Age: 83
End: 2023-09-22

## 2023-09-22 NOTE — TELEPHONE ENCOUNTER
Pt reported that he was dizzy. Wife took BP and it was 98/59. Pt just saw ML yesterday and BP was fine. Please advise. Not currently on any BP medication.

## 2023-09-29 ENCOUNTER — TELEPHONE (OUTPATIENT)
Dept: PULMONOLOGY | Age: 83
End: 2023-09-29

## 2023-10-13 ENCOUNTER — CARE COORDINATION (OUTPATIENT)
Dept: CARE COORDINATION | Facility: CLINIC | Age: 83
End: 2023-10-13

## 2023-10-13 NOTE — CARE COORDINATION
Care Transitions Follow Up Call    Patient: Marley Lyman  Patient : 1940   MRN: [id number]  Reason for Admission: COVID-19  Discharge Date: 23 RARS: Readmission Risk Score: 21.6  Multiple unsuccessful attempts to outreach for transitions of care. Unable to reach patient. Messages left and no return call. Episode closed at this time.    Follow Up  Future Appointments   Date Time Provider [address]   10/16/2023  2:15 PM DO NIDHI Mancilla [facility] AM[facility]   10/18/2023  9:50 AM Cadence Cartagena MD P[facility] AM[facility]      Care Transitions Subsequent and Final Call    Subsequent and Final Calls  Care Transitions Interventions                          Other Interventions:         Erica Ayala RN

## 2023-10-16 ENCOUNTER — OFFICE VISIT (OUTPATIENT)
Dept: INTERNAL MEDICINE CLINIC | Facility: CLINIC | Age: 83
End: 2023-10-16
Payer: MEDICARE

## 2023-10-16 VITALS
BODY MASS INDEX: 19.93 KG/M2 | DIASTOLIC BLOOD PRESSURE: 72 MMHG | RESPIRATION RATE: 17 BRPM | HEART RATE: 90 BPM | WEIGHT: 124 LBS | SYSTOLIC BLOOD PRESSURE: 136 MMHG | OXYGEN SATURATION: 95 % | HEIGHT: 66 IN

## 2023-10-16 DIAGNOSIS — F02.818 LATE ONSET ALZHEIMER'S DEMENTIA WITH BEHAVIORAL DISTURBANCE (HCC): ICD-10-CM

## 2023-10-16 DIAGNOSIS — G30.1 LATE ONSET ALZHEIMER'S DEMENTIA WITH BEHAVIORAL DISTURBANCE (HCC): ICD-10-CM

## 2023-10-16 DIAGNOSIS — J44.9 COPD, SEVERE (HCC): Primary | Chronic | ICD-10-CM

## 2023-10-16 DIAGNOSIS — Z23 FLU VACCINE NEED: ICD-10-CM

## 2023-10-16 DIAGNOSIS — Z86.16 HISTORY OF COVID-19: ICD-10-CM

## 2023-10-16 PROBLEM — F01.518 VASCULAR DEMENTIA WITH BEHAVIORAL DISTURBANCE (HCC): Status: ACTIVE | Noted: 2023-10-16

## 2023-10-16 PROCEDURE — 3023F SPIROM DOC REV: CPT | Performed by: FAMILY MEDICINE

## 2023-10-16 PROCEDURE — G8420 CALC BMI NORM PARAMETERS: HCPCS | Performed by: FAMILY MEDICINE

## 2023-10-16 PROCEDURE — G8427 DOCREV CUR MEDS BY ELIG CLIN: HCPCS | Performed by: FAMILY MEDICINE

## 2023-10-16 PROCEDURE — 1123F ACP DISCUSS/DSCN MKR DOCD: CPT | Performed by: FAMILY MEDICINE

## 2023-10-16 PROCEDURE — 90694 VACC AIIV4 NO PRSRV 0.5ML IM: CPT | Performed by: FAMILY MEDICINE

## 2023-10-16 PROCEDURE — G0008 ADMIN INFLUENZA VIRUS VAC: HCPCS | Performed by: FAMILY MEDICINE

## 2023-10-16 PROCEDURE — 99214 OFFICE O/P EST MOD 30 MIN: CPT | Performed by: FAMILY MEDICINE

## 2023-10-16 PROCEDURE — 1036F TOBACCO NON-USER: CPT | Performed by: FAMILY MEDICINE

## 2023-10-16 PROCEDURE — G8484 FLU IMMUNIZE NO ADMIN: HCPCS | Performed by: FAMILY MEDICINE

## 2023-10-16 RX ORDER — QUETIAPINE FUMARATE 25 MG/1
TABLET, FILM COATED ORAL
Qty: 180 TABLET | Refills: 1 | Status: SHIPPED | OUTPATIENT
Start: 2023-10-16

## 2023-10-16 NOTE — PROGRESS NOTES
Martin Brunson DO  Diplomate of the [employer] [address] Internal Medicine      Carolynne Felty (: 1940) is a 80 y.o. male, here for evaluation of the following chief complaint(s):  COPD       ASSESSMENT/PLAN:  1. COPD, severe ([address])  2. Late onset Alzheimer's dementia with behavioral disturbance (HCC)  -     QUEtiapine (SEROQUEL) 25 MG tablet; Take one tab at 1500/Take one tab at HS, Disp-180 tablet, R-1Normal  3. History of COVID-19  4. Flu vaccine need  -     Influenza, FLUAD, (age 72 y+), IM, Preservative Free, 0.5 mL     -We will continue with Trelegy as he is doing well with this. Continue with albuterol on a as needed basis for breakthrough symptoms. Also on Spiriva, but no longer covered by insurance so has not been taking. Continue follow-up with pulmonology as scheduled.  -Will continue with seroquel dosing for sun downing as patient tolerating well. Will continue with aricept for now. -Age appropriate flu vaccine administered today. SUBJECTIVE/OBJECTIVE:  HPI:  -Patient comes in today with his wife. He has been doing fairly well from a COPD standpoint. He was hospitalized due to [facility]. Breathing is much better now. No significant mucopurulent productive sputum/shortness of breath or wheezing.  -Has been doing much better as far as Alzheimer's is concerned with Seroquel. When he was hospitalized he did adjust his therapy is receiving 1 dose in the afternoon and 1 before bed and wife states that this has controlled his owning very well. He is tolerating well without significant adverse effects. Lorazepam has caused his confusion to be worse. ROS negative except as noted above today.     Social History     Tobacco Use    Smoking status: Former     Packs/day: 1.50     Years: 20.00     Additional pack years: 0.00     Total pack years: 30.00     Types: Cigarettes     Quit date: 1973     Years since quittin.8    Smokeless tobacco: Never

## 2023-10-18 ENCOUNTER — OFFICE VISIT (OUTPATIENT)
Dept: PULMONOLOGY | Age: 83
End: 2023-10-18
Payer: MEDICARE

## 2023-10-18 VITALS
SYSTOLIC BLOOD PRESSURE: 112 MMHG | WEIGHT: 125 LBS | HEIGHT: 66 IN | BODY MASS INDEX: 20.09 KG/M2 | DIASTOLIC BLOOD PRESSURE: 62 MMHG | TEMPERATURE: 97.4 F | OXYGEN SATURATION: 94 % | HEART RATE: 92 BPM | RESPIRATION RATE: 15 BRPM

## 2023-10-18 DIAGNOSIS — I95.9 HYPOTENSION, UNSPECIFIED HYPOTENSION TYPE: ICD-10-CM

## 2023-10-18 DIAGNOSIS — J44.9 STAGE 3 SEVERE COPD BY GOLD CLASSIFICATION (HCC): ICD-10-CM

## 2023-10-18 DIAGNOSIS — Z71.85 VACCINE COUNSELING: ICD-10-CM

## 2023-10-18 DIAGNOSIS — R91.1 RIGHT UPPER LOBE PULMONARY NODULE: Primary | ICD-10-CM

## 2023-10-18 PROBLEM — E46 PROTEIN CALORIE MALNUTRITION (HCC): Status: ACTIVE | Noted: 2023-10-18

## 2023-10-18 PROCEDURE — 1123F ACP DISCUSS/DSCN MKR DOCD: CPT | Performed by: INTERNAL MEDICINE

## 2023-10-18 PROCEDURE — G8484 FLU IMMUNIZE NO ADMIN: HCPCS | Performed by: INTERNAL MEDICINE

## 2023-10-18 PROCEDURE — 3023F SPIROM DOC REV: CPT | Performed by: INTERNAL MEDICINE

## 2023-10-18 PROCEDURE — 1036F TOBACCO NON-USER: CPT | Performed by: INTERNAL MEDICINE

## 2023-10-18 PROCEDURE — G8427 DOCREV CUR MEDS BY ELIG CLIN: HCPCS | Performed by: INTERNAL MEDICINE

## 2023-10-18 PROCEDURE — 99214 OFFICE O/P EST MOD 30 MIN: CPT | Performed by: INTERNAL MEDICINE

## 2023-10-18 PROCEDURE — G8420 CALC BMI NORM PARAMETERS: HCPCS | Performed by: INTERNAL MEDICINE

## 2023-10-18 RX ORDER — RESPIRATORY SYNCYTIAL VISUS VACCINE RECOMBINANT, ADJUVANTED 120MCG/0.5
0.5 KIT INTRAMUSCULAR ONCE
Qty: 1 EACH | Refills: 0 | Status: SHIPPED | OUTPATIENT
Start: 2023-10-18 | End: 2023-10-18

## 2023-10-18 NOTE — PROGRESS NOTES
1500/Take one tab at HS    SUMAtriptan (IMITREX) 100 MG tablet TAKE 1/2 TO 1 TABLET BY MOUTH AS NEEDED FOR MIGRAINE MAY REPEAT IN 2HRS MAX 2/24HRS    tamsulosin (FLOMAX) 0.4 MG capsule TAKE 2 CAPSULES BY MOUTH EVERY DAY    ticagrelor (BRILINTA) 90 mg, Oral, 2 TIMES DAILY    vitamin D3 (CHOLECALCIFEROL) 10 MCG (400 UNIT) TABS tablet Oral, DAILY

## 2023-10-31 DIAGNOSIS — I95.9 HYPOTENSION, UNSPECIFIED HYPOTENSION TYPE: ICD-10-CM

## 2023-10-31 LAB — CORTIS AM PEAK SERPL-MCNC: 15.8 UG/DL (ref 7–25)

## 2023-11-02 ENCOUNTER — TELEPHONE (OUTPATIENT)
Dept: PULMONOLOGY | Age: 83
End: 2023-11-02

## 2023-11-02 NOTE — TELEPHONE ENCOUNTER
----- Message from Marques Brizuela MD sent at 11/1/2023  2:47 PM EDT -----  Please let patient know that lab testing of cortisol levels was normal.

## 2023-12-03 DIAGNOSIS — N40.1 BENIGN PROSTATIC HYPERPLASIA WITH LOWER URINARY TRACT SYMPTOMS: ICD-10-CM

## 2023-12-04 RX ORDER — TAMSULOSIN HYDROCHLORIDE 0.4 MG/1
CAPSULE ORAL
Qty: 180 CAPSULE | Refills: 1 | Status: SHIPPED | OUTPATIENT
Start: 2023-12-04

## 2024-01-03 ENCOUNTER — HOSPITAL ENCOUNTER (OUTPATIENT)
Dept: CT IMAGING | Age: 84
Discharge: HOME | End: 2024-01-06
Attending: INTERNAL MEDICINE
Payer: MEDICARE

## 2024-01-03 DIAGNOSIS — R91.1 RIGHT UPPER LOBE PULMONARY NODULE: ICD-10-CM

## 2024-01-03 PROCEDURE — 71250 CT THORAX DX C-: CPT

## 2024-01-11 ENCOUNTER — TELEPHONE (OUTPATIENT)
Dept: PULMONOLOGY | Age: 84
End: 2024-01-11

## 2024-01-11 DIAGNOSIS — R91.1 PULMONARY NODULE: Primary | ICD-10-CM

## 2024-01-11 NOTE — TELEPHONE ENCOUNTER
Spoke to patient and went over ct chest results and he agrees to follow up ct in 6 months and voices understanding. Order placed.

## 2024-01-11 NOTE — TELEPHONE ENCOUNTER
----- Message from Moraima Vivar MD sent at 1/9/2024  2:46 PM EST -----  Please let patient know that the left lower lobe changes on chest ct have resolved on his most recent imaging.  Follow up of a 7.4mm nodule is recommended in 6 mos.  If he is in agreement with this, please arrange.

## 2024-01-19 ENCOUNTER — OFFICE VISIT (OUTPATIENT)
Dept: INTERNAL MEDICINE CLINIC | Facility: CLINIC | Age: 84
End: 2024-01-19

## 2024-01-19 VITALS
SYSTOLIC BLOOD PRESSURE: 120 MMHG | HEART RATE: 90 BPM | BODY MASS INDEX: 20.73 KG/M2 | WEIGHT: 129 LBS | OXYGEN SATURATION: 98 % | RESPIRATION RATE: 17 BRPM | DIASTOLIC BLOOD PRESSURE: 70 MMHG | HEIGHT: 66 IN

## 2024-01-19 DIAGNOSIS — G30.1 LATE ONSET ALZHEIMER'S DEMENTIA WITH BEHAVIORAL DISTURBANCE (HCC): ICD-10-CM

## 2024-01-19 DIAGNOSIS — I71.20 THORACIC AORTIC ANEURYSM WITHOUT RUPTURE, UNSPECIFIED PART (HCC): ICD-10-CM

## 2024-01-19 DIAGNOSIS — Z00.00 MEDICARE ANNUAL WELLNESS VISIT, SUBSEQUENT: Primary | ICD-10-CM

## 2024-01-19 DIAGNOSIS — I25.119 ATHEROSCLEROSIS OF NATIVE CORONARY ARTERY OF NATIVE HEART WITH ANGINA PECTORIS (HCC): ICD-10-CM

## 2024-01-19 DIAGNOSIS — E04.1 THYROID NODULE: ICD-10-CM

## 2024-01-19 DIAGNOSIS — F02.818 LATE ONSET ALZHEIMER'S DEMENTIA WITH BEHAVIORAL DISTURBANCE (HCC): ICD-10-CM

## 2024-01-19 DIAGNOSIS — J44.9 STAGE 3 SEVERE COPD BY GOLD CLASSIFICATION (HCC): ICD-10-CM

## 2024-01-19 DIAGNOSIS — G43.109 MIGRAINE WITH AURA, NOT INTRACTABLE, WITHOUT STATUS MIGRAINOSUS: ICD-10-CM

## 2024-01-19 PROBLEM — E46 PROTEIN CALORIE MALNUTRITION (HCC): Status: RESOLVED | Noted: 2023-10-18 | Resolved: 2024-01-19

## 2024-01-19 LAB — TSH W FREE THYROID IF ABNORMAL: 0.71 UIU/ML (ref 0.36–3.74)

## 2024-01-19 RX ORDER — QUETIAPINE FUMARATE 25 MG/1
TABLET, FILM COATED ORAL
Qty: 270 TABLET | Refills: 1 | Status: SHIPPED | OUTPATIENT
Start: 2024-01-19

## 2024-01-19 RX ORDER — DONEPEZIL HYDROCHLORIDE 5 MG/1
5 TABLET, FILM COATED ORAL NIGHTLY
Qty: 90 TABLET | Refills: 1 | Status: SHIPPED | OUTPATIENT
Start: 2024-01-19

## 2024-01-19 RX ORDER — SUMATRIPTAN 100 MG/1
TABLET, FILM COATED ORAL
Qty: 9 TABLET | Refills: 5 | Status: SHIPPED | OUTPATIENT
Start: 2024-01-19

## 2024-01-19 ASSESSMENT — PATIENT HEALTH QUESTIONNAIRE - PHQ9
2. FEELING DOWN, DEPRESSED OR HOPELESS: 1
SUM OF ALL RESPONSES TO PHQ QUESTIONS 1-9: 2
SUM OF ALL RESPONSES TO PHQ QUESTIONS 1-9: 2
1. LITTLE INTEREST OR PLEASURE IN DOING THINGS: 1
SUM OF ALL RESPONSES TO PHQ QUESTIONS 1-9: 2
SUM OF ALL RESPONSES TO PHQ9 QUESTIONS 1 & 2: 2
SUM OF ALL RESPONSES TO PHQ QUESTIONS 1-9: 2

## 2024-01-19 NOTE — PROGRESS NOTES
today:  Patient comes in today with his wife.  She states that his dementia is actually getting worse and sundowning is worsening.  Having a lot of difficulty sleeping at night and gets up agitated at times.  Wondering if we can increase the Seroquel dosing.  Breathing remains stable.  He has chronic shortness of breath due to his COPD.  Migraines have been stable.  Typically managed with Imitrex.  There was a thyroid nodule that was incidentally found on a recent CT by pulmonary.  Patient is currently asymptomatic.  ROS negative except as noted above today.  Patient's complete Health Risk Assessment and screening values have been reviewed and are found in Flowsheets. The following problems were reviewed today and where indicated follow up appointments were made and/or referrals ordered.    Positive Risk Factor Screenings with Interventions:    Fall Risk:  Do you feel unsteady or are you worried about falling? : no  2 or more falls in past year?: (!) yes  Fall with injury in past year?: no     Interventions:    Reviewed medications, home hazards, visual acuity, and co-morbidities that can increase risk for falls             Activity, Diet, and Weight:  On average, how many days per week do you engage in moderate to strenuous exercise (like a brisk walk)?: 0 days  On average, how many minutes do you engage in exercise at this level?: 0 min    Do you eat balanced/healthy meals regularly?: (!) No    Body mass index is 20.82 kg/m².      Inactivity Interventions:  Encourage activity as able  Do you eat balanced/healthy meals regularly Interventions:  Encourage good PO intake.        Dentist Screen:  Have you seen the dentist within the past year?: (!) No    Intervention:  Advised to schedule with their dentist       ADL's:   Patient reports needing help with:  Select all that apply: (!) Transportation  Interventions:  Wife provides transportation              Objective   Vitals:    01/19/24 1351   BP: 120/70   Site: Left

## 2024-01-19 NOTE — PATIENT INSTRUCTIONS
immunizations, labs, imaging, and/or referrals for you.  A list of these orders (if applicable) as well as your Preventive Care list are included within your After Visit Summary for your review.    Other Preventive Recommendations:    A preventive eye exam performed by an eye specialist is recommended every 1-2 years to screen for glaucoma; cataracts, macular degeneration, and other eye disorders.  A preventive dental visit is recommended every 6 months.  Try to get at least 150 minutes of exercise per week or 10,000 steps per day on a pedometer .  Order or download the FREE \"Exercise & Physical Activity: Your Everyday Guide\" from [employer]. Call 1-638.721.3210 or search [employer] online.  You need 3884-2187 mg of calcium and 4943-3681 IU of vitamin D per day. It is possible to meet your calcium requirement with diet alone, but a vitamin D supplement is usually necessary to meet this goal.  When exposed to the sun, use a sunscreen that protects against both UVA and UVB radiation with an SPF of 30 or greater. Reapply every 2 to 3 hours or after sweating, drying off with a towel, or swimming.  Always wear a seat belt when traveling in a car. Always wear a helmet when riding a bicycle or motorcycle.

## 2024-02-06 ENCOUNTER — OFFICE VISIT (OUTPATIENT)
Dept: PULMONOLOGY | Age: 84
End: 2024-02-06
Payer: MEDICARE

## 2024-02-06 VITALS
OXYGEN SATURATION: 94 % | RESPIRATION RATE: 17 BRPM | HEART RATE: 84 BPM | SYSTOLIC BLOOD PRESSURE: 126 MMHG | HEIGHT: 66 IN | DIASTOLIC BLOOD PRESSURE: 72 MMHG | WEIGHT: 125.7 LBS | TEMPERATURE: 97.2 F | BODY MASS INDEX: 20.2 KG/M2

## 2024-02-06 DIAGNOSIS — R91.1 PULMONARY NODULE: Primary | ICD-10-CM

## 2024-02-06 DIAGNOSIS — J44.9 STAGE 3 SEVERE COPD BY GOLD CLASSIFICATION (HCC): ICD-10-CM

## 2024-02-06 DIAGNOSIS — J47.9 BRONCHIECTASIS WITHOUT COMPLICATION (HCC): ICD-10-CM

## 2024-02-06 PROCEDURE — G8427 DOCREV CUR MEDS BY ELIG CLIN: HCPCS | Performed by: NURSE PRACTITIONER

## 2024-02-06 PROCEDURE — 1123F ACP DISCUSS/DSCN MKR DOCD: CPT | Performed by: NURSE PRACTITIONER

## 2024-02-06 PROCEDURE — G8484 FLU IMMUNIZE NO ADMIN: HCPCS | Performed by: NURSE PRACTITIONER

## 2024-02-06 PROCEDURE — 3023F SPIROM DOC REV: CPT | Performed by: NURSE PRACTITIONER

## 2024-02-06 PROCEDURE — 1036F TOBACCO NON-USER: CPT | Performed by: NURSE PRACTITIONER

## 2024-02-06 PROCEDURE — 99214 OFFICE O/P EST MOD 30 MIN: CPT | Performed by: NURSE PRACTITIONER

## 2024-02-06 PROCEDURE — G8420 CALC BMI NORM PARAMETERS: HCPCS | Performed by: NURSE PRACTITIONER

## 2024-02-06 NOTE — PROGRESS NOTES
Name:  Nate Lyman  YOB: 1940   MRN: [id number]      Office Visit: 2/6/2024        ASSESSMENT AND PLAN:  (Medical Decision Making)    Impression: 83 y.o. male     1. Pulmonary nodule  --7.4 mm RUL nodule--needs follow up in July, 2024.  Previously noted LLL changes have resolved.    2. Stage 3 severe COPD by GOLD classification ([facility])  --remains on Trelegy once daily and prn albuterol.  No exacerbations since last visit.  --remains on Dupixent monthly.    3. Bronchiectasis without complication ([facility])  --has percussion vest at home, but does not use it.  Does use albuterol in nebulizer twice daily.  Currently has no problems with mucus.    No orders of the defined types were placed in this encounter.    No orders of the defined types were placed in this encounter.    Follow-up and Dispositions    Return in about 6 months (around 8/6/2024) for Cb Marcano//lung nodule, bronchiectasis, Arrive 15 minutes prior to appt time.     Collaborating physician is Dr. Cesario Guardado.    ADS    KATELYN Thomas - CNP    Vivar/Terry    Total time for encounter on day of encounter was 30 minutes.  This time includes chart prep, review of tests/procedures, review of other provider's notes, documentation and counseling patient regarding disease process and medications.     _________________________________________________________________________    HISTORY OF PRESENT ILLNESS:    Mr. Nate Lyman is a 83 y.o. male who is seen at [facility] for  Abnormal CT and Pulmonary Nodule     The patient is an 83-year-old male who is seen for follow-up of asthma/COPD (FEV1 47% June 2019) on Dupixent, bronchiectasis, lung nodules, and chronic hypoxic respiratory failure on 2 L of oxygen at night.  He has a history of waxing and waning nodules. He is accompanied by his wife who assists with the history.    In August 2023, he underwent transthoracic needle biopsy of right upper lobe nodule which was

## 2024-02-06 NOTE — PATIENT INSTRUCTIONS
I have ordered CT in July, 2024.  You should receive a call from scheduling within 2-3 business days.  If you do not hear from them, please call [phone] to schedule your test.

## 2024-02-21 ENCOUNTER — HOSPITAL ENCOUNTER (OUTPATIENT)
Dept: ULTRASOUND IMAGING | Age: 84
Discharge: HOME | End: 2024-02-24
Attending: FAMILY MEDICINE
Payer: MEDICARE

## 2024-02-21 DIAGNOSIS — E04.1 THYROID NODULE: ICD-10-CM

## 2024-02-21 PROCEDURE — 76536 US EXAM OF HEAD AND NECK: CPT

## 2024-02-26 DIAGNOSIS — E04.1 THYROID NODULE: Primary | ICD-10-CM

## 2024-03-07 ENCOUNTER — OFFICE VISIT (OUTPATIENT)
Dept: ENDOCRINOLOGY | Age: 84
End: 2024-03-07

## 2024-03-07 VITALS
DIASTOLIC BLOOD PRESSURE: 62 MMHG | OXYGEN SATURATION: 97 % | HEART RATE: 90 BPM | BODY MASS INDEX: 20.98 KG/M2 | SYSTOLIC BLOOD PRESSURE: 120 MMHG | WEIGHT: 130 LBS

## 2024-03-07 DIAGNOSIS — E04.2 MULTIPLE THYROID NODULES: Primary | ICD-10-CM

## 2024-03-07 ASSESSMENT — ENCOUNTER SYMPTOMS
CONSTIPATION: 0
DIARRHEA: 0

## 2024-03-07 NOTE — PROGRESS NOTES
Huang Saldana MD, [facility]  [address] [place] [address], [address]  [place] [address]  Phone [phone]  Facsimile [phone]          Nate Lyman is a 83 y.o. male seen 3/7/2024 at the request of Dr. Santiago for the evaluation of thyroid nodule        ASSESSMENT AND PLAN:    1. Multiple thyroid nodules  I performed an FNA biopsy of the dominant inferior right lobe nodule today.  The specimen will be [facility]  if needed.  Assuming the biopsy is benign, I will have him return for a follow up ultrasound in 6 months to document stability.  He has no obvious compressive symptoms at this point which would warrant referral for thyroidectomy.  He was recently biochemically euthyroid 1/2024.            Procedures:    Thyroid FNA Biopsy Procedure Note:    Informed consent was obtained from the patient.  I explained the small risk of bleeding and infection.  A timeout was performed.  The neck was cleansed using alcohol pads.  The skin was anesthetized using 1% lidocaine.  5 passes with a 27-gauge needle were made into the inferior right lobe nodule using ultrasound-guidance.  The needle was visualized in the nodule on all attempts.  The material from 3 passes was placed in CytoLyt solution and 2 passes into the Afirma GSC tube.  The patient tolerated the procedure well.  Post-procedure ultrasound revealed no evidence of swelling or hemorrhage.  The biopsy site was covered with a bandaid.  The patient was advised to call with swelling, dysphagia, excessive bruising or severe neck pain.      Follow-up and Dispositions    Return in about 6 months (around 9/7/2024).         HISTORY OF PRESENT ILLNESS:    THYROID NODULE / MULTINODULAR GOITER    Presentation: Incidentally noted on chest CT.    Thyroid Cancer Risk Factors:  There is no family history of thyroid cancer.  There is no history of radiation to the

## 2024-03-14 ENCOUNTER — TELEPHONE (OUTPATIENT)
Dept: ENDOCRINOLOGY | Age: 84
End: 2024-03-14

## 2024-04-08 DIAGNOSIS — N50.819 PAIN IN TESTICLE, UNSPECIFIED LATERALITY: Primary | ICD-10-CM

## 2024-04-22 ENCOUNTER — TELEPHONE (OUTPATIENT)
Dept: PULMONOLOGY | Age: 84
End: 2024-04-22

## 2024-04-22 NOTE — TELEPHONE ENCOUNTER
If his current concentrator is working, there is no need to get a new one at this point and we can address at next appointment.

## 2024-04-22 NOTE — TELEPHONE ENCOUNTER
Krys I spoke with Enid with [employer]. She states that his certification is up. And in order for medicare to pay he will need a new cert. If he does not get one then if any thing goes wrong with his machine he will have to pay out of pocket,they are trying to avoid that happening to him. Justina iqbal

## 2024-04-22 NOTE — TELEPHONE ENCOUNTER
----- Message from Nate Lyman sent at 4/19/2024  5:25 PM EDT -----  Regarding: Oxygen  Contact: [phone]  [facility] has requested that Nate get a new oxygen order.  They need medical info from the Doctor  also.   Thank You,  Shana Lyman

## 2024-04-22 NOTE — TELEPHONE ENCOUNTER
Spoke to Ashley at [facility] and patient's 5 year kwame is coming up and needs all new testing and updated office visit with oxygen orders for the re- certification.

## 2024-04-23 NOTE — TELEPHONE ENCOUNTER
I SPOKE WITH PATIENT AND WIFE TO LET THEM KNOW THAT WE CAN RECERT HIM AT HIS NEXT APPOINTMENT. THEY UNDERSTOOD AND NO OTHER QUESTIONS. AKUA IRVING

## 2024-04-23 NOTE — TELEPHONE ENCOUNTER
Please let patient know that we can address this at next appointment.  If he wants to be seen sooner to address this, I'd be happy to see him.

## 2024-04-24 NOTE — PROGRESS NOTES
Insecurity: Not on file (6/30/2023)   Transportation Needs: Unknown (6/30/2023)    PRAPARE - Transportation     Lack of Transportation (Medical): Not on file     Lack of Transportation (Non-Medical): No   Physical Activity: Inactive (1/19/2024)    Exercise Vital Sign     Days of Exercise per Week: 0 days     Minutes of Exercise per Session: 0 min   Stress: Not on file   Social Connections: Not on file   Intimate Partner Violence: Not on file   Housing Stability: Unknown (6/30/2023)    Housing Stability Vital Sign     Unable to Pay for Housing in the Last Year: Not on file     Number of Places Lived in the Last Year: Not on file     Unstable Housing in the Last Year: No     Family History   Problem Relation Age of Onset    COPD Mother     Asthma Father     No Known Problems Sister     No Known Problems Sister     No Known Problems Brother     Dementia Other         UNCLE    Thyroid Cancer Neg Hx        Review of Systems  Constitutional:   Negative for fever, chills, appetite change, malaise/fatigue, headaches and weight loss.  Skin:  Negative for skin lesions, rash and itching.  Eyes:  Negative for visual disturbance, eye pain and eye discharge.  ENT:  Negative for difficulty articulating words, pain swallowing, high frequency hearing loss and dry mouth.  Respiratory:  Negative for cough, blood in sputum, shortness of breath and wheezing.  Cardiovascular:  Negative for chest pain, hypertension, irregular heartbeat, leg pain, leg swelling, regular rate and rhythm and varicose veins.  GI:  Negative for nausea, vomiting, abdominal pain, blood in stool, constipation, diarrhea, indigestion and heartburn.  Genitourinary:  Negative for urinary burning, hematuria, flank pain, recurrent UTIs, history of urolithiasis, nocturia, slower stream, straining, urgency, leakage w/ urge, frequent urination, incomplete emptying, erectile dysfunction, testicular pain, sexually transmitted disease, discharge and urethral

## 2024-04-25 ENCOUNTER — TELEPHONE (OUTPATIENT)
Dept: UROLOGY | Age: 84
End: 2024-04-25

## 2024-04-25 ENCOUNTER — OFFICE VISIT (OUTPATIENT)
Dept: UROLOGY | Age: 84
End: 2024-04-25
Payer: MEDICARE

## 2024-04-25 DIAGNOSIS — N43.3 HYDROCELE, UNSPECIFIED HYDROCELE TYPE: Primary | ICD-10-CM

## 2024-04-25 LAB
BILIRUBIN, URINE, POC: NEGATIVE
BLOOD URINE, POC: NEGATIVE
GLUCOSE URINE, POC: NEGATIVE
KETONES, URINE, POC: NEGATIVE
LEUKOCYTE ESTERASE, URINE, POC: NEGATIVE
NITRITE, URINE, POC: NEGATIVE
PH, URINE, POC: 5.5 (ref 4.6–8)
PROTEIN,URINE, POC: NEGATIVE
SPECIFIC GRAVITY, URINE, POC: 1.02 (ref 1–1.03)
URINALYSIS CLARITY, POC: NORMAL
URINALYSIS COLOR, POC: NORMAL
UROBILINOGEN, POC: NORMAL

## 2024-04-25 PROCEDURE — 81003 URINALYSIS AUTO W/O SCOPE: CPT | Performed by: UROLOGY

## 2024-04-25 PROCEDURE — 1123F ACP DISCUSS/DSCN MKR DOCD: CPT | Performed by: UROLOGY

## 2024-04-25 PROCEDURE — G8427 DOCREV CUR MEDS BY ELIG CLIN: HCPCS | Performed by: UROLOGY

## 2024-04-25 PROCEDURE — G8420 CALC BMI NORM PARAMETERS: HCPCS | Performed by: UROLOGY

## 2024-04-25 PROCEDURE — 1036F TOBACCO NON-USER: CPT | Performed by: UROLOGY

## 2024-04-25 PROCEDURE — 99204 OFFICE O/P NEW MOD 45 MIN: CPT | Performed by: UROLOGY

## 2024-04-25 ASSESSMENT — ENCOUNTER SYMPTOMS
CONSTIPATION: 0
HEARTBURN: 0
EYE DISCHARGE: 0
EYE PAIN: 0
SKIN LESIONS: 0
WHEEZING: 0
SHORTNESS OF BREATH: 0
COUGH: 0
NAUSEA: 0
BLOOD IN STOOL: 0
VOMITING: 0
INDIGESTION: 0
BACK PAIN: 0
DIARRHEA: 0
ABDOMINAL PAIN: 0

## 2024-04-25 NOTE — TELEPHONE ENCOUNTER
----- Message from Nahum Gresham MD sent at 4/25/2024  2:23 PM EDT -----  Regarding: Surgery [facility]: [facility]    Surgeon: Marga    Assist: NONE    Diagnosis: Left Hydrocele    Procedure: Left Hydrocelectomy    Posting time: 1 hour    Special Instruments Needed:  NONE    Anesthesia: GENERAL    Labs: CBC, BMP, U/A    Tests: EKG per anesthesia    Blood: NONE    Bowel Prep: NONE    Special Instructions: Needs clearance to hold brilenta for procedure    Orders/HandP:     Follow up appointment: 2-3 days drain removal with NP or Susan and 6 week wound check with me.

## 2024-05-03 PROBLEM — N43.3 HYDROCELE: Status: ACTIVE | Noted: 2024-04-25

## 2024-05-03 NOTE — TELEPHONE ENCOUNTER
Procedures: Procedure(s):   HYDROCELECTOMY / LEFT   Date: 5/28/2024   Time: 0944   Location: [facility] OP OR 02     Scheduled.  Please complete orders.  Post op appt's scheduled.

## 2024-05-06 ENCOUNTER — PREP FOR PROCEDURE (OUTPATIENT)
Dept: UROLOGY | Age: 84
End: 2024-05-06

## 2024-05-06 DIAGNOSIS — N43.3 LEFT HYDROCELE: Primary | ICD-10-CM

## 2024-05-06 RX ORDER — SODIUM CHLORIDE 0.9 % (FLUSH) 0.9 %
5-40 SYRINGE (ML) INJECTION PRN
Status: CANCELLED | OUTPATIENT
Start: 2024-05-06

## 2024-05-06 RX ORDER — SODIUM CHLORIDE 0.9 % (FLUSH) 0.9 %
5-40 SYRINGE (ML) INJECTION EVERY 12 HOURS SCHEDULED
Status: CANCELLED | OUTPATIENT
Start: 2024-05-06

## 2024-05-06 RX ORDER — SODIUM CHLORIDE 9 MG/ML
INJECTION, SOLUTION INTRAVENOUS PRN
Status: CANCELLED | OUTPATIENT
Start: 2024-05-06

## 2024-05-21 ENCOUNTER — HOSPITAL ENCOUNTER (OUTPATIENT)
Dept: SURGERY | Age: 84
Discharge: HOME | End: 2024-05-24
Payer: MEDICARE

## 2024-05-21 VITALS
BODY MASS INDEX: 22.16 KG/M2 | DIASTOLIC BLOOD PRESSURE: 78 MMHG | TEMPERATURE: 98 F | OXYGEN SATURATION: 94 % | HEIGHT: 64 IN | HEART RATE: 92 BPM | SYSTOLIC BLOOD PRESSURE: 138 MMHG | WEIGHT: 129.8 LBS | RESPIRATION RATE: 18 BRPM

## 2024-05-21 DIAGNOSIS — N43.3 LEFT HYDROCELE: ICD-10-CM

## 2024-05-21 LAB
ANION GAP SERPL CALC-SCNC: 12 MMOL/L (ref 9–18)
BUN SERPL-MCNC: 15 MG/DL (ref 8–23)
CALCIUM SERPL-MCNC: 9.3 MG/DL (ref 8.8–10.2)
CHLORIDE SERPL-SCNC: 101 MMOL/L (ref 98–107)
CO2 SERPL-SCNC: 24 MMOL/L (ref 20–28)
CREAT SERPL-MCNC: 0.69 MG/DL (ref 0.8–1.3)
ERYTHROCYTE [DISTWIDTH] IN BLOOD BY AUTOMATED COUNT: 17.4 % (ref 11.9–14.6)
GLUCOSE SERPL-MCNC: 102 MG/DL (ref 70–99)
HCT VFR BLD AUTO: 35.1 % (ref 41.1–50.3)
HGB BLD-MCNC: 10.5 G/DL (ref 13.6–17.2)
MCH RBC QN AUTO: 20.8 PG (ref 26.1–32.9)
MCHC RBC AUTO-ENTMCNC: 29.9 G/DL (ref 31.4–35)
MCV RBC AUTO: 69.5 FL (ref 82–102)
NRBC # BLD: 0 K/UL (ref 0–0.2)
PLATELET # BLD AUTO: 311 K/UL (ref 150–450)
PMV BLD AUTO: 8.6 FL (ref 9.4–12.3)
POTASSIUM SERPL-SCNC: 4.6 MMOL/L (ref 3.5–5.1)
RBC # BLD AUTO: 5.05 M/UL (ref 4.23–5.6)
SODIUM SERPL-SCNC: 137 MMOL/L (ref 136–145)
WBC # BLD AUTO: 7.2 K/UL (ref 4.3–11.1)

## 2024-05-21 PROCEDURE — 85027 COMPLETE CBC AUTOMATED: CPT

## 2024-05-21 PROCEDURE — 80048 BASIC METABOLIC PNL TOTAL CA: CPT

## 2024-05-21 RX ORDER — CLOPIDOGREL BISULFATE 75 MG/1
75 TABLET ORAL DAILY
COMMUNITY
Start: 2024-02-29

## 2024-05-21 NOTE — PERIOP NOTE
PLEASE CONTINUE TAKING ALL PRESCRIPTION MEDICATIONS UP TO THE DAY OF SURGERY UNLESS OTHERWISE DIRECTED BELOW. You may take Tylenol, allergy,  and/or indigestion medications.     TAKE ONLY THESE MEDICATIONS ON THE DAY OF SURGERY    Albuterol (Ventolin/ Pro-air) use and bring  Trelegy inhaler   gabapentin (Neurontin) if needed  Pantoprazole (Protonix)            DISCONTINUE all vitamins and supplements 7 days prior to surgery. DISCONTINUE Non-Steroidal Anti-Inflammatory (NSAIDS) such as Advil and Aleve 5 days prior to surgery.     Home Medications to Hold- please continue all other medications except these.    You are taking a blood thinner/ anticoagulant Clopidogrel (Plavix). The surgeon will advise you on whether you will continue this medication or hold this medication prior to surgery. If you have not already received these instructions, you need to call your surgeon's office. If you are asked to hold your anticoagulant then our anesthesiologist would like for you to take an Aspirin 81 mg while you are holding.           donepezil (Aricept) hold beginning today     Comments      On the day before surgery please take 2 Tylenol in the morning and then again before bed. You may use either regular or extra strength.           Please do not bring home medications with you on the day of surgery unless otherwise directed by your nurse.  If you are instructed to bring home medications, please give them to your nurse as they will be administered by the nursing staff.    If you have any questions, please call [facility] [phone].    A copy of this note was provided to the patient for reference.

## 2024-05-21 NOTE — PERIOP NOTE
Patient verified name and     Order for consent was found in EHR and matches case posting; patient verified.     Type 1B surgery, walk-in assessment complete.    Labs per surgeon: cbc, bmp; results pending  Labs per anesthesia protocol: no additional  EKG: not requried        Patient provided with and instructed on educational handouts including Guide to Surgery, Preventing Surgical Site Infections, Pain Management, and [facility] Brochure.    Patient answered medical/surgical history questions at their best of ability. All prior to admission medications documented in EPIC. Original medication prescription bottle not visualized during patient appointment.     Patient instructed to hold all vitamins 7 days prior to surgery and NSAIDS 5 days prior to surgery, patient verbalized understanding.     Patient teach back successful and patient demonstrates knowledge of instructions.

## 2024-05-24 NOTE — PERIOP NOTE
Preop department called to notify patient of arrival time for scheduled procedure. Instructions given to   - Arrive at OPC Entrance [address].  - Remain NPO after midnight, unless otherwise indicated, including gum, mints, and ice chips.   - Have a responsible adult to drive patient to the hospital, stay during surgery, and patient will need supervision 24 hours after anesthesia.   - Use antibacterial soap in shower the night before surgery and on the morning of surgery.       Was patient contacted: no  Voicemail left: yes-pts wifes phone   Numbers contacted: [phone]   Arrival time: 0745

## 2024-05-24 NOTE — PERIOP NOTE
Preop department called to notify patient of arrival time for scheduled procedure. Instructions given to   - Arrive at OPC Entrance [address].  - Remain NPO after midnight, unless otherwise indicated, including gum, mints, and ice chips.   - Have a responsible adult to drive patient to the hospital, stay during surgery, and patient will need supervision 24 hours after anesthesia.   - Use antibacterial soap in shower the night before surgery and on the morning of surgery.       Was patient contacted: patient called back  Voicemail left:   Numbers contacted: [phone]   Arrival time: 0745

## 2024-05-28 ENCOUNTER — HOSPITAL ENCOUNTER (OUTPATIENT)
Age: 84
Setting detail: OUTPATIENT SURGERY
Discharge: HOME | End: 2024-05-28
Attending: UROLOGY | Admitting: UROLOGY
Payer: MEDICARE

## 2024-05-28 ENCOUNTER — ANESTHESIA (OUTPATIENT)
Dept: SURGERY | Age: 84
End: 2024-05-28
Payer: MEDICARE

## 2024-05-28 ENCOUNTER — ANESTHESIA EVENT (OUTPATIENT)
Dept: SURGERY | Age: 84
End: 2024-05-28
Payer: MEDICARE

## 2024-05-28 VITALS
OXYGEN SATURATION: 96 % | RESPIRATION RATE: 16 BRPM | HEART RATE: 97 BPM | BODY MASS INDEX: 22.14 KG/M2 | WEIGHT: 129 LBS | TEMPERATURE: 98.5 F | DIASTOLIC BLOOD PRESSURE: 98 MMHG | SYSTOLIC BLOOD PRESSURE: 161 MMHG

## 2024-05-28 DIAGNOSIS — N43.3 LEFT HYDROCELE: Primary | ICD-10-CM

## 2024-05-28 DIAGNOSIS — N43.3 HYDROCELE: ICD-10-CM

## 2024-05-28 LAB
APPEARANCE UR: CLEAR
BILIRUB UR QL: NEGATIVE
COLOR UR: NORMAL
GLUCOSE UR STRIP.AUTO-MCNC: NEGATIVE MG/DL
HGB UR QL STRIP: NEGATIVE
KETONES UR QL STRIP.AUTO: NEGATIVE MG/DL
LEUKOCYTE ESTERASE UR QL STRIP.AUTO: NEGATIVE
NITRITE UR QL STRIP.AUTO: NEGATIVE
PH UR STRIP: 6 (ref 5–9)
PROT UR STRIP-MCNC: NEGATIVE MG/DL
SP GR UR REFRACTOMETRY: 1.02 (ref 1–1.02)
UROBILINOGEN UR QL STRIP.AUTO: 0.2 EU/DL (ref 0.2–1)

## 2024-05-28 PROCEDURE — 7100000010 HC PHASE II RECOVERY - FIRST 15 MIN: Performed by: UROLOGY

## 2024-05-28 PROCEDURE — 3700000001 HC ADD 15 MINUTES (ANESTHESIA): Performed by: UROLOGY

## 2024-05-28 PROCEDURE — 6360000002 HC RX W HCPCS: Performed by: UROLOGY

## 2024-05-28 PROCEDURE — 6370000000 HC RX 637 (ALT 250 FOR IP): Performed by: ANESTHESIOLOGY

## 2024-05-28 PROCEDURE — 2709999900 HC NON-CHARGEABLE SUPPLY: Performed by: UROLOGY

## 2024-05-28 PROCEDURE — 3600000002 HC SURGERY LEVEL 2 BASE: Performed by: UROLOGY

## 2024-05-28 PROCEDURE — 3600000012 HC SURGERY LEVEL 2 ADDTL 15MIN: Performed by: UROLOGY

## 2024-05-28 PROCEDURE — 2580000003 HC RX 258: Performed by: ANESTHESIOLOGY

## 2024-05-28 PROCEDURE — 88304 TISSUE EXAM BY PATHOLOGIST: CPT

## 2024-05-28 PROCEDURE — 7100000000 HC PACU RECOVERY - FIRST 15 MIN: Performed by: UROLOGY

## 2024-05-28 PROCEDURE — 6360000002 HC RX W HCPCS: Performed by: NURSE ANESTHETIST, CERTIFIED REGISTERED

## 2024-05-28 PROCEDURE — 6370000000 HC RX 637 (ALT 250 FOR IP): Performed by: UROLOGY

## 2024-05-28 PROCEDURE — 7100000001 HC PACU RECOVERY - ADDTL 15 MIN: Performed by: UROLOGY

## 2024-05-28 PROCEDURE — 81003 URINALYSIS AUTO W/O SCOPE: CPT

## 2024-05-28 PROCEDURE — 55040 REMOVAL OF HYDROCELE: CPT | Performed by: UROLOGY

## 2024-05-28 PROCEDURE — 2500000003 HC RX 250 WO HCPCS: Performed by: NURSE ANESTHETIST, CERTIFIED REGISTERED

## 2024-05-28 PROCEDURE — 2720000010 HC SURG SUPPLY STERILE: Performed by: UROLOGY

## 2024-05-28 PROCEDURE — 7100000011 HC PHASE II RECOVERY - ADDTL 15 MIN: Performed by: UROLOGY

## 2024-05-28 PROCEDURE — 3700000000 HC ANESTHESIA ATTENDED CARE: Performed by: UROLOGY

## 2024-05-28 RX ORDER — ASPIRIN 81 MG/1
81 TABLET ORAL
Status: COMPLETED | OUTPATIENT
Start: 2024-05-28 | End: 2024-05-28

## 2024-05-28 RX ORDER — NALOXONE HYDROCHLORIDE 0.4 MG/ML
INJECTION, SOLUTION INTRAMUSCULAR; INTRAVENOUS; SUBCUTANEOUS PRN
Status: DISCONTINUED | OUTPATIENT
Start: 2024-05-28 | End: 2024-05-28 | Stop reason: HOSPADM

## 2024-05-28 RX ORDER — SODIUM CHLORIDE 9 MG/ML
INJECTION, SOLUTION INTRAVENOUS PRN
Status: DISCONTINUED | OUTPATIENT
Start: 2024-05-28 | End: 2024-05-28 | Stop reason: HOSPADM

## 2024-05-28 RX ORDER — ONDANSETRON 2 MG/ML
INJECTION INTRAMUSCULAR; INTRAVENOUS PRN
Status: DISCONTINUED | OUTPATIENT
Start: 2024-05-28 | End: 2024-05-28 | Stop reason: SDUPTHER

## 2024-05-28 RX ORDER — DEXAMETHASONE SODIUM PHOSPHATE 4 MG/ML
INJECTION, SOLUTION INTRA-ARTICULAR; INTRALESIONAL; INTRAMUSCULAR; INTRAVENOUS; SOFT TISSUE PRN
Status: DISCONTINUED | OUTPATIENT
Start: 2024-05-28 | End: 2024-05-28 | Stop reason: SDUPTHER

## 2024-05-28 RX ORDER — BACITRACIN ZINC AND POLYMYXIN B SULFATE 500; 1000 [USP'U]/G; [USP'U]/G
OINTMENT TOPICAL
Qty: 15 G | Refills: 5 | Status: SHIPPED | OUTPATIENT
Start: 2024-05-28 | End: 2024-06-04

## 2024-05-28 RX ORDER — ASPIRIN 81 MG/1
81 TABLET ORAL ONCE
Status: DISCONTINUED | OUTPATIENT
Start: 2024-05-28 | End: 2024-05-28

## 2024-05-28 RX ORDER — BUPIVACAINE HYDROCHLORIDE 5 MG/ML
INJECTION, SOLUTION EPIDURAL; INTRACAUDAL PRN
Status: DISCONTINUED | OUTPATIENT
Start: 2024-05-28 | End: 2024-05-28 | Stop reason: ALTCHOICE

## 2024-05-28 RX ORDER — SODIUM CHLORIDE 0.9 % (FLUSH) 0.9 %
5-40 SYRINGE (ML) INJECTION EVERY 12 HOURS SCHEDULED
Status: DISCONTINUED | OUTPATIENT
Start: 2024-05-28 | End: 2024-05-28 | Stop reason: HOSPADM

## 2024-05-28 RX ORDER — PROPOFOL 10 MG/ML
INJECTION, EMULSION INTRAVENOUS PRN
Status: DISCONTINUED | OUTPATIENT
Start: 2024-05-28 | End: 2024-05-28 | Stop reason: SDUPTHER

## 2024-05-28 RX ORDER — LIDOCAINE HYDROCHLORIDE 20 MG/ML
INJECTION, SOLUTION EPIDURAL; INFILTRATION; INTRACAUDAL; PERINEURAL PRN
Status: DISCONTINUED | OUTPATIENT
Start: 2024-05-28 | End: 2024-05-28 | Stop reason: SDUPTHER

## 2024-05-28 RX ORDER — OXYCODONE HYDROCHLORIDE 5 MG/1
5 TABLET ORAL ONCE
Status: COMPLETED | OUTPATIENT
Start: 2024-05-28 | End: 2024-05-28

## 2024-05-28 RX ORDER — DIPHENHYDRAMINE HYDROCHLORIDE 50 MG/ML
12.5 INJECTION INTRAMUSCULAR; INTRAVENOUS
Status: DISCONTINUED | OUTPATIENT
Start: 2024-05-28 | End: 2024-05-28 | Stop reason: HOSPADM

## 2024-05-28 RX ORDER — SODIUM CHLORIDE 0.9 % (FLUSH) 0.9 %
5-40 SYRINGE (ML) INJECTION PRN
Status: DISCONTINUED | OUTPATIENT
Start: 2024-05-28 | End: 2024-05-28 | Stop reason: HOSPADM

## 2024-05-28 RX ORDER — FAMOTIDINE 20 MG/1
20 TABLET, FILM COATED ORAL ONCE
Status: DISCONTINUED | OUTPATIENT
Start: 2024-05-28 | End: 2024-05-28 | Stop reason: HOSPADM

## 2024-05-28 RX ORDER — OXYCODONE HYDROCHLORIDE 5 MG/1
10 TABLET ORAL ONCE
Status: COMPLETED | OUTPATIENT
Start: 2024-05-28 | End: 2024-05-28

## 2024-05-28 RX ORDER — ACETAMINOPHEN 500 MG
1000 TABLET ORAL ONCE
Status: DISCONTINUED | OUTPATIENT
Start: 2024-05-28 | End: 2024-05-28 | Stop reason: HOSPADM

## 2024-05-28 RX ORDER — OXYCODONE HYDROCHLORIDE AND ACETAMINOPHEN 5; 325 MG/1; MG/1
1 TABLET ORAL EVERY 6 HOURS PRN
Qty: 20 TABLET | Refills: 0 | Status: SHIPPED | OUTPATIENT
Start: 2024-05-28 | End: 2024-06-02

## 2024-05-28 RX ORDER — MIDAZOLAM HYDROCHLORIDE 2 MG/2ML
2 INJECTION, SOLUTION INTRAMUSCULAR; INTRAVENOUS
Status: DISCONTINUED | OUTPATIENT
Start: 2024-05-28 | End: 2024-05-28 | Stop reason: HOSPADM

## 2024-05-28 RX ORDER — HYDROMORPHONE HYDROCHLORIDE 2 MG/ML
0.5 INJECTION, SOLUTION INTRAMUSCULAR; INTRAVENOUS; SUBCUTANEOUS EVERY 5 MIN PRN
Status: DISCONTINUED | OUTPATIENT
Start: 2024-05-28 | End: 2024-05-28 | Stop reason: HOSPADM

## 2024-05-28 RX ORDER — EPHEDRINE SULFATE 5 MG/ML
INJECTION INTRAVENOUS PRN
Status: DISCONTINUED | OUTPATIENT
Start: 2024-05-28 | End: 2024-05-28 | Stop reason: SDUPTHER

## 2024-05-28 RX ORDER — FENTANYL CITRATE 50 UG/ML
100 INJECTION, SOLUTION INTRAMUSCULAR; INTRAVENOUS
Status: DISCONTINUED | OUTPATIENT
Start: 2024-05-28 | End: 2024-05-28 | Stop reason: HOSPADM

## 2024-05-28 RX ORDER — CEPHALEXIN 500 MG/1
500 CAPSULE ORAL 2 TIMES DAILY
Qty: 10 CAPSULE | Refills: 0 | Status: SHIPPED | OUTPATIENT
Start: 2024-05-28 | End: 2024-06-02

## 2024-05-28 RX ORDER — LIDOCAINE HYDROCHLORIDE 10 MG/ML
1 INJECTION, SOLUTION INFILTRATION; PERINEURAL
Status: DISCONTINUED | OUTPATIENT
Start: 2024-05-28 | End: 2024-05-28 | Stop reason: HOSPADM

## 2024-05-28 RX ORDER — SODIUM CHLORIDE, SODIUM LACTATE, POTASSIUM CHLORIDE, CALCIUM CHLORIDE 600; 310; 30; 20 MG/100ML; MG/100ML; MG/100ML; MG/100ML
INJECTION, SOLUTION INTRAVENOUS CONTINUOUS
Status: DISCONTINUED | OUTPATIENT
Start: 2024-05-28 | End: 2024-05-28 | Stop reason: HOSPADM

## 2024-05-28 RX ORDER — PROCHLORPERAZINE EDISYLATE 5 MG/ML
5 INJECTION INTRAMUSCULAR; INTRAVENOUS
Status: DISCONTINUED | OUTPATIENT
Start: 2024-05-28 | End: 2024-05-28 | Stop reason: HOSPADM

## 2024-05-28 RX ADMIN — PROPOFOL 20 MG: 10 INJECTION, EMULSION INTRAVENOUS at 10:29

## 2024-05-28 RX ADMIN — ASPIRIN 81 MG: 81 TABLET, COATED ORAL at 09:19

## 2024-05-28 RX ADMIN — LIDOCAINE HYDROCHLORIDE 100 MG: 20 INJECTION, SOLUTION EPIDURAL; INFILTRATION; INTRACAUDAL; PERINEURAL at 09:48

## 2024-05-28 RX ADMIN — DEXAMETHASONE SODIUM PHOSPHATE 4 MG: 4 INJECTION INTRA-ARTICULAR; INTRALESIONAL; INTRAMUSCULAR; INTRAVENOUS; SOFT TISSUE at 10:13

## 2024-05-28 RX ADMIN — SODIUM CHLORIDE, POTASSIUM CHLORIDE, SODIUM LACTATE AND CALCIUM CHLORIDE: 600; 310; 30; 20 INJECTION, SOLUTION INTRAVENOUS at 08:19

## 2024-05-28 RX ADMIN — OXYCODONE 5 MG: 5 TABLET ORAL at 11:12

## 2024-05-28 RX ADMIN — PROPOFOL 150 MG: 10 INJECTION, EMULSION INTRAVENOUS at 09:48

## 2024-05-28 RX ADMIN — Medication 2000 MG: at 09:53

## 2024-05-28 RX ADMIN — PHENYLEPHRINE HYDROCHLORIDE 100 MCG: 0.1 INJECTION, SOLUTION INTRAVENOUS at 09:58

## 2024-05-28 RX ADMIN — EPHEDRINE SULFATE 10 MG: 5 INJECTION INTRAVENOUS at 10:02

## 2024-05-28 RX ADMIN — ONDANSETRON 4 MG: 2 INJECTION INTRAMUSCULAR; INTRAVENOUS at 10:13

## 2024-05-28 RX ADMIN — PHENYLEPHRINE HYDROCHLORIDE 100 MCG: 0.1 INJECTION, SOLUTION INTRAVENOUS at 10:00

## 2024-05-28 ASSESSMENT — PAIN - FUNCTIONAL ASSESSMENT
PAIN_FUNCTIONAL_ASSESSMENT: 0-10
PAIN_FUNCTIONAL_ASSESSMENT: NONE - DENIES PAIN

## 2024-05-28 ASSESSMENT — PAIN SCALES - GENERAL: PAINLEVEL_OUTOF10: 5

## 2024-05-28 ASSESSMENT — COPD QUESTIONNAIRES: CAT_SEVERITY: SEVERE

## 2024-05-28 NOTE — ANESTHESIA POSTPROCEDURE EVALUATION
Department of Anesthesiology  Postprocedure Note    Patient: Nate Lyman  MRN: [id number]  YOB: 1940  Date of evaluation: 5/28/2024    Procedure Summary       Date: 05/28/24 Room / Location: [facility] OP OR 02 / [facility]    Anesthesia Start: 0933 Anesthesia Stop: 1053    Procedure: HYDROCELECTOMY / LEFT (Left: Scrotum) Diagnosis:       Hydrocele      (Hydrocele [N43.3])    Surgeons: Nahum Gresham MD Responsible Provider: JOSE Leal MD    Anesthesia Type: General ASA Status: 3            Anesthesia Type: General    Alexandra Phase I: Alexandra Score: 8    Alexandra Phase II:      Anesthesia Post Evaluation    Patient location during evaluation: PACU  Patient participation: complete - patient participated  Level of consciousness: awake and alert  Airway patency: patent  Nausea & Vomiting: no nausea and no vomiting  Cardiovascular status: hemodynamically stable  Respiratory status: acceptable, nonlabored ventilation and spontaneous ventilation  Hydration status: euvolemic  Comments: /78   Pulse 92   Temp 98.5 °F (36.9 °C) (Temporal)   Resp 16   Wt 58.5 kg (129 lb)   SpO2 97%   BMI 22.14 kg/m²     Multimodal analgesia pain management approach  Pain management: adequate and satisfactory to patient    No notable events documented.

## 2024-05-28 NOTE — H&P
[facility] H&P Note                                           05/28/24     Patient: Nate Lyman  MRN: [id number]    Admission Date:  5/28/2024, 0  Admission Diagnosis: Hydrocele [N43.3]  Reason for Consult: Left Hydrocele    ASSESSMENT: 83 y.o. male with L >> R hydrocele who presents for left hydrocelectomy today.    PLAN:  -To OR for left hydrocelectomy    I explained to pt. that a hydrocele is a fluid collection in close proximity to the testis.  It carries no risk of neoplasia.  Secondary to symptoms, pt. wished to discuss treatment options.  We discussed drainage (with highest recurrence rates), drainage with sclerosis (high recurrence rate, pain with sclerosis), and hydrocelectomy.  We discussed risks of the hydrocelectomy including bleeding, infection, testicular injury, and hydrocele recurrence (10%).  He desires to proceed with left hydrocelectomy.     -Consented  -Antibiotic on call to OR  -NPO for procedure.     __________________________________________________________________________________    HPI:     Nate Lyman is a 83 y.o. male with L >> R hydrocele who presents for left hydrocelectomy today.    No changes in medical history since last seen by me.     Scrotal US 7/27/23 shows L >>> R hydrocele.     Not on blood thinning medication.     Past Medical History:  Past Medical History:   Diagnosis Date    Arthritis     Benign essential HTN 6/6/2017    CAD (coronary artery disease)     Chronic obstructive pulmonary disease (HCC)     Cognitive deficits 8/20/2021    GERD (gastroesophageal reflux disease)     History of multiple allergies     Hx of migraines     Hyperlipidemia     Hypertension     Irritable bowel syndrome with diarrhea 10/2/2018    Pancreatic insufficiency     Sinus problem     Thoracic aneurysm without mention of rupture 12/2/2014    No chest or upper back pain.  Echo shows a mildly dilated aortic root at 4.0 cm.  There is mild LVH  Pt denies being prescribed any medications for insomnia in the past.    He is willing to try Ambien for insomnia.    Okay to place order for Ambien?

## 2024-05-28 NOTE — BRIEF OP NOTE
Brief Postoperative Note      Patient: Nate Lyman  YOB: 1940  MRN: [id number]    Date of Procedure: 5/28/2024    Pre-Op Diagnosis Codes:     * Hydrocele [N43.3]    Post-Op Diagnosis: Same       Procedure(s):  HYDROCELECTOMY / LEFT    Surgeon(s):  Nahum Gresham MD    Assistant:  * No surgical staff found *    Anesthesia: General    Estimated Blood Loss (mL): less than 50     Complications: None    Specimens:   ID Type Source Tests Collected by Time Destination   A : Left Hydrocelectomy sac Tissue Scrotum SURGICAL PATHOLOGY Nahum Gresham MD 5/28/2024 1037        Implants:  * No implants in log *      Drains:   Open Drain 05/28/24 Groin (Active)       Findings:  Infection Present At Time Of Surgery (PATOS) (choose all levels that have infection present):  No infection present  Other Findings: 100 cc left hydrocele excised / removed.     Electronically signed by Nahum Gresham MD on 5/28/2024 at 10:55 AM

## 2024-05-28 NOTE — ANESTHESIA PRE PROCEDURE
Department of Anesthesiology  Preprocedure Note       Name:  Nate Lyman   Age:  83 y.o.  :  1940                                          MRN:  [id number]         Date:  2024      Surgeon: Surgeon(s):  Nahum Gresham MD    Procedure: Procedure(s):  HYDROCELECTOMY / LEFT    Medications prior to admission:   Prior to Admission medications    Medication Sig Start Date End Date Taking? Authorizing Provider   clopidogrel (PLAVIX) 75 MG tablet Take 1 tablet by mouth daily 24   Melody Hough MD   donepezil (ARICEPT) 5 MG tablet Take 1 tablet by mouth nightly 24   Elias Santiago DO   SUMAtriptan (IMITREX) 100 MG tablet TAKE 1/2 TO 1 TABLET BY MOUTH AS NEEDED FOR MIGRAINE MAY REPEAT IN 2HRS MAX 2/24HRS 24   Elias Santiago DO   QUEtiapine (SEROQUEL) 25 MG tablet Take one tab at 1500/Take two tab at HS 24   Elias Santiago DO   tamsulosin (FLOMAX) 0.4 MG capsule TAKE 2 CAPSULES BY MOUTH EVERY DAY  Patient taking differently: nightly 23   Elias Santiago DO   albuterol (PROVENTIL) (2.5 MG/3ML) 0.083% nebulizer solution Take 3 mLs by nebulization every 6 hours as needed for Wheezing USE 3 ML BY NEBULIZATION ROUTE 4 TIMES DAILY. BILL TO MEDICARE PART B WITH DX: J44.9 COPD. 23   Moraima Vivar MD   CREON [id number] units CPEP delayed release capsule Take 1 capsule by mouth 3 times daily (with meals) 23   Melody Hough MD   fluticasone-umeclidin-vilant (TRELEGY ELLIPTA) 100-62.5-25 MCG/ACT AEPB inhaler Inhale 1 puff into the lungs daily 8/15/23   Moraima Vivar MD   albuterol sulfate HFA (PROVENTIL;VENTOLIN;PROAIR) 108 (90 Base) MCG/ACT inhaler INHALE 2 PUFFS BY MOUTH EVERY 4 HOURS AS NEEDED 8/15/23   Moraima Vivar MD   pantoprazole (PROTONIX) 40 MG tablet Take 1 tablet by mouth daily 7/3/23   Provider, MD Melody   gabapentin (NEURONTIN) 300 MG capsule TAKE 1 CAPSULE BY MOUTH THREE TIMES A DAY  Patient taking differently: 3 times

## 2024-05-28 NOTE — OP NOTE
[address]  [address]                            OPERATIVE REPORT      PATIENT NAME: MASOOD KIRKLAND          : 1940  MED REC NO: [id number]                       ROOM: OPOR  ACCOUNT NO: [id number]                       ADMIT DATE: 2024  PROVIDER: Nahum Gresham MD    DATE OF SERVICE:  2024    PREOPERATIVE DIAGNOSES:  Left hydrocele.    POSTOPERATIVE DIAGNOSES:  Left hydrocele.    PROCEDURES PERFORMED:  Left hydrocelectomy.    SURGEON:  Nahum Gresham MD    ASSISTANT:  None.    ANESTHESIA:  General.    ESTIMATED BLOOD LOSS:  Less than 50.    SPECIMENS REMOVED:  Left hydrocele sac.    INTRAOPERATIVE FINDINGS:  No infection.  100 mL left hydrocele excised and removed.     COMPLICATIONS:  None.    IMPLANTS:  None.    INDICATIONS:  The patient is a pleasant 83-year-old gentleman with a history of bothersome left hydrocele.  He presents today for hydrocelectomy after risk-benefit discussion was had.    DESCRIPTION OF PROCEDURE:  After informed consent was obtained, the correct patient was identified in the preoperative holding area.  He was taken back to the operating room suite, placed on the table in supine position.  Time-out was performed confirming correct patient and planned procedure.  He received 2 g IV Ancef prior to the smooth induction of general endotracheal anesthesia.  He was left in the supine position, prepped and draped in the usual sterile fashion.  Over the left hemiscrotum, I made a 5 cm incision using #11 blade scalpel.  I then used Bovie electrocautery and blunt and sharp dissection to carry my dissection down through the dartos tissue and expose the tunica vaginalis of the left testicle.  I freed this up bluntly circumferentially and then was able to deliver the left testicle, left epididymis, and left hydrocele into the operative field.  I then carefully incised the tunica vaginalis

## 2024-05-30 ENCOUNTER — TELEPHONE (OUTPATIENT)
Dept: UROLOGY | Age: 84
End: 2024-05-30

## 2024-05-30 NOTE — TELEPHONE ENCOUNTER
pts wife isnt feeling well and cant bring him, wants sooner ema then june 18th, please contact pt

## 2024-06-01 DIAGNOSIS — N40.1 BENIGN PROSTATIC HYPERPLASIA WITH LOWER URINARY TRACT SYMPTOMS: ICD-10-CM

## 2024-06-03 ENCOUNTER — TELEPHONE (OUTPATIENT)
Dept: PULMONOLOGY | Age: 84
End: 2024-06-03

## 2024-06-03 DIAGNOSIS — R07.81 RIB PAIN: Primary | ICD-10-CM

## 2024-06-03 NOTE — TELEPHONE ENCOUNTER
TRIAGE CALL      Complaint: SOB/fell last week and hurt ribs  Cough: no  Productive:  no  Bloody Sputum:  no  Increased SOB/Wheezing:  yes  Duration: 1 weeks  Fever/Chills: no  OTC Meds tried: Tylenol

## 2024-06-04 ENCOUNTER — HOSPITAL ENCOUNTER (OUTPATIENT)
Dept: GENERAL RADIOLOGY | Age: 84
Discharge: HOME | End: 2024-06-07
Payer: MEDICARE

## 2024-06-04 DIAGNOSIS — R07.81 RIB PAIN: ICD-10-CM

## 2024-06-04 PROCEDURE — 71046 X-RAY EXAM CHEST 2 VIEWS: CPT

## 2024-06-04 RX ORDER — TAMSULOSIN HYDROCHLORIDE 0.4 MG/1
0.8 CAPSULE ORAL
Qty: 180 CAPSULE | Refills: 3 | Status: SHIPPED | OUTPATIENT
Start: 2024-06-04

## 2024-06-04 NOTE — TELEPHONE ENCOUNTER
Last seen: 2/6/24  Hx: nodule, COPD, bronchiectasis    Family calling to report increased sob, notes a fall in the last week w/ \"hurt ribs\"; no cough or fever; has tried Tylenol.    Contacted spouse noting sob w/ exertion since having the fall at home, post surgery, was taking pain medication to help w/ surgical pain; realizing the fall seems to have caused more pain.    Reviewed that there are no open appts to work patient in acutely but message can be sent to provider to review for recommendations. Has not reported to PCP. Encouraged to have CXR done so that it can be reviewed by provider for next steps, noting may still need to see other provider to treat. Voiced understanding.

## 2024-06-04 NOTE — TELEPHONE ENCOUNTER
Agree with plan below  CXR now  If no visits possible in the pulmonary or PCP office:  recommend ER or urgent care.  Moraima Vivar MD

## 2024-06-06 ENCOUNTER — OFFICE VISIT (OUTPATIENT)
Dept: UROLOGY | Age: 84
End: 2024-06-06
Payer: MEDICARE

## 2024-06-06 ENCOUNTER — TELEPHONE (OUTPATIENT)
Dept: PULMONOLOGY | Age: 84
End: 2024-06-06

## 2024-06-06 DIAGNOSIS — N43.3 LEFT HYDROCELE: Primary | ICD-10-CM

## 2024-06-06 LAB
BILIRUBIN, URINE, POC: NEGATIVE
BLOOD URINE, POC: NEGATIVE
GLUCOSE URINE, POC: NEGATIVE
KETONES, URINE, POC: NEGATIVE
LEUKOCYTE ESTERASE, URINE, POC: NEGATIVE
NITRITE, URINE, POC: NEGATIVE
PH, URINE, POC: 5.5 (ref 4.6–8)
PROTEIN,URINE, POC: NEGATIVE
SPECIFIC GRAVITY, URINE, POC: 1.03 (ref 1–1.03)
URINALYSIS CLARITY, POC: NORMAL
URINALYSIS COLOR, POC: NORMAL
UROBILINOGEN, POC: NORMAL

## 2024-06-06 PROCEDURE — 81003 URINALYSIS AUTO W/O SCOPE: CPT | Performed by: NURSE PRACTITIONER

## 2024-06-06 ASSESSMENT — ENCOUNTER SYMPTOMS: BACK PAIN: 0

## 2024-06-06 NOTE — TELEPHONE ENCOUNTER
Contacted patient with MD recommendations. Patient verbalized understanding, no further questions or concerns at this time.  I also reiterated for patient and wife to seek urgent care or ER.

## 2024-06-06 NOTE — TELEPHONE ENCOUNTER
CXR has been done, please advise if needs to be set up for procedure. Have reiterated to spouse/patient via [employer] to seek PCP or ER evaluation.

## 2024-06-06 NOTE — PROGRESS NOTES
Social Determinants of Health     Financial Resource Strain: Low Risk  (6/30/2023)    Overall Financial Resource Strain (CARDIA)     Difficulty of Paying Living Expenses: Not hard at all   Food Insecurity: Not on file (6/30/2023)   Transportation Needs: Unknown (6/30/2023)    PRAPARE - Transportation     Lack of Transportation (Medical): Not on file     Lack of Transportation (Non-Medical): No   Physical Activity: Inactive (1/19/2024)    Exercise Vital Sign     Days of Exercise per Week: 0 days     Minutes of Exercise per Session: 0 min   Stress: Not on file   Social Connections: Not on file   Intimate Partner Violence: Not on file   Housing Stability: Unknown (6/30/2023)    Housing Stability Vital Sign     Unable to Pay for Housing in the Last Year: Not on file     Number of Places Lived in the Last Year: Not on file     Unstable Housing in the Last Year: No     Family History   Problem Relation Age of Onset    COPD Mother     Asthma Father     No Known Problems Sister     No Known Problems Sister     No Known Problems Brother     Dementia Other         UNCLE    Thyroid Cancer Neg Hx        Review of Systems  Constitutional:   Negative for fever.  Genitourinary:  Negative for hematuria.  Musculoskeletal:  Negative for back pain.      Urinalysis  UA - Dipstick  Results for orders placed or performed in visit on 06/06/24   AMB POC URINALYSIS DIP STICK AUTO W/O MICRO   Result Value Ref Range    Color (UA POC)      Clarity (UA POC)      Glucose, Urine, POC Negative     Bilirubin, Urine, POC Negative     KETONES, Urine, POC Negative     Specific Gravity, Urine, POC 1.030 1.001 - 1.035    Blood (UA POC) Negative     pH, Urine, POC 5.5 4.6 - 8.0    Protein, Urine, POC Negative     Urobilinogen, POC 0.2 mg/dL     Nitrite, Urine, POC Negative     Leukocyte Esterase, Urine, POC Negative    Results for orders placed or performed in visit on 07/19/23   AMB POC URINALYSIS DIP STICK AUTO W/O MICRO   Result Value Ref Range

## 2024-07-16 RX ORDER — GABAPENTIN 300 MG/1
300 CAPSULE ORAL 3 TIMES DAILY
Qty: 270 CAPSULE | Refills: 1 | Status: CANCELLED | OUTPATIENT
Start: 2024-07-16 | End: 2025-01-12

## 2024-07-22 ENCOUNTER — OFFICE VISIT (OUTPATIENT)
Dept: INTERNAL MEDICINE CLINIC | Facility: CLINIC | Age: 84
End: 2024-07-22
Payer: MEDICARE

## 2024-07-22 VITALS
WEIGHT: 126 LBS | HEART RATE: 90 BPM | SYSTOLIC BLOOD PRESSURE: 110 MMHG | OXYGEN SATURATION: 95 % | HEIGHT: 64 IN | DIASTOLIC BLOOD PRESSURE: 70 MMHG | BODY MASS INDEX: 21.51 KG/M2

## 2024-07-22 DIAGNOSIS — J44.9 COPD, SEVERE (HCC): Chronic | ICD-10-CM

## 2024-07-22 DIAGNOSIS — G30.1 LATE ONSET ALZHEIMER'S DEMENTIA WITH BEHAVIORAL DISTURBANCE (HCC): Primary | ICD-10-CM

## 2024-07-22 DIAGNOSIS — B02.29 OTHER POSTHERPETIC NERVOUS SYSTEM INVOLVEMENT: ICD-10-CM

## 2024-07-22 DIAGNOSIS — R53.83 OTHER FATIGUE: ICD-10-CM

## 2024-07-22 DIAGNOSIS — F02.818 LATE ONSET ALZHEIMER'S DEMENTIA WITH BEHAVIORAL DISTURBANCE (HCC): Primary | ICD-10-CM

## 2024-07-22 DIAGNOSIS — R13.10 ODYNOPHAGIA: ICD-10-CM

## 2024-07-22 LAB
ALBUMIN SERPL-MCNC: 3.5 G/DL (ref 3.2–4.6)
ALBUMIN/GLOB SERPL: 1.1 (ref 1–1.9)
ALP SERPL-CCNC: 111 U/L (ref 40–129)
ALT SERPL-CCNC: 16 U/L (ref 12–65)
ANION GAP SERPL CALC-SCNC: 10 MMOL/L (ref 9–18)
AST SERPL-CCNC: 20 U/L (ref 15–37)
BILIRUB SERPL-MCNC: 0.3 MG/DL (ref 0–1.2)
BUN SERPL-MCNC: 15 MG/DL (ref 8–23)
CALCIUM SERPL-MCNC: 9.1 MG/DL (ref 8.8–10.2)
CHLORIDE SERPL-SCNC: 103 MMOL/L (ref 98–107)
CO2 SERPL-SCNC: 25 MMOL/L (ref 20–28)
CREAT SERPL-MCNC: 0.89 MG/DL (ref 0.8–1.3)
ERYTHROCYTE [DISTWIDTH] IN BLOOD BY AUTOMATED COUNT: 21.1 % (ref 11.9–14.6)
GLOBULIN SER CALC-MCNC: 3.3 G/DL (ref 2.3–3.5)
GLUCOSE SERPL-MCNC: 109 MG/DL (ref 70–99)
HCT VFR BLD AUTO: 35.9 % (ref 41.1–50.3)
HGB BLD-MCNC: 10.4 G/DL (ref 13.6–17.2)
MCH RBC QN AUTO: 20.1 PG (ref 26.1–32.9)
MCHC RBC AUTO-ENTMCNC: 29 G/DL (ref 31.4–35)
MCV RBC AUTO: 69.3 FL (ref 82–102)
NRBC # BLD: 0 K/UL (ref 0–0.2)
PLATELET # BLD AUTO: 352 K/UL (ref 150–450)
PMV BLD AUTO: 8.4 FL (ref 9.4–12.3)
POTASSIUM SERPL-SCNC: 4.6 MMOL/L (ref 3.5–5.1)
PROT SERPL-MCNC: 6.8 G/DL (ref 6.3–8.2)
RBC # BLD AUTO: 5.18 M/UL (ref 4.23–5.6)
SODIUM SERPL-SCNC: 137 MMOL/L (ref 136–145)
WBC # BLD AUTO: 9 K/UL (ref 4.3–11.1)

## 2024-07-22 PROCEDURE — 99214 OFFICE O/P EST MOD 30 MIN: CPT | Performed by: FAMILY MEDICINE

## 2024-07-22 PROCEDURE — 1123F ACP DISCUSS/DSCN MKR DOCD: CPT | Performed by: FAMILY MEDICINE

## 2024-07-22 PROCEDURE — G8427 DOCREV CUR MEDS BY ELIG CLIN: HCPCS | Performed by: FAMILY MEDICINE

## 2024-07-22 PROCEDURE — 3023F SPIROM DOC REV: CPT | Performed by: FAMILY MEDICINE

## 2024-07-22 PROCEDURE — 1036F TOBACCO NON-USER: CPT | Performed by: FAMILY MEDICINE

## 2024-07-22 PROCEDURE — G8420 CALC BMI NORM PARAMETERS: HCPCS | Performed by: FAMILY MEDICINE

## 2024-07-22 RX ORDER — DONEPEZIL HYDROCHLORIDE 10 MG/1
10 TABLET, FILM COATED ORAL NIGHTLY
Qty: 30 TABLET | Refills: 5 | Status: SHIPPED | OUTPATIENT
Start: 2024-07-22

## 2024-07-22 RX ORDER — QUETIAPINE FUMARATE 25 MG/1
TABLET, FILM COATED ORAL
Qty: 270 TABLET | Refills: 1 | Status: SHIPPED | OUTPATIENT
Start: 2024-07-22

## 2024-07-22 NOTE — PROGRESS NOTES
Elias Santiago, DO  Diplomate of the American Board of Family Medicine  [facility]      Nate Lyman (: 1940) is a 84 y.o. male, here for evaluation of the following chief complaint(s):  Insomnia       ASSESSMENT/PLAN:  1. Late onset Alzheimer's dementia with behavioral disturbance (HCC)  -     QUEtiapine (SEROQUEL) 25 MG tablet; Take one tab at 1500/Take two tab at HS, Disp-270 tablet, R-1Normal  -     donepezil (ARICEPT) 10 MG tablet; Take 1 tablet by mouth nightly, Disp-30 tablet, R-5Normal  2. Odynophagia  -     [facility]  3. Other postherpetic nervous system involvement  4. COPD, severe (HCC)  5. Other fatigue  -     CBC; Future  -     Comprehensive Metabolic Panel; Future     Will continue with Seroquel dosing as ordered.  Patient seems to be doing better with this.  Wife is aware of the risks associated with the medication.  Will also go ahead and increase donepezil up to 10 mg at at bedtime and see if this helps.  Referral to ENT for a dyne aphasia.  Currently doing well without gabapentin, will continue to monitor.  Continue with inhalers as well as nebulizers.  He does have upcoming appointment with pulmonology as well.  Will go ahead and check labs as ordered.          SUBJECTIVE/OBJECTIVE:  HPI:  Patient comes in today with his wife.  He has advancing Alzheimer's dementia.  She states that he has been having a lot of sundowning and difficulty sleeping.  She has increased Seroquel dosing to 1 tablet in the afternoon and then 2 tablets at bedtime and this seems to have helped.  Has been complaining of some pain in his throat.  States that it is painful when he swallows at times.  Not having any dysphagia per se.  States there is a spot on the left side of his throat that is very sore.  Has been doing well as far as postherpetic neuralgia is concerned.  Wife has not been giving him gabapentin.  Continues to have a lot of breathing difficulty.  He

## 2024-07-26 ENCOUNTER — HOSPITAL ENCOUNTER (OUTPATIENT)
Dept: CT IMAGING | Age: 84
End: 2024-07-26
Attending: INTERNAL MEDICINE
Payer: MEDICARE

## 2024-07-26 DIAGNOSIS — R91.1 PULMONARY NODULE: ICD-10-CM

## 2024-07-26 PROCEDURE — 71250 CT THORAX DX C-: CPT

## 2024-08-05 ENCOUNTER — TELEPHONE (OUTPATIENT)
Dept: PULMONOLOGY | Age: 84
End: 2024-08-05

## 2024-08-05 NOTE — TELEPHONE ENCOUNTER
----- Message from Moraima Vivar MD sent at 8/1/2024  2:32 PM EDT -----  Can you see if suad or feng can get to any of these...they seem to be multiplying.  And put him on for tumor conference?  MD Dr Feng Bloom-I have sent a message for case presentation n 8/7 per Dr Vivar and have pushed recent CT chest to ION.  Please review for potential BX per request of Dr Vivar.

## 2024-08-06 ENCOUNTER — OFFICE VISIT (OUTPATIENT)
Dept: PULMONOLOGY | Age: 84
End: 2024-08-06
Payer: MEDICARE

## 2024-08-06 VITALS
RESPIRATION RATE: 18 BRPM | OXYGEN SATURATION: 99 % | BODY MASS INDEX: 21.94 KG/M2 | HEIGHT: 64 IN | WEIGHT: 128.5 LBS | SYSTOLIC BLOOD PRESSURE: 122 MMHG | TEMPERATURE: 97.5 F | DIASTOLIC BLOOD PRESSURE: 64 MMHG | HEART RATE: 98 BPM
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
## 2024-08-06 DIAGNOSIS — R91.8 MULTIPLE LUNG NODULES: Primary | ICD-10-CM

## 2024-08-06 DIAGNOSIS — S22.020A COMPRESSION FRACTURE OF T2 VERTEBRA, INITIAL ENCOUNTER (HCC): ICD-10-CM

## 2024-08-06 DIAGNOSIS — Z23 ENCOUNTER FOR PREVNAR PNEUMOCOCCAL VACCINATION: ICD-10-CM

## 2024-08-06 DIAGNOSIS — G47.34 NOCTURNAL HYPOXEMIA: ICD-10-CM

## 2024-08-06 DIAGNOSIS — J47.9 BRONCHIECTASIS WITHOUT COMPLICATION (HCC): ICD-10-CM

## 2024-08-06 DIAGNOSIS — J44.9 STAGE 3 SEVERE COPD BY GOLD CLASSIFICATION (HCC): ICD-10-CM
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
## 2024-08-06 PROCEDURE — 1036F TOBACCO NON-USER: CPT | Performed by: NURSE PRACTITIONER

## 2024-08-06 PROCEDURE — G8427 DOCREV CUR MEDS BY ELIG CLIN: HCPCS | Performed by: NURSE PRACTITIONER

## 2024-08-06 PROCEDURE — 99215 OFFICE O/P EST HI 40 MIN: CPT | Performed by: NURSE PRACTITIONER

## 2024-08-06 PROCEDURE — 90473 IMMUNE ADMIN ORAL/NASAL: CPT | Performed by: NURSE PRACTITIONER

## 2024-08-06 PROCEDURE — G0009 ADMIN PNEUMOCOCCAL VACCINE: HCPCS | Performed by: NURSE PRACTITIONER

## 2024-08-06 PROCEDURE — 1123F ACP DISCUSS/DSCN MKR DOCD: CPT | Performed by: NURSE PRACTITIONER

## 2024-08-06 PROCEDURE — 90677 PCV20 VACCINE IM: CPT | Performed by: NURSE PRACTITIONER

## 2024-08-06 PROCEDURE — 3023F SPIROM DOC REV: CPT | Performed by: NURSE PRACTITIONER

## 2024-08-06 PROCEDURE — G8420 CALC BMI NORM PARAMETERS: HCPCS | Performed by: NURSE PRACTITIONER

## 2024-08-06 RX ORDER — ALBUTEROL SULFATE 2.5 MG/3ML
2.5 SOLUTION RESPIRATORY (INHALATION) EVERY 6 HOURS PRN
Qty: 120 EACH | Refills: 11 | Status: SHIPPED | OUTPATIENT
Start: 2024-08-06

## 2024-08-06 RX ORDER — ALBUTEROL SULFATE 90 UG/1
AEROSOL, METERED RESPIRATORY (INHALATION)
Qty: 8.5 EACH | Refills: 11 | Status: SHIPPED | OUTPATIENT
Start: 2024-08-06

## 2024-08-06 NOTE — PROGRESS NOTES
Name:  Nate Lyman  YOB: 1940   MRN: [id number]      Office Visit: 8/6/2024       Assessment & Plan    (Medical Decision Making)    Impression: 84 y.o. male     1. Multiple lung nodules  --He has a history of multiple bilateral waxing and waning of nodules over the past several years.  Had RUL nodule needle biopsy 8/23/24 which was nondiagnostic for malignancy (had right PTX requiring chest tube and hospitalization after procedure)).  Now has multiple pleural nodules, increased from previous exam, largest is on left and measures 15 mm.  Nodules have increased in size.  Concern is for malignancy.  Poor candidate for chemo and surgery.  Would like to present him to tumor board tomorrow for their recommendations.  We will notify patient in the next 1-2 days to discuss recommendations.    2. Bronchiectasis without complication (HCC)  --currently not using percussion vest.  Hasn't used in over a year.  Has not problems with excessive mucus production currently.    3. Stage 3 severe COPD by GOLD classification (HCC)  --Continue Trelegy once daily.  Use albuterol 2-4 times daily to aid with mucus clearing.  --needs new O2 concentrator.  Will arrange for COURTNEY on room air for qualification--also needs new [employer] company.    - albuterol (PROVENTIL) (2.5 MG/3ML) 0.083% nebulizer solution; Take 3 mLs by nebulization every 6 hours as needed for Wheezing USE 3 ML BY NEBULIZATION ROUTE 4 TIMES DAILY. BILL TO MEDICARE PART B WITH DX: J44.9 COPD.  Dispense: 120 each; Refill: 11  - albuterol sulfate HFA (PROVENTIL;VENTOLIN;PROAIR) 108 (90 Base) MCG/ACT inhaler; INHALE 2 PUFFS BY MOUTH EVERY 4 HOURS AS NEEDED  Dispense: 8.5 each; Refill: 11  - fluticasone-umeclidin-vilant (TRELEGY ELLIPTA) 100-62.5-25 MCG/ACT AEPB inhaler; Inhale 1 puff into the lungs daily  Dispense: 3 each; Refill: 3  - Pulse oximetry, overnight; Future    4. Fall in home, initial encounter  --fell at home after hydrocelectomy.  CXR with small 
daughter-in-law

## 2024-08-07 ENCOUNTER — TELEPHONE (OUTPATIENT)
Dept: PULMONOLOGY | Age: 84
End: 2024-08-07

## 2024-08-07 DIAGNOSIS — R91.8 MULTIPLE LUNG NODULES: Primary | ICD-10-CM

## 2024-08-14 ENCOUNTER — PATIENT MESSAGE (OUTPATIENT)
Dept: PULMONOLOGY | Age: 84
End: 2024-08-14

## 2024-08-15 NOTE — TELEPHONE ENCOUNTER
Please follow up with DME company about COURTNEY on room air for re-qualification.  He needs new DME company ASAP.  Thank you.

## 2024-08-30 DIAGNOSIS — J44.9 STAGE 3 SEVERE COPD BY GOLD CLASSIFICATION (HCC): ICD-10-CM

## 2024-08-30 DIAGNOSIS — G47.34 NOCTURNAL HYPOXEMIA: ICD-10-CM

## 2024-09-01 NOTE — RESULT ENCOUNTER NOTE
Please let wife know that COURTNEY was abnormal.  This was done to qualify him for new concentrator.  Lowest O2 sat was 77%--needs to be wearing O2 at 3 lpm with sleep.  Thanks.

## 2024-09-03 ENCOUNTER — TELEPHONE (OUTPATIENT)
Dept: PULMONOLOGY | Age: 84
End: 2024-09-03

## 2024-09-03 DIAGNOSIS — J44.9 STAGE 3 SEVERE COPD BY GOLD CLASSIFICATION (HCC): Primary | ICD-10-CM

## 2024-09-10 DIAGNOSIS — J44.9 STAGE 3 SEVERE COPD BY GOLD CLASSIFICATION (HCC): ICD-10-CM

## 2024-09-11 ENCOUNTER — TELEPHONE (OUTPATIENT)
Dept: PULMONOLOGY | Age: 84
End: 2024-09-11

## 2024-09-11 DIAGNOSIS — F02.818 LATE ONSET ALZHEIMER'S DEMENTIA WITH BEHAVIORAL DISTURBANCE (HCC): ICD-10-CM

## 2024-09-11 DIAGNOSIS — G30.1 LATE ONSET ALZHEIMER'S DEMENTIA WITH BEHAVIORAL DISTURBANCE (HCC): ICD-10-CM

## 2024-09-11 RX ORDER — DONEPEZIL HYDROCHLORIDE 5 MG/1
5 TABLET, FILM COATED ORAL NIGHTLY
Qty: 90 TABLET | Refills: 1 | OUTPATIENT
Start: 2024-09-11

## 2024-09-11 NOTE — TELEPHONE ENCOUNTER
Patient is almost out of his Albuterol inhaler.  Not able to get a refill until 9/22.  Asking if we can get them to give him one early.  Did not get his 02 until a couple days ago and had to use inhaler more due to this

## 2024-09-17 RX ORDER — ALBUTEROL SULFATE 90 UG/1
INHALANT RESPIRATORY (INHALATION)
Qty: 8.5 EACH | Refills: 11 | OUTPATIENT
Start: 2024-09-17

## 2024-09-22 DIAGNOSIS — G30.1 LATE ONSET ALZHEIMER'S DEMENTIA WITH BEHAVIORAL DISTURBANCE (HCC): ICD-10-CM

## 2024-09-22 DIAGNOSIS — F02.818 LATE ONSET ALZHEIMER'S DEMENTIA WITH BEHAVIORAL DISTURBANCE (HCC): ICD-10-CM

## 2024-09-23 NOTE — TELEPHONE ENCOUNTER
I left message for patient that we do not get samples of the Patient is almost out of his Albuterol inhaler.  Not able to get a refill until 9/22.  Asking if we can get them to give him one early.  Did not get his 02 until a couple days ago and had to use inhaler more due to this  I ask for a call back. Justina iqbal

## 2024-09-24 ENCOUNTER — TELEPHONE (OUTPATIENT)
Dept: PULMONOLOGY | Age: 84
End: 2024-09-24

## 2024-09-24 RX ORDER — QUETIAPINE FUMARATE 25 MG/1
TABLET, FILM COATED ORAL
Qty: 30 TABLET | Refills: 5 | Status: SHIPPED | OUTPATIENT
Start: 2024-09-24 | End: 2024-09-25 | Stop reason: SDUPTHER

## 2024-09-25 ENCOUNTER — PATIENT MESSAGE (OUTPATIENT)
Dept: PULMONOLOGY | Age: 84
End: 2024-09-25

## 2024-09-25 ENCOUNTER — OFFICE VISIT (OUTPATIENT)
Dept: PULMONOLOGY | Age: 84
End: 2024-09-25
Payer: MEDICARE

## 2024-09-25 ENCOUNTER — PATIENT MESSAGE (OUTPATIENT)
Dept: INTERNAL MEDICINE CLINIC | Facility: CLINIC | Age: 84
End: 2024-09-25

## 2024-09-25 VITALS
HEIGHT: 66 IN | HEART RATE: 101 BPM | OXYGEN SATURATION: 99 % | WEIGHT: 124.7 LBS | RESPIRATION RATE: 18 BRPM | SYSTOLIC BLOOD PRESSURE: 132 MMHG | DIASTOLIC BLOOD PRESSURE: 76 MMHG | TEMPERATURE: 97.7 F | BODY MASS INDEX: 20.04 KG/M2

## 2024-09-25 DIAGNOSIS — J44.9 STAGE 3 SEVERE COPD BY GOLD CLASSIFICATION (HCC): ICD-10-CM

## 2024-09-25 DIAGNOSIS — Z66 DNR (DO NOT RESUSCITATE): ICD-10-CM

## 2024-09-25 DIAGNOSIS — R06.09 DOE (DYSPNEA ON EXERTION): ICD-10-CM

## 2024-09-25 DIAGNOSIS — G30.1 LATE ONSET ALZHEIMER'S DEMENTIA WITH BEHAVIORAL DISTURBANCE (HCC): ICD-10-CM

## 2024-09-25 DIAGNOSIS — R91.8 MULTIPLE LUNG NODULES: Primary | ICD-10-CM

## 2024-09-25 DIAGNOSIS — R09.02 HYPOXEMIA: ICD-10-CM

## 2024-09-25 DIAGNOSIS — F41.9 ANXIETY: ICD-10-CM

## 2024-09-25 DIAGNOSIS — Z51.5 PALLIATIVE CARE PATIENT: ICD-10-CM

## 2024-09-25 DIAGNOSIS — F02.818 LATE ONSET ALZHEIMER'S DEMENTIA WITH BEHAVIORAL DISTURBANCE (HCC): ICD-10-CM

## 2024-09-25 DIAGNOSIS — J47.9 BRONCHIECTASIS WITHOUT COMPLICATION (HCC): ICD-10-CM

## 2024-09-25 PROCEDURE — 1123F ACP DISCUSS/DSCN MKR DOCD: CPT | Performed by: NURSE PRACTITIONER

## 2024-09-25 PROCEDURE — G8420 CALC BMI NORM PARAMETERS: HCPCS | Performed by: NURSE PRACTITIONER

## 2024-09-25 PROCEDURE — 1036F TOBACCO NON-USER: CPT | Performed by: NURSE PRACTITIONER

## 2024-09-25 PROCEDURE — 3023F SPIROM DOC REV: CPT | Performed by: NURSE PRACTITIONER

## 2024-09-25 PROCEDURE — 99497 ADVNCD CARE PLAN 30 MIN: CPT | Performed by: NURSE PRACTITIONER

## 2024-09-25 PROCEDURE — 99215 OFFICE O/P EST HI 40 MIN: CPT | Performed by: NURSE PRACTITIONER

## 2024-09-25 PROCEDURE — G8427 DOCREV CUR MEDS BY ELIG CLIN: HCPCS | Performed by: NURSE PRACTITIONER

## 2024-09-29 RX ORDER — QUETIAPINE FUMARATE 25 MG/1
25 TABLET, FILM COATED ORAL 4 TIMES DAILY
Qty: 120 TABLET | Refills: 5 | Status: SHIPPED | OUTPATIENT
Start: 2024-09-29

## 2024-10-16 DIAGNOSIS — F02.818 LATE ONSET ALZHEIMER'S DEMENTIA WITH BEHAVIORAL DISTURBANCE (HCC): ICD-10-CM

## 2024-10-16 DIAGNOSIS — G30.1 LATE ONSET ALZHEIMER'S DEMENTIA WITH BEHAVIORAL DISTURBANCE (HCC): ICD-10-CM

## 2024-10-16 NOTE — TELEPHONE ENCOUNTER
Patient prefers to stay on Seroquel 25 mg QID. Pharmacy never received rx back from September. Please resend

## 2024-10-18 RX ORDER — QUETIAPINE FUMARATE 25 MG/1
25 TABLET, FILM COATED ORAL 4 TIMES DAILY
Qty: 120 TABLET | Refills: 5 | Status: SHIPPED | OUTPATIENT
Start: 2024-10-18

## 2024-10-29 ENCOUNTER — OFFICE VISIT (OUTPATIENT)
Age: 84
End: 2024-10-29
Payer: MEDICARE

## 2024-10-29 VITALS
SYSTOLIC BLOOD PRESSURE: 118 MMHG | OXYGEN SATURATION: 98 % | DIASTOLIC BLOOD PRESSURE: 70 MMHG | HEART RATE: 82 BPM | TEMPERATURE: 97.5 F

## 2024-10-29 DIAGNOSIS — F41.9 ANXIETY: ICD-10-CM

## 2024-10-29 DIAGNOSIS — J96.11 CHRONIC RESPIRATORY FAILURE WITH HYPOXIA: ICD-10-CM

## 2024-10-29 DIAGNOSIS — Z51.5 PALLIATIVE CARE PATIENT: ICD-10-CM

## 2024-10-29 DIAGNOSIS — Z66 DNR (DO NOT RESUSCITATE): ICD-10-CM

## 2024-10-29 DIAGNOSIS — J47.9 BRONCHIECTASIS WITHOUT COMPLICATION (HCC): ICD-10-CM

## 2024-10-29 DIAGNOSIS — F02.80 ALZHEIMER'S DEMENTIA WITHOUT BEHAVIORAL DISTURBANCE, PSYCHOTIC DISTURBANCE, MOOD DISTURBANCE, OR ANXIETY, UNSPECIFIED DEMENTIA SEVERITY, UNSPECIFIED TIMING OF DEMENTIA ONSET (HCC): ICD-10-CM

## 2024-10-29 DIAGNOSIS — J44.9 COPD, VERY SEVERE (HCC): Primary | ICD-10-CM

## 2024-10-29 DIAGNOSIS — G30.9 ALZHEIMER'S DEMENTIA WITHOUT BEHAVIORAL DISTURBANCE, PSYCHOTIC DISTURBANCE, MOOD DISTURBANCE, OR ANXIETY, UNSPECIFIED DEMENTIA SEVERITY, UNSPECIFIED TIMING OF DEMENTIA ONSET (HCC): ICD-10-CM

## 2024-10-29 PROCEDURE — G8427 DOCREV CUR MEDS BY ELIG CLIN: HCPCS | Performed by: NURSE PRACTITIONER

## 2024-10-29 PROCEDURE — G8420 CALC BMI NORM PARAMETERS: HCPCS | Performed by: NURSE PRACTITIONER

## 2024-10-29 PROCEDURE — G8484 FLU IMMUNIZE NO ADMIN: HCPCS | Performed by: NURSE PRACTITIONER

## 2024-10-29 PROCEDURE — 1036F TOBACCO NON-USER: CPT | Performed by: NURSE PRACTITIONER

## 2024-10-29 PROCEDURE — 1126F AMNT PAIN NOTED NONE PRSNT: CPT | Performed by: NURSE PRACTITIONER

## 2024-10-29 PROCEDURE — 99497 ADVNCD CARE PLAN 30 MIN: CPT | Performed by: NURSE PRACTITIONER

## 2024-10-29 PROCEDURE — 1159F MED LIST DOCD IN RCRD: CPT | Performed by: NURSE PRACTITIONER

## 2024-10-29 PROCEDURE — 99215 OFFICE O/P EST HI 40 MIN: CPT | Performed by: NURSE PRACTITIONER

## 2024-10-29 PROCEDURE — 1123F ACP DISCUSS/DSCN MKR DOCD: CPT | Performed by: NURSE PRACTITIONER

## 2024-10-29 PROCEDURE — 3023F SPIROM DOC REV: CPT | Performed by: NURSE PRACTITIONER

## 2024-10-29 RX ORDER — CLOPIDOGREL BISULFATE 75 MG/1
75 TABLET ORAL DAILY
COMMUNITY
Start: 2024-08-29

## 2024-10-29 NOTE — PROGRESS NOTES
Name:  Nate Lyman  YOB: 1940   MRN: [id number]      Office Visit: 10/29/2024     [facility]      Assessment & Plan    (Medical Decision Making)    Impression: 84 y.o. male     1. COPD, very severe (HCC)  --has Trelegy and prn albuterol in nebulizer, but is not very compliant with this..  His Alzheimer's is contributing to his noncompliance.  - DO NOT RESUSCITATE (DNR)  - External Referral to Hospice    2. Bronchiectasis without complication (HCC)  --  - DO NOT RESUSCITATE (DNR)  - External Referral to Hospice    3. Chronic respiratory failure with hypoxia  --remains on O2 at 3 lpm  - DO NOT RESUSCITATE (DNR)  - External Referral to Hospice    4. Alzheimer's dementia without behavioral disturbance, psychotic disturbance, mood disturbance, or anxiety, unspecified dementia severity, unspecified timing of dementia onset (HCC)  --complicating his care.  - DO NOT RESUSCITATE (DNR)  - External Referral to Hospice    5. Anxiety  --contributing to KELLY--Zyprexa helps.    6. DNR (do not resuscitate)  --due to multiple co-morbidities and severity of his disease burden, have recommended hospice for better symptom management.    - DO NOT RESUSCITATE (DNR)  - External Referral to Hospice    7. Palliative care patient  --have recommended hospice--will make referral  --heavy symptom burden.  - DO NOT RESUSCITATE (DNR)  - External Referral to Hospice    No orders of the defined types were placed in this encounter.    No orders of the defined types were placed in this encounter.    Follow-up and Dispositions    Return if symptoms worsen or fail to improve.     Collaborating physician is KATELYN Ventura - CNP      Total time for encounter on day of encounter was 65 minutes (23 of which were spent with ACP).  This time includes chart prep, review of tests/procedures, review of other provider's notes, documentation and counseling patient regarding disease process and

## 2024-10-29 NOTE — ACP (ADVANCE CARE PLANNING)
Advance Care Planning     Advance Care Planning (ACP) Physician/NP/PA Conversation    Date of Conversation: 10/29/2024  Conducted with: Patient with Decision Making Capacity and wife    Healthcare Decision Maker:      Primary Decision Maker: Shana Lyman - Spouse - [phone]    Click here to complete Healthcare Decision Makers including selection of the Healthcare Decision Maker Relationship (ie \"Primary\")      Care Preferences:    Hospitalization:  \"If your health worsens and it becomes clear that your chance of recovery is unlikely, what would be your preference regarding hospitalization?\"  The patient would prefer comfort-focused treatment without hospitalization.    Ventilation:  \"If you were unable to breath on your own and your chance of recovery was unlikely, what would be your preference about the use of a ventilator (breathing machine) if it was available to you?\"  The patient would NOT desire the use of a ventilator.    Resuscitation:  \"In the event your heart stopped as a result of an underlying serious health condition, would you want attempts made to restart your heart, or would you prefer a natural death?\"  No, do NOT attempt to resuscitate.    We also discussed tube feedings, IV fluids, antibiotics, and hospice.    Conversation Outcomes / Follow-Up Plan:  ACP in process - information provided, considering goals and options  Completed today  Reviewed DNR/DNI and patient confirms current DNR status - completed forms on file (place new order if needed)    Length of Voluntary ACP Conversation in minutes:  25 minutes    Krys Stewart APRN - CNP

## 2025-01-01 DIAGNOSIS — F02.818 LATE ONSET ALZHEIMER'S DEMENTIA WITH BEHAVIORAL DISTURBANCE (HCC): ICD-10-CM

## 2025-01-01 DIAGNOSIS — G30.1 LATE ONSET ALZHEIMER'S DEMENTIA WITH BEHAVIORAL DISTURBANCE (HCC): ICD-10-CM

## 2025-01-01 RX ORDER — QUETIAPINE FUMARATE 25 MG/1
25 TABLET, FILM COATED ORAL 4 TIMES DAILY
Qty: 360 TABLET | Refills: 2 | Status: CANCELLED | OUTPATIENT
Start: 2025-01-01

## 2025-01-13 ENCOUNTER — TELEPHONE (OUTPATIENT)
Dept: PULMONOLOGY | Age: 85
End: 2025-01-13

## 2025-01-13 NOTE — TELEPHONE ENCOUNTER
Called and left VM letting him know it was time for his next appointment with Dr. Vivar. Left office number for patient to call to schedule. Sending letter as well

## 2025-01-21 DIAGNOSIS — G30.1 LATE ONSET ALZHEIMER'S DEMENTIA WITH BEHAVIORAL DISTURBANCE (HCC): ICD-10-CM

## 2025-01-21 DIAGNOSIS — F02.818 LATE ONSET ALZHEIMER'S DEMENTIA WITH BEHAVIORAL DISTURBANCE (HCC): ICD-10-CM

## 2025-01-21 RX ORDER — DONEPEZIL HYDROCHLORIDE 10 MG/1
10 TABLET, FILM COATED ORAL NIGHTLY
Qty: 90 TABLET | Refills: 3 | Status: SHIPPED | OUTPATIENT
Start: 2025-01-21

## 2025-04-08 NOTE — PROGRESS NOTES
I called the pt back to talk to him about pharmacy preference   Will discuss with patient at appt next week. CT is stable to slightly improved. Message sent to patient via [phone] protein:    Lean meat    Low fat dairy    Egg whites    Beans and legumes    Quinoa & lentils     Principle sources of fiber:    Beans (not green beans), legumes and fruit    Examples of fruit are:    Blackberries 144g serving, 62 bernabe, 8g fiber    Blueberries 148g serving, 80 bernabe, 4g fiber    Strawberries 166g serving, 54 bernabe, 3g fiber    Bananas 118g serving, 105 bernabe, 3g fiber    Gala apples 172g serving, 98 bernabe, 4g fiber     Other sources of fiber:    Almonds  28g serving, 170 bernabe, 3g fiber    Walnuts 28g serving, 182 bernabe, 2g fiber    Pumpkin seeds 31g, 180 bernabe, 3g fiber    Sunflower seeds 30g, 180 bernabe, 2g fiber     Low calorie non-starchy vegetables:  Food (per 100 g) Calories Fibers T. Carbohydrates Protein Fat Sodium (mg) Potassium (mg)   Green Bell Peppers 10 1.7 4.6 0.9 0.2 3 175   Cucumbers 15 0.5 3.6 0.7 0.1 2 147   Celery 16 1.6 3 0.7 0.2 80 260   Tomatoes 18 1.2 3.9 0.9 0.2 5 237   Carrots 41 2.8 10 0.9 0.2 69 320   Cabbage 25 2.5 6 1.3 0.1 18 170   Broccoli 35 3.3 7.2 2.4 0.4 41 293   Cauliflower 23 2.3 4.1 1.8 0.5 15 142   Iceberg Lettuce 14 1.2 3 0.9 0.1 10 141   Kale 28 2 5.6 1.9 0.4 23 228   Brussel Sprouts 43 3.8 9 3.4 0.3 25 389   Spinach 23 2.4 3.8 3 0.3 70 466   Zucchini 15 1 2.7 1.1 0.4 3 264   Mushroom 28 2.2 5.3 2.2 0.5 2 356   Asparagus 22 2 4.1 2.4 0.2 14 224   Green Beans 35 3.2 7.9 1.9 0.3 1 146   Eggplant 35 2.5 8.7 0.8 0.2 1 123

## (undated) DEVICE — SINGLE USE BIOPSY VALVE MAJ-210: Brand: SINGLE USE BIOPSY VALVE (STERILE)

## (undated) DEVICE — AGENT HEMSTAT 3GM OXIDIZED REGENERATED CELOS ABSRB FOR CONT (ORDER MULTIPLES OF 5EA)

## (undated) DEVICE — PREMIUM WET SKIN PREP TRAY: Brand: MEDLINE INDUSTRIES, INC.

## (undated) DEVICE — Z DISCONTINUED NO SUB SUTURE CHROMIC GUT SZ 4-0 L18IN ABSRB BRN L19MM PS-2 3/8 1637G

## (undated) DEVICE — GLOVE SURG SZ 7 CRM LTX FREE POLYISOPRENE POLYMER BEAD ANTI

## (undated) DEVICE — MINOR SPLIT GENERAL: Brand: MEDLINE INDUSTRIES, INC.

## (undated) DEVICE — SOLUTION IRRIG 1000ML 0.9% SOD CHL USP POUR PLAS BTL

## (undated) DEVICE — BANDAGE,GAUZE,BULKEE II,4.5"X4.1YD,STRL: Brand: MEDLINE

## (undated) DEVICE — SUTURE CHROMIC GUT SZ 4-0 L27IN ABSRB BRN L26MM SH 1/2 CIR G121H

## (undated) DEVICE — ATHLETIC SUPPORTER LATEX FREE, LARGE

## (undated) DEVICE — NEEDLE HYPO 25GA L1.5IN BLU POLYPR HUB S STL REG BVL STR

## (undated) DEVICE — SUTURE PERMAHAND SZ 2-0 L18IN NONABSORBABLE BLK L26MM PS 1588H

## (undated) DEVICE — DRAIN SURG PENROSE 0.25X12 IN CLOSED WND DRAINAGE PREM SIL

## (undated) DEVICE — Z DISCONTINUED USE 2220190 SUTURE VICRYL SZ 3-0 L27IN ABSRB UD L26MM SH 1/2 CIR J416H

## (undated) DEVICE — SUTURE VICRYL + SZ 3-0 L27IN ABSRB UD L26MM SH 1/2 CIR VCP416H

## (undated) DEVICE — SHEET, T, LAPAROTOMY, STERILE: Brand: MEDLINE

## (undated) DEVICE — BRONCHOSCOPY PACK: Brand: MEDLINE INDUSTRIES, INC.

## (undated) DEVICE — BLADE CLIPPER GEN PURP NS

## (undated) DEVICE — SUPPORT ATHLETIC SCROTAL

## (undated) DEVICE — SINGLE USE SUCTION VALVE MAJ-209: Brand: SINGLE USE SUCTION VALVE (STERILE)

## (undated) DEVICE — KENDALL RADIOLUCENT FOAM MONITORING ELECTRODE RECTANGULAR SHAPE: Brand: KENDALL